# Patient Record
Sex: FEMALE | Race: WHITE | Employment: OTHER | URBAN - METROPOLITAN AREA
[De-identification: names, ages, dates, MRNs, and addresses within clinical notes are randomized per-mention and may not be internally consistent; named-entity substitution may affect disease eponyms.]

---

## 2018-05-16 ENCOUNTER — APPOINTMENT (OUTPATIENT)
Dept: GENERAL RADIOLOGY | Age: 83
End: 2018-05-16
Payer: MEDICARE

## 2018-05-16 ENCOUNTER — HOSPITAL ENCOUNTER (EMERGENCY)
Age: 83
Discharge: HOME | End: 2018-05-17
Attending: EMERGENCY MEDICINE
Payer: MEDICARE

## 2018-05-16 VITALS
OXYGEN SATURATION: 97 % | HEIGHT: 63 IN | BODY MASS INDEX: 26.22 KG/M2 | WEIGHT: 148 LBS | DIASTOLIC BLOOD PRESSURE: 78 MMHG | RESPIRATION RATE: 16 BRPM | HEART RATE: 74 BPM | TEMPERATURE: 99.1 F | SYSTOLIC BLOOD PRESSURE: 142 MMHG

## 2018-05-16 DIAGNOSIS — K59.09 OTHER CONSTIPATION: Primary | ICD-10-CM

## 2018-05-16 LAB
BASOPHILS ABSOLUTE: 0.03 E9/L (ref 0–0.2)
BASOPHILS RELATIVE PERCENT: 0.5 % (ref 0–2)
EOSINOPHILS ABSOLUTE: 0.08 E9/L (ref 0.05–0.5)
EOSINOPHILS RELATIVE PERCENT: 1.3 % (ref 0–6)
HCT VFR BLD CALC: 41 % (ref 34–48)
HEMOGLOBIN: 14.7 G/DL (ref 11.5–15.5)
IMMATURE GRANULOCYTES #: 0.02 E9/L
IMMATURE GRANULOCYTES %: 0.3 % (ref 0–5)
LYMPHOCYTES ABSOLUTE: 1.22 E9/L (ref 1.5–4)
LYMPHOCYTES RELATIVE PERCENT: 20.2 % (ref 20–42)
MCH RBC QN AUTO: 31.2 PG (ref 26–35)
MCHC RBC AUTO-ENTMCNC: 35.9 % (ref 32–34.5)
MCV RBC AUTO: 87 FL (ref 80–99.9)
MONOCYTES ABSOLUTE: 0.62 E9/L (ref 0.1–0.95)
MONOCYTES RELATIVE PERCENT: 10.2 % (ref 2–12)
NEUTROPHILS ABSOLUTE: 4.08 E9/L (ref 1.8–7.3)
NEUTROPHILS RELATIVE PERCENT: 67.5 % (ref 43–80)
PDW BLD-RTO: 12.6 FL (ref 11.5–15)
PLATELET # BLD: 223 E9/L (ref 130–450)
PMV BLD AUTO: 11.1 FL (ref 7–12)
RBC # BLD: 4.71 E12/L (ref 3.5–5.5)
WBC # BLD: 6.1 E9/L (ref 4.5–11.5)

## 2018-05-16 PROCEDURE — 80053 COMPREHEN METABOLIC PANEL: CPT

## 2018-05-16 PROCEDURE — 99284 EMERGENCY DEPT VISIT MOD MDM: CPT

## 2018-05-16 PROCEDURE — 36415 COLL VENOUS BLD VENIPUNCTURE: CPT

## 2018-05-16 PROCEDURE — 74022 RADEX COMPL AQT ABD SERIES: CPT

## 2018-05-16 PROCEDURE — 85025 COMPLETE CBC W/AUTO DIFF WBC: CPT

## 2018-05-16 RX ORDER — TRAMADOL HYDROCHLORIDE 50 MG/1
50 TABLET ORAL EVERY 6 HOURS PRN
COMMUNITY
End: 2021-06-25 | Stop reason: ALTCHOICE

## 2018-05-16 RX ORDER — ATENOLOL 50 MG/1
50 TABLET ORAL DAILY
COMMUNITY

## 2018-05-16 RX ORDER — LORAZEPAM 1 MG/1
1 TABLET ORAL 2 TIMES DAILY PRN
COMMUNITY

## 2018-05-16 RX ORDER — ATORVASTATIN CALCIUM 10 MG/1
10 TABLET, FILM COATED ORAL DAILY
Status: ON HOLD | COMMUNITY
End: 2021-12-27

## 2018-05-17 LAB
ALBUMIN SERPL-MCNC: 4.4 G/DL (ref 3.5–5.2)
ALP BLD-CCNC: 84 U/L (ref 35–104)
ALT SERPL-CCNC: 18 U/L (ref 0–32)
ANION GAP SERPL CALCULATED.3IONS-SCNC: 14 MMOL/L (ref 7–16)
AST SERPL-CCNC: 35 U/L (ref 0–31)
BILIRUB SERPL-MCNC: 0.9 MG/DL (ref 0–1.2)
BUN BLDV-MCNC: 12 MG/DL (ref 8–23)
CALCIUM SERPL-MCNC: 10 MG/DL (ref 8.6–10.2)
CHLORIDE BLD-SCNC: 85 MMOL/L (ref 98–107)
CO2: 27 MMOL/L (ref 22–29)
CREAT SERPL-MCNC: 0.9 MG/DL (ref 0.5–1)
GFR AFRICAN AMERICAN: >60
GFR NON-AFRICAN AMERICAN: 59 ML/MIN/1.73
GLUCOSE BLD-MCNC: 120 MG/DL (ref 74–109)
POTASSIUM SERPL-SCNC: 4.2 MMOL/L (ref 3.5–5)
SODIUM BLD-SCNC: 126 MMOL/L (ref 132–146)
TOTAL PROTEIN: 7.7 G/DL (ref 6.4–8.3)

## 2018-05-17 PROCEDURE — 6370000000 HC RX 637 (ALT 250 FOR IP): Performed by: EMERGENCY MEDICINE

## 2018-05-17 RX ADMIN — MAGESIUM CITRATE 296 ML: 1.75 LIQUID ORAL at 00:18

## 2020-05-08 ENCOUNTER — HOSPITAL ENCOUNTER (OUTPATIENT)
Age: 85
Discharge: HOME | End: 2020-05-10
Payer: MEDICARE

## 2020-05-08 PROCEDURE — 87088 URINE BACTERIA CULTURE: CPT

## 2020-05-08 PROCEDURE — 87186 SC STD MICRODIL/AGAR DIL: CPT

## 2020-05-11 LAB
ORGANISM: ABNORMAL
URINE CULTURE, ROUTINE: ABNORMAL

## 2020-06-03 ENCOUNTER — HOSPITAL ENCOUNTER (EMERGENCY)
Age: 85
Discharge: HOME | End: 2020-06-03
Payer: MEDICARE

## 2020-06-03 VITALS
WEIGHT: 148 LBS | HEART RATE: 86 BPM | OXYGEN SATURATION: 98 % | HEIGHT: 63 IN | RESPIRATION RATE: 16 BRPM | SYSTOLIC BLOOD PRESSURE: 144 MMHG | TEMPERATURE: 97.8 F | BODY MASS INDEX: 26.22 KG/M2 | DIASTOLIC BLOOD PRESSURE: 78 MMHG

## 2020-06-03 LAB
BACTERIA: ABNORMAL /HPF
BILIRUBIN URINE: NEGATIVE
BLOOD, URINE: ABNORMAL
CLARITY: ABNORMAL
COLOR: YELLOW
EPITHELIAL CELLS, UA: ABNORMAL /HPF
GLUCOSE URINE: NEGATIVE MG/DL
KETONES, URINE: NEGATIVE MG/DL
LEUKOCYTE ESTERASE, URINE: ABNORMAL
NITRITE, URINE: POSITIVE
PH UA: 7 (ref 5–9)
PROTEIN UA: NEGATIVE MG/DL
RBC UA: ABNORMAL /HPF (ref 0–2)
SPECIFIC GRAVITY UA: 1.01 (ref 1–1.03)
UROBILINOGEN, URINE: 0.2 E.U./DL
WBC UA: ABNORMAL /HPF (ref 0–5)

## 2020-06-03 PROCEDURE — 81001 URINALYSIS AUTO W/SCOPE: CPT

## 2020-06-03 PROCEDURE — 99283 EMERGENCY DEPT VISIT LOW MDM: CPT

## 2020-06-03 PROCEDURE — 6370000000 HC RX 637 (ALT 250 FOR IP): Performed by: PHYSICIAN ASSISTANT

## 2020-06-03 RX ORDER — CIPROFLOXACIN 500 MG/1
500 TABLET, FILM COATED ORAL 2 TIMES DAILY
Qty: 14 TABLET | Refills: 0 | Status: SHIPPED | OUTPATIENT
Start: 2020-06-03 | End: 2020-06-10

## 2020-06-03 RX ORDER — CIPROFLOXACIN 500 MG/1
500 TABLET, FILM COATED ORAL ONCE
Status: COMPLETED | OUTPATIENT
Start: 2020-06-03 | End: 2020-06-03

## 2020-06-03 RX ORDER — OXYBUTYNIN CHLORIDE 15 MG/1
15 TABLET, EXTENDED RELEASE ORAL
COMMUNITY
End: 2020-12-31

## 2020-06-03 RX ORDER — HYDROCHLOROTHIAZIDE 12.5 MG/1
12.5 CAPSULE, GELATIN COATED ORAL DAILY
Status: ON HOLD | COMMUNITY
End: 2020-10-14 | Stop reason: HOSPADM

## 2020-06-03 RX ADMIN — CIPROFLOXACIN 500 MG: 500 TABLET, FILM COATED ORAL at 16:00

## 2020-06-03 NOTE — ED PROVIDER NOTES
if her symptoms worsen or do not begin to improve after Cipro. Patient understands she may need admission to hospital for IV antibiotics and possible ID consult. All of this was offered to her at this visit, however, she politely and adamantly refused. Another urine culture will be obtained at this ED visit. Counseling: The emergency provider has spoken with the patient/caregiver and discussed todays results, in addition to providing specific details for the plan of care and counseling regarding the diagnosis and prognosis. Questions are answered at this time and they are agreeable with the plan. All results reviewed. I discussed the differential, results and discharge plan with the patient and/or family/friend/caregiver if present. I emphasized the importance of follow-up with the physician I referred them to in the timeframe recommended. I explained reasons for the patient to return to the Emergency Department. Additional verbal discharge instructions were also given and discussed with the patient to supplement those generated by the EMR. We also discussed medications that were prescribed (if any) including common side effects and interactions. All questions were addressed. They understand return precautions and discharge instructions. The patient and/or family/friend/caregiver expressed understanding. Vitals were stable and they were in no distress at discharge. Assessment      1. Urinary tract infection without hematuria, site unspecified      Plan   Discharge to home with urology follow-up   Patient condition is good    New Medications     Discharge Medication List as of 6/3/2020  3:53 PM      START taking these medications    Details   ciprofloxacin (CIPRO) 500 MG tablet Take 1 tablet by mouth 2 times daily for 7 days, Disp-14 tablet, R-0Print             Electronically signed by Katerin Blankenship PA-C   DD: 6/3/20    **This report was transcribed using voice recognition software.  Every effort was

## 2020-06-09 ENCOUNTER — HOSPITAL ENCOUNTER (EMERGENCY)
Age: 85
Discharge: HOME | End: 2020-06-09
Attending: EMERGENCY MEDICINE
Payer: MEDICARE

## 2020-06-09 VITALS
DIASTOLIC BLOOD PRESSURE: 73 MMHG | WEIGHT: 148 LBS | SYSTOLIC BLOOD PRESSURE: 144 MMHG | HEART RATE: 70 BPM | TEMPERATURE: 98.1 F | BODY MASS INDEX: 26.22 KG/M2 | OXYGEN SATURATION: 98 % | RESPIRATION RATE: 17 BRPM

## 2020-06-09 LAB
BACTERIA: ABNORMAL /HPF
BILIRUBIN URINE: NEGATIVE
BLOOD, URINE: ABNORMAL
CLARITY: ABNORMAL
COLOR: YELLOW
GLUCOSE URINE: NEGATIVE MG/DL
KETONES, URINE: NEGATIVE MG/DL
LEUKOCYTE ESTERASE, URINE: ABNORMAL
NITRITE, URINE: POSITIVE
PH UA: 6 (ref 5–9)
PROTEIN UA: NEGATIVE MG/DL
RBC UA: ABNORMAL /HPF (ref 0–2)
SPECIFIC GRAVITY UA: 1.01 (ref 1–1.03)
UROBILINOGEN, URINE: 0.2 E.U./DL
WBC UA: ABNORMAL /HPF (ref 0–5)

## 2020-06-09 PROCEDURE — 87077 CULTURE AEROBIC IDENTIFY: CPT

## 2020-06-09 PROCEDURE — 87186 SC STD MICRODIL/AGAR DIL: CPT

## 2020-06-09 PROCEDURE — 81001 URINALYSIS AUTO W/SCOPE: CPT

## 2020-06-09 PROCEDURE — 99283 EMERGENCY DEPT VISIT LOW MDM: CPT

## 2020-06-09 PROCEDURE — 87088 URINE BACTERIA CULTURE: CPT

## 2020-06-09 PROCEDURE — 6370000000 HC RX 637 (ALT 250 FOR IP): Performed by: EMERGENCY MEDICINE

## 2020-06-09 RX ORDER — GRANULES FOR ORAL 3 G/1
3 POWDER ORAL ONCE
Status: COMPLETED | OUTPATIENT
Start: 2020-06-09 | End: 2020-06-09

## 2020-06-09 RX ADMIN — FOSFOMYCIN TROMETHAMINE 1 PACKET: 3 POWDER ORAL at 18:34

## 2020-06-09 ASSESSMENT — ENCOUNTER SYMPTOMS
SHORTNESS OF BREATH: 0
VOMITING: 0
CHEST TIGHTNESS: 0
SINUS PAIN: 0
SORE THROAT: 0
DIARRHEA: 0
NAUSEA: 0
BACK PAIN: 0
COUGH: 0
ABDOMINAL PAIN: 0

## 2020-06-09 NOTE — ED PROVIDER NOTES
Abdominal:      General: Bowel sounds are normal. There is no distension. Palpations: Abdomen is soft. Tenderness: There is no abdominal tenderness. Musculoskeletal: Normal range of motion. Skin:     General: Skin is warm and dry. Capillary Refill: Capillary refill takes less than 2 seconds. Neurological:      Mental Status: She is alert and oriented to person, place, and time. Cranial Nerves: No cranial nerve deficit. Sensory: No sensory deficit. Motor: No abnormal muscle tone. Psychiatric:         Behavior: Behavior normal.         Thought Content: Thought content normal.         Judgment: Judgment normal.          Procedures     MDM  Number of Diagnoses or Management Options  Diagnosis management comments: Patient presents with concerns of a UTI. For months, she has had frequent urination and at times she gets burning but she denies any burning at this time. No chest pain or shortness of breath. No fevers. No vomiting or diarrhea. Reviewed cultures, will start on fosfomycin after urinalysis and culture obtained and have her follow-up with urology for reevaluation. Welcomed her back to the department if symptoms worsen. ED Course as of Jun 09 1901   Tue Jun 09, 2020 1813 Reviewed previous culture results, she has many antibiotic resistances. Will give fosfomycin here and have her follow-up with urology as an outpatient. She has no systemic symptoms, no indication for admission at this point. [CW]   1900 Patient in no acute distress, vitals stable. Will discharge to follow-up with urology. Welcomed her back for reevaluation if condition worsens. Discussed that a send a urine culture off so if there are any abnormalities she will get a call within the next few days, otherwise she needs to follow-up with urology for reevaluation. She and family voiced understanding and agreement with the plan.     [CW]      ED Course User Index  [CW] Britni Blair DO --------------------------------------------- PAST HISTORY ---------------------------------------------  Past Medical History:  has a past medical history of Arthritis, Hypertension, Myocardial infarct ([address] and UTI (urinary tract infection). Past Surgical History:  has a past surgical history that includes Hysterectomy and Appendectomy. Social History:  reports that she has never smoked. She has never used smokeless tobacco. She reports that she does not drink alcohol. Family History: family history is not on file. The patients home medications have been reviewed. Allergies: Sulfa antibiotics    -------------------------------------------------- RESULTS -------------------------------------------------  Labs:  Results for orders placed or performed during the hospital encounter of 06/09/20   Urinalysis, reflex to microscopic   Result Value Ref Range    Color, UA Yellow Straw/Yellow    Clarity, UA CLOUDY (A) Clear    Glucose, Ur Negative Negative mg/dL    Bilirubin Urine Negative Negative    Ketones, Urine Negative Negative mg/dL    Specific Gravity, UA 1.010 1.005 - 1.030    Blood, Urine SMALL (A) Negative    pH, UA 6.0 5.0 - 9.0    Protein, UA Negative Negative mg/dL    Urobilinogen, Urine 0.2 <2.0 E.U./dL    Nitrite, Urine POSITIVE (A) Negative    Leukocyte Esterase, Urine LARGE (A) Negative   Microscopic Urinalysis   Result Value Ref Range    WBC, UA PACKED (A) 0 - 5 /HPF    RBC, UA 5-10 (A) 0 - 2 /HPF    Bacteria, UA MANY (A) None Seen /HPF       Radiology:  No orders to display           ------------------------- NURSING NOTES AND VITALS REVIEWED ---------------------------  Date / Time Roomed:  6/9/2020  5:19 PM  ED Bed Assignment:  RIAN HARO    The nursing notes within the ED encounter and vital signs as below have been reviewed.    BP (!) 163/79   Pulse 79   Temp 96.6 °F (35.9 °C) (Infrared)   Resp 20   Wt 148 lb (67.1 kg)   LMP 05/16/2018   SpO2 95%   BMI 26.22 kg/m²

## 2020-06-09 NOTE — ED NOTES
Pt alert and oriented x4. Speech clear. Respirations easy/unlabored. Skin warm/dry. Appropriate color. No signs of acute distress noted. Pt/family teaching provided; verbalized understanding. Pt stable for discharge.        Tri Simeon RN  06/09/20 1921

## 2020-06-12 LAB
ORGANISM: ABNORMAL
URINE CULTURE, ROUTINE: ABNORMAL

## 2020-06-23 ENCOUNTER — HOSPITAL ENCOUNTER (OUTPATIENT)
Age: 85
Discharge: HOME | End: 2020-06-25
Payer: MEDICARE

## 2020-06-23 PROCEDURE — 87186 SC STD MICRODIL/AGAR DIL: CPT

## 2020-06-23 PROCEDURE — 87077 CULTURE AEROBIC IDENTIFY: CPT

## 2020-06-23 PROCEDURE — 87088 URINE BACTERIA CULTURE: CPT

## 2020-06-27 LAB
ORGANISM: ABNORMAL
URINE CULTURE, ROUTINE: ABNORMAL

## 2020-07-16 ENCOUNTER — APPOINTMENT (OUTPATIENT)
Dept: GENERAL RADIOLOGY | Age: 85
DRG: 690 | End: 2020-07-16
Payer: MEDICARE

## 2020-07-16 ENCOUNTER — HOSPITAL ENCOUNTER (INPATIENT)
Age: 85
LOS: 6 days | Discharge: NURSING, REHAB OR LONG-TERM CARE | DRG: 690 | End: 2020-07-22
Attending: EMERGENCY MEDICINE | Admitting: FAMILY MEDICINE
Payer: MEDICARE

## 2020-07-16 PROBLEM — Z16.12 ESBL (EXTENDED SPECTRUM BETA-LACTAMASE) PRODUCING BACTERIA INFECTION: Status: ACTIVE | Noted: 2020-07-16

## 2020-07-16 PROBLEM — A49.9 ESBL (EXTENDED SPECTRUM BETA-LACTAMASE) PRODUCING BACTERIA INFECTION: Status: ACTIVE | Noted: 2020-07-16

## 2020-07-16 LAB
ALBUMIN SERPL-MCNC: 4.4 G/DL (ref 3.5–5.2)
ALP BLD-CCNC: 83 U/L (ref 35–104)
ALT SERPL-CCNC: 16 U/L (ref 0–32)
ANION GAP SERPL CALCULATED.3IONS-SCNC: 14 MMOL/L (ref 7–16)
AST SERPL-CCNC: 28 U/L (ref 0–31)
BACTERIA: ABNORMAL /HPF
BASOPHILS ABSOLUTE: 0.03 E9/L (ref 0–0.2)
BASOPHILS RELATIVE PERCENT: 0.4 % (ref 0–2)
BILIRUB SERPL-MCNC: 0.6 MG/DL (ref 0–1.2)
BILIRUBIN URINE: NEGATIVE
BLOOD, URINE: ABNORMAL
BUN BLDV-MCNC: 12 MG/DL (ref 8–23)
CALCIUM SERPL-MCNC: 9.8 MG/DL (ref 8.6–10.2)
CHLORIDE BLD-SCNC: 86 MMOL/L (ref 98–107)
CLARITY: ABNORMAL
CO2: 27 MMOL/L (ref 22–29)
COLOR: YELLOW
CREAT SERPL-MCNC: 0.9 MG/DL (ref 0.5–1)
EOSINOPHILS ABSOLUTE: 0.03 E9/L (ref 0.05–0.5)
EOSINOPHILS RELATIVE PERCENT: 0.4 % (ref 0–6)
GFR AFRICAN AMERICAN: >60
GFR NON-AFRICAN AMERICAN: 58 ML/MIN/1.73
GLUCOSE BLD-MCNC: 125 MG/DL (ref 74–99)
GLUCOSE URINE: NEGATIVE MG/DL
HCT VFR BLD CALC: 40.8 % (ref 34–48)
HEMOGLOBIN: 13.9 G/DL (ref 11.5–15.5)
IMMATURE GRANULOCYTES #: 0.02 E9/L
IMMATURE GRANULOCYTES %: 0.3 % (ref 0–5)
KETONES, URINE: NEGATIVE MG/DL
LEUKOCYTE ESTERASE, URINE: ABNORMAL
LYMPHOCYTES ABSOLUTE: 1.82 E9/L (ref 1.5–4)
LYMPHOCYTES RELATIVE PERCENT: 25.4 % (ref 20–42)
MCH RBC QN AUTO: 31.4 PG (ref 26–35)
MCHC RBC AUTO-ENTMCNC: 34.1 % (ref 32–34.5)
MCV RBC AUTO: 92.1 FL (ref 80–99.9)
MONOCYTES ABSOLUTE: 0.61 E9/L (ref 0.1–0.95)
MONOCYTES RELATIVE PERCENT: 8.5 % (ref 2–12)
NEUTROPHILS ABSOLUTE: 4.65 E9/L (ref 1.8–7.3)
NEUTROPHILS RELATIVE PERCENT: 65 % (ref 43–80)
NITRITE, URINE: NEGATIVE
PDW BLD-RTO: 13.2 FL (ref 11.5–15)
PH UA: 7 (ref 5–9)
PLATELET # BLD: 216 E9/L (ref 130–450)
PMV BLD AUTO: 9.7 FL (ref 7–12)
POTASSIUM SERPL-SCNC: 3.9 MMOL/L (ref 3.5–5)
PROTEIN UA: NEGATIVE MG/DL
RBC # BLD: 4.43 E12/L (ref 3.5–5.5)
RBC UA: ABNORMAL /HPF (ref 0–2)
SODIUM BLD-SCNC: 127 MMOL/L (ref 132–146)
SPECIFIC GRAVITY UA: 1.01 (ref 1–1.03)
TOTAL PROTEIN: 7.4 G/DL (ref 6.4–8.3)
TROPONIN: <0.01 NG/ML (ref 0–0.03)
UROBILINOGEN, URINE: 0.2 E.U./DL
WBC # BLD: 7.2 E9/L (ref 4.5–11.5)
WBC UA: >20 /HPF (ref 0–5)

## 2020-07-16 PROCEDURE — 93005 ELECTROCARDIOGRAM TRACING: CPT | Performed by: PHYSICIAN ASSISTANT

## 2020-07-16 PROCEDURE — 99285 EMERGENCY DEPT VISIT HI MDM: CPT

## 2020-07-16 PROCEDURE — 6370000000 HC RX 637 (ALT 250 FOR IP): Performed by: FAMILY MEDICINE

## 2020-07-16 PROCEDURE — 2580000003 HC RX 258: Performed by: PHYSICIAN ASSISTANT

## 2020-07-16 PROCEDURE — 87040 BLOOD CULTURE FOR BACTERIA: CPT

## 2020-07-16 PROCEDURE — 85025 COMPLETE CBC W/AUTO DIFF WBC: CPT

## 2020-07-16 PROCEDURE — 87088 URINE BACTERIA CULTURE: CPT

## 2020-07-16 PROCEDURE — 73060 X-RAY EXAM OF HUMERUS: CPT

## 2020-07-16 PROCEDURE — 81001 URINALYSIS AUTO W/SCOPE: CPT

## 2020-07-16 PROCEDURE — 84484 ASSAY OF TROPONIN QUANT: CPT

## 2020-07-16 PROCEDURE — 73080 X-RAY EXAM OF ELBOW: CPT

## 2020-07-16 PROCEDURE — 80053 COMPREHEN METABOLIC PANEL: CPT

## 2020-07-16 PROCEDURE — 87186 SC STD MICRODIL/AGAR DIL: CPT

## 2020-07-16 PROCEDURE — 36415 COLL VENOUS BLD VENIPUNCTURE: CPT

## 2020-07-16 PROCEDURE — 6360000002 HC RX W HCPCS: Performed by: PHYSICIAN ASSISTANT

## 2020-07-16 PROCEDURE — 1200000000 HC SEMI PRIVATE

## 2020-07-16 PROCEDURE — 87077 CULTURE AEROBIC IDENTIFY: CPT

## 2020-07-16 PROCEDURE — 6360000002 HC RX W HCPCS: Performed by: FAMILY MEDICINE

## 2020-07-16 RX ORDER — TRAMADOL HYDROCHLORIDE 50 MG/1
50 TABLET ORAL EVERY 6 HOURS PRN
Status: DISCONTINUED | OUTPATIENT
Start: 2020-07-16 | End: 2020-07-22 | Stop reason: HOSPADM

## 2020-07-16 RX ORDER — ATORVASTATIN CALCIUM 10 MG/1
10 TABLET, FILM COATED ORAL DAILY
Status: DISCONTINUED | OUTPATIENT
Start: 2020-07-16 | End: 2020-07-22 | Stop reason: HOSPADM

## 2020-07-16 RX ORDER — ATENOLOL 50 MG/1
50 TABLET ORAL DAILY
Status: DISCONTINUED | OUTPATIENT
Start: 2020-07-16 | End: 2020-07-22 | Stop reason: HOSPADM

## 2020-07-16 RX ORDER — HYDROCHLOROTHIAZIDE 12.5 MG/1
12.5 TABLET ORAL DAILY
Status: DISCONTINUED | OUTPATIENT
Start: 2020-07-16 | End: 2020-07-22 | Stop reason: HOSPADM

## 2020-07-16 RX ORDER — LORAZEPAM 1 MG/1
1 TABLET ORAL 2 TIMES DAILY PRN
Status: DISCONTINUED | OUTPATIENT
Start: 2020-07-16 | End: 2020-07-22 | Stop reason: HOSPADM

## 2020-07-16 RX ORDER — ISOSORBIDE MONONITRATE 30 MG/1
30 TABLET, EXTENDED RELEASE ORAL DAILY
Status: DISCONTINUED | OUTPATIENT
Start: 2020-07-16 | End: 2020-07-22 | Stop reason: HOSPADM

## 2020-07-16 RX ORDER — ASPIRIN 81 MG/1
81 TABLET ORAL DAILY
Status: DISCONTINUED | OUTPATIENT
Start: 2020-07-16 | End: 2020-07-22 | Stop reason: HOSPADM

## 2020-07-16 RX ORDER — OXYBUTYNIN CHLORIDE 5 MG/1
15 TABLET, EXTENDED RELEASE ORAL
Status: DISCONTINUED | OUTPATIENT
Start: 2020-07-16 | End: 2020-07-22 | Stop reason: HOSPADM

## 2020-07-16 RX ADMIN — MEROPENEM 1 G: 1 INJECTION, POWDER, FOR SOLUTION INTRAVENOUS at 16:06

## 2020-07-16 RX ADMIN — ENOXAPARIN SODIUM 40 MG: 40 INJECTION SUBCUTANEOUS at 20:42

## 2020-07-16 RX ADMIN — LORAZEPAM 1 MG: 1 TABLET ORAL at 20:43

## 2020-07-16 ASSESSMENT — PAIN SCALES - GENERAL: PAINLEVEL_OUTOF10: 0

## 2020-07-17 ENCOUNTER — APPOINTMENT (OUTPATIENT)
Dept: ULTRASOUND IMAGING | Age: 85
DRG: 690 | End: 2020-07-17
Payer: MEDICARE

## 2020-07-17 LAB
ANION GAP SERPL CALCULATED.3IONS-SCNC: 12 MMOL/L (ref 7–16)
BUN BLDV-MCNC: 8 MG/DL (ref 8–23)
CALCIUM SERPL-MCNC: 8.7 MG/DL (ref 8.6–10.2)
CHLORIDE BLD-SCNC: 89 MMOL/L (ref 98–107)
CO2: 29 MMOL/L (ref 22–29)
CREAT SERPL-MCNC: 0.8 MG/DL (ref 0.5–1)
EKG ATRIAL RATE: 74 BPM
EKG P AXIS: 80 DEGREES
EKG P-R INTERVAL: 234 MS
EKG Q-T INTERVAL: 476 MS
EKG QRS DURATION: 160 MS
EKG QTC CALCULATION (BAZETT): 528 MS
EKG R AXIS: -48 DEGREES
EKG T AXIS: 104 DEGREES
EKG VENTRICULAR RATE: 74 BPM
GFR AFRICAN AMERICAN: >60
GFR NON-AFRICAN AMERICAN: >60 ML/MIN/1.73
GLUCOSE BLD-MCNC: 100 MG/DL (ref 74–99)
HCT VFR BLD CALC: 38.7 % (ref 34–48)
HEMOGLOBIN: 13.3 G/DL (ref 11.5–15.5)
MCH RBC QN AUTO: 31.4 PG (ref 26–35)
MCHC RBC AUTO-ENTMCNC: 34.4 % (ref 32–34.5)
MCV RBC AUTO: 91.3 FL (ref 80–99.9)
PDW BLD-RTO: 13.4 FL (ref 11.5–15)
PLATELET # BLD: 194 E9/L (ref 130–450)
PMV BLD AUTO: 10 FL (ref 7–12)
POTASSIUM SERPL-SCNC: 3 MMOL/L (ref 3.5–5)
RBC # BLD: 4.24 E12/L (ref 3.5–5.5)
SODIUM BLD-SCNC: 130 MMOL/L (ref 132–146)
WBC # BLD: 4.4 E9/L (ref 4.5–11.5)

## 2020-07-17 PROCEDURE — 97530 THERAPEUTIC ACTIVITIES: CPT

## 2020-07-17 PROCEDURE — 97161 PT EVAL LOW COMPLEX 20 MIN: CPT

## 2020-07-17 PROCEDURE — 93010 ELECTROCARDIOGRAM REPORT: CPT | Performed by: INTERNAL MEDICINE

## 2020-07-17 PROCEDURE — 2580000003 HC RX 258: Performed by: INTERNAL MEDICINE

## 2020-07-17 PROCEDURE — 6360000002 HC RX W HCPCS: Performed by: FAMILY MEDICINE

## 2020-07-17 PROCEDURE — 76770 US EXAM ABDO BACK WALL COMP: CPT

## 2020-07-17 PROCEDURE — 36415 COLL VENOUS BLD VENIPUNCTURE: CPT

## 2020-07-17 PROCEDURE — 97165 OT EVAL LOW COMPLEX 30 MIN: CPT

## 2020-07-17 PROCEDURE — 6370000000 HC RX 637 (ALT 250 FOR IP): Performed by: FAMILY MEDICINE

## 2020-07-17 PROCEDURE — 85027 COMPLETE CBC AUTOMATED: CPT

## 2020-07-17 PROCEDURE — 6360000002 HC RX W HCPCS: Performed by: INTERNAL MEDICINE

## 2020-07-17 PROCEDURE — 80048 BASIC METABOLIC PNL TOTAL CA: CPT

## 2020-07-17 PROCEDURE — 1200000000 HC SEMI PRIVATE

## 2020-07-17 RX ADMIN — MEROPENEM 1 G: 1 INJECTION, POWDER, FOR SOLUTION INTRAVENOUS at 11:28

## 2020-07-17 RX ADMIN — TRAMADOL HYDROCHLORIDE 50 MG: 50 TABLET, FILM COATED ORAL at 16:42

## 2020-07-17 RX ADMIN — TRAMADOL HYDROCHLORIDE 50 MG: 50 TABLET, FILM COATED ORAL at 10:23

## 2020-07-17 RX ADMIN — ENOXAPARIN SODIUM 40 MG: 40 INJECTION SUBCUTANEOUS at 08:35

## 2020-07-17 RX ADMIN — ATENOLOL 50 MG: 50 TABLET ORAL at 08:35

## 2020-07-17 RX ADMIN — ATORVASTATIN CALCIUM 10 MG: 10 TABLET, FILM COATED ORAL at 08:35

## 2020-07-17 RX ADMIN — HYDROCHLOROTHIAZIDE 12.5 MG: 12.5 TABLET ORAL at 08:35

## 2020-07-17 RX ADMIN — ASPIRIN 81 MG: 81 TABLET, COATED ORAL at 08:35

## 2020-07-17 RX ADMIN — ISOSORBIDE MONONITRATE 30 MG: 30 TABLET ORAL at 08:35

## 2020-07-17 RX ADMIN — LORAZEPAM 1 MG: 1 TABLET ORAL at 22:34

## 2020-07-17 ASSESSMENT — ENCOUNTER SYMPTOMS
COLOR CHANGE: 0
ABDOMINAL PAIN: 0
SHORTNESS OF BREATH: 0

## 2020-07-17 ASSESSMENT — PAIN SCALES - GENERAL
PAINLEVEL_OUTOF10: 7
PAINLEVEL_OUTOF10: 10
PAINLEVEL_OUTOF10: 4
PAINLEVEL_OUTOF10: 4
PAINLEVEL_OUTOF10: 0

## 2020-07-17 ASSESSMENT — PAIN DESCRIPTION - DESCRIPTORS
DESCRIPTORS: ACHING;CONSTANT;DISCOMFORT
DESCRIPTORS: ACHING;CONSTANT;DISCOMFORT

## 2020-07-17 ASSESSMENT — PAIN DESCRIPTION - PAIN TYPE
TYPE: ACUTE PAIN

## 2020-07-17 NOTE — PROGRESS NOTES
Occupational Therapy  OCCUPATIONAL THERAPY INITIAL EVALUATION      Date:2020  Patient Name: Noah Parker  MRN: [id number]  : 1926  Room: [address] [place] [address]    Evaluating OT: Jose [facility], OTR/L #[id number]    Referring physician:    Alonso Gauthier MD        AM-PAC Daily Activity Raw Score:   Recommended Adaptive Equipment: ww, life alert system    Diagnosis: ESBL    Surgery:   Past Surgical History:   Procedure Laterality Date    APPENDECTOMY      HYSTERECTOMY          Pertinent Medical History:   Past Medical History:   Diagnosis Date    Arthritis     Hypertension     Myocardial infarct ([facility] [address]     UTI (urinary tract infection)         Precautions:  Falls, [place], contact isolation ESBL urine     Home Living: Pt lives alone in a one floor home  with one step(s) to enter and one rail(s); bed/bath on main floor. Bathroom setup: tub shower with seat and HH shower and HR, elevated commode with HR   Equipment owned: stw, shower seat  Prior Level of Function:   Mod I  with ADLs ,  Mod I  with IADLs; using no AD usually and spc and stw prn recently for ambulation. Driving: no family assists                           Medication Management self  Occupation: enjoys reading    Pain Level: 5/10 B feet pain  Cognition: A&O: 3/3; Follows 2 step directions   Memory:  good    Sequencing:  good    Problem solving:  good    Judgement/safety:  fair      Functional Assessment:   Initial Eval Status  Date: 20 Treatment Status  Date: Short Term Goals=LTG  Treatment frequency: PRN 1-3 x/week   Feeding Independent       Grooming Setup sitting  Mod I with ww standing    UB Dressing setup  Independent   LB Dressing Min A   Mod I     Bathing N/t simulated min A  Mod I     Toileting Min A   Mod I with ww   Bed Mobility  Supine to sit: SBA  Sit to supine: SBA  Supine to sit:  Independent   Sit to supine: Independent   Functional Transfers Sit to stand: min A   Stand to sit:  Min A   Stand pivot: min A   Mod I with ww Functional Mobility Min A with ww  Mod I with ww   Balance Sitting:     Static:  good    Dynamic:SBA  Standing: min A      Activity Tolerance Good-  good   Visual/  Perceptual Glasses: yes WFL         Safety Good-                 good     Hand dominance: R   UE ROM: RUE:  WFL  LUE:  WFL  Strength: RUE:  4+/5 LUE:  4+/5   Strength: B WFL  Fine Motor Coordination:  WFL    Hearing: [place]   Sensation:  No c/o numbness or tingling  Tone:  WFL  Edema:min B LE's                            Comments: Nursing approval to work with patient given. Upon arrival, patient supine and agreeable to evaluation. OT evaluation performed with  education on benefits of mobility and safety with ADL completion. Patient cooperative and motivated. At end of session, patient sitting up in chair and  with call light and phone within reach, all lines and tubes intact. Nursing notified. OT treatment: OT for functional assessment of  ADL, Functional Transfer/Mobility Training, Equipment Needs, Energy Conservation Techniques, Pt/Family Education, Ther Ex- deep breathing for edema and pain control., OT role and POC reviewed. Patient and daughter demo good understanding of functional limitations and safety with mobility and ADL completion. Skilled occupational therapy services provided include instruction/training on safety and adapted techniques for completion of therapeutic activities, ADLs/IADLs. Therapist educated pt on ww safety precautions. Therapist facilitated bed mobility, functional transfers, graded functional activities (static/dynamic sitting, static/dynamic standing with ww, functional reaching), and functional ambulation - providing min verbal  cuing (verbal, visual, tactile) on AD management, hand placement, body mechanics, posture, breathing techniques, energy conservation, compensatory strategies, and safety.  Therapist facilitated self-care retraining: UB dressing, LB dressing, grooming, toileting, bathing-simulated tasks - providing min A and cuing (verbal, visual, tactile) on body mechanics, posture, breathing techniques, energy conservation, compensatory strategies, and safety. Therapist educated pt on compensatory strategies/energy conservation techniques to safely complete ADLs/IADLs. Skilled monitoring of O2 sats, HR, and pt response throughout treatment. would benefit from continued skilled OT to increase functional independence and quality of life. Eval Complexity: low    Assessment of current deficits   Functional mobility [x]  ADLs [x] Strength []  Cognition []  Functional transfers  [x] IADLs [x] Safety Awareness [x]  Endurance [x]  Fine Motor Coordination [] Balance [x] Vision/perception [] Sensation []   Gross Motor Coordination [] ROM [] Delirium []                  Motor Control []    Plan of Care: 2-4 days     ADL retraining [x]   Equipment needs [x]   Neuromuscular re-education [] Energy Conservation Techniques [x]  Functional Transfer training [x] Patient and/or Family Education [x]  Functional Mobility training [x]  Environmental Modifications [x]  Cognitive re-training []   Compensatory techniques for ADLs [x]  Splinting Needs []   Positioning to improve overall function [x]   Therapeutic Activity [x]  Therapeutic Exercise  [x]  Visual/Perceptual: []    Delirium prevention/treatment  []   Other:  []    Rehab Potential: Good for established goals    Patient / Family Goal: home with assist     Evaluation time includes thorough review of current medical information, gathering information on past medical & social history & PLOF, completion of standardized testing, informal observation of tasks, consultation with other medical professions/disciplines, assessment of data & development of POC/goals. Patient and/or family were instructed diagnosis, prognosis/goals and plan of care. yes Demonstrated good understanding.       Treatment Time In: 11:45            Treatment Time Out: 12:05             Treatment Charges:

## 2020-07-17 NOTE — H&P
Kylie Nicolas is an 80 y.o.  female. Patient presented to the ER with pelvic pain and dysuria. She has failed oral Abx and urine culture shows ESBL. Past Medical History:   Diagnosis Date    Arthritis     Hypertension     Myocardial infarct ([address]     UTI (urinary tract infection)      Past Surgical History:   Procedure Laterality Date    APPENDECTOMY      HYSTERECTOMY         No family history on file. Social History     Tobacco Use    Smoking status: Never Smoker    Smokeless tobacco: Never Used   Substance Use Topics    Alcohol use: No    Drug use: Not on file       Current Facility-Administered Medications   Medication Dose Route Frequency Provider Last Rate Last Dose    aspirin EC tablet 81 mg  81 mg Oral Daily Gypsy Molina MD        atenolol (TENORMIN) tablet 50 mg  50 mg Oral Daily Gypsy Molina MD        atorvastatin (LIPITOR) tablet 10 mg  10 mg Oral Daily Gypsy Molina MD        hydrochlorothiazide (HYDRODIURIL) tablet 12.5 mg  12.5 mg Oral Daily Gypsy Molina MD        isosorbide mononitrate (IMDUR) extended release tablet 30 mg  30 mg Oral Daily Gypsy Molina MD        LORazepam (ATIVAN) tablet 1 mg  1 mg Oral BID PRN Gypsy Molina MD   1 mg at 07/16/20 2043    oxybutynin (DITROPAN-XL) extended release tablet 15 mg  15 mg Oral Once per day on Mon Thu Gypsy Molina MD        traMADol [place] Mediate) tablet 50 mg  50 mg Oral Q6H PRN Gypsy Molina MD        enoxaparin (LOVENOX) injection 40 mg  40 mg Subcutaneous Daily Gypsy Molina MD   40 mg at 07/16/20 2042       Allergies: Allergies   Allergen Reactions    Sulfa Antibiotics Hives       Active Problems:    ESBL (extended spectrum beta-lactamase) producing bacteria infection  Resolved Problems:    * No resolved hospital problems. *    Blood pressure (!) 167/79, pulse 69, temperature 98 °F (36.7 °C), temperature source Temporal, resp.  rate 16, height 5' 5\" (1.651 m), weight 143 lb (64.9 kg), last menstrual period 05/16/2018, SpO2 95 %. Review of Systems   Constitutional: Positive for activity change. HENT: Negative for congestion. Respiratory: Negative for shortness of breath. Cardiovascular: Negative for chest pain. Gastrointestinal: Negative for abdominal pain. Genitourinary: Positive for dysuria and pelvic pain. Musculoskeletal: Negative for arthralgias. Skin: Negative for color change. Neurological: Negative for dizziness. Hematological: Negative for adenopathy. Psychiatric/Behavioral: Negative for agitation. Physical Exam  HENT:      Head: Normocephalic. Mouth/Throat:      Mouth: Mucous membranes are moist.   Eyes:      Pupils: Pupils are equal, round, and reactive to light. Neck:      Musculoskeletal: Normal range of motion and neck supple. Cardiovascular:      Rate and Rhythm: Normal rate and regular rhythm. Pulses: Normal pulses. Heart sounds: Normal heart sounds. Pulmonary:      Effort: Pulmonary effort is normal. No respiratory distress. Breath sounds: Normal breath sounds. No wheezing. Abdominal:      General: Abdomen is flat. Bowel sounds are normal. There is no distension. Tenderness: There is no abdominal tenderness. Skin:     General: Skin is warm and dry. Capillary Refill: Capillary refill takes less than 2 seconds. Neurological:      General: No focal deficit present. Mental Status: She is alert and oriented to person, place, and time. Psychiatric:         Mood and Affect: Mood normal.         Behavior: Behavior normal.         Assessment:  ESBL UTI  Hyponatremia  Falls    Plan:  ID consult as patient needs IV Abx. Monitor labs and cultures. Continue meds. PT/OT.     Sabiha Domingo MD  7/17/2020

## 2020-07-17 NOTE — CARE COORDINATION
Met with the pt's daughter at the bedside (the pt was not in the room d/t testing). The pt lives alone and is usually independent, but has recently started using a walker for assistance. She would like to return home when medically stable. If she requires IV antibiotics, she will need rehab d/t not having anyone who can stay with her. Choices for rehab are: 1. [facility] 2. [facility]. Referral made to chandu Briseno. Will follow.  Payton Pineda RN [phone]

## 2020-07-17 NOTE — PROGRESS NOTES
Physical Therapy  Physical Therapy Initial Assessment     Name: Johnny Heredia  : 1926  MRN: [id number]    Referring Provider:  Ezequiel Mendoza MD    Date of Service: 2020    Evaluating PT:  Rachel Rodríguez PT   Room #:  2675/0715-B  Diagnosis: ESBL UTI  PMHx/PSHx:   has a past medical history of Arthritis, Hypertension, Myocardial infarct ([address] and UTI (urinary tract infection). Procedure/Surgery:  Not Applicable  Precautions:  Falls, [place] , FWB (full weight bearing), Contact isolation  Equipment Needs:  none    SUBJECTIVE:    Patient lives alone with daily family support in a ranch home  with 1 step to enter without Rail  Bed is on 1 floor and bath is on 1 floor. Patient ambulated independently  PTA had recently purchased a walker for safety in her home. . Equipment owned: Christina Smith,  Discussed with daughter how to modify walker to fww. OBJECTIVE:   Initial Evaluation  Date: 20 Treatment Short Term/ Long Term   Goals   AM-PAC 6 Clicks      Was pt agreeable to Eval/treatment? yes     Does pt have pain? none     Bed Mobility  Rolling: SBA  Supine to sit: SBA  Sit to supine: NT  Scooting: SBA  Rolling: Ind  Supine to sit: Ind  Sit to supine: Ind  Scooting: Ind   Transfers Sit to stand: Min to fww  Stand to sit: Min  Stand pivot: Min  Sit to stand: Mod Ind  Stand to sit: Mod Ind  Stand pivot: Mod Ind   Ambulation    30 feet with fww Min with fww. 100 feet with device if needed Mod Ind. Stair negotiation: ascended and descended  NT  1 steps with 0 rail Min   ROM BUE:  See OT eval  BLE:  wfl     Strength BUE: See OT eval   RLE:  4/5  LLE:   4/5  4+/5   Balance Sitting EOB:  Ind  Dynamic Standing:  Min with fww. Sitting EOB:  Ind  Dynamic Standing: Mod Ind. Pt is A & O x 3  Sensation:  Pt denies numbness and tingling to extremities  Edema:  none  Therapeutic Exercises:  none    Patient education  Pt educated on role of PT eval and sequencing to use fww safely. Patient response to education:   Pt verbalized understanding Pt demonstrated skill Pt requires further education in this area   yes       ASSESSMENT:    Comments:  Patient was seen this date for PT evaluation. Patient was agreeable to evaluation. Results of the functional assessment are noted above. Upon entering the room patient was found supine in bed. Daughter was present and observed session. Sat EOB x 10 minutes to increase dynamic sitting balance and activity tolerance. Patient then completed gait with fww with minimal cues for sequencing. At end of session, patient in chair with  call light and phone within reach,  all lines and tubes intact, nursing notified. daughter present in the room. This patient can benefit from the continuation of skilled PT  to maximize functional level and return to PLOF. Treatment:  Patient practiced and was instructed in the following treatment:    Bed mobility training - pt given verbal and tactile cues to facilitate proper sequencing and safety during rolling and supine>sit as well as provided with physical assistance to complete task    Assistive device training - pt educated on using [employer] during gait, [employer] approximation/negotiation, and hand placement during sit<>stand to [employer]  STS and transfer training - educated on hand/foot placement, safety, and sequencing during STS and pivot transfers using assistive device  Gait training - verbal cues for [employer] approximation/negotiation, upright posture, and safety during 90 and 180 degree turns during gait       Pt's/ family goals   1. Home with family support. Patient and or family understand(s) diagnosis, prognosis, and plan of care. yes    PLAN:    PT care will be provided in accordance with the objectives noted above. Exercises and functional mobility practice will be used as well as appropriate assistive devices or modalities to obtain goals. Patient and family education will also be administered as needed.     Frequency of treatments: 2-5x/week x 1-2 weeks. Time in  1140  Time out  1200    Total Treatment Time  20 minutes     Evaluation Time includes thorough review of current medical information, gathering information on past medical history/social history and prior level of function, completion of standardized testing/informal observation of tasks, assessment of data and education on plan of care and goals.     CPT codes:  [x] Low Complexity PT evaluation 63836  [] Moderate Complexity PT evaluation 34688  [] High Complexity PT evaluation 68777  [] PT Re-evaluation 66630  [] Gait training 28869 - minutes  [] Manual therapy 87242 - minutes  [x] Therapeutic activities 20144 10 minutes  [] Therapeutic exercises 81264 - minutes  [] Neuromuscular reeducation 47249 - minutes     Dewey Story, [address] [facility]

## 2020-07-17 NOTE — PROGRESS NOTES
[address] [place] [address] [facility]    Consultation Note     Admit Date: 7/16/2020  1:10 PM    Reason for Consult:   ESBL UTI    Attending Physician:  Jamil Russell MD     Chief Complaint: back pain and foot pain    HISTORY OF PRESENT ILLNESS:   The patient is a 80 y.o.  female known to the Infectious Diseases service. The patient has the below past med history. Pt presented to ER with pelvic pain and dysuria. She failed outpt antibiotics and her urine culture shows ESBL  E. Coli. She has had multiple visits to the ED. Her daughter is concerned because she keeps falling     Past Medical History:        Diagnosis Date    Arthritis     Hypertension     Myocardial infarct ([facility] [address]     UTI (urinary tract infection)      Past Surgical History:        Procedure Laterality Date    APPENDECTOMY      HYSTERECTOMY       Current Medications:   Scheduled Meds:   aspirin  81 mg Oral Daily    atenolol  50 mg Oral Daily    atorvastatin  10 mg Oral Daily    hydrochlorothiazide  12.5 mg Oral Daily    isosorbide mononitrate  30 mg Oral Daily    oxybutynin  15 mg Oral Once per day on Mon Thu    enoxaparin  40 mg Subcutaneous Daily     Continuous Infusions:  PRN Meds:LORazepam, traMADol    Allergies:  Sulfa antibiotics    Social History:   Social History     Socioeconomic History    Marital status:       Spouse name: Not on file    Number of children: Not on file    Years of education: Not on file    Highest education level: Not on file   Occupational History    Not on file   Social Needs    Financial resource strain: Not on file    Food insecurity     Worry: Not on file     Inability: Not on file    Transportation needs     Medical: Not on file     Non-medical: Not on file   Tobacco Use    Smoking status: Never Smoker    Smokeless tobacco: Never Used   Substance and Sexual Activity    Alcohol use: No    Drug use: Not on file    Sexual activity: Not on file   Lifestyle    Physical activity     Days per week: Not on file     Minutes per session: Not on file    Stress: Not on file   Relationships    Social connections     Talks on phone: Not on file     Gets together: Not on file     Attends [employer] service: Not on file     Active member of club or organization: Not on file     Attends meetings of clubs or organizations: Not on file     Relationship status: Not on file    Intimate partner violence     Fear of current or ex partner: Not on file     Emotionally abused: Not on file     Physically abused: Not on file     Forced sexual activity: Not on file   Other Topics Concern    Not on file   Social History Narrative    Not on file     Tobacco: No  Alcohol: No  Pets: No  Travel: No    Family History:   No family history on file. . Otherwise non-pertinent to the chief complaint. REVIEW OF SYSTEMS:    CONSTITUTIONAL:  No chills, fevers or night sweats. No loss of weight. EYES:  No double vision or drainage from eyes, ears or throat. HEENT:  No neck stiffness. No dysphagia. No drainage from eyes, ears or throat  RESPIRATORY:  No cough, productive sputum or hemoptysis. CARDIOVASCULAR:  No chest pain, palpitations, orthopnea or dyspnea on exertion. GASTROINTESTINAL:  No nausea, vomiting, diarrhea or constipation or hematochezia   GENITOURINARY:  No frequency burning dysuria or hematuria. INTEGUMENT/BREAST:  No rash or breast masses. HEMATOLOGIC/LYMPHATIC:  No lymphadenopathy or blood dyscrasics. ALLERGIC/IMMUNOLOGIC:  No anaphylaxis. ENDOCRINE:  No polyuria or polydipsia or temperature intolerance. MUSCULOSKELETAL:  No myalgia or arthralgia. Full ROM. NEUROLOGICAL:  No focal motor sensory deficit. BEHAVIOR/PSYCH:  No psychosis.      PHYSICAL EXAM:    Vitals:    BP (!) 176/77   Pulse 61   Temp 97.8 °F (36.6 °C) (Temporal)   Resp 18   Ht 5' 5\" (1.651 m)   Wt 143 lb (64.9 kg)   LMP 05/16/2018   SpO2 96%   BMI 23.80 kg/m²   Constitutional: The patient is awake, alert, and oriented. Skin: Warm and dry. No rashes were noted. HEENT: Eyes show round, and reactive pupils. No jaundice. Moist mucous membranes, no ulcerations, no thrush. Neck: Supple to movements. No lymphadenopathy. Chest: No use of accessory muscles to breathe. Symmetrical expansion. Auscultation reveals no wheezing, crackles, or rhonchi. Cardiovascular: S1 and S2 are rhythmic and regular. No murmurs appreciated. Abdomen: Positive bowel sounds to auscultation. Benign to palpation. No masses felt. No hepatosplenomegaly. Extremities: No clubbing, no cyanosis, no edema.   Lines: peripheral      CBC+dif:  Recent Labs     07/16/20  1328 07/17/20  0555   WBC 7.2 4.4*   HGB 13.9 13.3   HCT 40.8 38.7   MCV 92.1 91.3    194   NEUTROABS 4.65  --      No results found for: CRP  No results found for: CRPHS  No results found for: SEDRATE  Lab Results   Component Value Date    ALT 16 07/16/2020    AST 28 07/16/2020    ALKPHOS 83 07/16/2020    BILITOT 0.6 07/16/2020     Lab Results   Component Value Date     07/17/2020    K 3.0 07/17/2020    CL 89 07/17/2020    CO2 29 07/17/2020    BUN 8 07/17/2020    CREATININE 0.8 07/17/2020    GFRAA >60 07/17/2020    LABGLOM >60 07/17/2020    GLUCOSE 100 07/17/2020    PROT 7.4 07/16/2020    LABALBU 4.4 07/16/2020    CALCIUM 8.7 07/17/2020    BILITOT 0.6 07/16/2020    ALKPHOS 83 07/16/2020    AST 28 07/16/2020    ALT 16 07/16/2020       No results found for: PROTIME, INR    No results found for: TSH    Lab Results   Component Value Date    COLORU Yellow 07/16/2020    PHUR 7.0 07/16/2020    WBCUA >20 07/16/2020    RBCUA 0-1 07/16/2020    BACTERIA MANY 07/16/2020    CLARITYU CLOUDY 07/16/2020    SPECGRAV 1.010 07/16/2020    LEUKOCYTESUR LARGE 07/16/2020    UROBILINOGEN 0.2 07/16/2020    BILIRUBINUR Negative 07/16/2020    BLOODU SMALL 07/16/2020    GLUCOSEU Negative 07/16/2020       No results found for: CQB3AWM, BEART, O1ZUOXIX, PHART, THGBART, DSJ6UVR, PO2ART, [address]  Radiology:  XR HUMERUS LEFT (MIN 2 VIEWS)   Final Result   No definite acute fracture or dislocation. See above. Osteopenia. Osteoarthritis the left shoulder. XR ELBOW LEFT (MIN 3 VIEWS)   Final Result   No definite acute fracture or dislocation. See above. Osteopenia. Osteoarthritis the left shoulder. Microbiology:  Pending  No results for input(s): BC in the last 72 hours. No results for input(s): ORG in the last 72 hours. No results for input(s): Maryfrances Bigger in the last 72 hours. No results for input(s): STREPNEUMAGU in the last 72 hours. No results for input(s): LP1UAG in the last 72 hours. No results for input(s): ASO in the last 72 hours. No results for input(s): CULTRESP in the last 72 hours. Assessment:  · ESBL UTI MDR   · History of falls     Plan:    · Cont merrem for seven days   · US of the kidneys   · Check cultures  · Baseline ESR, CRP  · Monitor labs  · Will follow with you    Thank you for having us see this patient in consultation. I will be discussing this case with the treating physicians.       Electronically signed by Ge Aranda MD on 7/17/2020 at 10:41 AM

## 2020-07-18 LAB
ANION GAP SERPL CALCULATED.3IONS-SCNC: 13 MMOL/L (ref 7–16)
BUN BLDV-MCNC: 8 MG/DL (ref 8–23)
CALCIUM SERPL-MCNC: 8.9 MG/DL (ref 8.6–10.2)
CHLORIDE BLD-SCNC: 88 MMOL/L (ref 98–107)
CO2: 28 MMOL/L (ref 22–29)
CREAT SERPL-MCNC: 0.7 MG/DL (ref 0.5–1)
GFR AFRICAN AMERICAN: >60
GFR NON-AFRICAN AMERICAN: >60 ML/MIN/1.73
GLUCOSE BLD-MCNC: 107 MG/DL (ref 74–99)
HCT VFR BLD CALC: 40.5 % (ref 34–48)
HEMOGLOBIN: 13.8 G/DL (ref 11.5–15.5)
MCH RBC QN AUTO: 31.1 PG (ref 26–35)
MCHC RBC AUTO-ENTMCNC: 34.1 % (ref 32–34.5)
MCV RBC AUTO: 91.2 FL (ref 80–99.9)
PDW BLD-RTO: 13.3 FL (ref 11.5–15)
PLATELET # BLD: 188 E9/L (ref 130–450)
PMV BLD AUTO: 9.9 FL (ref 7–12)
POTASSIUM SERPL-SCNC: 3.2 MMOL/L (ref 3.5–5)
RBC # BLD: 4.44 E12/L (ref 3.5–5.5)
SODIUM BLD-SCNC: 129 MMOL/L (ref 132–146)
WBC # BLD: 5.7 E9/L (ref 4.5–11.5)

## 2020-07-18 PROCEDURE — 36415 COLL VENOUS BLD VENIPUNCTURE: CPT

## 2020-07-18 PROCEDURE — 2580000003 HC RX 258: Performed by: INTERNAL MEDICINE

## 2020-07-18 PROCEDURE — 6370000000 HC RX 637 (ALT 250 FOR IP): Performed by: FAMILY MEDICINE

## 2020-07-18 PROCEDURE — 85027 COMPLETE CBC AUTOMATED: CPT

## 2020-07-18 PROCEDURE — 80048 BASIC METABOLIC PNL TOTAL CA: CPT

## 2020-07-18 PROCEDURE — 6360000002 HC RX W HCPCS: Performed by: INTERNAL MEDICINE

## 2020-07-18 PROCEDURE — 6360000002 HC RX W HCPCS: Performed by: FAMILY MEDICINE

## 2020-07-18 PROCEDURE — 1200000000 HC SEMI PRIVATE

## 2020-07-18 RX ORDER — DOCUSATE SODIUM 100 MG/1
100 CAPSULE, LIQUID FILLED ORAL DAILY
Status: DISCONTINUED | OUTPATIENT
Start: 2020-07-18 | End: 2020-07-22 | Stop reason: HOSPADM

## 2020-07-18 RX ORDER — BISACODYL 10 MG
10 SUPPOSITORY, RECTAL RECTAL DAILY PRN
Status: DISCONTINUED | OUTPATIENT
Start: 2020-07-18 | End: 2020-07-22 | Stop reason: HOSPADM

## 2020-07-18 RX ADMIN — HYDROCHLOROTHIAZIDE 12.5 MG: 12.5 TABLET ORAL at 08:20

## 2020-07-18 RX ADMIN — ASPIRIN 81 MG: 81 TABLET, COATED ORAL at 08:20

## 2020-07-18 RX ADMIN — ISOSORBIDE MONONITRATE 30 MG: 30 TABLET ORAL at 08:20

## 2020-07-18 RX ADMIN — TRAMADOL HYDROCHLORIDE 50 MG: 50 TABLET, FILM COATED ORAL at 08:21

## 2020-07-18 RX ADMIN — BISACODYL 10 MG: 10 SUPPOSITORY RECTAL at 16:49

## 2020-07-18 RX ADMIN — MAGNESIUM HYDROXIDE 30 ML: 2400 SUSPENSION ORAL at 12:03

## 2020-07-18 RX ADMIN — ATORVASTATIN CALCIUM 10 MG: 10 TABLET, FILM COATED ORAL at 08:20

## 2020-07-18 RX ADMIN — MEROPENEM 1 G: 1 INJECTION, POWDER, FOR SOLUTION INTRAVENOUS at 12:03

## 2020-07-18 RX ADMIN — DOCUSATE SODIUM 100 MG: 100 CAPSULE, LIQUID FILLED ORAL at 08:23

## 2020-07-18 RX ADMIN — TRAMADOL HYDROCHLORIDE 50 MG: 50 TABLET, FILM COATED ORAL at 19:50

## 2020-07-18 RX ADMIN — MEROPENEM 1 G: 1 INJECTION, POWDER, FOR SOLUTION INTRAVENOUS at 00:39

## 2020-07-18 RX ADMIN — ATENOLOL 50 MG: 50 TABLET ORAL at 08:20

## 2020-07-18 RX ADMIN — ENOXAPARIN SODIUM 40 MG: 40 INJECTION SUBCUTANEOUS at 08:21

## 2020-07-18 RX ADMIN — LORAZEPAM 1 MG: 1 TABLET ORAL at 23:15

## 2020-07-18 ASSESSMENT — PAIN DESCRIPTION - FREQUENCY: FREQUENCY: INTERMITTENT

## 2020-07-18 ASSESSMENT — PAIN DESCRIPTION - PAIN TYPE: TYPE: ACUTE PAIN

## 2020-07-18 ASSESSMENT — PAIN SCALES - GENERAL
PAINLEVEL_OUTOF10: 0
PAINLEVEL_OUTOF10: 6
PAINLEVEL_OUTOF10: 6

## 2020-07-18 ASSESSMENT — PAIN DESCRIPTION - LOCATION: LOCATION: OTHER (COMMENT)

## 2020-07-18 ASSESSMENT — PAIN DESCRIPTION - ONSET: ONSET: GRADUAL

## 2020-07-18 ASSESSMENT — PAIN DESCRIPTION - DESCRIPTORS: DESCRIPTORS: ACHING;DISCOMFORT

## 2020-07-18 NOTE — PLAN OF CARE
Problem: Falls - Risk of:  Goal: Will remain free from falls  Description: Will remain free from falls  7/18/2020 0244 by Parris Rodriguez RN  Outcome: Met This Shift     Problem: Falls - Risk of:  Goal: Absence of physical injury  Description: Absence of physical injury  7/18/2020 0244 by Parris Rodriguez RN  Outcome: Met This Shift

## 2020-07-18 NOTE — PROGRESS NOTES
Betina Liu is a 80 y.o. female patient. Patient says her pelvic pain is improved today. Current Facility-Administered Medications   Medication Dose Route Frequency Provider Last Rate Last Dose    docusate sodium (COLACE) capsule 100 mg  100 mg Oral Daily Kang Peralta MD        meropenem [facility]) 1 g in sodium chloride 0.9 % 100 mL IVPB (mini-bag)  1 g Intravenous Q12H Naomi Edouard MD   Stopped at 07/18/20 0109    aspirin EC tablet 81 mg  81 mg Oral Daily Kang Peralta MD   81 mg at 07/17/20 0835    atenolol (TENORMIN) tablet 50 mg  50 mg Oral Daily Kang Peralta MD   50 mg at 07/17/20 0835    atorvastatin (LIPITOR) tablet 10 mg  10 mg Oral Daily Kang Peralta MD   10 mg at 07/17/20 0835    hydrochlorothiazide (HYDRODIURIL) tablet 12.5 mg  12.5 mg Oral Daily Kang Peralta MD   12.5 mg at 07/17/20 8434    isosorbide mononitrate (IMDUR) extended release tablet 30 mg  30 mg Oral Daily Kang Peralta MD   30 mg at 07/17/20 3161    LORazepam (ATIVAN) tablet 1 mg  1 mg Oral BID PRN Kang Peralta MD   1 mg at 07/17/20 2234    oxybutynin (DITROPAN-XL) extended release tablet 15 mg  15 mg Oral Once per day on Mon Thu Kang Peralta MD        traMADol Erlin Dangelo) tablet 50 mg  50 mg Oral Q6H PRN Kang Peralta MD   50 mg at 07/17/20 1642    enoxaparin (LOVENOX) injection 40 mg  40 mg Subcutaneous Daily Kang Peralta MD   40 mg at 07/17/20 6761     Allergies   Allergen Reactions    Sulfa Antibiotics Hives     Active Problems:    ESBL (extended spectrum beta-lactamase) producing bacteria infection  Resolved Problems:    * No resolved hospital problems. *    Blood pressure (!) 171/83, pulse 67, temperature 96.9 °F (36.1 °C), temperature source Temporal, resp. rate 16, height 5' 5\" (1.651 m), weight 143 lb (64.9 kg), last menstrual period 05/16/2018, SpO2 94 %. Subjective:  Symptoms:  Improved. Diet:  Poor intake. Activity level: Impaired due to pain. Pain:  She complains of pain that is mild. Objective:  General Appearance:  Comfortable. Vital signs: (most recent): Blood pressure (!) 171/83, pulse 67, temperature 96.9 °F (36.1 °C), temperature source Temporal, resp. rate 16, height 5' 5\" (1.651 m), weight 143 lb (64.9 kg), last menstrual period 05/16/2018, SpO2 94 %. No fever. Lungs:  Normal effort and normal respiratory rate. Breath sounds clear to auscultation. Heart: Normal rate. Regular rhythm. S1 normal and S2 normal.      Assessment:  (ESBL UTI  Hyponatremia  Falls). Plan:   (Meropenem per ID  Monitor labs and cultures. Continue rest of meds. ).        Zuhair Kim MD  7/18/2020

## 2020-07-19 LAB
ANION GAP SERPL CALCULATED.3IONS-SCNC: 12 MMOL/L (ref 7–16)
BUN BLDV-MCNC: 8 MG/DL (ref 8–23)
CALCIUM SERPL-MCNC: 8.9 MG/DL (ref 8.6–10.2)
CHLORIDE BLD-SCNC: 89 MMOL/L (ref 98–107)
CO2: 29 MMOL/L (ref 22–29)
CREAT SERPL-MCNC: 0.7 MG/DL (ref 0.5–1)
GFR AFRICAN AMERICAN: >60
GFR NON-AFRICAN AMERICAN: >60 ML/MIN/1.73
GLUCOSE BLD-MCNC: 105 MG/DL (ref 74–99)
HCT VFR BLD CALC: 38.4 % (ref 34–48)
HEMOGLOBIN: 13.3 G/DL (ref 11.5–15.5)
MCH RBC QN AUTO: 31.6 PG (ref 26–35)
MCHC RBC AUTO-ENTMCNC: 34.6 % (ref 32–34.5)
MCV RBC AUTO: 91.2 FL (ref 80–99.9)
ORGANISM: ABNORMAL
PDW BLD-RTO: 13.2 FL (ref 11.5–15)
PLATELET # BLD: 172 E9/L (ref 130–450)
PMV BLD AUTO: 9.9 FL (ref 7–12)
POTASSIUM SERPL-SCNC: 3.3 MMOL/L (ref 3.5–5)
RBC # BLD: 4.21 E12/L (ref 3.5–5.5)
SODIUM BLD-SCNC: 130 MMOL/L (ref 132–146)
URINE CULTURE, ROUTINE: ABNORMAL
WBC # BLD: 6.1 E9/L (ref 4.5–11.5)

## 2020-07-19 PROCEDURE — 1200000000 HC SEMI PRIVATE

## 2020-07-19 PROCEDURE — 85027 COMPLETE CBC AUTOMATED: CPT

## 2020-07-19 PROCEDURE — 6360000002 HC RX W HCPCS: Performed by: FAMILY MEDICINE

## 2020-07-19 PROCEDURE — 36415 COLL VENOUS BLD VENIPUNCTURE: CPT

## 2020-07-19 PROCEDURE — 6370000000 HC RX 637 (ALT 250 FOR IP): Performed by: FAMILY MEDICINE

## 2020-07-19 PROCEDURE — 6360000002 HC RX W HCPCS: Performed by: INTERNAL MEDICINE

## 2020-07-19 PROCEDURE — 2580000003 HC RX 258: Performed by: INTERNAL MEDICINE

## 2020-07-19 PROCEDURE — 80048 BASIC METABOLIC PNL TOTAL CA: CPT

## 2020-07-19 RX ORDER — CALCIUM CARBONATE 200(500)MG
500 TABLET,CHEWABLE ORAL 3 TIMES DAILY PRN
Status: DISCONTINUED | OUTPATIENT
Start: 2020-07-19 | End: 2020-07-22 | Stop reason: HOSPADM

## 2020-07-19 RX ADMIN — MEROPENEM 1 G: 1 INJECTION, POWDER, FOR SOLUTION INTRAVENOUS at 11:53

## 2020-07-19 RX ADMIN — ATORVASTATIN CALCIUM 10 MG: 10 TABLET, FILM COATED ORAL at 08:06

## 2020-07-19 RX ADMIN — ASPIRIN 81 MG: 81 TABLET, COATED ORAL at 08:06

## 2020-07-19 RX ADMIN — CALCIUM CARBONATE (ANTACID) CHEW TAB 500 MG 500 MG: 500 CHEW TAB at 15:34

## 2020-07-19 RX ADMIN — TRAMADOL HYDROCHLORIDE 50 MG: 50 TABLET, FILM COATED ORAL at 08:05

## 2020-07-19 RX ADMIN — HYDROCHLOROTHIAZIDE 12.5 MG: 12.5 TABLET ORAL at 08:06

## 2020-07-19 RX ADMIN — MAGNESIUM CITRATE 296 ML: 1.75 LIQUID ORAL at 08:05

## 2020-07-19 RX ADMIN — MEROPENEM 1 G: 1 INJECTION, POWDER, FOR SOLUTION INTRAVENOUS at 00:20

## 2020-07-19 RX ADMIN — DOCUSATE SODIUM 100 MG: 100 CAPSULE, LIQUID FILLED ORAL at 08:06

## 2020-07-19 RX ADMIN — ISOSORBIDE MONONITRATE 30 MG: 30 TABLET ORAL at 08:06

## 2020-07-19 RX ADMIN — LORAZEPAM 1 MG: 1 TABLET ORAL at 23:23

## 2020-07-19 RX ADMIN — MEROPENEM 1 G: 1 INJECTION, POWDER, FOR SOLUTION INTRAVENOUS at 23:20

## 2020-07-19 RX ADMIN — ATENOLOL 50 MG: 50 TABLET ORAL at 08:06

## 2020-07-19 RX ADMIN — ENOXAPARIN SODIUM 40 MG: 40 INJECTION SUBCUTANEOUS at 08:06

## 2020-07-19 RX ADMIN — TRAMADOL HYDROCHLORIDE 50 MG: 50 TABLET, FILM COATED ORAL at 15:34

## 2020-07-19 ASSESSMENT — PAIN - FUNCTIONAL ASSESSMENT: PAIN_FUNCTIONAL_ASSESSMENT: ACTIVITIES ARE NOT PREVENTED

## 2020-07-19 ASSESSMENT — PAIN SCALES - GENERAL
PAINLEVEL_OUTOF10: 0
PAINLEVEL_OUTOF10: 0
PAINLEVEL_OUTOF10: 7
PAINLEVEL_OUTOF10: 0
PAINLEVEL_OUTOF10: 0
PAINLEVEL_OUTOF10: 4
PAINLEVEL_OUTOF10: 0

## 2020-07-19 ASSESSMENT — PAIN DESCRIPTION - LOCATION
LOCATION: OTHER (COMMENT)
LOCATION: OTHER (COMMENT)

## 2020-07-19 ASSESSMENT — PAIN DESCRIPTION - PAIN TYPE
TYPE: ACUTE PAIN
TYPE: ACUTE PAIN

## 2020-07-19 ASSESSMENT — PAIN DESCRIPTION - FREQUENCY
FREQUENCY: INTERMITTENT
FREQUENCY: INTERMITTENT

## 2020-07-19 ASSESSMENT — PAIN DESCRIPTION - DESCRIPTORS
DESCRIPTORS: ACHING;DISCOMFORT
DESCRIPTORS: ACHING;DISCOMFORT

## 2020-07-19 ASSESSMENT — PAIN DESCRIPTION - ONSET
ONSET: GRADUAL
ONSET: GRADUAL

## 2020-07-19 ASSESSMENT — PAIN DESCRIPTION - PROGRESSION
CLINICAL_PROGRESSION: NOT CHANGED
CLINICAL_PROGRESSION: NOT CHANGED

## 2020-07-19 NOTE — PROGRESS NOTES
Juan Diaz is a 80 y.o. female patient. Patient says her pelvic pain is improved today.     Current Facility-Administered Medications   Medication Dose Route Frequency Provider Last Rate Last Dose    magnesium citrate solution 296 mL  296 mL Oral Once Sherry Howard MD        docusate sodium (COLACE) capsule 100 mg  100 mg Oral Daily Sherry Howard MD   100 mg at 07/18/20 0478    magnesium hydroxide (MILK OF MAGNESIA) 400 MG/5ML suspension 30 mL  30 mL Oral Daily PRN Sherry Howard MD   30 mL at 07/18/20 1203    bisacodyl (DULCOLAX) suppository 10 mg  10 mg Rectal Daily PRN Sherry Howard MD   10 mg at 07/18/20 1649    meropenem (MERREM) 1 g in sodium chloride 0.9 % 100 mL IVPB (mini-bag)  1 g Intravenous Q12H Ge Aranda MD   Stopped at 07/19/20 0056    aspirin EC tablet 81 mg  81 mg Oral Daily Sherry Howard MD   81 mg at 07/18/20 0820    atenolol (TENORMIN) tablet 50 mg  50 mg Oral Daily Sherry Howard MD   50 mg at 07/18/20 0820    atorvastatin (LIPITOR) tablet 10 mg  10 mg Oral Daily Sherry Howard MD   10 mg at 07/18/20 0820    hydrochlorothiazide (HYDRODIURIL) tablet 12.5 mg  12.5 mg Oral Daily Sherry Howard MD   12.5 mg at 07/18/20 0820    isosorbide mononitrate (IMDUR) extended release tablet 30 mg  30 mg Oral Daily Sherry Howard MD   30 mg at 07/18/20 0820    LORazepam (ATIVAN) tablet 1 mg  1 mg Oral BID PRN Sherry Howard MD   1 mg at 07/18/20 2315    oxybutynin (DITROPAN-XL) extended release tablet 15 mg  15 mg Oral Once per day on Mon Thu Sherry Howard MD        traMADol Guerry Mater) tablet 50 mg  50 mg Oral Q6H PRN Sherry Howard MD   50 mg at 07/18/20 1950    enoxaparin (LOVENOX) injection 40 mg  40 mg Subcutaneous Daily Sherry Howard MD   40 mg at 07/18/20 8202     Allergies   Allergen Reactions    Sulfa Antibiotics Hives     Active Problems:    ESBL (extended spectrum beta-lactamase) producing bacteria infection  Resolved Problems:    * No resolved hospital problems. *    Blood pressure 138/86, pulse 74, temperature 97 °F (36.1 °C), temperature source Temporal, resp. rate 18, height 5' 5\" (1.651 m), weight 143 lb (64.9 kg), last menstrual period 05/16/2018, SpO2 98 %. Subjective:  Symptoms:  Improved. Diet:  Poor intake. Activity level: Impaired due to pain. Pain:  She complains of pain that is mild. Objective:  General Appearance:  Comfortable. Vital signs: (most recent): Blood pressure 138/86, pulse 74, temperature 97 °F (36.1 °C), temperature source Temporal, resp. rate 18, height 5' 5\" (1.651 m), weight 143 lb (64.9 kg), last menstrual period 05/16/2018, SpO2 98 %. No fever. Lungs:  Normal effort and normal respiratory rate. Breath sounds clear to auscultation. Heart: Normal rate. Regular rhythm. S1 normal and S2 normal.      Assessment:  (ESBL UTI  Hyponatremia  Falls). Plan:   (Meropenem per ID  Monitor labs and cultures. Continue rest of meds. ).        Chico Hugo MD  7/19/2020

## 2020-07-19 NOTE — PLAN OF CARE
Problem: Falls - Risk of:  Goal: Will remain free from falls  Description: Will remain free from falls  7/19/2020 0254 by Tahira Rodgers RN  Outcome: Met This Shift  7/18/2020 1830 by Britni Cortes RN  Outcome: Met This Shift  Goal: Absence of physical injury  Description: Absence of physical injury  7/18/2020 1830 by Britni Cortes RN  Outcome: Met This Shift     Problem: Skin Integrity:  Goal: Will show no infection signs and symptoms  Description: Will show no infection signs and symptoms  7/19/2020 0254 by Tahira Rodgers RN  Outcome: Met This Shift  7/18/2020 1830 by Britni Cortes RN  Outcome: Met This Shift  Goal: Absence of new skin breakdown  Description: Absence of new skin breakdown  7/19/2020 0254 by Tahira Rodgers RN  Outcome: Met This Shift  7/18/2020 1830 by Britni Cortes RN  Outcome: Met This Shift     Problem: Pain:  Goal: Pain level will decrease  Description: Pain level will decrease  7/19/2020 0254 by Tahira Rodgers RN  Outcome: Ongoing  7/18/2020 1830 by Britni Cortes RN  Outcome: Met This Shift  Goal: Control of acute pain  Description: Control of acute pain  7/19/2020 0254 by Tahira Rodgers RN  Outcome: Ongoing  7/18/2020 1830 by Britni Cortes RN  Outcome: Met This Shift  Goal: Control of chronic pain  Description: Control of chronic pain  7/19/2020 0254 by Tahira Rodgers RN  Outcome: Ongoing  7/18/2020 1830 by Britni Cortes RN  Outcome: Met This Shift

## 2020-07-20 LAB
ANION GAP SERPL CALCULATED.3IONS-SCNC: 11 MMOL/L (ref 7–16)
BUN BLDV-MCNC: 10 MG/DL (ref 8–23)
CALCIUM SERPL-MCNC: 8.8 MG/DL (ref 8.6–10.2)
CHLORIDE BLD-SCNC: 90 MMOL/L (ref 98–107)
CO2: 32 MMOL/L (ref 22–29)
CREAT SERPL-MCNC: 0.8 MG/DL (ref 0.5–1)
GFR AFRICAN AMERICAN: >60
GFR NON-AFRICAN AMERICAN: >60 ML/MIN/1.73
GLUCOSE BLD-MCNC: 102 MG/DL (ref 74–99)
HCT VFR BLD CALC: 37.8 % (ref 34–48)
HEMOGLOBIN: 12.9 G/DL (ref 11.5–15.5)
MCH RBC QN AUTO: 31.2 PG (ref 26–35)
MCHC RBC AUTO-ENTMCNC: 34.1 % (ref 32–34.5)
MCV RBC AUTO: 91.3 FL (ref 80–99.9)
PDW BLD-RTO: 13.3 FL (ref 11.5–15)
PLATELET # BLD: 171 E9/L (ref 130–450)
PMV BLD AUTO: 9.8 FL (ref 7–12)
POTASSIUM SERPL-SCNC: 3.2 MMOL/L (ref 3.5–5)
RBC # BLD: 4.14 E12/L (ref 3.5–5.5)
SODIUM BLD-SCNC: 133 MMOL/L (ref 132–146)
WBC # BLD: 5.7 E9/L (ref 4.5–11.5)

## 2020-07-20 PROCEDURE — 85027 COMPLETE CBC AUTOMATED: CPT

## 2020-07-20 PROCEDURE — 36415 COLL VENOUS BLD VENIPUNCTURE: CPT

## 2020-07-20 PROCEDURE — 97535 SELF CARE MNGMENT TRAINING: CPT

## 2020-07-20 PROCEDURE — 1200000000 HC SEMI PRIVATE

## 2020-07-20 PROCEDURE — 6360000002 HC RX W HCPCS: Performed by: FAMILY MEDICINE

## 2020-07-20 PROCEDURE — 6370000000 HC RX 637 (ALT 250 FOR IP): Performed by: FAMILY MEDICINE

## 2020-07-20 PROCEDURE — 6360000002 HC RX W HCPCS: Performed by: INTERNAL MEDICINE

## 2020-07-20 PROCEDURE — 97530 THERAPEUTIC ACTIVITIES: CPT

## 2020-07-20 PROCEDURE — 80048 BASIC METABOLIC PNL TOTAL CA: CPT

## 2020-07-20 PROCEDURE — 2580000003 HC RX 258: Performed by: INTERNAL MEDICINE

## 2020-07-20 RX ADMIN — ENOXAPARIN SODIUM 40 MG: 40 INJECTION SUBCUTANEOUS at 08:34

## 2020-07-20 RX ADMIN — ATORVASTATIN CALCIUM 10 MG: 10 TABLET, FILM COATED ORAL at 08:33

## 2020-07-20 RX ADMIN — LORAZEPAM 1 MG: 1 TABLET ORAL at 09:31

## 2020-07-20 RX ADMIN — LORAZEPAM 1 MG: 1 TABLET ORAL at 23:25

## 2020-07-20 RX ADMIN — OXYBUTYNIN CHLORIDE 15 MG: 5 TABLET, EXTENDED RELEASE ORAL at 08:33

## 2020-07-20 RX ADMIN — ATENOLOL 50 MG: 50 TABLET ORAL at 08:33

## 2020-07-20 RX ADMIN — MEROPENEM 1 G: 1 INJECTION, POWDER, FOR SOLUTION INTRAVENOUS at 23:32

## 2020-07-20 RX ADMIN — DOCUSATE SODIUM 100 MG: 100 CAPSULE, LIQUID FILLED ORAL at 08:33

## 2020-07-20 RX ADMIN — ISOSORBIDE MONONITRATE 30 MG: 30 TABLET ORAL at 08:33

## 2020-07-20 RX ADMIN — HYDROCHLOROTHIAZIDE 12.5 MG: 12.5 TABLET ORAL at 08:33

## 2020-07-20 RX ADMIN — TRAMADOL HYDROCHLORIDE 50 MG: 50 TABLET, FILM COATED ORAL at 16:11

## 2020-07-20 RX ADMIN — MEROPENEM 1 G: 1 INJECTION, POWDER, FOR SOLUTION INTRAVENOUS at 11:41

## 2020-07-20 RX ADMIN — ASPIRIN 81 MG: 81 TABLET, COATED ORAL at 08:33

## 2020-07-20 ASSESSMENT — PAIN DESCRIPTION - DESCRIPTORS: DESCRIPTORS: ACHING;DISCOMFORT;SORE

## 2020-07-20 ASSESSMENT — PAIN DESCRIPTION - PROGRESSION
CLINICAL_PROGRESSION: NOT CHANGED
CLINICAL_PROGRESSION: NOT CHANGED

## 2020-07-20 ASSESSMENT — PAIN SCALES - GENERAL
PAINLEVEL_OUTOF10: 0
PAINLEVEL_OUTOF10: 6

## 2020-07-20 ASSESSMENT — PAIN DESCRIPTION - LOCATION: LOCATION: LEG

## 2020-07-20 ASSESSMENT — PAIN DESCRIPTION - PAIN TYPE: TYPE: CHRONIC PAIN

## 2020-07-20 ASSESSMENT — PAIN DESCRIPTION - ORIENTATION: ORIENTATION: RIGHT;LEFT

## 2020-07-20 NOTE — DISCHARGE INSTR - COC
Continuity of Care Form    Patient Name: Juan Diaz   :  1926  MRN:  [id number]    Admit date:  2020  Discharge date:   2020    Code Status Order: DNR-CCA   Advance Directives:   [facility] Documentation     Date/Time Healthcare Directive Type of Healthcare Directive Copy in [address] Agent's Name Healthcare Agent's Phone Number    [phone]  Yes, patient has an advance directive for healthcare treatment  Living will  --  --  --  --          Admitting Physician:  Sherry Howard MD  PCP: [facility] Unit/Room#: 1090/5155-S  Discharging Unit Phone Number: [phone]  Emergency Contact:   Extended Emergency Contact Information  Primary Emergency Contact: Kerri Banuelos  Address: [address] Phone: [phone]  Mobile Phone: [phone]  Relation: Child  Secondary Emergency Contact: Chris Banuelos  Address: [address] Phone: [phone]  Relation: Grandchild    Past Surgical History:  Past Surgical History:   Procedure Laterality Date    APPENDECTOMY      HYSTERECTOMY         Immunization History: There is no immunization history on file for this patient.     Active Problems:  Patient Active Problem List   Diagnosis Code    ESBL (extended spectrum beta-lactamase) producing bacteria infection A49.9, Z16.12       Isolation/Infection:   Isolation          Contact        Patient Infection Status     Infection Onset Added Last Indicated Last Indicated By Review Planned Expiration Resolved Resolved By    MDRO (multi-drug resistant organism)  20 Gareth Grant, ROBE        E Coli Urine 20, 20, 20    ESBL (Extended Spectrum Beta Lactamase) 05/08/20 05/10/20 07/16/20 Culture, Urine        E.coli urine 20, 20, 20, 20          Nurse Assessment:  Last Vital Signs: BP (!) 144/70   Pulse 72   Temp 97 °F (36.1 °C) (Temporal)   Resp 18   Ht 5' 5\" (1.651 m)   Wt 143 lb (64.9 kg)   LMP 05/16/2018   SpO2 96%   BMI 23.80 kg/m²     Last documented pain score (0-10 scale): Pain Level: 0  Last Weight:   Wt Readings from Last 1 Encounters:   07/16/20 143 lb (64.9 kg)     Mental Status:  oriented and alert confused to time sometimes    IV Access:  - midline 07/21/2020    Nursing Mobility/ADLs:  Walking   Assisted  Transfer  Assisted  Bathing  Assisted  Dressing  Assisted  Toileting  Assisted  Feeding  Assisted  Med Admin  Assisted  Med Delivery   whole    Wound Care Documentation and Therapy:        Elimination:  Continence:   · Bowel: Yes  · Bladder: Yes  Urinary Catheter: Insertion Date: 07/16/2020   Colostomy/Ileostomy/Ileal Conduit: No       Date of Last BM:     Intake/Output Summary (Last 24 hours) at 7/20/2020 1023  Last data filed at 7/20/2020 0649  Gross per 24 hour   Intake 180 ml   Output 1950 ml   Net -1770 ml     I/O last 3 completed shifts: In: 180 [P.O.:180]  Out: 2350 [Urine:2350]    Safety Concerns: At Risk for Falls    Impairments/Disabilities:      Vision and Hearing    Nutrition Therapy:  Current Nutrition Therapy:   - Oral Diet:  Cardiac    Routes of Feeding: Oral  Liquids: Thin Liquids  Daily Fluid Restriction: no  Last Modified Barium Swallow with Video (Video Swallowing Test): not done    Treatments at the Time of Hospital Discharge:   Oxygen Therapy:  is not on home oxygen therapy.   Ventilator:    - No ventilator support    Rehab Therapies: Physical Therapy and OT  Weight Bearing Status/Restrictions: No weight bearing restirctions  Other Medical Equipment (for information only, NOT a DME order):  walker, bedside commode and hospital bed  Other Treatments:     Patient's personal belongings (please select all that are sent with patient):  {JUAN DME Belongings:[id number]}    RN SIGNATURE:  {Esignature:[id number]}    CASE MANAGEMENT/SOCIAL WORK SECTION    Inpatient Status Date: ***    Readmission Risk Assessment Score:  Readmission Risk              Risk of Unplanned Readmission:        13           Discharging to Facility/ Agency   · Name: [facility]  · Address:  · Phone:  · Fax:    Dialysis Facility (if applicable)   · Name:  · Address:  · Dialysis Schedule:  · Phone:  · Fax:    / signature: Electronically signed by TONY Mohan on 7/20/2020 at 10:24 AM      PHYSICIAN SECTION    Prognosis: {Prognosis:9326285154}    Condition at Discharge: [facility] Patient Condition:[id number]}    Rehab Potential (if transferring to Rehab): {Prognosis:6313123950}    Recommended Labs or Other Treatments After Discharge: ***    Physician Certification: I certify the above information and transfer of Zneobia Garcia  is necessary for the continuing treatment of the diagnosis listed and that she requires [place] for less 30 days.      Update Admission H&P: {CHP DME Changes in HOMWD:[id number]}    PHYSICIAN SIGNATURE:  Eladio Watkins MD

## 2020-07-20 NOTE — PROGRESS NOTES
[address] [place] [address] [facility]  Progress Note      Chief Complaint   Patient presents with    Fatigue     chronic UTI with antbx resistance, [facility] appt [facility] on monday but states cannot wait this long; dysuria/frequency       SUBJECTIVE:      Patient is tolerating medications. No reported adverse drug reactions. No problems overnight. Review of systems:    As stated above in the chief complaint, otherwise negative. Medications:    Scheduled Meds:   docusate sodium  100 mg Oral Daily    meropenem  1 g Intravenous Q12H    aspirin  81 mg Oral Daily    atenolol  50 mg Oral Daily    atorvastatin  10 mg Oral Daily    hydrochlorothiazide  12.5 mg Oral Daily    isosorbide mononitrate  30 mg Oral Daily    oxybutynin  15 mg Oral Once per day on Mon Thu    enoxaparin  40 mg Subcutaneous Daily     Continuous Infusions:  PRN Meds:calcium carbonate, magnesium hydroxide, bisacodyl, LORazepam, traMADol  Prior to Admission medications    Medication Sig Start Date End Date Taking? Authorizing Provider   traMADol (ULTRAM) 50 MG tablet Take 50 mg by mouth every 6 hours as needed for Pain. .   Yes Historical Provider, MD   hydroCHLOROthiazide (MICROZIDE) 12.5 MG capsule Take 12.5 mg by mouth daily    Historical Provider, MD   oxybutynin (DITROPAN XL) 15 MG extended release tablet Take 15 mg by mouth Twice a Week    Historical Provider, MD   Isosorbide Mononitrate (IMDUR PO) Take 30 mg by mouth daily     Historical Provider, MD   ATENOLOL PO Take 50 mg by mouth daily     Historical Provider, MD   aspirin 81 MG tablet Take 81 mg by mouth daily    Historical Provider, MD   Atorvastatin Calcium (LIPITOR PO) Take 10 mg by mouth daily     Historical Provider, MD   LORazepam (ATIVAN PO) Take 1 mg by mouth 2 times daily as needed     Historical Provider, MD       OBJECTIVE:  BP (!) 144/70   Pulse 72   Temp 97 °F (36.1 °C) (Temporal)   Resp 18   Ht 5' 5\" (1.651 m)   Wt 143 lb (64.9 kg)   LMP 2018   SpO2 96%   BMI 23.80 kg/m²   Temp  Av.4 °F (36.3 °C)  Min: 97 °F (36.1 °C)  Max: 98.1 °F (36.7 °C)  General appearance: Resting in bed in no apparent distress. Skin: Warm and dry. No rashes were noted. HEENT: Round and reactive pupils. Moist mucous membranes. No ulcerations or thrush. Neck: Supple to movements. Chest: No use of accessory muscles to breathe. Symmetrical expansion. No wheezing, crackles or rhonchi. Cardiovascular: Reguar rate and rhythm. No murmurs gallops, or rubs appreciated. Abdomen: Bowel sounds present, nontender, nondistended, no masses or hepatosplenomegaly. Extremities: No clubbing, no cyanosis, no edema. Lines: peripheral    I/O last 3 completed shifts: In: 180 [P.O.:180]  Out: 2350 [Urine:2350]      Laboratory and Tests Review:      Lab Results   Component Value Date    WBC 5.7 2020    WBC 6.1 2020    WBC 5.7 2020    HGB 12.9 2020    HCT 37.8 2020    MCV 91.3 2020     2020     Lab Results   Component Value Date    NEUTROABS 4.65 2020    NEUTROABS 4.08 2018     No results found for: [facility]  Lab Results   Component Value Date    ALT 16 2020    AST 28 2020    ALKPHOS 83 2020    BILITOT 0.6 2020     Lab Results   Component Value Date     2020    K 3.2 2020    CL 90 2020    CO2 32 2020    BUN 10 2020    CREATININE 0.8 2020    CREATININE 0.7 2020    CREATININE 0.7 2020    GFRAA >60 2020    LABGLOM >60 2020    GLUCOSE 102 2020    PROT 7.4 2020    LABALBU 4.4 2020    CALCIUM 8.8 2020    BILITOT 0.6 2020    ALKPHOS 83 2020    AST 28 2020    ALT 16 2020     No results found for: CRP  No results found for: Dora Ramirez    Radiology:    US RETROPERITONEAL COMPLETE   Final Result   No evidence of hydronephrosis.          XR HUMERUS LEFT (MIN 2 VIEWS)   Final Result   No definite acute fracture or dislocation. See above. Osteopenia. Osteoarthritis the left shoulder. XR ELBOW LEFT (MIN 3 VIEWS)   Final Result   No definite acute fracture or dislocation. See above. Osteopenia. Osteoarthritis the left shoulder. Microbiology:   Lab Results   Component Value Date    BC 24 Hours no growth 07/16/2020    ORG Escherichia coli 07/16/2020    ORG Escherichia coli 06/23/2020    ORG Escherichia coli 06/09/2020     Lab Results   Component Value Date    BLOODCULT2 24 Hours no growth 07/16/2020    ORG Escherichia coli 07/16/2020    ORG Escherichia coli 06/23/2020    ORG Escherichia coli 06/09/2020     No results found for: WNDABS  No results found for: RESPSMEAR  No results found for: MPNEUMO, CLAMYDCU, LABLEGI, AFBCX, FUNGSM, LABFUNG  No results found for: CULTRESP  No results found for: CXCATHTIP  No results found for: BFCS  No results found for: CXSURG  Urine Culture, Routine   Date Value Ref Range Status   07/16/2020   Final    >100,000 CFU/ml  This organism possesses an Extended Spectrum Beta Lactamase  that is inhibited by Clavulanic Acid. This isolate demonstrated resistance to multiple classes of  antibiotics. Please review results with care and follow  isolation guidelines as indicated. 06/23/2020   Final    >100,000 CFU/ml  This organism possesses an Extended Spectrum Beta Lactamase  that is inhibited by Clavulanic Acid. This isolate demonstrated resistance to multiple classes of  antibiotics. Please review results with care and follow  isolation guidelines as indicated. 06/09/2020   Final    >100,000 CFU/ml  This organism possesses an Extended Spectrum Beta Lactamase  that is inhibited by Clavulanic Acid. This isolate demonstrated resistance to multiple classes of  antibiotics. Please review results with care and follow  isolation guidelines as indicated.        No results found for: [facility]    Problem list:    Active Problems:    ESBL (extended spectrum beta-lactamase) producing bacteria infection  Resolved Problems:    * No resolved hospital problems. *      ASSESSMENT:    ESBL UTI MDR  E.  Coli   History of falls   WBC 5700  afebrile  Plan:   Cont merrem for seven days   Day #4  US of the kidneys : no evidence of hydronephrosis  Check cultures   Baseline ESR, CRP   Monitor labs   Will follow with you        Natalia Newman DO    10:21 AM  7/20/2020

## 2020-07-20 NOTE — PROGRESS NOTES
Toileting Min A  CGA  For clothing management  Mod I with ww   Bed Mobility  Supine to sit: SBA  Sit to supine: SBA SBA- supine to sit  Educated pt on technique to increase independence.    Supine to sit: Independent   Sit to supine: Independent   Functional Transfers Sit to stand: min A   Stand to sit:  Min A   Stand pivot: min A  Min A- sit<->stand  Cuing for hand placement and body mechanics, LOB backwards when standing requiring Min A to recover  Mod I with ww   Functional Mobility Min A with ww CGA  To and from bathroom using w/w, cuing for w/w safety  Mod I with ww   Balance Sitting:     Static:  good    Dynamic:SBA  Standing: min A  Sitting:     Static:  Ind    Dynamic: Ind  Standing: CGA      Activity Tolerance Good-  Good- good   Visual/  Perceptual Glasses: yes WFL           Safety Good-  Fair                good       Comments: Upon arrival pt supine in bed. Pt educated on techniques to increase independence and safety during ADL's, bed mobility, and functional transfers. Discussed home set up with pt, giving suggestions to increase safety at discharge. At end of session pt left seated in bedside chair, call light within reach. · Pt has made fair progress towards set goals.      · Continue with current plan of care    Treatment Time In: 11:05            Treatment Time Out: 11:30             Treatment Charges: Mins Units   Ther Ex  [id number]     Manual Therapy [id number]     Thera Activities 02207 10 1   ADL/Home Mgt 07625 15 1   Neuro Re-ed 59710     Group Therapy      Orthotic manage/training  46726     Non-Billable Time     Total Timed Treatment 25 2       Delmis Score, Algade 86

## 2020-07-20 NOTE — PROGRESS NOTES
Johnny Heredia is a 80 y.o. female patient. Patient resting, hard to arouse.     Current Facility-Administered Medications   Medication Dose Route Frequency Provider Last Rate Last Dose    calcium carbonate (TUMS) chewable tablet 500 mg  500 mg Oral TID PRN Ezequiel Mendoza MD   500 mg at 07/19/20 1534    docusate sodium (COLACE) capsule 100 mg  100 mg Oral Daily Ezequiel Mendoza MD   100 mg at 07/19/20 0806    magnesium hydroxide (MILK OF MAGNESIA) 400 MG/5ML suspension 30 mL  30 mL Oral Daily PRN Ezequiel Mendoza MD   30 mL at 07/18/20 1203    bisacodyl (DULCOLAX) suppository 10 mg  10 mg Rectal Daily PRN Ezequiel Mendoza MD   10 mg at 07/18/20 1649    meropenem (MERREM) 1 g in sodium chloride 0.9 % 100 mL IVPB (mini-bag)  1 g Intravenous Q12H Avis Begum MD   Stopped at 07/20/20 0023    aspirin EC tablet 81 mg  81 mg Oral Daily Ezequiel Menodza MD   81 mg at 07/19/20 0806    atenolol (TENORMIN) tablet 50 mg  50 mg Oral Daily Ezequiel Mendoza MD   50 mg at 07/19/20 0806    atorvastatin (LIPITOR) tablet 10 mg  10 mg Oral Daily Ezequiel Mendoza MD   10 mg at 07/19/20 0806    hydrochlorothiazide (HYDRODIURIL) tablet 12.5 mg  12.5 mg Oral Daily Ezequiel Mendoza MD   12.5 mg at 07/19/20 3464    isosorbide mononitrate (IMDUR) extended release tablet 30 mg  30 mg Oral Daily Ezequiel Mendoza MD   30 mg at 07/19/20 7289    LORazepam (ATIVAN) tablet 1 mg  1 mg Oral BID PRN Ezequiel Mendoza MD   1 mg at 07/19/20 2323    oxybutynin (DITROPAN-XL) extended release tablet 15 mg  15 mg Oral Once per day on Mon Thu Ezequiel Mendoza MD        traMADol [facility]) tablet 50 mg  50 mg Oral Q6H PRN Ezequiel Mendoza MD   50 mg at 07/19/20 1534    enoxaparin (LOVENOX) injection 40 mg  40 mg Subcutaneous Daily Ezequiel Mendoza MD   40 mg at 07/19/20 0966     Allergies   Allergen Reactions    Sulfa Antibiotics Hives     Active Problems:

## 2020-07-21 LAB
ANION GAP SERPL CALCULATED.3IONS-SCNC: 11 MMOL/L (ref 7–16)
BLOOD CULTURE, ROUTINE: NORMAL
BUN BLDV-MCNC: 14 MG/DL (ref 8–23)
CALCIUM SERPL-MCNC: 9.4 MG/DL (ref 8.6–10.2)
CHLORIDE BLD-SCNC: 89 MMOL/L (ref 98–107)
CO2: 31 MMOL/L (ref 22–29)
CREAT SERPL-MCNC: 0.7 MG/DL (ref 0.5–1)
CULTURE, BLOOD 2: NORMAL
GFR AFRICAN AMERICAN: >60
GFR NON-AFRICAN AMERICAN: >60 ML/MIN/1.73
GLUCOSE BLD-MCNC: 91 MG/DL (ref 74–99)
HCT VFR BLD CALC: 39 % (ref 34–48)
HEMOGLOBIN: 13 G/DL (ref 11.5–15.5)
MCH RBC QN AUTO: 31.1 PG (ref 26–35)
MCHC RBC AUTO-ENTMCNC: 33.3 % (ref 32–34.5)
MCV RBC AUTO: 93.3 FL (ref 80–99.9)
PDW BLD-RTO: 13.6 FL (ref 11.5–15)
PLATELET # BLD: 190 E9/L (ref 130–450)
PMV BLD AUTO: 10.5 FL (ref 7–12)
POTASSIUM SERPL-SCNC: 3.4 MMOL/L (ref 3.5–5)
RBC # BLD: 4.18 E12/L (ref 3.5–5.5)
SODIUM BLD-SCNC: 131 MMOL/L (ref 132–146)
WBC # BLD: 5 E9/L (ref 4.5–11.5)

## 2020-07-21 PROCEDURE — 02HV33Z INSERTION OF INFUSION DEVICE INTO SUPERIOR VENA CAVA, PERCUTANEOUS APPROACH: ICD-10-PCS | Performed by: INTERNAL MEDICINE

## 2020-07-21 PROCEDURE — 97530 THERAPEUTIC ACTIVITIES: CPT

## 2020-07-21 PROCEDURE — 97535 SELF CARE MNGMENT TRAINING: CPT

## 2020-07-21 PROCEDURE — 2580000003 HC RX 258: Performed by: INTERNAL MEDICINE

## 2020-07-21 PROCEDURE — 36415 COLL VENOUS BLD VENIPUNCTURE: CPT

## 2020-07-21 PROCEDURE — 6370000000 HC RX 637 (ALT 250 FOR IP): Performed by: FAMILY MEDICINE

## 2020-07-21 PROCEDURE — 1200000000 HC SEMI PRIVATE

## 2020-07-21 PROCEDURE — C1751 CATH, INF, PER/CENT/MIDLINE: HCPCS

## 2020-07-21 PROCEDURE — 36410 VNPNXR 3YR/> PHY/QHP DX/THER: CPT

## 2020-07-21 PROCEDURE — 6360000002 HC RX W HCPCS: Performed by: FAMILY MEDICINE

## 2020-07-21 PROCEDURE — 80048 BASIC METABOLIC PNL TOTAL CA: CPT

## 2020-07-21 PROCEDURE — 6360000002 HC RX W HCPCS: Performed by: INTERNAL MEDICINE

## 2020-07-21 PROCEDURE — 76937 US GUIDE VASCULAR ACCESS: CPT

## 2020-07-21 PROCEDURE — 85027 COMPLETE CBC AUTOMATED: CPT

## 2020-07-21 RX ORDER — HEPARIN SODIUM (PORCINE) LOCK FLUSH IV SOLN 100 UNIT/ML 100 UNIT/ML
1 SOLUTION INTRAVENOUS PRN
Status: DISCONTINUED | OUTPATIENT
Start: 2020-07-21 | End: 2020-07-22 | Stop reason: HOSPADM

## 2020-07-21 RX ORDER — HEPARIN SODIUM (PORCINE) LOCK FLUSH IV SOLN 100 UNIT/ML 100 UNIT/ML
1 SOLUTION INTRAVENOUS EVERY 12 HOURS SCHEDULED
Status: DISCONTINUED | OUTPATIENT
Start: 2020-07-21 | End: 2020-07-22 | Stop reason: HOSPADM

## 2020-07-21 RX ORDER — SODIUM CHLORIDE 0.9 % (FLUSH) 0.9 %
10 SYRINGE (ML) INJECTION PRN
Status: DISCONTINUED | OUTPATIENT
Start: 2020-07-21 | End: 2020-07-22 | Stop reason: HOSPADM

## 2020-07-21 RX ADMIN — ATORVASTATIN CALCIUM 10 MG: 10 TABLET, FILM COATED ORAL at 09:08

## 2020-07-21 RX ADMIN — MEROPENEM 1 G: 1 INJECTION, POWDER, FOR SOLUTION INTRAVENOUS at 15:32

## 2020-07-21 RX ADMIN — TRAMADOL HYDROCHLORIDE 50 MG: 50 TABLET, FILM COATED ORAL at 09:08

## 2020-07-21 RX ADMIN — ISOSORBIDE MONONITRATE 30 MG: 30 TABLET ORAL at 09:08

## 2020-07-21 RX ADMIN — DOCUSATE SODIUM 100 MG: 100 CAPSULE, LIQUID FILLED ORAL at 09:08

## 2020-07-21 RX ADMIN — SODIUM CHLORIDE, PRESERVATIVE FREE 100 UNITS: 5 INJECTION INTRAVENOUS at 20:59

## 2020-07-21 RX ADMIN — MEROPENEM 1 G: 1 INJECTION, POWDER, FOR SOLUTION INTRAVENOUS at 23:21

## 2020-07-21 RX ADMIN — HYDROCHLOROTHIAZIDE 12.5 MG: 12.5 TABLET ORAL at 09:07

## 2020-07-21 RX ADMIN — LORAZEPAM 1 MG: 1 TABLET ORAL at 20:58

## 2020-07-21 RX ADMIN — ENOXAPARIN SODIUM 40 MG: 40 INJECTION SUBCUTANEOUS at 09:07

## 2020-07-21 RX ADMIN — LORAZEPAM 1 MG: 1 TABLET ORAL at 12:20

## 2020-07-21 RX ADMIN — ASPIRIN 81 MG: 81 TABLET, COATED ORAL at 09:07

## 2020-07-21 RX ADMIN — ATENOLOL 50 MG: 50 TABLET ORAL at 09:08

## 2020-07-21 ASSESSMENT — PAIN SCALES - GENERAL
PAINLEVEL_OUTOF10: 0
PAINLEVEL_OUTOF10: 7
PAINLEVEL_OUTOF10: 0
PAINLEVEL_OUTOF10: 0

## 2020-07-21 NOTE — PROGRESS NOTES
education  Pt educated on role of PT eval and sequencing to use fww safely. Patient response to education:   Pt verbalized understanding Pt demonstrated skill Pt requires further education in this area   yes       ASSESSMENT:    Comments:  Pt was found supine when went into the room. Pt was eager to get up on own and start walking and did not fatigue with longer ambulation distance. Pt walked to bathroom and able to toilet self. Pt left on EOB at end of tx and given food tray and call light. Treatment:  Patient practiced and was instructed in the following treatment:    Bed mobility training - pt able to complete task without any cues  Assistive device training - pt used proper hand placement while ambulating  STS and transfer training - educated on hand/foot placement, safety, and sequencing during STS and pivot transfers using assistive device  Gait training - was able to correct gait quickly with proper hand placements            PLAN:    PT care will be provided in accordance with the objectives noted above. Exercises and functional mobility practice will be used as well as appropriate assistive devices or modalities to obtain goals. Patient and family education will also be administered as needed. Frequency of treatments: 2-5x/week x 1-2 weeks.     Time in  840  Time out  900    Total Treatment Time  20 minutes       CPT codes:  [] Low Complexity PT evaluation 89545  [] Moderate Complexity PT evaluation 37343  [] High Complexity PT evaluation 42989  [] PT Re-evaluation 91838  [] Gait training 63558 - minutes  [] Manual therapy 60501 - minutes  [x] Therapeutic activities 74470 20 minutes  [] Therapeutic exercises 01529 - minutes  [] Neuromuscular reeducation 93457 - minutes     Merlinda Floro [id number]

## 2020-07-21 NOTE — PROGRESS NOTES
Occupational Therapy  OT BEDSIDE TREATMENT NOTE      Date:2020  Patient Name: Michael Albright  MRN: [id number]  : 1926  Room: [address] [place] [address]     Evaluating OT: Zora Debbie. Ruslan 72, OTR/L #[id number]     Referring physician:     Martha Delgado MD       AM-PAC Daily Activity Raw Score:   Recommended Adaptive Equipment: ww, life alert system,     Diagnosis: ESBL    Surgery:   Past Surgical History         Past Surgical History:   Procedure Laterality Date    APPENDECTOMY        HYSTERECTOMY             Pertinent Medical History:   Past Medical History        Past Medical History:   Diagnosis Date    Arthritis      Hypertension      Myocardial infarct ([facility] [address]      UTI (urinary tract infection)          Precautions:  Falls, [place], contact isolation ESBL urine     Home Living: Pt lives alone in a one floor home  with one step(s) to enter and one rail(s); bed/bath on main floor. Bathroom setup: tub shower with seat and HH shower and HR, elevated commode with HR   Equipment owned: stw, shower seat  Prior Level of Function:   Mod I  with ADLs , Mod I  with IADLs; using no AD usually and spc and stw prn recently for ambulation. Driving: no family assists                           Medication Management self  Occupation: enjoys reading     Pain Level: No pain reported at this time. Cognition: A&O: 3/3; Follows 2 step directions              Memory:  good               Sequencing:  good               Problem solving:  good               Judgement/safety:  fair+                 Functional Assessment:    Initial Eval Status  Date: 20 Treatment Status  Date: 20 Short Term Goals=LTG  Treatment frequency: PRN 1-3 x/week   Feeding Independent    Independent      Grooming Setup sitting SBA;    Pt stood up at the sink to wash face, brush denture/mouth and comb hair with SBA.     Mod I with ww standing    UB Dressing setup  Setup   Independent   LB Dressing Min A   SBA    To don socks while seated EOB   Mod I

## 2020-07-21 NOTE — PROGRESS NOTES
POWER MIDLINE CATH Placement 7/21/2020    Product number:  [id number]   Lot Number: [id number]      Ultrasound: YES   Anatomy; iv placement site: RIGHT BRACHIAL VEIN                Upper Arm Circumference: 28 CM    Size: 4.5 FR SL    Exposed Length: 3 CM    Internal Length: 12 CM   Cut: 0 CM   Vein Measurement: 0.50 CM    Ray Vargas  7/21/2020  2:59 PM    PLACED PER Maggi Plata RN

## 2020-07-21 NOTE — PROGRESS NOTES
Active Problems:    ESBL (extended spectrum beta-lactamase) producing bacteria infection  Resolved Problems:    * No resolved hospital problems. *    Blood pressure 123/60, pulse 79, temperature 98.3 °F (36.8 °C), temperature source Temporal, resp. rate 18, height 5' 5\" (1.651 m), weight 143 lb (64.9 kg), last menstrual period 05/16/2018, SpO2 94 %. Subjective:  Symptoms:  Improved. Diet:  Poor intake. Activity level: Impaired due to pain. Pain:  She complains of pain that is mild. Objective:  General Appearance:  Comfortable. Vital signs: (most recent): Blood pressure 123/60, pulse 79, temperature 98.3 °F (36.8 °C), temperature source Temporal, resp. rate 18, height 5' 5\" (1.651 m), weight 143 lb (64.9 kg), last menstrual period 05/16/2018, SpO2 94 %. No fever. Lungs:  Normal effort and normal respiratory rate. Breath sounds clear to auscultation. Heart: Normal rate. Regular rhythm. S1 normal and S2 normal.      Assessment:  (ESBL UTI  Hyponatremia  Falls). Plan:   (Meropenem per [facility]  Monitor labs and cultures. Continue rest of meds. ).        Konrad Syed MD  7/21/2020

## 2020-07-21 NOTE — PROGRESS NOTES
[address] [place] [address] [facility]  Progress Note      Chief Complaint   Patient presents with    Fatigue     chronic UTI with antbx resistance, has appt at [facility] on monday but states cannot wait this long; dysuria/frequency       SUBJECTIVE:      Patient is tolerating medications. No reported adverse drug reactions. No problems overnight. Review of systems:    As stated above in the chief complaint, otherwise negative. Medications:    Scheduled Meds:   docusate sodium  100 mg Oral Daily    meropenem  1 g Intravenous Q12H    aspirin  81 mg Oral Daily    atenolol  50 mg Oral Daily    atorvastatin  10 mg Oral Daily    hydrochlorothiazide  12.5 mg Oral Daily    isosorbide mononitrate  30 mg Oral Daily    oxybutynin  15 mg Oral Once per day on Mon Thu    enoxaparin  40 mg Subcutaneous Daily     Continuous Infusions:  PRN Meds:calcium carbonate, magnesium hydroxide, bisacodyl, LORazepam, traMADol  Prior to Admission medications    Medication Sig Start Date End Date Taking? Authorizing Provider   traMADol (ULTRAM) 50 MG tablet Take 50 mg by mouth every 6 hours as needed for Pain. .   Yes Historical Provider, MD   hydroCHLOROthiazide (MICROZIDE) 12.5 MG capsule Take 12.5 mg by mouth daily    Historical Provider, MD   oxybutynin (DITROPAN XL) 15 MG extended release tablet Take 15 mg by mouth Twice a Week    Historical Provider, MD   Isosorbide Mononitrate (IMDUR PO) Take 30 mg by mouth daily     Historical Provider, MD   ATENOLOL PO Take 50 mg by mouth daily     Historical Provider, MD   aspirin 81 MG tablet Take 81 mg by mouth daily    Historical Provider, MD   Atorvastatin Calcium (LIPITOR PO) Take 10 mg by mouth daily     Historical Provider, MD   LORazepam (ATIVAN PO) Take 1 mg by mouth 2 times daily as needed     Historical Provider, MD       OBJECTIVE:  BP (!) 145/67   Pulse 99   Temp 97.3 °F (36.3 °C) (Temporal)   Resp 17   Ht 5' 5\" (1.651 m)   Wt 143 lb (64.9 kg)   LMP 2018   SpO2 98%   BMI 23.80 kg/m²   Temp  Av.7 °F (36.5 °C)  Min: 97.3 °F (36.3 °C)  Max: 98.3 °F (36.8 °C)  General appearance: Resting in bed in no apparent distress. Skin: Warm and dry. No rashes were noted. HEENT: Round and reactive pupils. Moist mucous membranes. No ulcerations or thrush. Neck: Supple to movements. Chest: No use of accessory muscles to breathe. Symmetrical expansion. No wheezing, crackles or rhonchi. Cardiovascular: Reguar rate and rhythm. No murmurs gallops, or rubs appreciated. Abdomen: Bowel sounds present, nontender, nondistended, no masses or hepatosplenomegaly. Extremities: No clubbing, no cyanosis, no edema. Lines: peripheral    I/O last 3 completed shifts: In: 240 [P.O.:240]  Out: 2500 [Urine:2500]      Laboratory and Tests Review:      Lab Results   Component Value Date    WBC 5.0 2020    WBC 5.7 2020    WBC 6.1 2020    HGB 13.0 2020    HCT 39.0 2020    MCV 93.3 2020     2020     Lab Results   Component Value Date    NEUTROABS 4.65 2020    NEUTROABS 4.08 2018     No results found for: [facility]  Lab Results   Component Value Date    ALT 16 2020    AST 28 2020    ALKPHOS 83 2020    BILITOT 0.6 2020     Lab Results   Component Value Date     2020    K 3.4 2020    CL 89 2020    CO2 31 2020    BUN 14 2020    CREATININE 0.7 2020    CREATININE 0.8 2020    CREATININE 0.7 2020    GFRAA >60 2020    LABGLOM >60 2020    GLUCOSE 91 2020    PROT 7.4 2020    LABALBU 4.4 2020    CALCIUM 9.4 2020    BILITOT 0.6 2020    ALKPHOS 83 2020    AST 28 2020    ALT 16 2020     No results found for: CRP  No results found for: [address]    Radiology:    US RETROPERITONEAL COMPLETE   Final Result   No evidence of hydronephrosis.          XR HUMERUS LEFT (MIN 2 VIEWS)   Final Result   No definite producing bacteria infection  Resolved Problems:    * No resolved hospital problems. *      ASSESSMENT:    ESBL UTI MDR E.  Coli   History of falls   WBC 5700   afebrile  Plan:   Cont merrem for seven days Day #5   She needs two more days of antibiotics  Talked to   Talked to daughter   US of the kidneys : no evidence of hydronephrosis   Check cultures   Baseline ESR, CRP   Monitor labs   Will follow with you          TJ Mcpherson DO    11:40 AM  7/21/2020

## 2020-07-22 VITALS
RESPIRATION RATE: 19 BRPM | BODY MASS INDEX: 23.82 KG/M2 | WEIGHT: 143 LBS | DIASTOLIC BLOOD PRESSURE: 74 MMHG | HEART RATE: 84 BPM | HEIGHT: 65 IN | TEMPERATURE: 97.3 F | SYSTOLIC BLOOD PRESSURE: 168 MMHG | OXYGEN SATURATION: 98 %

## 2020-07-22 LAB — SARS-COV-2, NAAT: NOT DETECTED

## 2020-07-22 PROCEDURE — 6360000002 HC RX W HCPCS: Performed by: FAMILY MEDICINE

## 2020-07-22 PROCEDURE — 6360000002 HC RX W HCPCS: Performed by: INTERNAL MEDICINE

## 2020-07-22 PROCEDURE — 6370000000 HC RX 637 (ALT 250 FOR IP): Performed by: FAMILY MEDICINE

## 2020-07-22 PROCEDURE — U0002 COVID-19 LAB TEST NON-CDC: HCPCS

## 2020-07-22 PROCEDURE — 2580000003 HC RX 258: Performed by: INTERNAL MEDICINE

## 2020-07-22 RX ORDER — MEROPENEM 500 MG/1
1 INJECTION, POWDER, FOR SOLUTION INTRAVENOUS EVERY 12 HOURS
Qty: 60 G | Refills: 1
Start: 2020-07-22 | End: 2020-07-23

## 2020-07-22 RX ORDER — PSEUDOEPHEDRINE HCL 30 MG
100 TABLET ORAL DAILY
Qty: 30 CAPSULE | Refills: 0
Start: 2020-07-22

## 2020-07-22 RX ORDER — BISACODYL 10 MG
10 SUPPOSITORY, RECTAL RECTAL DAILY PRN
Qty: 30 SUPPOSITORY | Refills: 0
Start: 2020-07-22 | End: 2020-08-21

## 2020-07-22 RX ADMIN — LORAZEPAM 1 MG: 1 TABLET ORAL at 09:27

## 2020-07-22 RX ADMIN — SODIUM CHLORIDE, PRESERVATIVE FREE 10 ML: 5 INJECTION INTRAVENOUS at 09:30

## 2020-07-22 RX ADMIN — ATORVASTATIN CALCIUM 10 MG: 10 TABLET, FILM COATED ORAL at 09:27

## 2020-07-22 RX ADMIN — ASPIRIN 81 MG: 81 TABLET, COATED ORAL at 09:27

## 2020-07-22 RX ADMIN — MEROPENEM 1 G: 1 INJECTION, POWDER, FOR SOLUTION INTRAVENOUS at 11:11

## 2020-07-22 RX ADMIN — ENOXAPARIN SODIUM 40 MG: 40 INJECTION SUBCUTANEOUS at 09:00

## 2020-07-22 RX ADMIN — TRAMADOL HYDROCHLORIDE 50 MG: 50 TABLET, FILM COATED ORAL at 09:27

## 2020-07-22 RX ADMIN — HYDROCHLOROTHIAZIDE 12.5 MG: 12.5 TABLET ORAL at 09:27

## 2020-07-22 RX ADMIN — SODIUM CHLORIDE, PRESERVATIVE FREE 100 UNITS: 5 INJECTION INTRAVENOUS at 09:00

## 2020-07-22 RX ADMIN — DOCUSATE SODIUM 100 MG: 100 CAPSULE, LIQUID FILLED ORAL at 09:27

## 2020-07-22 RX ADMIN — ISOSORBIDE MONONITRATE 30 MG: 30 TABLET ORAL at 09:27

## 2020-07-22 RX ADMIN — ATENOLOL 50 MG: 50 TABLET ORAL at 09:27

## 2020-07-22 ASSESSMENT — PAIN SCALES - GENERAL
PAINLEVEL_OUTOF10: 4
PAINLEVEL_OUTOF10: 0
PAINLEVEL_OUTOF10: 5

## 2020-07-22 NOTE — PROGRESS NOTES
extended release tablet 15 mg  15 mg Oral Once per day on Mon Thu Yue Peralta MD   15 mg at 07/20/20 8608    traMADol (ULTRAM) tablet 50 mg  50 mg Oral Q6H PRN Yue Peralta MD   50 mg at 07/21/20 0908    enoxaparin (LOVENOX) injection 40 mg  40 mg Subcutaneous Daily Yue Peralta MD   40 mg at 07/21/20 0907     Allergies   Allergen Reactions    Sulfa Antibiotics Hives     Active Problems:    ESBL (extended spectrum beta-lactamase) producing bacteria infection  Resolved Problems:    * No resolved hospital problems. *    Blood pressure 133/62, pulse 76, temperature 97.5 °F (36.4 °C), temperature source Temporal, resp. rate 18, height 5' 5\" (1.651 m), weight 143 lb (64.9 kg), last menstrual period 05/16/2018, SpO2 98 %. Subjective:  Symptoms:  Improved. Diet:  Poor intake. Activity level: Impaired due to pain. Pain:  She complains of pain that is mild. Objective:  General Appearance:  Comfortable. Vital signs: (most recent): Blood pressure 133/62, pulse 76, temperature 97.5 °F (36.4 °C), temperature source Temporal, resp. rate 18, height 5' 5\" (1.651 m), weight 143 lb (64.9 kg), last menstrual period 05/16/2018, SpO2 98 %. No fever. Lungs:  Normal effort and normal respiratory rate. Breath sounds clear to auscultation. Heart: Normal rate. Regular rhythm. S1 normal and S2 normal.      Assessment:  (ESBL UTI  Hyponatremia  Falls). Plan:   (Meropenem per [facility]  Monitor labs and cultures. Continue rest of meds. ).        Yue Peralta MD  7/22/2020

## 2020-07-22 NOTE — DISCHARGE SUMMARY
Physician Discharge Summary     Patient ID:  Cain Valerie  [id number]  54 y.o.  8/27/1926    Admit date: 7/16/2020    Discharge date and time: 7/22/20     Admitting Physician: Sabiha Domingo MD     Discharge Physician: Salvador Pham MD    Admission Diagnoses: ESBL (extended spectrum beta-lactamase) producing bacteria infection [A49.9, Z16.12]    Discharge Diagnoses: SAME    Admission Condition: fair    Discharged Condition: stable    Indication for Admission: ESBL UTI    Hospital Course: Patient admitted after failed oral Abx. Patient on meropenem with ID and did well. Consults: [facility]    Significant Diagnostic Studies:culture    Treatments: as above    Discharge Exam:  See note    Disposition: long term care facility    In process/preliminary results:  Outstanding Order Results     No orders found from 6/17/2020 to 7/17/2020. Patient Instructions:   Current Discharge Medication List      START taking these medications    Details   bisacodyl (DULCOLAX) 10 MG suppository Place 1 suppository rectally daily as needed for Constipation  Qty: 30 suppository, Refills: 0      docusate sodium (COLACE, DULCOLAX) 100 MG CAPS Take 100 mg by mouth daily  Qty: 30 capsule, Refills: 0         CONTINUE these medications which have NOT CHANGED    Details   traMADol (ULTRAM) 50 MG tablet Take 50 mg by mouth every 6 hours as needed for Pain. .      hydroCHLOROthiazide (MICROZIDE) 12.5 MG capsule Take 12.5 mg by mouth daily      oxybutynin (DITROPAN XL) 15 MG extended release tablet Take 15 mg by mouth Twice a Week      Isosorbide Mononitrate (IMDUR PO) Take 30 mg by mouth daily       ATENOLOL PO Take 50 mg by mouth daily       aspirin 81 MG tablet Take 81 mg by mouth daily      Atorvastatin Calcium (LIPITOR PO) Take 10 mg by mouth daily       LORazepam (ATIVAN PO) Take 1 mg by mouth 2 times daily as needed            Activity: activity as tolerated  Diet: cardiac diet  Wound Care: none needed    Follow-up with facility    Signed:  Nicole Min MD  7/22/2020  8:48 AM

## 2020-07-22 NOTE — PROGRESS NOTES
[address] [place] [address] [facility]  Progress Note      Chief Complaint   Patient presents with    Fatigue     chronic UTI with antbx resistance, [facility] appt at [facility] on monday but states cannot wait this long; dysuria/frequency       SUBJECTIVE:      Patient is tolerating medications. No reported adverse drug reactions. No problems overnight. Review of systems:    As stated above in the chief complaint, otherwise negative. Medications:    Scheduled Meds:   lidocaine  5 mL Intradermal Once    heparin flush  1 mL Intravenous 2 times per day    docusate sodium  100 mg Oral Daily    meropenem  1 g Intravenous Q12H    aspirin  81 mg Oral Daily    atenolol  50 mg Oral Daily    atorvastatin  10 mg Oral Daily    hydrochlorothiazide  12.5 mg Oral Daily    isosorbide mononitrate  30 mg Oral Daily    oxybutynin  15 mg Oral Once per day on Mon Thu    enoxaparin  40 mg Subcutaneous Daily     Continuous Infusions:  PRN Meds:sodium chloride flush, heparin flush, calcium carbonate, magnesium hydroxide, bisacodyl, LORazepam, traMADol  Prior to Admission medications    Medication Sig Start Date End Date Taking? Authorizing Provider   bisacodyl (DULCOLAX) 10 MG suppository Place 1 suppository rectally daily as needed for Constipation 7/22/20 8/21/20 Yes Wander Ngo MD   docusate sodium (COLACE, DULCOLAX) 100 MG CAPS Take 100 mg by mouth daily 7/22/20  Yes Wander Ngo MD   meropenem (MERREM) 500 MG injection Infuse 1 g intravenously every 12 hours for 1 day 7/22/20 7/23/20 Yes Jose Ramon Patton MD   traMADol (ULTRAM) 50 MG tablet Take 50 mg by mouth every 6 hours as needed for Pain. .   Yes Historical Provider, MD   hydroCHLOROthiazide (MICROZIDE) 12.5 MG capsule Take 12.5 mg by mouth daily    Historical Provider, MD   oxybutynin (DITROPAN XL) 15 MG extended release tablet Take 15 mg by mouth Twice a Week    Historical Provider, MD   Isosorbide Mononitrate (IMDUR PO) Take 30 mg by mouth daily     Historical Provider, MD   ATENOLOL PO Take 50 mg by mouth daily     Historical Provider, MD   aspirin 81 MG tablet Take 81 mg by mouth daily    Historical Provider, MD   Atorvastatin Calcium (LIPITOR PO) Take 10 mg by mouth daily     Historical Provider, MD   LORazepam (ATIVAN PO) Take 1 mg by mouth 2 times daily as needed     Historical Provider, MD       OBJECTIVE:  BP (!) 168/74   Pulse 84   Temp 97.6 °F (36.4 °C) (Temporal)   Resp 19   Ht 5' 5\" (1.651 m)   Wt 143 lb (64.9 kg)   LMP 2018   SpO2 98%   BMI 23.80 kg/m²   Temp  Av.5 °F (36.4 °C)  Min: 97.3 °F (36.3 °C)  Max: 97.6 °F (36.4 °C)  General appearance: Resting in bed in no apparent distress. Skin: Warm and dry. No rashes were noted. HEENT: Round and reactive pupils. Moist mucous membranes. No ulcerations or thrush. Neck: Supple to movements. Chest: No use of accessory muscles to breathe. Symmetrical expansion. No wheezing, crackles or rhonchi. Cardiovascular: Reguar rate and rhythm. No murmurs gallops, or rubs appreciated. Abdomen: Bowel sounds present, nontender, nondistended, no masses or hepatosplenomegaly. Extremities: No clubbing, no cyanosis, no edema.   Lines: peripheral    I/O last 3 completed shifts:  In: -   Out: 2800 [Urine:2800]      Laboratory and Tests Review:      Lab Results   Component Value Date    WBC 5.0 2020    WBC 5.7 2020    WBC 6.1 2020    HGB 13.0 2020    HCT 39.0 2020    MCV 93.3 2020     2020     Lab Results   Component Value Date    NEUTROABS 4.65 2020    NEUTROABS 4.08 2018     No results found for: [facility]  Lab Results   Component Value Date    ALT 16 2020    AST 28 2020    ALKPHOS 83 2020    BILITOT 0.6 2020     Lab Results   Component Value Date     2020    K 3.4 2020    CL 89 2020    CO2 31 2020    BUN 14 2020    CREATININE 0.7 2020    CREATININE 0.8 07/20/2020    CREATININE 0.7 07/19/2020    GFRAA >60 07/21/2020    LABGLOM >60 07/21/2020    GLUCOSE 91 07/21/2020    PROT 7.4 07/16/2020    LABALBU 4.4 07/16/2020    CALCIUM 9.4 07/21/2020    BILITOT 0.6 07/16/2020    ALKPHOS 83 07/16/2020    AST 28 07/16/2020    ALT 16 07/16/2020     No results found for: CRP  No results found for: [address]    Radiology:    [address] [facility]   Final Result   No evidence of hydronephrosis. XR HUMERUS LEFT (MIN 2 VIEWS)   Final Result   No definite acute fracture or dislocation. See above. Osteopenia. Osteoarthritis the left shoulder. XR ELBOW LEFT (MIN 3 VIEWS)   Final Result   No definite acute fracture or dislocation. See above. Osteopenia. Osteoarthritis the left shoulder. Microbiology:   Lab Results   Component Value Date    BC 5 Days no growth 07/16/2020    ORG Escherichia coli 07/16/2020    ORG Escherichia coli 06/23/2020    ORG Escherichia coli 06/09/2020     Lab Results   Component Value Date    BLOODCULT2 5 Days no growth 07/16/2020    ORG Escherichia coli 07/16/2020    ORG Escherichia coli 06/23/2020    ORG Escherichia coli 06/09/2020     No results found for: WNDABS  No results found for: RESPSMEAR  No results found for: MPNEUMO, CLAMYDCU, LABLEGI, AFBCX, FUNGSM, LABFUNG  No results found for: CULTRESP  No results found for: CXCATHTIP  No results found for: BFCS  No results found for: CXSURG  Urine Culture, Routine   Date Value Ref Range Status   07/16/2020   Final    >100,000 CFU/ml  This organism possesses an Extended Spectrum Beta Lactamase  that is inhibited by Clavulanic Acid. This isolate demonstrated resistance to multiple classes of  antibiotics. Please review results with care and follow  isolation guidelines as indicated. 06/23/2020   Final    >100,000 CFU/ml  This organism possesses an Extended Spectrum Beta Lactamase  that is inhibited by Clavulanic Acid.   This isolate demonstrated resistance to multiple classes of  antibiotics. Please review results with care and follow  isolation guidelines as indicated. 06/09/2020   Final    >100,000 CFU/ml  This organism possesses an Extended Spectrum Beta Lactamase  that is inhibited by Clavulanic Acid. This isolate demonstrated resistance to multiple classes of  antibiotics. Please review results with care and follow  isolation guidelines as indicated. No results found for: [facility]    Problem list:    Active Problems:    ESBL (extended spectrum beta-lactamase) producing bacteria infection  Resolved Problems:    * No resolved hospital problems. *      ASSESSMENT:  ESBL UTI MDR E.  Coli   History of falls   WBC 5700   afebrile  Plan:   Cont merrem for seven days Day #6  She needs two more days of antibiotics   Talked to    Talked to daughter   US of the kidneys : no evidence of hydronephrosis   Check cultures   Baseline ESR, CRP   Monitor labs   Will follow with you  She is leaving today to finish antibiotics at [address]    10:41 AM  7/22/2020

## 2020-07-22 NOTE — CARE COORDINATION
7/22/2020 social work transition of care planning  Sw notified that margret received auth for pt. Sw messaged physician for discharge order. Awatiing Id to place med on med rec. Sw will work with facility to set up transport time. Electronically signed by TONY Galeano on 7/22/2020 at 8:56 AM     Addendum: Facility to transport at noon today. Nurse and dtr notified of pt transport.   Electronically signed by TONY Galeano on 7/22/2020 at 9:43 AM

## 2020-10-07 ENCOUNTER — HOSPITAL ENCOUNTER (INPATIENT)
Age: 85
LOS: 7 days | Discharge: HOME HEALTH | DRG: 479 | End: 2020-10-14
Attending: FAMILY MEDICINE | Admitting: INTERNAL MEDICINE
Payer: MEDICARE

## 2020-10-07 ENCOUNTER — APPOINTMENT (OUTPATIENT)
Dept: CT IMAGING | Age: 85
DRG: 479 | End: 2020-10-07
Payer: MEDICARE

## 2020-10-07 PROBLEM — S22.000A COMPRESSION FX, THORACIC SPINE, CLOSED, INITIAL ENCOUNTER (HCC): Status: ACTIVE | Noted: 2020-10-07

## 2020-10-07 LAB
ALBUMIN SERPL-MCNC: 3.8 G/DL (ref 3.5–5.2)
ALP BLD-CCNC: 104 U/L (ref 35–104)
ALT SERPL-CCNC: 15 U/L (ref 0–32)
ANION GAP SERPL CALCULATED.3IONS-SCNC: 12 MMOL/L (ref 7–16)
AST SERPL-CCNC: 30 U/L (ref 0–31)
BACTERIA: ABNORMAL /HPF
BASOPHILS ABSOLUTE: 0.05 E9/L (ref 0–0.2)
BASOPHILS RELATIVE PERCENT: 0.8 % (ref 0–2)
BILIRUB SERPL-MCNC: 0.8 MG/DL (ref 0–1.2)
BILIRUBIN URINE: NEGATIVE
BLOOD, URINE: ABNORMAL
BUN BLDV-MCNC: 12 MG/DL (ref 8–23)
CALCIUM SERPL-MCNC: 9.7 MG/DL (ref 8.6–10.2)
CHLORIDE BLD-SCNC: 92 MMOL/L (ref 98–107)
CLARITY: CLEAR
CO2: 31 MMOL/L (ref 22–29)
COLOR: YELLOW
CREAT SERPL-MCNC: 0.9 MG/DL (ref 0.5–1)
EOSINOPHILS ABSOLUTE: 0.16 E9/L (ref 0.05–0.5)
EOSINOPHILS RELATIVE PERCENT: 2.6 % (ref 0–6)
GFR AFRICAN AMERICAN: >60
GFR NON-AFRICAN AMERICAN: 58 ML/MIN/1.73
GLUCOSE BLD-MCNC: 129 MG/DL (ref 74–99)
GLUCOSE URINE: NEGATIVE MG/DL
HCT VFR BLD CALC: 41.1 % (ref 34–48)
HEMOGLOBIN: 14.3 G/DL (ref 11.5–15.5)
IMMATURE GRANULOCYTES #: 0.02 E9/L
IMMATURE GRANULOCYTES %: 0.3 % (ref 0–5)
KETONES, URINE: NEGATIVE MG/DL
LEUKOCYTE ESTERASE, URINE: ABNORMAL
LYMPHOCYTES ABSOLUTE: 1.07 E9/L (ref 1.5–4)
LYMPHOCYTES RELATIVE PERCENT: 17.1 % (ref 20–42)
MCH RBC QN AUTO: 31.2 PG (ref 26–35)
MCHC RBC AUTO-ENTMCNC: 34.8 % (ref 32–34.5)
MCV RBC AUTO: 89.7 FL (ref 80–99.9)
MONOCYTES ABSOLUTE: 0.73 E9/L (ref 0.1–0.95)
MONOCYTES RELATIVE PERCENT: 11.7 % (ref 2–12)
NEUTROPHILS ABSOLUTE: 4.21 E9/L (ref 1.8–7.3)
NEUTROPHILS RELATIVE PERCENT: 67.5 % (ref 43–80)
NITRITE, URINE: NEGATIVE
PDW BLD-RTO: 12.6 FL (ref 11.5–15)
PH UA: 8 (ref 5–9)
PLATELET # BLD: 244 E9/L (ref 130–450)
PMV BLD AUTO: 10.2 FL (ref 7–12)
POTASSIUM REFLEX MAGNESIUM: 3.8 MMOL/L (ref 3.5–5)
PROTEIN UA: NEGATIVE MG/DL
RBC # BLD: 4.58 E12/L (ref 3.5–5.5)
RBC UA: ABNORMAL /HPF (ref 0–2)
SODIUM BLD-SCNC: 135 MMOL/L (ref 132–146)
SPECIFIC GRAVITY UA: 1.01 (ref 1–1.03)
TOTAL PROTEIN: 7.2 G/DL (ref 6.4–8.3)
UROBILINOGEN, URINE: 0.2 E.U./DL
WBC # BLD: 6.2 E9/L (ref 4.5–11.5)
WBC UA: ABNORMAL /HPF (ref 0–5)

## 2020-10-07 PROCEDURE — 36415 COLL VENOUS BLD VENIPUNCTURE: CPT

## 2020-10-07 PROCEDURE — 2580000003 HC RX 258: Performed by: INTERNAL MEDICINE

## 2020-10-07 PROCEDURE — 96374 THER/PROPH/DIAG INJ IV PUSH: CPT

## 2020-10-07 PROCEDURE — 81001 URINALYSIS AUTO W/SCOPE: CPT

## 2020-10-07 PROCEDURE — 85025 COMPLETE CBC W/AUTO DIFF WBC: CPT

## 2020-10-07 PROCEDURE — 72131 CT LUMBAR SPINE W/O DYE: CPT

## 2020-10-07 PROCEDURE — 6360000002 HC RX W HCPCS: Performed by: INTERNAL MEDICINE

## 2020-10-07 PROCEDURE — 80053 COMPREHEN METABOLIC PANEL: CPT

## 2020-10-07 PROCEDURE — 74177 CT ABD & PELVIS W/CONTRAST: CPT

## 2020-10-07 PROCEDURE — 2060000000 HC ICU INTERMEDIATE R&B

## 2020-10-07 PROCEDURE — 99284 EMERGENCY DEPT VISIT MOD MDM: CPT

## 2020-10-07 PROCEDURE — 6360000004 HC RX CONTRAST MEDICATION: Performed by: STUDENT IN AN ORGANIZED HEALTH CARE EDUCATION/TRAINING PROGRAM

## 2020-10-07 PROCEDURE — 6360000002 HC RX W HCPCS: Performed by: FAMILY MEDICINE

## 2020-10-07 PROCEDURE — 6360000002 HC RX W HCPCS: Performed by: EMERGENCY MEDICINE

## 2020-10-07 PROCEDURE — 96375 TX/PRO/DX INJ NEW DRUG ADDON: CPT

## 2020-10-07 PROCEDURE — 2580000003 HC RX 258: Performed by: STUDENT IN AN ORGANIZED HEALTH CARE EDUCATION/TRAINING PROGRAM

## 2020-10-07 RX ORDER — PROMETHAZINE HYDROCHLORIDE 25 MG/1
12.5 TABLET ORAL EVERY 6 HOURS PRN
Status: DISCONTINUED | OUTPATIENT
Start: 2020-10-07 | End: 2020-10-14 | Stop reason: HOSPADM

## 2020-10-07 RX ORDER — SODIUM CHLORIDE 0.9 % (FLUSH) 0.9 %
10 SYRINGE (ML) INJECTION PRN
Status: COMPLETED | OUTPATIENT
Start: 2020-10-07 | End: 2020-10-07

## 2020-10-07 RX ORDER — ACETAMINOPHEN 325 MG/1
650 TABLET ORAL EVERY 6 HOURS PRN
Status: DISCONTINUED | OUTPATIENT
Start: 2020-10-07 | End: 2020-10-14 | Stop reason: HOSPADM

## 2020-10-07 RX ORDER — ACETAMINOPHEN 650 MG/1
650 SUPPOSITORY RECTAL EVERY 6 HOURS PRN
Status: DISCONTINUED | OUTPATIENT
Start: 2020-10-07 | End: 2020-10-14 | Stop reason: HOSPADM

## 2020-10-07 RX ORDER — KETOROLAC TROMETHAMINE 30 MG/ML
15 INJECTION, SOLUTION INTRAMUSCULAR; INTRAVENOUS ONCE
Status: COMPLETED | OUTPATIENT
Start: 2020-10-07 | End: 2020-10-07

## 2020-10-07 RX ORDER — SODIUM CHLORIDE 0.9 % (FLUSH) 0.9 %
10 SYRINGE (ML) INJECTION PRN
Status: DISCONTINUED | OUTPATIENT
Start: 2020-10-07 | End: 2020-10-14 | Stop reason: HOSPADM

## 2020-10-07 RX ORDER — ISOSORBIDE MONONITRATE 30 MG/1
30 TABLET, EXTENDED RELEASE ORAL DAILY
Status: DISCONTINUED | OUTPATIENT
Start: 2020-10-08 | End: 2020-10-14 | Stop reason: HOSPADM

## 2020-10-07 RX ORDER — SODIUM CHLORIDE 0.9 % (FLUSH) 0.9 %
10 SYRINGE (ML) INJECTION EVERY 12 HOURS SCHEDULED
Status: DISCONTINUED | OUTPATIENT
Start: 2020-10-07 | End: 2020-10-14 | Stop reason: HOSPADM

## 2020-10-07 RX ORDER — ACETAMINOPHEN 650 MG/1
650 SUPPOSITORY RECTAL EVERY 6 HOURS PRN
Status: DISCONTINUED | OUTPATIENT
Start: 2020-10-07 | End: 2020-10-07

## 2020-10-07 RX ORDER — MORPHINE SULFATE 2 MG/ML
2 INJECTION, SOLUTION INTRAMUSCULAR; INTRAVENOUS EVERY 4 HOURS PRN
Status: DISCONTINUED | OUTPATIENT
Start: 2020-10-07 | End: 2020-10-07 | Stop reason: SDUPTHER

## 2020-10-07 RX ORDER — ONDANSETRON 2 MG/ML
4 INJECTION INTRAMUSCULAR; INTRAVENOUS EVERY 6 HOURS PRN
Status: DISCONTINUED | OUTPATIENT
Start: 2020-10-07 | End: 2020-10-14 | Stop reason: HOSPADM

## 2020-10-07 RX ORDER — TRAMADOL HYDROCHLORIDE 50 MG/1
50 TABLET ORAL EVERY 6 HOURS PRN
Status: DISCONTINUED | OUTPATIENT
Start: 2020-10-07 | End: 2020-10-14 | Stop reason: HOSPADM

## 2020-10-07 RX ORDER — ACETAMINOPHEN 325 MG/1
650 TABLET ORAL EVERY 6 HOURS PRN
Status: DISCONTINUED | OUTPATIENT
Start: 2020-10-07 | End: 2020-10-07

## 2020-10-07 RX ORDER — MORPHINE SULFATE 2 MG/ML
1 INJECTION, SOLUTION INTRAMUSCULAR; INTRAVENOUS EVERY 4 HOURS PRN
Status: DISCONTINUED | OUTPATIENT
Start: 2020-10-07 | End: 2020-10-14 | Stop reason: HOSPADM

## 2020-10-07 RX ORDER — SENNA PLUS 8.6 MG/1
1 TABLET ORAL DAILY PRN
Status: DISCONTINUED | OUTPATIENT
Start: 2020-10-07 | End: 2020-10-07 | Stop reason: SDUPTHER

## 2020-10-07 RX ORDER — MORPHINE SULFATE 2 MG/ML
1 INJECTION, SOLUTION INTRAMUSCULAR; INTRAVENOUS
Status: DISCONTINUED | OUTPATIENT
Start: 2020-10-07 | End: 2020-10-07 | Stop reason: SDUPTHER

## 2020-10-07 RX ORDER — SENNA PLUS 8.6 MG/1
1 TABLET ORAL DAILY PRN
Status: DISCONTINUED | OUTPATIENT
Start: 2020-10-07 | End: 2020-10-14 | Stop reason: HOSPADM

## 2020-10-07 RX ORDER — LORAZEPAM 2 MG/ML
1 INJECTION INTRAMUSCULAR 2 TIMES DAILY PRN
Status: DISCONTINUED | OUTPATIENT
Start: 2020-10-07 | End: 2020-10-08

## 2020-10-07 RX ORDER — FENTANYL CITRATE 50 UG/ML
25 INJECTION, SOLUTION INTRAMUSCULAR; INTRAVENOUS ONCE
Status: COMPLETED | OUTPATIENT
Start: 2020-10-07 | End: 2020-10-07

## 2020-10-07 RX ORDER — MORPHINE SULFATE 2 MG/ML
2 INJECTION, SOLUTION INTRAMUSCULAR; INTRAVENOUS EVERY 4 HOURS PRN
Status: DISCONTINUED | OUTPATIENT
Start: 2020-10-07 | End: 2020-10-14 | Stop reason: HOSPADM

## 2020-10-07 RX ORDER — ATENOLOL 50 MG/1
50 TABLET ORAL DAILY
Status: DISCONTINUED | OUTPATIENT
Start: 2020-10-08 | End: 2020-10-14 | Stop reason: HOSPADM

## 2020-10-07 RX ADMIN — Medication 10 ML: at 21:30

## 2020-10-07 RX ADMIN — Medication 10 ML: at 15:47

## 2020-10-07 RX ADMIN — FENTANYL CITRATE 25 MCG: 50 INJECTION, SOLUTION INTRAMUSCULAR; INTRAVENOUS at 16:44

## 2020-10-07 RX ADMIN — SODIUM CHLORIDE, PRESERVATIVE FREE 10 ML: 5 INJECTION INTRAVENOUS at 23:31

## 2020-10-07 RX ADMIN — IOPAMIDOL 75 ML: 755 INJECTION, SOLUTION INTRAVENOUS at 15:47

## 2020-10-07 RX ADMIN — LORAZEPAM 1 MG: 2 INJECTION INTRAMUSCULAR; INTRAVENOUS at 23:31

## 2020-10-07 RX ADMIN — KETOROLAC TROMETHAMINE 15 MG: 30 INJECTION, SOLUTION INTRAMUSCULAR; INTRAVENOUS at 14:00

## 2020-10-07 RX ADMIN — MORPHINE SULFATE 2 MG: 2 INJECTION, SOLUTION INTRAMUSCULAR; INTRAVENOUS at 21:30

## 2020-10-07 ASSESSMENT — PAIN SCALES - GENERAL
PAINLEVEL_OUTOF10: 8
PAINLEVEL_OUTOF10: 8
PAINLEVEL_OUTOF10: 5
PAINLEVEL_OUTOF10: 8
PAINLEVEL_OUTOF10: 7
PAINLEVEL_OUTOF10: 9
PAINLEVEL_OUTOF10: 5

## 2020-10-07 ASSESSMENT — PAIN DESCRIPTION - PAIN TYPE
TYPE: ACUTE PAIN
TYPE: ACUTE PAIN

## 2020-10-07 ASSESSMENT — PAIN DESCRIPTION - LOCATION
LOCATION: BACK
LOCATION: BACK

## 2020-10-07 ASSESSMENT — PAIN DESCRIPTION - ORIENTATION
ORIENTATION: LOWER
ORIENTATION: RIGHT;LEFT;LOWER

## 2020-10-07 ASSESSMENT — PAIN DESCRIPTION - PROGRESSION: CLINICAL_PROGRESSION: GRADUALLY WORSENING

## 2020-10-07 ASSESSMENT — PAIN DESCRIPTION - FREQUENCY
FREQUENCY: CONTINUOUS
FREQUENCY: CONTINUOUS

## 2020-10-07 ASSESSMENT — PAIN DESCRIPTION - DESCRIPTORS: DESCRIPTORS: ACHING;CONSTANT;DISCOMFORT;SORE

## 2020-10-07 NOTE — ED NOTES
Nurse to nurse report called to the floor, spoke to Eleazar. Patient's test results reviewed, and vitals signs reported to the receiving nurse. And any and all other important information regarding the patient disclosed.    [phone]     Kiya Bryson RN  10/07/20 7037

## 2020-10-07 NOTE — ED PROVIDER NOTES
Department of Emergency Medicine    ED  Provider Note - Sign out / Oncoming Provider   Admit Date/RoomTime: 10/7/2020 12:08 PM  9:09 PM EDT      I received this patient at sign out from Dr. Ken Carrel  I have discussed the patient's initial exam, treatment and plan of care with the out going physician. I have introduced my self to the patient / family and have answered their questions to this point. I have examined the patient myself and reviewed ordered tests / medications and  reviewed any available results to this point. If a resident is involved in the Emergency Department care, I have discussed my findings and plan with them as well. MDM:     I, Dr. Phill Chi am the primary provider of record    Took over case from Dr. Ken Carrel with scans awaiting. Patient was found to have pancreatic cyst as well as acute to subacute compression fracture of T12. Given that she lived alone and could not likely care for herself, case was discussed with Dr. Narda Castro who agreed to admit the patient. Patient and daughter were made aware and agreeable with plan. Time: 2008  Re-evaluation. Patients symptoms are improving  Repeat physical examination is not changed       --------------------------------- IMPRESSION AND DISPOSITION ---------------------------------    IMPRESSION  1. Pancreatic cyst    2. Abnormal CT scan    3.  [facility]        DISPOSITION  Disposition: Admit to med/surg floor  Patient condition is stable       Jenna Wylie DO  10/08/20 9428

## 2020-10-07 NOTE — ED NOTES
Bed: 10  Expected date:   Expected time:   Means of arrival:   Comments:  lanes Amparo Lava, RN  10/07/20 8396

## 2020-10-07 NOTE — ED PROVIDER NOTES
HPI:  10/7/20,   Time: 1:30 PM EDT         Dub Hodgkins is a 80 y.o. female presenting to the ED for acute onset low back pain and intermittent mid abdominal pain starting 2 nights ago. The back pain is a constant, aching type pain of moderately severe intensity. It does not radiate to the buttocks or legs. The abdominal pain is an achy pain of moderate intensity that is intermittent. The back pain worsens with change of position. She denies paresthesias or weakness of the lower extremities. She denies nausea or vomiting. She had a normal bowel movement yesterday and she denies constipation or diarrhea. She denies urinary symptoms such as dysuria or frequency urgency. Recently within the past few weeks she was hospitalized with a urinary tract infection. ROS:   Pertinent positives and negatives are stated within HPI, all other systems reviewed and are negative.  --------------------------------------------- PAST HISTORY ---------------------------------------------  Past Medical History:  has a past medical history of Arthritis, Hypertension, Myocardial infarct ([address] and UTI (urinary tract infection). Past Surgical History:  has a past surgical history that includes Hysterectomy and Appendectomy. Social History:  reports that she has never smoked. She has never used smokeless tobacco. She reports that she does not drink alcohol. Family History: family history is not on file. The patients home medications have been reviewed. Allergies: Sulfa antibiotics    -------------------------------------------------- RESULTS -------------------------------------------------  All laboratory and radiology results have been personally reviewed by myself   LABS:  No results found for this visit on 10/07/20.     RADIOLOGY:  Interpreted by Radiologist.  Bill    (Results Pending)   CT ABDOMEN PELVIS W IV CONTRAST Additional Contrast? None    (Results Pending) ------------------------- NURSING NOTES AND VITALS REVIEWED ---------------------------   The nursing notes within the ED encounter and vital signs as below have been reviewed. BP (!) 170/100   Pulse 70   Resp 16   Ht 5' 3\" (1.6 m)   Wt 145 lb (65.8 kg)   LMP 05/16/2018   SpO2 96%   BMI 25.69 kg/m²   Oxygen Saturation Interpretation: Normal      ---------------------------------------------------PHYSICAL EXAM--------------------------------------    Constitutional/General: Alert and oriented x3, well appearing, non toxic in NAD  Head: NC/AT  Eyes: PERRL, EOMI  Mouth: Oropharynx clear, handling secretions, no trismus  Neck: Supple, full ROM, no meningeal signs  Pulmonary: Lungs clear to auscultation bilaterally, no wheezes, rales, or rhonchi. Not in respiratory distress  Cardiovascular:  Regular rate and rhythm, no murmurs, gallops, or rubs. 2+ distal pulses  Abdomen: Soft, there is mildly diffuse abdominal tenderness palpation, no hernias or masses organomegaly were palpated, there is no rebound tenderness; Extremities: Moves all extremities x 4. Warm and well perfused  Back:  There is tenderness palpation along the left greater than the right paralumbar areas, the lumbar vertebra and sacrum are nontender. The SI joints are nontender. Skin: warm and dry without rash  Neurologic: GCS 15,  Psych: Normal Affect      ------------------------------ ED COURSE/MEDICAL DECISION MAKING----------------------  Medications   ketorolac (TORADOL) injection 15 mg (15 mg Intravenous Given 10/7/20 1400)         Medical Decision Making:    Patient certainly could have arthritis of the lumbar spine but we will rule out compression fracture. Also do CT scan the abdomen pelvis for abdominal pain of unclear etiology. She will have lab testing and she will get Toradol for pain relief.     The patient was endorsed to Dr. Rolando Jaramillo at [phone] follow-up with lab results and imaging results, final diagnosis and disposition.    --------------------------------- IMPRESSION AND DISPOSITION ---------------------------------    IMPRESSION  Low back pain               Alexei Ashton MD  10/08/20 4465

## 2020-10-07 NOTE — ED NOTES
Patient transport form signed at this time by patient, and placed into the chart.      Laurie Barriga RN  10/07/20 5125

## 2020-10-07 NOTE — ED NOTES
Patient remains [id number] family at the bedside. Patient currently has mask on. Nurse currently has mask and gloves on while providing patient care. Patient resting comfortably. No current complaints/problems stated or noted at this time.       Salomón Muñoz RN  10/07/20 4301

## 2020-10-07 NOTE — ED NOTES
[facility] called for patient transport spoke Darcie Muired will be90 to 2 hrs     Marry Yarbrough, ROBE  10/07/20 9044

## 2020-10-08 PROCEDURE — 1200000000 HC SEMI PRIVATE

## 2020-10-08 PROCEDURE — 6370000000 HC RX 637 (ALT 250 FOR IP): Performed by: INTERNAL MEDICINE

## 2020-10-08 PROCEDURE — 6360000002 HC RX W HCPCS: Performed by: INTERNAL MEDICINE

## 2020-10-08 PROCEDURE — 2580000003 HC RX 258: Performed by: INTERNAL MEDICINE

## 2020-10-08 RX ORDER — LORAZEPAM 1 MG/1
1 TABLET ORAL 2 TIMES DAILY PRN
Status: DISCONTINUED | OUTPATIENT
Start: 2020-10-08 | End: 2020-10-14 | Stop reason: HOSPADM

## 2020-10-08 RX ORDER — OXYCODONE HYDROCHLORIDE AND ACETAMINOPHEN 5; 325 MG/1; MG/1
1 TABLET ORAL EVERY 6 HOURS PRN
Status: DISCONTINUED | OUTPATIENT
Start: 2020-10-08 | End: 2020-10-14 | Stop reason: HOSPADM

## 2020-10-08 RX ADMIN — LORAZEPAM 1 MG: 1 TABLET ORAL at 14:30

## 2020-10-08 RX ADMIN — SODIUM CHLORIDE, PRESERVATIVE FREE 10 ML: 5 INJECTION INTRAVENOUS at 14:18

## 2020-10-08 RX ADMIN — SODIUM CHLORIDE, PRESERVATIVE FREE 10 ML: 5 INJECTION INTRAVENOUS at 03:09

## 2020-10-08 RX ADMIN — OXYCODONE AND ACETAMINOPHEN 1 TABLET: 5; 325 TABLET ORAL at 09:08

## 2020-10-08 RX ADMIN — ATENOLOL 50 MG: 50 TABLET ORAL at 09:08

## 2020-10-08 RX ADMIN — OXYCODONE AND ACETAMINOPHEN 1 TABLET: 5; 325 TABLET ORAL at 19:05

## 2020-10-08 RX ADMIN — LORAZEPAM 1 MG: 1 TABLET ORAL at 20:44

## 2020-10-08 RX ADMIN — Medication 2 MG: at 14:17

## 2020-10-08 RX ADMIN — SODIUM CHLORIDE, PRESERVATIVE FREE 10 ML: 5 INJECTION INTRAVENOUS at 20:44

## 2020-10-08 RX ADMIN — SODIUM CHLORIDE, PRESERVATIVE FREE 10 ML: 5 INJECTION INTRAVENOUS at 09:08

## 2020-10-08 RX ADMIN — ISOSORBIDE MONONITRATE 30 MG: 30 TABLET ORAL at 09:08

## 2020-10-08 RX ADMIN — Medication 2 MG: at 03:09

## 2020-10-08 RX ADMIN — Medication 2 MG: at 20:44

## 2020-10-08 ASSESSMENT — ENCOUNTER SYMPTOMS
SINUS PAIN: 0
ANAL BLEEDING: 0
ABDOMINAL DISTENTION: 0
CHEST TIGHTNESS: 0
BLOOD IN STOOL: 0
PHOTOPHOBIA: 0
SHORTNESS OF BREATH: 0
APNEA: 0
WHEEZING: 0
BACK PAIN: 1
RHINORRHEA: 0
COLOR CHANGE: 0
CONSTIPATION: 0

## 2020-10-08 ASSESSMENT — PAIN DESCRIPTION - ONSET
ONSET: ON-GOING
ONSET: PROGRESSIVE
ONSET: GRADUAL
ONSET: ON-GOING
ONSET: GRADUAL
ONSET: ON-GOING

## 2020-10-08 ASSESSMENT — PAIN DESCRIPTION - LOCATION
LOCATION: BACK

## 2020-10-08 ASSESSMENT — PAIN DESCRIPTION - FREQUENCY
FREQUENCY: CONTINUOUS

## 2020-10-08 ASSESSMENT — PAIN SCALES - GENERAL
PAINLEVEL_OUTOF10: 0
PAINLEVEL_OUTOF10: 0
PAINLEVEL_OUTOF10: 9
PAINLEVEL_OUTOF10: 0
PAINLEVEL_OUTOF10: 10
PAINLEVEL_OUTOF10: 9
PAINLEVEL_OUTOF10: 4
PAINLEVEL_OUTOF10: 3
PAINLEVEL_OUTOF10: 3
PAINLEVEL_OUTOF10: 9
PAINLEVEL_OUTOF10: 6
PAINLEVEL_OUTOF10: 9

## 2020-10-08 ASSESSMENT — PAIN DESCRIPTION - DESCRIPTORS
DESCRIPTORS: ACHING;CONSTANT;DISCOMFORT;SHARP
DESCRIPTORS: ACHING;DISCOMFORT;SHARP
DESCRIPTORS: ACHING;DISCOMFORT;DULL
DESCRIPTORS: ACHING;DISCOMFORT;DULL
DESCRIPTORS: ACHING;CONSTANT;DISCOMFORT;SHARP
DESCRIPTORS: ACHING;CONSTANT;DISCOMFORT;SHARP

## 2020-10-08 ASSESSMENT — PAIN DESCRIPTION - PROGRESSION
CLINICAL_PROGRESSION: NOT CHANGED
CLINICAL_PROGRESSION: GRADUALLY WORSENING
CLINICAL_PROGRESSION: NOT CHANGED
CLINICAL_PROGRESSION: NOT CHANGED

## 2020-10-08 ASSESSMENT — PAIN DESCRIPTION - PAIN TYPE
TYPE: ACUTE PAIN

## 2020-10-08 ASSESSMENT — PAIN - FUNCTIONAL ASSESSMENT
PAIN_FUNCTIONAL_ASSESSMENT: PREVENTS OR INTERFERES WITH ALL ACTIVE AND SOME PASSIVE ACTIVITIES
PAIN_FUNCTIONAL_ASSESSMENT: PREVENTS OR INTERFERES SOME ACTIVE ACTIVITIES AND ADLS
PAIN_FUNCTIONAL_ASSESSMENT: PREVENTS OR INTERFERES WITH ALL ACTIVE AND SOME PASSIVE ACTIVITIES
PAIN_FUNCTIONAL_ASSESSMENT: PREVENTS OR INTERFERES SOME ACTIVE ACTIVITIES AND ADLS

## 2020-10-08 ASSESSMENT — PAIN DESCRIPTION - ORIENTATION
ORIENTATION: LOWER

## 2020-10-08 NOTE — PROGRESS NOTES
Physical Therapy    PT order received, chart reviewed and PT evaluation held at this time. Pt has T12 compression fx, is on bed rest and awaiting Neurosurgery consult. Will re-attempt evaluation at a later time. Thank you for the opportunity to assist in the care of this patient.   Margot Ribera, PT, DPT  License PT [id number]

## 2020-10-08 NOTE — CONSULTS
[address]                  [address] [facility],  [facility]                                  CONSULTATION    PATIENT NAME: Grzegorz Nation                    :        1926  MED REC NO:   [id number]                            ROOM:       5414  ACCOUNT NO:   [de-identified]                           ADMIT DATE: 10/07/2020  PROVIDER:     Flash Conde MD    CONSULT DATE:  10/08/2020    CHIEF COMPLAINT:  Pain in the lower back of 4 days' duration. HISTORY OF PRESENT ILLNESS:  This 66-year-old female with sudden onset  of pain in the lower back 2 days ago. The patient apparently attended a  party for her daughter's birthday and subsequently experienced low back  pain which since then has persisted. She denies any fall or any  untoward event. She did walk up and down the steps. The pain does not  radiate down to the lower extremities. She denies any symptoms  pertaining to the upper extremities and denies passing out or blacking  out spells. She underwent various studies on admission including CT  scan of the lumbar spine, which were reported to show age indeterminate  T12 vertebral body fracture with superior endplate compression and #2 CT  scan of the abdomen and pelvis revealed a 11-mm pancreatic cystic  lesion. PAST MEDICAL HISTORY:  Arthritis, hypertension, myocardial infarction,  and urinary tract infection. PAST SURGICAL HISTORY:  Appendectomy and hysterectomy. ALLERGIES:  Personal history of allergy to SULFA antibiotics. SOCIAL HISTORY:  Not a smoker. Does not use alcohol or street drugs. PHYSICAL EXAMINATION:  GENERAL:  On examination, she is well-developed and well-nourished  female, in no acute distress. She is alert, awake and oriented,  somewhat hard of hearing. HEENT:  Pupils are equal and reactive. Extraocular movements are  present. NEUROLOGIC:  She can count the fingers well. Handgrip is equal  bilaterally.   No focal deficit noted in the upper extremities. No focal  motor deficit noted in the lower extremities. Sensations are intact in  the lower extremities. She does have tenderness over the thoracolumbar  area. IMPRESSION:  Compression fracture T12, probably acute and no other  medical lesion. Recommend MRI scan of the thoracic and lumbar spine. Sagittal views to rule out any other compression fractures. I discussed the option of back brace versus kyphoplasty with the  daughter. She is leaning more towards kyphoplasty. Once the MRI scan  is completed, we will make further recommendations. We will follow  along.         Christal Mckee MD    D: 10/08/2020 12:31:56       T: 10/08/2020 12:40:41     [email or web address]  Job#: [id number]     Doc#: [id number]    CC:

## 2020-10-08 NOTE — PROGRESS NOTES
Occupational Therapy  Attempted OT eval, however, will await NS recommendations prior to engaging pt in assessment ax for pt's safety.   Thank you for this referral. Tr Serrano MOT, OTR/L  # [phone]

## 2020-10-08 NOTE — H&P
History & Physicial  10/08/20  Primary Care:  Morenita Metcalf MD  [address] [facility] [address]        Chief Complaint   Patient presents with    Back Pain     lower back pain starting last night, NKI       HPI:    Patient is a 80year old female who presented to [facility] [address] in [place] due to Back pain that started suddenly on Tuesday. Patient states she was in her normal state of health on Monday and even attended a party for her Daughter's birthday. She has had no falls or injuries. She states on Tuesday she started having pain in her back. She tried biofreeze, tylenol and resting without relief. She has not eaten for 2 days due to inability to get comfortable. She denies any weakness in her legs or numbness or tingling. She does have arthritis but this pain was rated a 10/10 in intensity. Prior to Visit Medications    Medication Sig Taking? Authorizing Provider   docusate sodium (COLACE, DULCOLAX) 100 MG CAPS Take 100 mg by mouth daily Yes Sepideh Sandoval MD   Isosorbide Mononitrate (IMDUR PO) Take 30 mg by mouth daily  Yes Historical Provider, MD   ATENOLOL PO Take 50 mg by mouth daily  Yes Historical Provider, MD   aspirin 81 MG tablet Take 81 mg by mouth daily Yes Historical Provider, MD   traMADol (ULTRAM) 50 MG tablet Take 50 mg by mouth every 6 hours as needed for Pain. . Yes Historical Provider, MD   Atorvastatin Calcium (LIPITOR PO) Take 10 mg by mouth daily  Yes Historical Provider, MD   LORazepam (ATIVAN PO) Take 1 mg by mouth 2 times daily as needed  Yes Historical Provider, MD   hydroCHLOROthiazide (MICROZIDE) 12.5 MG capsule Take 12.5 mg by mouth daily  Historical Provider, MD   oxybutynin (DITROPAN XL) 15 MG extended release tablet Take 15 mg by mouth Twice a Week  Historical Provider, MD     Social History     Tobacco Use    Smoking status: Never Smoker    Smokeless tobacco: Never Used   Substance Use Topics    Alcohol use: No    Drug use: Not on file     History reviewed. No pertinent family history. Past Surgical History:   Procedure Laterality Date    APPENDECTOMY      HYSTERECTOMY       Past Medical History:   Diagnosis Date    Arthritis     Hypertension     My[facility] ([facility]     UTI (urinary tract infection)      Review of Systems   Constitutional: Positive for activity change and appetite change. Negative for chills and fever. HENT: Negative for rhinorrhea and sinus pain. Eyes: Negative for photophobia and visual disturbance. Respiratory: Negative for apnea, chest tightness, shortness of breath and wheezing. Cardiovascular: Negative for chest pain, palpitations and leg swelling. Gastrointestinal: Negative for abdominal distention, anal bleeding, blood in stool and constipation. Endocrine: Negative for cold intolerance and heat intolerance. Genitourinary: Negative for difficulty urinating, dysuria and flank pain. Musculoskeletal: Positive for back pain. Negative for arthralgias. Skin: Negative for color change, pallor and rash. Allergic/Immunologic: Negative for environmental allergies and food allergies. Neurological: Negative for dizziness and light-headedness. Hematological: Negative for adenopathy. Does not bruise/bleed easily. Psychiatric/Behavioral: Negative for agitation and confusion. Vitals:    10/08/20 0530   BP: (!) 140/85   Pulse: 100   Resp: 16   Temp: 96.5 °F (35.8 °C)   SpO2: 98%       Physical Exam  Constitutional:       Appearance: Normal appearance. HENT:      Head: Normocephalic and atraumatic. Right Ear: External ear normal.      Left Ear: External ear normal.      Ears:      Comments: Hard of hearing. Wearing hearing aids. Nose: Nose normal. No congestion. Mouth/Throat:      Mouth: Mucous membranes are moist.      Pharynx: Oropharynx is clear. No oropharyngeal exudate. Neck:      Musculoskeletal: Normal range of motion and neck supple.    Cardiovascular:      Rate and Rhythm: Normal rate and regular rhythm. Pulses: Normal pulses. Pulmonary:      Effort: Pulmonary effort is normal.      Breath sounds: Normal breath sounds. Abdominal:      General: Bowel sounds are normal. There is no distension. Palpations: Abdomen is soft. Tenderness: There is no abdominal tenderness. Musculoskeletal:      Right lower leg: No edema. Left lower leg: No edema. Skin:     General: Skin is warm and dry. Capillary Refill: Capillary refill takes less than 2 seconds. Neurological:      General: No focal deficit present. Mental Status: She is alert and oriented to person, place, and time. Psychiatric:         Mood and Affect: Mood normal.         Behavior: Behavior normal.         Active Problems:    Compression fx, thoracic spine, closed, initial encounter ([facility]  Resolved Problems:    * No resolved hospital problems. *  1. T12 compression fracture  2. 11 mm cystic pancreatic lesion  3. Hypertension  4. CAD  5. Anxiety    Plan:  Consult Neurosurgery. Bed rest. Pain control with narcotics. Will need to follow up with Pancreatic Surgery after discharge for incidental pancreatic lesion. Resume home medications. BP elevated due to uncontrolled pain.      DVT Prophylaxis: SCDs  Code Status: DNR-CCA      Palmira Owen DO      Electronically signed by Palmira Owen DO on 10/8/2020 at 7:41 AM

## 2020-10-09 ENCOUNTER — APPOINTMENT (OUTPATIENT)
Dept: MRI IMAGING | Age: 85
DRG: 479 | End: 2020-10-09
Payer: MEDICARE

## 2020-10-09 ENCOUNTER — ANESTHESIA EVENT (OUTPATIENT)
Dept: OPERATING ROOM | Age: 85
DRG: 479 | End: 2020-10-09
Payer: MEDICARE

## 2020-10-09 ENCOUNTER — ANESTHESIA (OUTPATIENT)
Dept: OPERATING ROOM | Age: 85
DRG: 479 | End: 2020-10-09
Payer: MEDICARE

## 2020-10-09 LAB
ALBUMIN SERPL-MCNC: 3.6 G/DL (ref 3.5–5.2)
ALP BLD-CCNC: 106 U/L (ref 35–104)
ALT SERPL-CCNC: 12 U/L (ref 0–32)
ANION GAP SERPL CALCULATED.3IONS-SCNC: 14 MMOL/L (ref 7–16)
AST SERPL-CCNC: 23 U/L (ref 0–31)
BASOPHILS ABSOLUTE: 0.08 E9/L (ref 0–0.2)
BASOPHILS RELATIVE PERCENT: 1.5 % (ref 0–2)
BILIRUB SERPL-MCNC: 0.8 MG/DL (ref 0–1.2)
BUN BLDV-MCNC: 16 MG/DL (ref 8–23)
CALCIUM SERPL-MCNC: 9.6 MG/DL (ref 8.6–10.2)
CHLORIDE BLD-SCNC: 91 MMOL/L (ref 98–107)
CO2: 30 MMOL/L (ref 22–29)
CREAT SERPL-MCNC: 1 MG/DL (ref 0.5–1)
EKG ATRIAL RATE: 75 BPM
EKG P AXIS: 80 DEGREES
EKG P-R INTERVAL: 206 MS
EKG Q-T INTERVAL: 458 MS
EKG QRS DURATION: 160 MS
EKG QTC CALCULATION (BAZETT): 511 MS
EKG R AXIS: -45 DEGREES
EKG T AXIS: 116 DEGREES
EKG VENTRICULAR RATE: 75 BPM
EOSINOPHILS ABSOLUTE: 0.39 E9/L (ref 0.05–0.5)
EOSINOPHILS RELATIVE PERCENT: 7.3 % (ref 0–6)
GFR AFRICAN AMERICAN: >60
GFR NON-AFRICAN AMERICAN: 52 ML/MIN/1.73
GLUCOSE BLD-MCNC: 117 MG/DL (ref 74–99)
HCT VFR BLD CALC: 42.5 % (ref 34–48)
HEMOGLOBIN: 14.3 G/DL (ref 11.5–15.5)
IMMATURE GRANULOCYTES #: 0.02 E9/L
IMMATURE GRANULOCYTES %: 0.4 % (ref 0–5)
LYMPHOCYTES ABSOLUTE: 1.36 E9/L (ref 1.5–4)
LYMPHOCYTES RELATIVE PERCENT: 25.4 % (ref 20–42)
MAGNESIUM: 1.9 MG/DL (ref 1.6–2.6)
MCH RBC QN AUTO: 30 PG (ref 26–35)
MCHC RBC AUTO-ENTMCNC: 33.6 % (ref 32–34.5)
MCV RBC AUTO: 89.3 FL (ref 80–99.9)
MONOCYTES ABSOLUTE: 0.69 E9/L (ref 0.1–0.95)
MONOCYTES RELATIVE PERCENT: 12.9 % (ref 2–12)
NEUTROPHILS ABSOLUTE: 2.82 E9/L (ref 1.8–7.3)
NEUTROPHILS RELATIVE PERCENT: 52.5 % (ref 43–80)
PDW BLD-RTO: 13.1 FL (ref 11.5–15)
PLATELET # BLD: 220 E9/L (ref 130–450)
PMV BLD AUTO: 10.3 FL (ref 7–12)
POTASSIUM REFLEX MAGNESIUM: 3.3 MMOL/L (ref 3.5–5)
RBC # BLD: 4.76 E12/L (ref 3.5–5.5)
SODIUM BLD-SCNC: 135 MMOL/L (ref 132–146)
TOTAL PROTEIN: 6.7 G/DL (ref 6.4–8.3)
WBC # BLD: 5.4 E9/L (ref 4.5–11.5)

## 2020-10-09 PROCEDURE — 6370000000 HC RX 637 (ALT 250 FOR IP): Performed by: INTERNAL MEDICINE

## 2020-10-09 PROCEDURE — 6360000002 HC RX W HCPCS: Performed by: INTERNAL MEDICINE

## 2020-10-09 PROCEDURE — 2580000003 HC RX 258: Performed by: INTERNAL MEDICINE

## 2020-10-09 PROCEDURE — 1200000000 HC SEMI PRIVATE

## 2020-10-09 PROCEDURE — 6370000000 HC RX 637 (ALT 250 FOR IP): Performed by: NEUROLOGICAL SURGERY

## 2020-10-09 PROCEDURE — 93005 ELECTROCARDIOGRAM TRACING: CPT | Performed by: INTERNAL MEDICINE

## 2020-10-09 PROCEDURE — 85025 COMPLETE CBC W/AUTO DIFF WBC: CPT

## 2020-10-09 PROCEDURE — 80053 COMPREHEN METABOLIC PANEL: CPT

## 2020-10-09 PROCEDURE — 83735 ASSAY OF MAGNESIUM: CPT

## 2020-10-09 PROCEDURE — 72148 MRI LUMBAR SPINE W/O DYE: CPT

## 2020-10-09 PROCEDURE — 72146 MRI CHEST SPINE W/O DYE: CPT

## 2020-10-09 PROCEDURE — 36415 COLL VENOUS BLD VENIPUNCTURE: CPT

## 2020-10-09 RX ORDER — POTASSIUM CHLORIDE 20 MEQ/1
20 TABLET, EXTENDED RELEASE ORAL ONCE
Status: COMPLETED | OUTPATIENT
Start: 2020-10-09 | End: 2020-10-09

## 2020-10-09 RX ORDER — ZINC OXIDE 13 %
CREAM (GRAM) TOPICAL
COMMUNITY

## 2020-10-09 RX ORDER — OXYCODONE HYDROCHLORIDE 5 MG/1
5 TABLET ORAL EVERY 6 HOURS
Status: DISCONTINUED | OUTPATIENT
Start: 2020-10-09 | End: 2020-10-10

## 2020-10-09 RX ADMIN — LORAZEPAM 1 MG: 1 TABLET ORAL at 12:33

## 2020-10-09 RX ADMIN — MAGNESIUM HYDROXIDE 30 ML: 2400 SUSPENSION ORAL at 21:06

## 2020-10-09 RX ADMIN — Medication 2 MG: at 20:11

## 2020-10-09 RX ADMIN — Medication 2 MG: at 09:21

## 2020-10-09 RX ADMIN — OXYCODONE 5 MG: 5 TABLET ORAL at 10:49

## 2020-10-09 RX ADMIN — SENNOSIDES 8.6 MG: 8.6 TABLET, FILM COATED ORAL at 16:22

## 2020-10-09 RX ADMIN — POTASSIUM CHLORIDE 20 MEQ: 1500 TABLET, EXTENDED RELEASE ORAL at 11:47

## 2020-10-09 RX ADMIN — SODIUM CHLORIDE, PRESERVATIVE FREE 10 ML: 5 INJECTION INTRAVENOUS at 20:11

## 2020-10-09 RX ADMIN — Medication 2 MG: at 14:22

## 2020-10-09 RX ADMIN — ATENOLOL 50 MG: 50 TABLET ORAL at 16:22

## 2020-10-09 RX ADMIN — Medication 10 ML: at 20:11

## 2020-10-09 RX ADMIN — OXYCODONE 5 MG: 5 TABLET ORAL at 16:22

## 2020-10-09 RX ADMIN — ISOSORBIDE MONONITRATE 30 MG: 30 TABLET ORAL at 16:22

## 2020-10-09 RX ADMIN — Medication 10 ML: at 09:09

## 2020-10-09 RX ADMIN — Medication 2 MG: at 05:01

## 2020-10-09 RX ADMIN — OXYCODONE 5 MG: 5 TABLET ORAL at 21:11

## 2020-10-09 ASSESSMENT — PAIN DESCRIPTION - DESCRIPTORS
DESCRIPTORS: ACHING;CONSTANT;DISCOMFORT
DESCRIPTORS_2: ACHING;CRAMPING;DISCOMFORT
DESCRIPTORS: ACHING;DISCOMFORT;CONSTANT
DESCRIPTORS_2: ACHING;CRAMPING;DISCOMFORT
DESCRIPTORS: ACHING;CONSTANT;DISCOMFORT

## 2020-10-09 ASSESSMENT — PAIN SCALES - GENERAL
PAINLEVEL_OUTOF10: 0
PAINLEVEL_OUTOF10: 9
PAINLEVEL_OUTOF10: 6
PAINLEVEL_OUTOF10: 0
PAINLEVEL_OUTOF10: 8
PAINLEVEL_OUTOF10: 0
PAINLEVEL_OUTOF10: 10
PAINLEVEL_OUTOF10: 9
PAINLEVEL_OUTOF10: 8
PAINLEVEL_OUTOF10: 7

## 2020-10-09 ASSESSMENT — PAIN DESCRIPTION - PROGRESSION
CLINICAL_PROGRESSION: GRADUALLY IMPROVING
CLINICAL_PROGRESSION: GRADUALLY WORSENING
CLINICAL_PROGRESSION_2: GRADUALLY WORSENING
CLINICAL_PROGRESSION_2: NOT CHANGED
CLINICAL_PROGRESSION: NOT CHANGED

## 2020-10-09 ASSESSMENT — PAIN DESCRIPTION - ONSET
ONSET_2: ON-GOING
ONSET_2: ON-GOING
ONSET: ON-GOING
ONSET: GRADUAL
ONSET: ON-GOING
ONSET: ON-GOING

## 2020-10-09 ASSESSMENT — PAIN DESCRIPTION - ORIENTATION
ORIENTATION: LOWER
ORIENTATION_2: MID
ORIENTATION: LOWER
ORIENTATION_2: MID

## 2020-10-09 ASSESSMENT — ENCOUNTER SYMPTOMS
VOMITING: 0
SHORTNESS OF BREATH: 0
NAUSEA: 0
COUGH: 0
DIARRHEA: 0

## 2020-10-09 ASSESSMENT — PAIN DESCRIPTION - LOCATION
LOCATION_2: ABDOMEN
LOCATION: BACK
LOCATION_2: ABDOMEN
LOCATION: BACK

## 2020-10-09 ASSESSMENT — PAIN DESCRIPTION - FREQUENCY
FREQUENCY: CONTINUOUS

## 2020-10-09 ASSESSMENT — PAIN DESCRIPTION - INTENSITY
RATING_2: 8
RATING_2: 8

## 2020-10-09 ASSESSMENT — PAIN DESCRIPTION - PAIN TYPE
TYPE: ACUTE PAIN
TYPE_2: ACUTE PAIN
TYPE: ACUTE PAIN
TYPE_2: ACUTE PAIN
TYPE: ACUTE PAIN

## 2020-10-09 ASSESSMENT — PAIN DESCRIPTION - DURATION
DURATION_2: INTERMITTENT
DURATION_2: INTERMITTENT

## 2020-10-09 ASSESSMENT — PAIN - FUNCTIONAL ASSESSMENT
PAIN_FUNCTIONAL_ASSESSMENT: PREVENTS OR INTERFERES SOME ACTIVE ACTIVITIES AND ADLS

## 2020-10-09 NOTE — PROGRESS NOTES
OT consult received and appreciated. Chart reviewed. Will hold evaluation due to MRI pending and bracing vs kyphoplasty decision to be made . Will evaluate at a later time. Thank you.  Alexys Garcia, OTR/L #[id number]

## 2020-10-09 NOTE — CARE COORDINATION
10/9/2020 social work transition of care planning  Pt was out for MRI, assessment completed with pt's dtr at bedside. Pt is form home alone and had been independent. PerKerri (pt's dtr) pt has w/w and cane at home, but does not use. Pt pcp is Dr. Angelique Garcia and [facility] is [facility]. Pt has hx of snf at [facility] and [facility]-unable to remember provider. Explained sw role in transition of care planning. Pt's dtr,would like for pt to return home, will await further evals and recs to assist wit transition of care planning. Sw left [facility] and [facility] list with pt's dtr,Kerri. The Plan for Transition of Care is related to the following treatment goals: improvement of functioning    The Patient and/or patient representative  was provided with a choice of provider and agrees   with the discharge plan. [x] Yes [] No    Freedom of choice list was provided with basic dialogue that supports the patient's individualized plan of care/goals, treatment preferences and shares the quality data associated with the providers.  [x] Yes [] No  Electronically signed by TONY Murrell on 10/9/2020 at 1:19 PM

## 2020-10-09 NOTE — PROGRESS NOTES
pts daughter at the bedside. Consents signed and both pt and daughter okay with pt going for surgery today with Dr. Holley Monday. Surgery notified pt back from MRI an consents have been signed.

## 2020-10-09 NOTE — PROGRESS NOTES
Physical Therapy    Date: 10/9/2020       Patient Name: Efrain George  : 1926      MRN: [id number]    PT order received. Chart has been reviewed. PT evaluation will be on hold due to awaiting mri and then NS POC. Bracing vs kyphoplasty. Will continue to follow and complete evaluation at later time.      Brisa Aguila, PT

## 2020-10-09 NOTE — PROGRESS NOTES
Per Dr. Crystal Martinez, will do surgery tomorrow 10/10/20. Pt able to have diet back and eat dinner tonight.  NPO at midnight

## 2020-10-09 NOTE — PROGRESS NOTES
Progress Note  Date:10/9/2020       QSDX:[id number]  Patient Nayely [place]     YOB: 1926     Age:89 y.o. Patient seen for follow up of T12 compression fracture. Patient states that she missed her pain medication last night. She was in terrible pain this am. She denies any chest pains, shortness of breath, nausea, vomiting or diarrhea. Subjective    Subjective:  Symptoms:  No shortness of breath, cough, chest pain, headache, chest pressure or diarrhea. Diet:  No nausea or vomiting. Review of Systems   Respiratory: Negative for cough and shortness of breath. Cardiovascular: Negative for chest pain. Gastrointestinal: Negative for diarrhea, nausea and vomiting. Objective         Vitals Last 24 Hours:  TEMPERATURE:  Temp  Av.7 °F (36.5 °C)  Min: 97.2 °F (36.2 °C)  Max: 98.1 °F (36.7 °C)  RESPIRATIONS RANGE: Resp  Av  Min: 15  Max: 17  PULSE OXIMETRY RANGE: SpO2  Av.3 %  Min: 92 %  Max: 96 %  PULSE RANGE: Pulse  Av.8  Min: 65  Max: 70  BLOOD PRESSURE RANGE: Systolic (45IZG), DJU:323 , Min:110 , TEP:345   ; Diastolic (04POG), EWY:47, Min:58, Max:71    I/O (24Hr): Intake/Output Summary (Last 24 hours) at 10/9/2020 0534  Last data filed at 10/8/2020 2044  Gross per 24 hour   Intake 130 ml   Output --   Net 130 ml     Objective:  General Appearance:  Comfortable, well-appearing and in no acute distress. Vital signs: (most recent): Blood pressure (!) 159/76, pulse 70, temperature 98.2 °F (36.8 °C), temperature source Temporal, resp. rate 16, height 5' 3\" (1.6 m), weight 145 lb (65.8 kg), last menstrual period 2018, SpO2 92 %. Lungs:  Normal effort and normal respiratory rate. Breath sounds clear to auscultation. No rales or rhonchi. Heart: Normal rate. Regular rhythm. S1 normal and S2 normal.  No friction rub. Abdomen: Abdomen is soft and non-distended. Bowel sounds are normal.   There is no abdominal tenderness.   There is no rebound tenderness. There is no guarding. Extremities: There is no dependent edema. Skin:  Warm and dry. Labs/Imaging/Diagnostics    Labs:  CBC:  Recent Labs     10/07/20  1421   WBC 6.2   RBC 4.58   HGB 14.3   HCT 41.1   MCV 89.7   RDW 12.6        CHEMISTRIES:  Recent Labs     10/07/20  1421      K 3.8   CL 92*   CO2 31*   BUN 12   CREATININE 0.9   GLUCOSE 129*     PT/INR:No results for input(s): PROTIME, INR in the last 72 hours. APTT:No results for input(s): APTT in the last 72 hours. LIVER PROFILE:  Recent Labs     10/07/20  1421   AST 30   ALT 15   BILITOT 0.8   ALKPHOS 104       Imaging Last 24 Hours:  Ct Lumbar Spine Wo Contrast    Result Date: 10/7/2020  EXAMINATION: CT OF THE LUMBAR SPINE WITHOUT CONTRAST  10/7/2020 TECHNIQUE: CT of the lumbar spine was performed without the administration of intravenous contrast. Multiplanar reformatted images are provided for review. Dose modulation, iterative reconstruction, and/or weight based adjustment of the mA/kV was utilized to reduce the radiation dose to as low as reasonably achievable. COMPARISON: None HISTORY: ORDERING SYSTEM PROVIDED HISTORY: PAIN TECHNOLOGIST PROVIDED HISTORY: Reason for exam:->Low lumbar and paralumbar pain FINDINGS: The last inferior well-formed disc space will be termed as L5-S1 for the purpose of this interpretation. Given this nomenclature, there is verna sacralization of the left L5 vertebral body. There is moderate levocurvature of the lumbar spine with apex centered at L1-L2. There is mild left lateral subluxation of L2 in relation to L3 vertebral body. There is normal anatomic lumbar lordosis with grade 1 anterolisthesis at L4-L5. There is diffuse osteopenia. There is age-indeterminate mild compression fracture of the T12 vertebral body superior endplate with subtle surrounding perivertebral edematous changes, likely acute to subacute. There is no significant bony retropulsion.   The lumbar vertebral body Peritoneum/Retroperitoneum: No intraperitoneal free air or free fluid. No evidence of mesenteric or retroperitoneal lymphadenopathy. There are scattered vascular calcifications. Bones/Soft Tissues: No suspicious lytic or blastic osseous lesion. Refer to lumbar spine CT imaging same date. 1. Diverticulosis without diverticulitis. 2. 11 mm pancreatic cystic lesion likely the result of an incidental IPMN. Attention on follow-up studies recommended as a cystic neoplasm cannot be excluded. 3. Hiatal hernia. EKG: Left bundle from July of 2020. Will repeat today. Pre-Operative Risk assessment using 2014 ACC/AHA guidelines     Emergent procedure No  Active Cardiac Condition No (decompensated HF, Arrhythmia, MI <3 weeks, severe valve disease)  Risk Level of Procedure Intermediate Risk (intraperitoneal, intrathoracic, HENT, orthopedic, or carotid endarterectomy, etc.)  Revised Cardiac Risk Index Risk factors: History of ischemic heart disease  Measurement of Exercise Tolerance before Surgery >4 Yes    According to the 2014 ACC/AHA pre-operative risk assessment guidelines Anne Marie Baker is a low risk for major cardiac complications during a intermediate risk procedure and may continue as planned. Specific medication recommendations are listed below. Medications recommended to continue should be taken with a sip of water even when NPO. Further recommendations from consultants: None    Medication Recommendations:  Betablocker should be continued the day of surgery     Assessment//Plan           Hospital Problems           Last Modified POA    Compression fx, thoracic spine, closed, initial encounter ([facility] [address] 10/7/2020 Yes        Assessment & Plan  1. T12 compression fracture  2. 11 mm cystic pancreatic lesion  3. Hypertension  4. CAD  5. Anxiety    Appreciate Neurosurgery input. For MRI today and possible Kyphoplasty after.     Queenei Mayorga DO     Electronically signed by Queenie Mayorga DO on 10/9/20 at 5:34 AM EDT

## 2020-10-09 NOTE — ANESTHESIA PRE PROCEDURE
Department of Anesthesiology  Preprocedure Note       Name:  Andreina Londono   Age:  80 y.o.  :  1926                                          MRN:  [id number]         Date:  10/9/2020      Surgeon: Edilia Emeryor):  Leela Askew MD    Procedure: Procedure(s):  T12, L1  KYPHOPLASTY    Medications prior to admission:   Prior to Admission medications    Medication Sig Start Date End Date Taking? Authorizing Provider   Probiotic Product (PROBIOTIC DAILY PO) Take by mouth   Yes Historical Provider, MD   docusate sodium (COLACE, DULCOLAX) 100 MG CAPS Take 100 mg by mouth daily 20  Yes Valentine Glover MD   Isosorbide Mononitrate (IMDUR PO) Take 30 mg by mouth daily    Yes Historical Provider, MD   ATENOLOL PO Take 50 mg by mouth daily    Yes Historical Provider, MD   aspirin 81 MG tablet Take 81 mg by mouth daily   Yes Historical Provider, MD   traMADol (ULTRAM) 50 MG tablet Take 50 mg by mouth every 6 hours as needed for Pain. .   Yes Historical Provider, MD   Atorvastatin Calcium (LIPITOR PO) Take 10 mg by mouth daily    Yes Historical Provider, MD   LORazepam (ATIVAN PO) Take 1 mg by mouth 2 times daily as needed    Yes Historical Provider, MD   hydroCHLOROthiazide (MICROZIDE) 12.5 MG capsule Take 12.5 mg by mouth daily    Historical Provider, MD   oxybutynin (DITROPAN XL) 15 MG extended release tablet Take 15 mg by mouth Twice a Week    Historical Provider, MD       Current medications:    Current Facility-Administered Medications   Medication Dose Route Frequency Provider Last Rate Last Dose    oxyCODONE (ROXICODONE) immediate release tablet 5 mg  5 mg Oral Q6H Danita MELANIA India, DO   5 mg at 10/09/20 1622    oxyCODONE-acetaminophen (PERCOCET) 5-325 MG per tablet 1 tablet  1 tablet Oral Q6H PRN Danita E India, DO   1 tablet at 10/08/20 1905    LORazepam (ATIVAN) tablet 1 mg  1 mg Oral BID PRN Danita MELANIA India, DO   1 mg at 10/09/20 1233    sodium chloride flush 0.9 % injection 10 mL  10 mL Intravenous 2 times per day Danita E India, DO   10 mL at 10/09/20 0909    sodium chloride flush 0.9 % injection 10 mL  10 mL Intravenous PRN Danita E India, DO        promethazine (PHENERGAN) tablet 12.5 mg  12.5 mg Oral Q6H PRN Danita E India, DO        Or    ondansetron (ZOFRAN) injection 4 mg  4 mg Intravenous Q6H PRN Danita E India, DO        sodium chloride flush 0.9 % injection 10 mL  10 mL Intravenous 2 times per day Danita E India, DO   10 mL at 10/08/20 2044    sodium chloride flush 0.9 % injection 10 mL  10 mL Intravenous PRN Danita E India, DO   10 mL at 10/08/20 1418    acetaminophen (TYLENOL) tablet 650 mg  650 mg Oral Q6H PRN Danita E India, DO        Or    acetaminophen (TYLENOL) suppository 650 mg  650 mg Rectal Q6H PRN Danita E India, DO        senna (SENOKOT) tablet 8.6 mg  1 tablet Oral Daily PRN Danita E India, DO   8.6 mg at 10/09/20 1622    morphine (PF) injection 2 mg  2 mg Intravenous Q4H PRN Danita E India, DO   2 mg at 10/09/20 1422    morphine (PF) injection 1 mg  1 mg Intravenous Q4H PRN Danita E India, DO        isosorbide mononitrate (IMDUR) extended release tablet 30 mg  30 mg Oral Daily Danita E India, DO   30 mg at 10/09/20 1622    atenolol (TENORMIN) tablet 50 mg  50 mg Oral Daily Danita E India, DO   50 mg at 10/09/20 1622    traMADol (ULTRAM) tablet 50 mg  50 mg Oral Q6H PRN Danita E India, DO           Allergies:     Allergies   Allergen Reactions    Sulfa Antibiotics Hives       Problem List:    Patient Active Problem List   Diagnosis Code    ESBL (extended spectrum beta-lactamase) producing bacteria infection A49.9, Z16.12    [facility] S22.000A       Past Medical History:        Diagnosis Date    Arthritis     Hypertension     [facility]     UTI (urinary tract infection)        Past Surgical History:        Procedure Laterality Date    APPENDECTOMY      PHART, PO2ART, BBY0UJU, TOE4WSJ, BEART, B2MZDRJH     Type & Screen (If Applicable):  No results found for: LABABO, LABRH    Drug/Infectious Status (If Applicable):  No results found for: HIV, HEPCAB    COVID-19 Screening (If Applicable):   Lab Results   Component Value Date    COVID19 Not Detected 07/22/2020     EKG 10-9-20    Component  Value  Ref Range & Units  Status  Collected  Lab    Ventricular Rate  75  BPM  Final  10/09/2020  2:32 PM  [facility]    Atrial Rate  75  BPM  Final  10/09/2020  2:32 PM  [facility]    P-R Interval  206  ms  Final  10/09/2020  2:32 PM  [facility]    QRS Duration  160  ms  Final  10/09/2020  2:32 PM  [facility]    Q-T Interval  458  ms  Final  10/09/2020  2:32 PM  [facility]    QTc Calculation (Bazett)  511  ms  Final  10/09/2020  2:32 PM  [facility]    P Axis  80  degrees  Final  10/09/2020  2:32 PM  [facility]    R Axis  -45  degrees  Final  10/09/2020  2:32 PM  [facility]    T Axis  116  degrees  Final  10/09/2020  2:32 PM  [facility]    Testing Performed By     Lab - Abbreviation  Name  Director  Address  Valid Date Range    360-[facility]  [facility] MUSE  Unknown  Unknown  04/18/16 0721-Present    Narrative & Impression     Normal sinus rhythm with 1st degree block  Left axis deviation  Left bundle branch block  Abnormal ECG  When compared with ECG of 16-JUL-2020 15:59,  No significant change was found  Confirmed by Markos Brewster ([id number]) on 10/9/2020 4:04:01 PM     CT ABD 10-7-20      Impression    1. Diverticulosis without diverticulitis. 2. 11 mm pancreatic cystic lesion likely the result of an incidental IPMN. Attention on follow-up studies recommended as a cystic neoplasm cannot be    excluded. 3. Hiatal hernia.      Pt is a poor historian, medical hx obtained mostly from chart    Anesthesia Evaluation  Patient summary reviewed and Nursing notes reviewed no history of anesthetic complications:   Airway: Mallampati: II  TM distance: >3 FB   Neck ROM: limited  Mouth opening: < 3 FB Dental: (+) upper dentures and lower dentures      Pulmonary:Negative Pulmonary ROS   (+) decreased breath sounds,                             Cardiovascular:    (+) hypertension:, past MI:,       ECG reviewed  Rhythm: regular  Rate: normal           Beta Blocker:  Dose within 24 Hrs      ROS comment: LBBB     Neuro/Psych:                ROS comment: [place]- hearing aids GI/Hepatic/Renal:   (+) hiatal hernia,          ROS comment: UTI- recent admission  Pancreatic cystic lesion. Endo/Other:    (+) : arthritis: OA., electrolyte abnormalities, . Abdominal:           Vascular: negative vascular ROS. Anesthesia Plan      general     ASA 3       Induction: intravenous. BIS  MIPS: Postoperative opioids intended, Prophylactic antiemetics administered and Postoperative trial extubation. Anesthetic plan and risks discussed with patient. Plan discussed with CRNA and attending. Bladimir Crespo RN   10/9/2020    Pt seen, examined, chart reviewed, plan discussed.   [facility]  10/10/2020  7:16 AM

## 2020-10-10 ENCOUNTER — APPOINTMENT (OUTPATIENT)
Dept: GENERAL RADIOLOGY | Age: 85
DRG: 479 | End: 2020-10-10
Payer: MEDICARE

## 2020-10-10 VITALS
RESPIRATION RATE: 1 BRPM | DIASTOLIC BLOOD PRESSURE: 68 MMHG | SYSTOLIC BLOOD PRESSURE: 116 MMHG | OXYGEN SATURATION: 100 %

## 2020-10-10 LAB
ALBUMIN SERPL-MCNC: 3.7 G/DL (ref 3.5–5.2)
ALP BLD-CCNC: 99 U/L (ref 35–104)
ALT SERPL-CCNC: 13 U/L (ref 0–32)
ANION GAP SERPL CALCULATED.3IONS-SCNC: 16 MMOL/L (ref 7–16)
AST SERPL-CCNC: 24 U/L (ref 0–31)
BASOPHILS ABSOLUTE: 0.07 E9/L (ref 0–0.2)
BASOPHILS RELATIVE PERCENT: 0.9 % (ref 0–2)
BILIRUB SERPL-MCNC: 0.9 MG/DL (ref 0–1.2)
BUN BLDV-MCNC: 15 MG/DL (ref 8–23)
CALCIUM SERPL-MCNC: 9.5 MG/DL (ref 8.6–10.2)
CHLORIDE BLD-SCNC: 91 MMOL/L (ref 98–107)
CO2: 26 MMOL/L (ref 22–29)
CREAT SERPL-MCNC: 1 MG/DL (ref 0.5–1)
EOSINOPHILS ABSOLUTE: 0.3 E9/L (ref 0.05–0.5)
EOSINOPHILS RELATIVE PERCENT: 4.1 % (ref 0–6)
GFR AFRICAN AMERICAN: >60
GFR NON-AFRICAN AMERICAN: 52 ML/MIN/1.73
GLUCOSE BLD-MCNC: 132 MG/DL (ref 74–99)
HCT VFR BLD CALC: 43.6 % (ref 34–48)
HEMOGLOBIN: 14.9 G/DL (ref 11.5–15.5)
IMMATURE GRANULOCYTES #: 0.03 E9/L
IMMATURE GRANULOCYTES %: 0.4 % (ref 0–5)
LYMPHOCYTES ABSOLUTE: 1.13 E9/L (ref 1.5–4)
LYMPHOCYTES RELATIVE PERCENT: 15.3 % (ref 20–42)
MAGNESIUM: 2 MG/DL (ref 1.6–2.6)
MCH RBC QN AUTO: 30.8 PG (ref 26–35)
MCHC RBC AUTO-ENTMCNC: 34.2 % (ref 32–34.5)
MCV RBC AUTO: 90.1 FL (ref 80–99.9)
MONOCYTES ABSOLUTE: 0.92 E9/L (ref 0.1–0.95)
MONOCYTES RELATIVE PERCENT: 12.5 % (ref 2–12)
NEUTROPHILS ABSOLUTE: 4.92 E9/L (ref 1.8–7.3)
NEUTROPHILS RELATIVE PERCENT: 66.8 % (ref 43–80)
PDW BLD-RTO: 13 FL (ref 11.5–15)
PLATELET # BLD: 230 E9/L (ref 130–450)
PMV BLD AUTO: 9.7 FL (ref 7–12)
POTASSIUM REFLEX MAGNESIUM: 3.5 MMOL/L (ref 3.5–5)
RBC # BLD: 4.84 E12/L (ref 3.5–5.5)
SODIUM BLD-SCNC: 133 MMOL/L (ref 132–146)
TOTAL PROTEIN: 6.8 G/DL (ref 6.4–8.3)
WBC # BLD: 7.4 E9/L (ref 4.5–11.5)

## 2020-10-10 PROCEDURE — 6360000002 HC RX W HCPCS: Performed by: NEUROLOGICAL SURGERY

## 2020-10-10 PROCEDURE — 3209999900 FLUORO FOR SURGICAL PROCEDURES

## 2020-10-10 PROCEDURE — 2580000003 HC RX 258: Performed by: NEUROLOGICAL SURGERY

## 2020-10-10 PROCEDURE — 3700000000 HC ANESTHESIA ATTENDED CARE: Performed by: NEUROLOGICAL SURGERY

## 2020-10-10 PROCEDURE — 0PB40ZX EXCISION OF THORACIC VERTEBRA, OPEN APPROACH, DIAGNOSTIC: ICD-10-PCS | Performed by: NEUROLOGICAL SURGERY

## 2020-10-10 PROCEDURE — 0PS43ZZ REPOSITION THORACIC VERTEBRA, PERCUTANEOUS APPROACH: ICD-10-PCS | Performed by: NEUROLOGICAL SURGERY

## 2020-10-10 PROCEDURE — 85025 COMPLETE CBC W/AUTO DIFF WBC: CPT

## 2020-10-10 PROCEDURE — 2580000003 HC RX 258

## 2020-10-10 PROCEDURE — 3600000004 HC SURGERY LEVEL 4 BASE: Performed by: NEUROLOGICAL SURGERY

## 2020-10-10 PROCEDURE — 80053 COMPREHEN METABOLIC PANEL: CPT

## 2020-10-10 PROCEDURE — 6360000002 HC RX W HCPCS

## 2020-10-10 PROCEDURE — 6370000000 HC RX 637 (ALT 250 FOR IP): Performed by: INTERNAL MEDICINE

## 2020-10-10 PROCEDURE — 36415 COLL VENOUS BLD VENIPUNCTURE: CPT

## 2020-10-10 PROCEDURE — 3600000014 HC SURGERY LEVEL 4 ADDTL 15MIN: Performed by: NEUROLOGICAL SURGERY

## 2020-10-10 PROCEDURE — 87088 URINE BACTERIA CULTURE: CPT

## 2020-10-10 PROCEDURE — 6360000002 HC RX W HCPCS: Performed by: INTERNAL MEDICINE

## 2020-10-10 PROCEDURE — 2580000003 HC RX 258: Performed by: INTERNAL MEDICINE

## 2020-10-10 PROCEDURE — 7100000001 HC PACU RECOVERY - ADDTL 15 MIN: Performed by: NEUROLOGICAL SURGERY

## 2020-10-10 PROCEDURE — C1713 ANCHOR/SCREW BN/BN,TIS/BN: HCPCS | Performed by: NEUROLOGICAL SURGERY

## 2020-10-10 PROCEDURE — 2709999900 HC NON-CHARGEABLE SUPPLY: Performed by: NEUROLOGICAL SURGERY

## 2020-10-10 PROCEDURE — 7100000000 HC PACU RECOVERY - FIRST 15 MIN: Performed by: NEUROLOGICAL SURGERY

## 2020-10-10 PROCEDURE — 83735 ASSAY OF MAGNESIUM: CPT

## 2020-10-10 PROCEDURE — 0QU03JZ SUPPLEMENT LUMBAR VERTEBRA WITH SYNTHETIC SUBSTITUTE, PERCUTANEOUS APPROACH: ICD-10-PCS | Performed by: NEUROLOGICAL SURGERY

## 2020-10-10 PROCEDURE — 3700000001 HC ADD 15 MINUTES (ANESTHESIA): Performed by: NEUROLOGICAL SURGERY

## 2020-10-10 PROCEDURE — 2500000003 HC RX 250 WO HCPCS: Performed by: NEUROLOGICAL SURGERY

## 2020-10-10 PROCEDURE — 2500000003 HC RX 250 WO HCPCS

## 2020-10-10 PROCEDURE — 0PU43JZ SUPPLEMENT THORACIC VERTEBRA WITH SYNTHETIC SUBSTITUTE, PERCUTANEOUS APPROACH: ICD-10-PCS | Performed by: NEUROLOGICAL SURGERY

## 2020-10-10 PROCEDURE — 1200000000 HC SEMI PRIVATE

## 2020-10-10 PROCEDURE — 72100 X-RAY EXAM L-S SPINE 2/3 VWS: CPT

## 2020-10-10 PROCEDURE — 0QS03ZZ REPOSITION LUMBAR VERTEBRA, PERCUTANEOUS APPROACH: ICD-10-PCS | Performed by: NEUROLOGICAL SURGERY

## 2020-10-10 PROCEDURE — 6370000000 HC RX 637 (ALT 250 FOR IP): Performed by: NEUROLOGICAL SURGERY

## 2020-10-10 DEVICE — BONE CEMENT C01B HV-R WITH MIXER  US
Type: IMPLANTABLE DEVICE | Status: FUNCTIONAL
Brand: KYPHON® HV-R® BONE CEMENT AND KYPHON® MIXER PACK

## 2020-10-10 RX ORDER — NEOSTIGMINE METHYLSULFATE 1 MG/ML
INJECTION, SOLUTION INTRAVENOUS PRN
Status: DISCONTINUED | OUTPATIENT
Start: 2020-10-10 | End: 2020-10-10 | Stop reason: SDUPTHER

## 2020-10-10 RX ORDER — LIDOCAINE HYDROCHLORIDE 20 MG/ML
INJECTION, SOLUTION INTRAVENOUS PRN
Status: DISCONTINUED | OUTPATIENT
Start: 2020-10-10 | End: 2020-10-10 | Stop reason: SDUPTHER

## 2020-10-10 RX ORDER — LIDOCAINE HYDROCHLORIDE AND EPINEPHRINE 10; 10 MG/ML; UG/ML
INJECTION, SOLUTION INFILTRATION; PERINEURAL PRN
Status: DISCONTINUED | OUTPATIENT
Start: 2020-10-10 | End: 2020-10-10 | Stop reason: ALTCHOICE

## 2020-10-10 RX ORDER — PROPOFOL 10 MG/ML
INJECTION, EMULSION INTRAVENOUS PRN
Status: DISCONTINUED | OUTPATIENT
Start: 2020-10-10 | End: 2020-10-10 | Stop reason: SDUPTHER

## 2020-10-10 RX ORDER — PHENYLEPHRINE HCL IN 0.9% NACL 1 MG/10 ML
SYRINGE (ML) INTRAVENOUS PRN
Status: DISCONTINUED | OUTPATIENT
Start: 2020-10-10 | End: 2020-10-10 | Stop reason: SDUPTHER

## 2020-10-10 RX ORDER — ONDANSETRON 2 MG/ML
INJECTION INTRAMUSCULAR; INTRAVENOUS PRN
Status: DISCONTINUED | OUTPATIENT
Start: 2020-10-10 | End: 2020-10-10 | Stop reason: SDUPTHER

## 2020-10-10 RX ORDER — PROMETHAZINE HYDROCHLORIDE 25 MG/ML
25 INJECTION, SOLUTION INTRAMUSCULAR; INTRAVENOUS PRN
Status: DISCONTINUED | OUTPATIENT
Start: 2020-10-10 | End: 2020-10-10 | Stop reason: HOSPADM

## 2020-10-10 RX ORDER — LABETALOL HYDROCHLORIDE 5 MG/ML
5 INJECTION, SOLUTION INTRAVENOUS EVERY 10 MIN PRN
Status: DISCONTINUED | OUTPATIENT
Start: 2020-10-10 | End: 2020-10-10 | Stop reason: HOSPADM

## 2020-10-10 RX ORDER — ROCURONIUM BROMIDE 10 MG/ML
INJECTION, SOLUTION INTRAVENOUS PRN
Status: DISCONTINUED | OUTPATIENT
Start: 2020-10-10 | End: 2020-10-10 | Stop reason: SDUPTHER

## 2020-10-10 RX ORDER — FENTANYL CITRATE 50 UG/ML
INJECTION, SOLUTION INTRAMUSCULAR; INTRAVENOUS PRN
Status: DISCONTINUED | OUTPATIENT
Start: 2020-10-10 | End: 2020-10-10 | Stop reason: SDUPTHER

## 2020-10-10 RX ORDER — CEFAZOLIN SODIUM 2 G/50ML
SOLUTION INTRAVENOUS PRN
Status: DISCONTINUED | OUTPATIENT
Start: 2020-10-10 | End: 2020-10-10 | Stop reason: SDUPTHER

## 2020-10-10 RX ORDER — METHYLPREDNISOLONE ACETATE 80 MG/ML
INJECTION, SUSPENSION INTRA-ARTICULAR; INTRALESIONAL; INTRAMUSCULAR; SOFT TISSUE PRN
Status: DISCONTINUED | OUTPATIENT
Start: 2020-10-10 | End: 2020-10-10 | Stop reason: ALTCHOICE

## 2020-10-10 RX ORDER — GLYCOPYRROLATE 1 MG/5 ML
SYRINGE (ML) INTRAVENOUS PRN
Status: DISCONTINUED | OUTPATIENT
Start: 2020-10-10 | End: 2020-10-10 | Stop reason: SDUPTHER

## 2020-10-10 RX ORDER — SODIUM CHLORIDE 9 MG/ML
INJECTION, SOLUTION INTRAVENOUS CONTINUOUS PRN
Status: DISCONTINUED | OUTPATIENT
Start: 2020-10-10 | End: 2020-10-10 | Stop reason: SDUPTHER

## 2020-10-10 RX ORDER — ATORVASTATIN CALCIUM 10 MG/1
10 TABLET, FILM COATED ORAL DAILY
Status: DISCONTINUED | OUTPATIENT
Start: 2020-10-10 | End: 2020-10-14 | Stop reason: HOSPADM

## 2020-10-10 RX ORDER — MEPERIDINE HYDROCHLORIDE 25 MG/ML
12.5 INJECTION INTRAMUSCULAR; INTRAVENOUS; SUBCUTANEOUS EVERY 5 MIN PRN
Status: DISCONTINUED | OUTPATIENT
Start: 2020-10-10 | End: 2020-10-10 | Stop reason: HOSPADM

## 2020-10-10 RX ORDER — PANTOPRAZOLE SODIUM 40 MG/1
40 TABLET, DELAYED RELEASE ORAL
Status: DISCONTINUED | OUTPATIENT
Start: 2020-10-11 | End: 2020-10-14 | Stop reason: HOSPADM

## 2020-10-10 RX ORDER — DEXAMETHASONE SODIUM PHOSPHATE 10 MG/ML
INJECTION, SOLUTION INTRAMUSCULAR; INTRAVENOUS PRN
Status: DISCONTINUED | OUTPATIENT
Start: 2020-10-10 | End: 2020-10-10 | Stop reason: SDUPTHER

## 2020-10-10 RX ADMIN — SODIUM CHLORIDE, PRESERVATIVE FREE 10 ML: 5 INJECTION INTRAVENOUS at 04:58

## 2020-10-10 RX ADMIN — LIDOCAINE HYDROCHLORIDE 50 MG: 20 INJECTION, SOLUTION INTRAVENOUS at 07:52

## 2020-10-10 RX ADMIN — Medication 3 MG: at 08:35

## 2020-10-10 RX ADMIN — OXYCODONE 5 MG: 5 TABLET ORAL at 03:44

## 2020-10-10 RX ADMIN — LORAZEPAM 1 MG: 1 TABLET ORAL at 20:14

## 2020-10-10 RX ADMIN — FENTANYL CITRATE 50 MCG: 50 INJECTION, SOLUTION INTRAMUSCULAR; INTRAVENOUS at 07:52

## 2020-10-10 RX ADMIN — Medication 2 MG: at 04:57

## 2020-10-10 RX ADMIN — Medication 100 MCG: at 08:23

## 2020-10-10 RX ADMIN — ATENOLOL 50 MG: 50 TABLET ORAL at 15:32

## 2020-10-10 RX ADMIN — PROPOFOL 80 MG: 10 INJECTION, EMULSION INTRAVENOUS at 07:52

## 2020-10-10 RX ADMIN — Medication 100 MCG: at 08:20

## 2020-10-10 RX ADMIN — Medication 50 MCG: at 08:11

## 2020-10-10 RX ADMIN — ATORVASTATIN CALCIUM 10 MG: 10 TABLET, FILM COATED ORAL at 15:32

## 2020-10-10 RX ADMIN — ROCURONIUM BROMIDE 25 MG: 10 INJECTION, SOLUTION INTRAVENOUS at 07:52

## 2020-10-10 RX ADMIN — ISOSORBIDE MONONITRATE 30 MG: 30 TABLET ORAL at 15:33

## 2020-10-10 RX ADMIN — FENTANYL CITRATE 50 MCG: 50 INJECTION, SOLUTION INTRAMUSCULAR; INTRAVENOUS at 08:00

## 2020-10-10 RX ADMIN — DEXAMETHASONE SODIUM PHOSPHATE 10 MG: 10 INJECTION, SOLUTION INTRAMUSCULAR; INTRAVENOUS at 07:52

## 2020-10-10 RX ADMIN — Medication 100 MCG: at 08:29

## 2020-10-10 RX ADMIN — Medication 2 MG: at 00:24

## 2020-10-10 RX ADMIN — SODIUM CHLORIDE: 9 INJECTION, SOLUTION INTRAVENOUS at 07:43

## 2020-10-10 RX ADMIN — SODIUM CHLORIDE, PRESERVATIVE FREE 10 ML: 5 INJECTION INTRAVENOUS at 20:15

## 2020-10-10 RX ADMIN — CEFAZOLIN SODIUM 2 G: 2 SOLUTION INTRAVENOUS at 08:00

## 2020-10-10 RX ADMIN — SODIUM CHLORIDE, PRESERVATIVE FREE 10 ML: 5 INJECTION INTRAVENOUS at 00:25

## 2020-10-10 RX ADMIN — Medication 0.6 MG: at 08:35

## 2020-10-10 RX ADMIN — ONDANSETRON HYDROCHLORIDE 4 MG: 2 INJECTION, SOLUTION INTRAMUSCULAR; INTRAVENOUS at 07:52

## 2020-10-10 RX ADMIN — LORAZEPAM 1 MG: 1 TABLET ORAL at 03:47

## 2020-10-10 RX ADMIN — Medication 10 ML: at 10:43

## 2020-10-10 ASSESSMENT — PULMONARY FUNCTION TESTS
PIF_VALUE: 22
PIF_VALUE: 20
PIF_VALUE: 22
PIF_VALUE: 21
PIF_VALUE: 1
PIF_VALUE: 21
PIF_VALUE: 21
PIF_VALUE: 1
PIF_VALUE: 21
PIF_VALUE: 0
PIF_VALUE: 19
PIF_VALUE: 20
PIF_VALUE: 1
PIF_VALUE: 21
PIF_VALUE: 3
PIF_VALUE: 19
PIF_VALUE: 21
PIF_VALUE: 22
PIF_VALUE: 21
PIF_VALUE: 21
PIF_VALUE: 22
PIF_VALUE: 0
PIF_VALUE: 1
PIF_VALUE: 1
PIF_VALUE: 21
PIF_VALUE: 21
PIF_VALUE: 34
PIF_VALUE: 22
PIF_VALUE: 21
PIF_VALUE: 3
PIF_VALUE: 30
PIF_VALUE: 20
PIF_VALUE: 21
PIF_VALUE: 22
PIF_VALUE: 20
PIF_VALUE: 20
PIF_VALUE: 21
PIF_VALUE: 1
PIF_VALUE: 17
PIF_VALUE: 18
PIF_VALUE: 31
PIF_VALUE: 22
PIF_VALUE: 21
PIF_VALUE: 21
PIF_VALUE: 1
PIF_VALUE: 20
PIF_VALUE: 21
PIF_VALUE: 19
PIF_VALUE: 2
PIF_VALUE: 20
PIF_VALUE: 22
PIF_VALUE: 0
PIF_VALUE: 21
PIF_VALUE: 21
PIF_VALUE: 12

## 2020-10-10 ASSESSMENT — PAIN DESCRIPTION - LOCATION
LOCATION: BACK
LOCATION: BACK

## 2020-10-10 ASSESSMENT — PAIN DESCRIPTION - FREQUENCY
FREQUENCY: INTERMITTENT
FREQUENCY: INTERMITTENT

## 2020-10-10 ASSESSMENT — PAIN DESCRIPTION - ONSET
ONSET: GRADUAL
ONSET: GRADUAL

## 2020-10-10 ASSESSMENT — PAIN SCALES - GENERAL
PAINLEVEL_OUTOF10: 0
PAINLEVEL_OUTOF10: 10
PAINLEVEL_OUTOF10: 8
PAINLEVEL_OUTOF10: 9

## 2020-10-10 ASSESSMENT — PAIN DESCRIPTION - PROGRESSION
CLINICAL_PROGRESSION: NOT CHANGED

## 2020-10-10 ASSESSMENT — ENCOUNTER SYMPTOMS
SHORTNESS OF BREATH: 0
DIARRHEA: 0
NAUSEA: 0
COUGH: 0
VOMITING: 0

## 2020-10-10 ASSESSMENT — PAIN DESCRIPTION - ORIENTATION
ORIENTATION: LOWER
ORIENTATION: LOWER

## 2020-10-10 ASSESSMENT — PAIN DESCRIPTION - PAIN TYPE
TYPE: ACUTE PAIN
TYPE: ACUTE PAIN

## 2020-10-10 ASSESSMENT — PAIN - FUNCTIONAL ASSESSMENT
PAIN_FUNCTIONAL_ASSESSMENT: PREVENTS OR INTERFERES SOME ACTIVE ACTIVITIES AND ADLS
PAIN_FUNCTIONAL_ASSESSMENT: PREVENTS OR INTERFERES SOME ACTIVE ACTIVITIES AND ADLS

## 2020-10-10 ASSESSMENT — PAIN DESCRIPTION - DESCRIPTORS
DESCRIPTORS: DISCOMFORT;CONSTANT;SORE
DESCRIPTORS: DISCOMFORT;SORE;CONSTANT

## 2020-10-10 NOTE — PROGRESS NOTES
Physical Therapy  Name: Esdras Plascencia  Room: 5106/1349-O  Date: 10/10/20    PT consult received and appreciated. PT eval on hold, awaiting neurosurgery recs/POC.      Holli Sherman, PT, DPT  [id number]

## 2020-10-10 NOTE — OP NOTE
[address]                  [address] [facility]  [place]                                OPERATIVE REPORT    PATIENT NAME: Francisco Dela Cruz                    :        1926  MED REC NO:   [id number]                            ROOM:       1069  ACCOUNT NO:   [de-identified]                           ADMIT DATE: 10/07/2020  PROVIDER:     Anne-Marie Fonseca MD    DATE OF PROCEDURE:  10/10/2020    SURGEON:  Anne-Marie Fonseca MD    PREOPERATIVE DIAGNOSIS:  Compression fracture T12 and possibly L1 and  L2. POSTOPERATIVE DIAGNOSES:  Compression fracture T12 and possibly L1 and  L2, rule out metastasis and intractable pain. PROCEDURES:  Balloon osteoplasty T12, L1, and L2; bone biopsy T12, L1,  and L2; and injection of epidural steroid. TECHNIQUE:  The patient was placed on the operating room table in supine  position and after satisfactory endotracheal general anesthesia had been  administered, the patient was turned into prone position on the  operating room table. The lower back and mid back were prepared with  Betadine scrub and solution and routinely draped. Using AP and lateral  counting of the thoracic and lumbar spine, there was disparity between  the counting. T12 was counted at T11 on lateral while on the AP, that  counted at T12. So, it was decided to do the kyphoplasty of T12 which  was counted as T11 on the lateral counting as counting from the sacrum  up and L1 and L2. Using AP and lateral fluoroscopy, the level of T12 was marked on the  back. A point which was 3 cm from the midline on the right side was  infiltrated with 1% lidocaine solution. 3-mm incision made over this  point. Through this, a trocar and cannula from the Kyphon kit were  inserted under fluoroscopic guidance through the pedicle of T12 into the  body of T12. As the tip of the needle was lying at the junction of  pedicle and the body, trocar was removed.   Bone biopsy needle was  inserted and advanced into the body of T12 and a piece of bone was taken  which was sent to Pathology. The balloon was inserted into the body of  T12 and inflated to a volume of 3 mL. The balloon crossed the midline,  not necessitating insertion of second needle at this level. In a similar fashion, moving down to the level of L1, a point which was  3 cm from the midline on the left side was infiltrated with 1% lidocaine  solution. 3-mm incision made over this point. Through this, a trocar  and cannula followed by the biopsy needle followed by the balloon. Balloon was inflated to a volume of 2.5 mL. The balloon crossed the  midline, not necessitating insertion of second needle at this level. In a similar fashion, at the level of L2 on the right side, a point  which was 3 cm from the midline was infiltrated with 1% lidocaine  solution. 3-mm incision made over this point. Through this, a trocar  and cannula followed by the biopsy needle followed by the balloon. Balloon was inflated to a volume of 3 mL and the balloon crossed the  midline, not necessitating insertion of second needle at this level. Then, the balloon was removed and bone cement was injected passively  using injecting tube. 6 mL of the bone cement was injected into the  body of T12.  6 mL was injected into the body of L1, and 4.5 mL was  injected into the body of L2. Injection was monitored by AP and lateral  fluoroscopy. At the conclusion of the injection, injecting tubes were  removed and the cannulas were removed. Steri-Strips applied over the  incision area. The patient was left in this position for 10 minutes. During this time, a Tuohy needle from the epidural anesthesia tray was  inserted into the epidural space at the level of L3-L4. When the needle  was lying in the epidural space, stylets were removed. Epidural  catheter was inserted and advanced into the epidural space.   Hub was  connected to the catheter

## 2020-10-10 NOTE — PROGRESS NOTES
Progress Note  Date:10/10/2020       [id number]  Patient Radha Reading     YOB: 1926     Age:89 y.o. Patient seen early this am. She is awaiting to go to or for kyphoplasty. Pain better controlled this am. She denies any chest pains, shortness of breath, nausea, vomiting or diarrhea. Subjective    Subjective:  Symptoms:  No shortness of breath, cough, chest pain, headache, chest pressure or diarrhea. Diet:  No nausea or vomiting. Review of Systems   Respiratory: Negative for cough and shortness of breath. Cardiovascular: Negative for chest pain. Gastrointestinal: Negative for diarrhea, nausea and vomiting. Objective         Vitals Last 24 Hours:  TEMPERATURE:  Temp  Av.7 °F (37.1 °C)  Min: 98.2 °F (36.8 °C)  Max: 98.9 °F (37.2 °C)  RESPIRATIONS RANGE: Resp  Av.8  Min: 16  Max: 17  PULSE OXIMETRY RANGE: SpO2  Av %  Min: 93 %  Max: 93 %  PULSE RANGE: Pulse  Av  Min: 70  Max: 86  BLOOD PRESSURE RANGE: Systolic (48UIS), VOK:232 , Min:149 , HAK:916   ; Diastolic (58PRZ), NWM:66, Min:76, Max:89    I/O (24Hr): Intake/Output Summary (Last 24 hours) at 10/10/2020 0527  Last data filed at 10/10/2020 0457  Gross per 24 hour   Intake 50 ml   Output --   Net 50 ml     Objective:  General Appearance:  Comfortable, well-appearing and in no acute distress. Vital signs: (most recent): Blood pressure (!) 141/68, pulse 73, temperature 97.6 °F (36.4 °C), temperature source Temporal, resp. rate 16, height 5' 3\" (1.6 m), weight 145 lb (65.8 kg), last menstrual period 2018, SpO2 96 %. Lungs:  Normal effort and normal respiratory rate. Breath sounds clear to auscultation. No rales or rhonchi. Heart: Normal rate. Regular rhythm. S1 normal and S2 normal.  No friction rub. Abdomen: Abdomen is soft and non-distended. Bowel sounds are normal.   There is no abdominal tenderness. There is no rebound tenderness. There is no guarding.      Extremities: intravenous contrast. COMPARISON: None. HISTORY: ORDERING SYSTEM PROVIDED HISTORY: CompressionFracture-sagittal views TECHNOLOGIST PROVIDED HISTORY: Reason for exam:->CompressionFracture-sagittal views What reading provider will be dictating this exam?->CRC FINDINGS: MRI THORACIC SPINE: There is transitional anatomy at the lumbosacral junction. There is an acute T12 compression fracture demonstrating 10% loss of height. There is 2 mm of retropulsion of the posterior margin of the T12 vertebral body into the spinal canal without significant spinal canal stenosis. No other acute fracture of the thoracic spine is identified. There is an exaggerated midthoracic kyphosis. There is multilevel disc desiccation and disc space narrowing within the midthoracic spine. There is no epidural hematoma identified. There is a levoconvex thoracolumbar scoliosis. MRI LUMBAR SPINE: There is transitional anatomy at the lumbosacral junction. There is partial sacralization of L5. There is grade 1 anterolisthesis of L4 relative to L5 measuring 3 mm. Alignment is otherwise normal.  There is no acute fracture. Bone marrow signal intensity is normal.  There is mild multilevel disc desiccation. There is no paraspinal mass identified. L1-L2: There is no disc bulge or protrusion present. There is no significant spinal canal stenosis or neural foraminal narrowing present. L2-L3: There is no disc bulge or protrusion present. There is no significant spinal canal stenosis or neural foraminal narrowing present. L3-L4: There is no disc bulge or protrusion present. There is no significant spinal canal stenosis or neural foraminal narrowing present. There is bilateral facet arthropathy. L4-L5: There is no disc bulge or protrusion present. There is no significant spinal canal stenosis or neural foraminal narrowing present. There is bilateral facet arthropathy. L5-S1: There is no disc bulge or protrusion present.   There is no significant spinal canal stenosis or neural foraminal narrowing present. There is bilateral facet arthropathy. 1. Acute T12 compression fracture demonstrating 10% loss of height. 2. No acute fracture of the lumbar spine evident. 3. Transitional 9 me at the lumbosacral junction with partial sacralization of L5. Close attention to the correct anatomic level is recommended prior to any potential intervention. Assessment//Plan           Hospital Problems           Last Modified POA    Compression fx, thoracic spine, closed, initial encounter ([facility] [address] 10/7/2020 Yes        Assessment & Plan  1. T12 compression fracture  2. 11 mm cystic pancreatic lesion  3. Hypertension  4. CAD  5. Anxiety    For Kyphoplasty this am. Continue current medications. Will resume home Protonix for her stomach. [place] for DVT prophylaxis when ok with neurosurgery. Will need PT/OT after surgery.      Rito Deal DO    Electronically signed by Rito Deal DO on 10/10/20 at 5:27 AM EDT

## 2020-10-10 NOTE — BRIEF OP NOTE
Brief Postoperative Note      Patient: Luis Cohen  YOB: 1926  MRN: [id number]    Date of Procedure: 10/10/2020    Pre-Op Diagnosis: . Compression Fracture T12, Possibly L1 & L2  Post-Op Diagnosis: Same       Procedure(s):  T12, L1 and L2  KYPHOPLASTY    Surgeon(s):  Jaki Perera MD    Assistant:  * No surgical staff found *    Anesthesia: General    Estimated Blood Loss (mL): Minimal    Complications: None    Specimens:   ID Type Source Tests Collected by Time Destination   A : T 12 and L1 and L2 kyphoplasty Bone Biopsy SURGICAL PATHOLOGY Jaki Perera MD 10/10/2020 0840        Implants:  Implant Name Type Inv.  Item Serial No.  Lot No. LRB No. Used Action   KIT CEMENT BONE W/KYPHON MIXER TUBE [facility] Cement KIT CEMENT BONE W/KYPHON MIXER TUBE [address] [id number] N/A 1 Implanted         Drains:   [REMOVED] Urethral Catheter 16 fr (Removed)       Findings: As expected    Electronically signed by Jaki Perera MD on 10/10/2020 at 8:55 AM

## 2020-10-11 LAB
ALBUMIN SERPL-MCNC: 3.3 G/DL (ref 3.5–5.2)
ALP BLD-CCNC: 81 U/L (ref 35–104)
ALT SERPL-CCNC: 13 U/L (ref 0–32)
ANION GAP SERPL CALCULATED.3IONS-SCNC: 10 MMOL/L (ref 7–16)
AST SERPL-CCNC: 23 U/L (ref 0–31)
BASOPHILS ABSOLUTE: 0.01 E9/L (ref 0–0.2)
BASOPHILS RELATIVE PERCENT: 0.1 % (ref 0–2)
BILIRUB SERPL-MCNC: 0.6 MG/DL (ref 0–1.2)
BUN BLDV-MCNC: 18 MG/DL (ref 8–23)
CALCIUM SERPL-MCNC: 8.8 MG/DL (ref 8.6–10.2)
CHLORIDE BLD-SCNC: 94 MMOL/L (ref 98–107)
CO2: 29 MMOL/L (ref 22–29)
CREAT SERPL-MCNC: 0.9 MG/DL (ref 0.5–1)
EOSINOPHILS ABSOLUTE: 0.02 E9/L (ref 0.05–0.5)
EOSINOPHILS RELATIVE PERCENT: 0.3 % (ref 0–6)
GFR AFRICAN AMERICAN: >60
GFR NON-AFRICAN AMERICAN: 58 ML/MIN/1.73
GLUCOSE BLD-MCNC: 133 MG/DL (ref 74–99)
HCT VFR BLD CALC: 36 % (ref 34–48)
HEMOGLOBIN: 12 G/DL (ref 11.5–15.5)
IMMATURE GRANULOCYTES #: 0.03 E9/L
IMMATURE GRANULOCYTES %: 0.4 % (ref 0–5)
LYMPHOCYTES ABSOLUTE: 0.99 E9/L (ref 1.5–4)
LYMPHOCYTES RELATIVE PERCENT: 13.3 % (ref 20–42)
MCH RBC QN AUTO: 30.2 PG (ref 26–35)
MCHC RBC AUTO-ENTMCNC: 33.3 % (ref 32–34.5)
MCV RBC AUTO: 90.7 FL (ref 80–99.9)
MONOCYTES ABSOLUTE: 0.65 E9/L (ref 0.1–0.95)
MONOCYTES RELATIVE PERCENT: 8.7 % (ref 2–12)
NEUTROPHILS ABSOLUTE: 5.76 E9/L (ref 1.8–7.3)
NEUTROPHILS RELATIVE PERCENT: 77.2 % (ref 43–80)
PDW BLD-RTO: 13 FL (ref 11.5–15)
PLATELET # BLD: 189 E9/L (ref 130–450)
PMV BLD AUTO: 10.7 FL (ref 7–12)
POTASSIUM REFLEX MAGNESIUM: 3.6 MMOL/L (ref 3.5–5)
RBC # BLD: 3.97 E12/L (ref 3.5–5.5)
SODIUM BLD-SCNC: 133 MMOL/L (ref 132–146)
TOTAL PROTEIN: 5.6 G/DL (ref 6.4–8.3)
WBC # BLD: 7.5 E9/L (ref 4.5–11.5)

## 2020-10-11 PROCEDURE — 97161 PT EVAL LOW COMPLEX 20 MIN: CPT

## 2020-10-11 PROCEDURE — 6370000000 HC RX 637 (ALT 250 FOR IP): Performed by: INTERNAL MEDICINE

## 2020-10-11 PROCEDURE — 6360000002 HC RX W HCPCS: Performed by: NEUROLOGICAL SURGERY

## 2020-10-11 PROCEDURE — 36415 COLL VENOUS BLD VENIPUNCTURE: CPT

## 2020-10-11 PROCEDURE — 2580000003 HC RX 258: Performed by: NEUROLOGICAL SURGERY

## 2020-10-11 PROCEDURE — 80053 COMPREHEN METABOLIC PANEL: CPT

## 2020-10-11 PROCEDURE — 88311 DECALCIFY TISSUE: CPT

## 2020-10-11 PROCEDURE — 6370000000 HC RX 637 (ALT 250 FOR IP): Performed by: NEUROLOGICAL SURGERY

## 2020-10-11 PROCEDURE — 1200000000 HC SEMI PRIVATE

## 2020-10-11 PROCEDURE — 85025 COMPLETE CBC W/AUTO DIFF WBC: CPT

## 2020-10-11 PROCEDURE — 97530 THERAPEUTIC ACTIVITIES: CPT

## 2020-10-11 PROCEDURE — 88307 TISSUE EXAM BY PATHOLOGIST: CPT

## 2020-10-11 PROCEDURE — 2700000000 HC OXYGEN THERAPY PER DAY

## 2020-10-11 RX ORDER — SODIUM PHOSPHATE, DIBASIC AND SODIUM PHOSPHATE, MONOBASIC 7; 19 G/133ML; G/133ML
1 ENEMA RECTAL
Status: DISCONTINUED | OUTPATIENT
Start: 2020-10-12 | End: 2020-10-11

## 2020-10-11 RX ORDER — SODIUM PHOSPHATE, DIBASIC AND SODIUM PHOSPHATE, MONOBASIC 7; 19 G/133ML; G/133ML
1 ENEMA RECTAL ONCE
Status: DISCONTINUED | OUTPATIENT
Start: 2020-10-12 | End: 2020-10-14 | Stop reason: HOSPADM

## 2020-10-11 RX ORDER — AMLODIPINE BESYLATE 5 MG/1
5 TABLET ORAL DAILY
Status: DISCONTINUED | OUTPATIENT
Start: 2020-10-11 | End: 2020-10-14 | Stop reason: HOSPADM

## 2020-10-11 RX ADMIN — PANTOPRAZOLE SODIUM 40 MG: 40 TABLET, DELAYED RELEASE ORAL at 05:52

## 2020-10-11 RX ADMIN — LORAZEPAM 1 MG: 1 TABLET ORAL at 21:35

## 2020-10-11 RX ADMIN — ATENOLOL 50 MG: 50 TABLET ORAL at 08:31

## 2020-10-11 RX ADMIN — ISOSORBIDE MONONITRATE 30 MG: 30 TABLET ORAL at 08:31

## 2020-10-11 RX ADMIN — SODIUM CHLORIDE, PRESERVATIVE FREE 10 ML: 5 INJECTION INTRAVENOUS at 20:24

## 2020-10-11 RX ADMIN — OXYCODONE AND ACETAMINOPHEN 1 TABLET: 5; 325 TABLET ORAL at 18:07

## 2020-10-11 RX ADMIN — LORAZEPAM 1 MG: 1 TABLET ORAL at 03:21

## 2020-10-11 RX ADMIN — SODIUM CHLORIDE, PRESERVATIVE FREE 10 ML: 5 INJECTION INTRAVENOUS at 08:31

## 2020-10-11 RX ADMIN — Medication 2 MG: at 12:25

## 2020-10-11 RX ADMIN — SODIUM CHLORIDE, PRESERVATIVE FREE 10 ML: 5 INJECTION INTRAVENOUS at 21:36

## 2020-10-11 RX ADMIN — AMLODIPINE BESYLATE 5 MG: 5 TABLET ORAL at 08:35

## 2020-10-11 RX ADMIN — ATORVASTATIN CALCIUM 10 MG: 10 TABLET, FILM COATED ORAL at 08:31

## 2020-10-11 RX ADMIN — MAGNESIUM HYDROXIDE 30 ML: 2400 SUSPENSION ORAL at 10:21

## 2020-10-11 RX ADMIN — OXYCODONE AND ACETAMINOPHEN 1 TABLET: 5; 325 TABLET ORAL at 10:20

## 2020-10-11 RX ADMIN — MORPHINE SULFATE 1 MG: 2 INJECTION, SOLUTION INTRAMUSCULAR; INTRAVENOUS at 21:35

## 2020-10-11 RX ADMIN — MAGNESIUM CITRATE 150 ML: 1.75 LIQUID ORAL at 13:33

## 2020-10-11 RX ADMIN — SENNOSIDES 8.6 MG: 8.6 TABLET, FILM COATED ORAL at 08:31

## 2020-10-11 ASSESSMENT — PAIN SCALES - GENERAL
PAINLEVEL_OUTOF10: 4
PAINLEVEL_OUTOF10: 0
PAINLEVEL_OUTOF10: 10
PAINLEVEL_OUTOF10: 7
PAINLEVEL_OUTOF10: 5
PAINLEVEL_OUTOF10: 3
PAINLEVEL_OUTOF10: 4
PAINLEVEL_OUTOF10: 4
PAINLEVEL_OUTOF10: 8
PAINLEVEL_OUTOF10: 7

## 2020-10-11 ASSESSMENT — PAIN DESCRIPTION - DESCRIPTORS
DESCRIPTORS: ACHING;CONSTANT;DISCOMFORT

## 2020-10-11 ASSESSMENT — PAIN DESCRIPTION - LOCATION
LOCATION: BACK

## 2020-10-11 ASSESSMENT — PAIN - FUNCTIONAL ASSESSMENT
PAIN_FUNCTIONAL_ASSESSMENT: PREVENTS OR INTERFERES WITH ALL ACTIVE AND SOME PASSIVE ACTIVITIES
PAIN_FUNCTIONAL_ASSESSMENT: PREVENTS OR INTERFERES WITH MANY ACTIVE NOT PASSIVE ACTIVITIES

## 2020-10-11 ASSESSMENT — PAIN DESCRIPTION - PAIN TYPE
TYPE: ACUTE PAIN;SURGICAL PAIN
TYPE: SURGICAL PAIN
TYPE: ACUTE PAIN;SURGICAL PAIN

## 2020-10-11 ASSESSMENT — PAIN DESCRIPTION - ONSET
ONSET: ON-GOING
ONSET: ON-GOING

## 2020-10-11 ASSESSMENT — PAIN DESCRIPTION - FREQUENCY
FREQUENCY: CONTINUOUS

## 2020-10-11 ASSESSMENT — PAIN DESCRIPTION - PROGRESSION
CLINICAL_PROGRESSION: GRADUALLY WORSENING
CLINICAL_PROGRESSION: GRADUALLY WORSENING

## 2020-10-11 ASSESSMENT — ENCOUNTER SYMPTOMS
VOMITING: 0
COUGH: 0
NAUSEA: 0
SHORTNESS OF BREATH: 0
DIARRHEA: 0

## 2020-10-11 NOTE — PROGRESS NOTES
Status post Kyphoplasty  Reviewed the xrays  1.  Limited exam due to generalized osteopenia.  CT follow-up could be    helpful for further evaluation.         2.  Status post kyphoplasty appearing at levels of T12, L1, and L2 with    satisfactory alignment.  CT follow-up could be helpful for further evaluation. Patient C/O low back discomfort & constipation. There is no change in patient's neurosurgical status. Will continue to follow along.

## 2020-10-11 NOTE — PROGRESS NOTES
Patient states she has no went the bathroom for 4 days. Asked if patient would like Milk of mag, she declined, saying that she wanted to wait until 8:30-9am to take it. Will let next shift know.

## 2020-10-11 NOTE — PROGRESS NOTES
slowly needing extended time. Exhibits guarded posture in standing but ambulates with no device. Flexed posture with slow, shuffled gait. Pt requested therapist hold onto her for safety. May benefit from assistive device to increase safety but pt prefers no AD at this time. Seated in chair to end session   Pt with all needs met and call light in reach. Pt would benefit from continued PT POC to address functional deficits described above. Treatment:  Patient practiced and was instructed in the following treatment:     Patient education provided continuously throughout session for sequencing, safety maintenance, and improving any deficits found during the evaluation.  Bed mobility training - pt given verbal and tactile cues to facilitate proper sequencing and safety during rolling and supine>sit    STS and pivot transfer training - pt educated on proper hand and foot placement, safety and sequencing, and use of proper technique to safely complete sit<>stand   Gait training- pt was given verbal and tactile cues to facilitate improved balance with increased step height during ambulation as well as provided with physical assistance to complete task. Pt's/ family goals   1. Return home     Patient and or family understand(s) diagnosis, prognosis, and plan of care. Yes     PLAN:    Current Treatment Recommendations   [] Strengthening     [] ROM   [x] Balance Training   [x] Endurance Training   [x] Transfer Training   [x] Gait Training   [x] Stair Training   [] Positioning   [] Safety and Education Training   [] Patient/Caregiver Education   [] HEP  [] Other     Frequency of treatments: 2-5x/week x 1-2 weeks.     Time in  0905  Time out  0925    Total Treatment Time 10 minutes     Evaluation Time includes thorough review of current medical information, gathering information on past medical history/social history and prior level of function, completion of standardized testing/informal observation of tasks, assessment of data and education on plan of care and goals.     CPT codes:  [x] Low Complexity PT evaluation 42444  [] Moderate Complexity PT evaluation 13230  [] High Complexity PT evaluation 97958  [] PT Re-evaluation 67954  [] Gait training 13495 - minutes  [] Manual therapy 39310 - minutes  [x] Therapeutic activities 79347 10 minutes  [] Therapeutic exercises 25027 - minutes  [] Neuromuscular reeducation 80545 - minutes     Naif Nick, PT, DPT  [id number]

## 2020-10-11 NOTE — PROGRESS NOTES
Progress Note  Date:10/11/2020       IFEN:2302/4167-E  Patient Gio Solis     YOB: 1926     Age:89 y.o. Patient seen for follow up of Acute T12 compression fracture. Patient states that she was worried as last night her pressure was in the 481W systolic. She had missed a dosage of ativan yesterday and was getting shakey. She denies any chest pains, shortness of breath, nausea, vomiting or diarrhea. Subjective    Subjective:  Symptoms:  No shortness of breath, cough, chest pain, headache, chest pressure or diarrhea. Diet:  No nausea or vomiting. Review of Systems   Respiratory: Negative for cough and shortness of breath. Cardiovascular: Negative for chest pain. Gastrointestinal: Negative for diarrhea, nausea and vomiting. Objective         Vitals Last 24 Hours:  TEMPERATURE:  Temp  Av.7 °F (36.5 °C)  Min: 97 °F (36.1 °C)  Max: 98.2 °F (36.8 °C)  RESPIRATIONS RANGE: Resp  Avg: 10.2  Min: 0  Max: 32  PULSE OXIMETRY RANGE: SpO2  Av %  Min: 90 %  Max: 100 %  PULSE RANGE: Pulse  Av.1  Min: 68  Max: 85  BLOOD PRESSURE RANGE: Systolic (14UZM), QLQ:928 , Min:74 , JGV:721   ; Diastolic (30YAR), PLL:61, Min:48, Max:120    I/O (24Hr): Intake/Output Summary (Last 24 hours) at 10/11/2020 0517  Last data filed at 10/10/2020 1906  Gross per 24 hour   Intake 860 ml   Output 10 ml   Net 850 ml     Objective:  General Appearance:  Comfortable, well-appearing and in no acute distress. Vital signs: (most recent): Blood pressure (!) 184/92, pulse 83, temperature 97.8 °F (36.6 °C), temperature source Temporal, resp. rate 17, height 5' 3\" (1.6 m), weight 145 lb (65.8 kg), last menstrual period 2018, SpO2 93 %. Lungs:  Normal effort and normal respiratory rate. Breath sounds clear to auscultation. No rales or rhonchi. Heart: Normal rate. Regular rhythm. S1 normal and S2 normal.  No friction rub. Abdomen: Abdomen is soft and non-distended.   Bowel sounds SPINE WITHOUT CONTRAST, 10/9/2020 1:03 pm TECHNIQUE: Multiplanar multisequence MRI of the lumbar spine was performed without the administration of intravenous contrast.; Multiplanar multisequence MRI of the thoracic spine was performed without the administration of intravenous contrast. COMPARISON: None. HISTORY: ORDERING SYSTEM PROVIDED HISTORY: CompressionFracture-sagittal views TECHNOLOGIST PROVIDED HISTORY: Reason for exam:->CompressionFracture-sagittal views What reading provider will be dictating this exam?->CRC FINDINGS: MRI THORACIC SPINE: There is transitional anatomy at the lumbosacral junction. There is an acute T12 compression fracture demonstrating 10% loss of height. There is 2 mm of retropulsion of the posterior margin of the T12 vertebral body into the spinal canal without significant spinal canal stenosis. No other acute fracture of the thoracic spine is identified. There is an exaggerated midthoracic kyphosis. There is multilevel disc desiccation and disc space narrowing within the midthoracic spine. There is no epidural hematoma identified. There is a levoconvex thoracolumbar scoliosis. MRI LUMBAR SPINE: There is transitional anatomy at the lumbosacral junction. There is partial sacralization of L5. There is grade 1 anterolisthesis of L4 relative to L5 measuring 3 mm. Alignment is otherwise normal.  There is no acute fracture. Bone marrow signal intensity is normal.  There is mild multilevel disc desiccation. There is no paraspinal mass identified. L1-L2: There is no disc bulge or protrusion present. There is no significant spinal canal stenosis or neural foraminal narrowing present. L2-L3: There is no disc bulge or protrusion present. There is no significant spinal canal stenosis or neural foraminal narrowing present. L3-L4: There is no disc bulge or protrusion present. There is no significant spinal canal stenosis or neural foraminal narrowing present.   There is bilateral facet LUMBAR SPINE: There is transitional anatomy at the lumbosacral junction. There is partial sacralization of L5. There is grade 1 anterolisthesis of L4 relative to L5 measuring 3 mm. Alignment is otherwise normal.  There is no acute fracture. Bone marrow signal intensity is normal.  There is mild multilevel disc desiccation. There is no paraspinal mass identified. L1-L2: There is no disc bulge or protrusion present. There is no significant spinal canal stenosis or neural foraminal narrowing present. L2-L3: There is no disc bulge or protrusion present. There is no significant spinal canal stenosis or neural foraminal narrowing present. L3-L4: There is no disc bulge or protrusion present. There is no significant spinal canal stenosis or neural foraminal narrowing present. There is bilateral facet arthropathy. L4-L5: There is no disc bulge or protrusion present. There is no significant spinal canal stenosis or neural foraminal narrowing present. There is bilateral facet arthropathy. L5-S1: There is no disc bulge or protrusion present. There is no significant spinal canal stenosis or neural foraminal narrowing present. There is bilateral facet arthropathy. 1. Acute T12 compression fracture demonstrating 10% loss of height. 2. No acute fracture of the lumbar spine evident. 3. Transitional anatomy at the lumbosacral junction with partial sacralization of L5. Close attention to the correct anatomic level is recommended prior to any potential intervention. Fluoro For Surgical Procedures    Result Date: 10/10/2020  EXAMINATION: SPOT FLUOROSCOPIC IMAGES 10/10/2020 9:00 am TECHNIQUE: Fluoroscopy was provided by the radiology department for procedure. Radiologist was not present during examination.  FLUOROSCOPY DOSE AND TYPE OR TIME AND EXPOSURES: Fluoroscopy time 63.5 seconds Dose: 41.87 mGy COMPARISON: None HISTORY: ORDERING SYSTEM PROVIDED HISTORY: 3 level kypho TECHNOLOGIST PROVIDED HISTORY: Reason for exam:->3 level kypho What reading provider will be dictating this exam?->CRC Intraprocedural imaging. FINDINGS: Single spot images of the lumbar were obtained. Bone cement seen within the T12, L1 and L2 vertebral bodies. Intraprocedural fluoroscopic spot images as above. See separate procedure report for more information. Assessment//Plan           Hospital Problems           Last Modified POA    Compression fx, thoracic spine, closed, initial encounter ([facility], [place] [address] 10/7/2020 Yes        Assessment & Plan  1. T12 compression fracture   2. 11 mm cystic pancreatic lesion   3. Hypertension   4. CAD   5. Anxiety    S/p Kyphoplasty. BP elevated likely due to the withdrawal of benzos and increased anxiety. Will add norvasc for improved BP control. Monitor labs. PT/OT when ok with neurosurgery.      Panchito Tilley DO      Electronically signed by Panchito Tilley DO on 10/11/20 at 5:17 AM EDT

## 2020-10-11 NOTE — PROGRESS NOTES
Occupational Therapy    OT consult received to eval/treat and chart review complete. Patient politely refusing OT evaluation and treatment. Patient just returned to bed after being up in the chair with PT and is experiencing back pain. Patient recently medicated. OT to re-attempt at a later time as able and appropriate. Thank you.      Claudeen Rack, OTR/L  [id number]

## 2020-10-12 ENCOUNTER — APPOINTMENT (OUTPATIENT)
Dept: GENERAL RADIOLOGY | Age: 85
DRG: 479 | End: 2020-10-12
Payer: MEDICARE

## 2020-10-12 LAB
ALBUMIN SERPL-MCNC: 3.2 G/DL (ref 3.5–5.2)
ALP BLD-CCNC: 90 U/L (ref 35–104)
ALT SERPL-CCNC: 12 U/L (ref 0–32)
ANION GAP SERPL CALCULATED.3IONS-SCNC: 11 MMOL/L (ref 7–16)
AST SERPL-CCNC: 23 U/L (ref 0–31)
BASOPHILS ABSOLUTE: 0.04 E9/L (ref 0–0.2)
BASOPHILS RELATIVE PERCENT: 0.9 % (ref 0–2)
BILIRUB SERPL-MCNC: 0.6 MG/DL (ref 0–1.2)
BUN BLDV-MCNC: 15 MG/DL (ref 8–23)
CALCIUM SERPL-MCNC: 8.9 MG/DL (ref 8.6–10.2)
CHLORIDE BLD-SCNC: 96 MMOL/L (ref 98–107)
CO2: 28 MMOL/L (ref 22–29)
CREAT SERPL-MCNC: 0.8 MG/DL (ref 0.5–1)
EOSINOPHILS ABSOLUTE: 0.21 E9/L (ref 0.05–0.5)
EOSINOPHILS RELATIVE PERCENT: 4.5 % (ref 0–6)
GFR AFRICAN AMERICAN: >60
GFR NON-AFRICAN AMERICAN: >60 ML/MIN/1.73
GLUCOSE BLD-MCNC: 102 MG/DL (ref 74–99)
HCT VFR BLD CALC: 39.3 % (ref 34–48)
HEMOGLOBIN: 13.1 G/DL (ref 11.5–15.5)
IMMATURE GRANULOCYTES #: 0.02 E9/L
IMMATURE GRANULOCYTES %: 0.4 % (ref 0–5)
LYMPHOCYTES ABSOLUTE: 0.7 E9/L (ref 1.5–4)
LYMPHOCYTES RELATIVE PERCENT: 15.1 % (ref 20–42)
MAGNESIUM: 2.3 MG/DL (ref 1.6–2.6)
MCH RBC QN AUTO: 30 PG (ref 26–35)
MCHC RBC AUTO-ENTMCNC: 33.3 % (ref 32–34.5)
MCV RBC AUTO: 89.9 FL (ref 80–99.9)
MONOCYTES ABSOLUTE: 0.55 E9/L (ref 0.1–0.95)
MONOCYTES RELATIVE PERCENT: 11.9 % (ref 2–12)
NEUTROPHILS ABSOLUTE: 3.11 E9/L (ref 1.8–7.3)
NEUTROPHILS RELATIVE PERCENT: 67.2 % (ref 43–80)
PDW BLD-RTO: 12.9 FL (ref 11.5–15)
PLATELET # BLD: 198 E9/L (ref 130–450)
PMV BLD AUTO: 10.1 FL (ref 7–12)
POTASSIUM REFLEX MAGNESIUM: 3.3 MMOL/L (ref 3.5–5)
RBC # BLD: 4.37 E12/L (ref 3.5–5.5)
SODIUM BLD-SCNC: 135 MMOL/L (ref 132–146)
TOTAL PROTEIN: 5.9 G/DL (ref 6.4–8.3)
URINE CULTURE, ROUTINE: NORMAL
WBC # BLD: 4.6 E9/L (ref 4.5–11.5)

## 2020-10-12 PROCEDURE — 97530 THERAPEUTIC ACTIVITIES: CPT

## 2020-10-12 PROCEDURE — 36415 COLL VENOUS BLD VENIPUNCTURE: CPT

## 2020-10-12 PROCEDURE — 2700000000 HC OXYGEN THERAPY PER DAY

## 2020-10-12 PROCEDURE — 80053 COMPREHEN METABOLIC PANEL: CPT

## 2020-10-12 PROCEDURE — 6370000000 HC RX 637 (ALT 250 FOR IP): Performed by: INTERNAL MEDICINE

## 2020-10-12 PROCEDURE — 6370000000 HC RX 637 (ALT 250 FOR IP): Performed by: NEUROLOGICAL SURGERY

## 2020-10-12 PROCEDURE — 2580000003 HC RX 258: Performed by: NEUROLOGICAL SURGERY

## 2020-10-12 PROCEDURE — 6360000002 HC RX W HCPCS: Performed by: NEUROLOGICAL SURGERY

## 2020-10-12 PROCEDURE — 1200000000 HC SEMI PRIVATE

## 2020-10-12 PROCEDURE — 83735 ASSAY OF MAGNESIUM: CPT

## 2020-10-12 PROCEDURE — 97166 OT EVAL MOD COMPLEX 45 MIN: CPT

## 2020-10-12 PROCEDURE — 73502 X-RAY EXAM HIP UNI 2-3 VIEWS: CPT

## 2020-10-12 PROCEDURE — 85025 COMPLETE CBC W/AUTO DIFF WBC: CPT

## 2020-10-12 RX ADMIN — ISOSORBIDE MONONITRATE 30 MG: 30 TABLET ORAL at 08:40

## 2020-10-12 RX ADMIN — LORAZEPAM 1 MG: 1 TABLET ORAL at 22:42

## 2020-10-12 RX ADMIN — PANTOPRAZOLE SODIUM 40 MG: 40 TABLET, DELAYED RELEASE ORAL at 05:38

## 2020-10-12 RX ADMIN — Medication 2 MG: at 05:39

## 2020-10-12 RX ADMIN — Medication 10 ML: at 08:40

## 2020-10-12 RX ADMIN — SODIUM CHLORIDE, PRESERVATIVE FREE 10 ML: 5 INJECTION INTRAVENOUS at 21:03

## 2020-10-12 RX ADMIN — ATORVASTATIN CALCIUM 10 MG: 10 TABLET, FILM COATED ORAL at 08:40

## 2020-10-12 RX ADMIN — AMLODIPINE BESYLATE 5 MG: 5 TABLET ORAL at 08:40

## 2020-10-12 RX ADMIN — Medication 2 MG: at 10:06

## 2020-10-12 RX ADMIN — SODIUM CHLORIDE, PRESERVATIVE FREE 10 ML: 5 INJECTION INTRAVENOUS at 08:40

## 2020-10-12 RX ADMIN — OXYCODONE AND ACETAMINOPHEN 1 TABLET: 5; 325 TABLET ORAL at 08:39

## 2020-10-12 RX ADMIN — ATENOLOL 50 MG: 50 TABLET ORAL at 08:40

## 2020-10-12 RX ADMIN — OXYBUTYNIN CHLORIDE 15 MG: 5 TABLET, EXTENDED RELEASE ORAL at 08:39

## 2020-10-12 RX ADMIN — OXYCODONE AND ACETAMINOPHEN 1 TABLET: 5; 325 TABLET ORAL at 18:16

## 2020-10-12 RX ADMIN — MORPHINE SULFATE 1 MG: 2 INJECTION, SOLUTION INTRAMUSCULAR; INTRAVENOUS at 21:10

## 2020-10-12 RX ADMIN — Medication 2 MG: at 14:58

## 2020-10-12 ASSESSMENT — PAIN DESCRIPTION - FREQUENCY
FREQUENCY: INTERMITTENT

## 2020-10-12 ASSESSMENT — PAIN DESCRIPTION - LOCATION
LOCATION: BACK

## 2020-10-12 ASSESSMENT — PAIN - FUNCTIONAL ASSESSMENT
PAIN_FUNCTIONAL_ASSESSMENT: PREVENTS OR INTERFERES WITH ALL ACTIVE AND SOME PASSIVE ACTIVITIES
PAIN_FUNCTIONAL_ASSESSMENT: PREVENTS OR INTERFERES SOME ACTIVE ACTIVITIES AND ADLS

## 2020-10-12 ASSESSMENT — PAIN SCALES - GENERAL
PAINLEVEL_OUTOF10: 10
PAINLEVEL_OUTOF10: 8
PAINLEVEL_OUTOF10: 9
PAINLEVEL_OUTOF10: 7
PAINLEVEL_OUTOF10: 10
PAINLEVEL_OUTOF10: 7
PAINLEVEL_OUTOF10: 5
PAINLEVEL_OUTOF10: 3
PAINLEVEL_OUTOF10: 6

## 2020-10-12 ASSESSMENT — PAIN DESCRIPTION - ONSET
ONSET: ON-GOING

## 2020-10-12 ASSESSMENT — PAIN DESCRIPTION - PROGRESSION
CLINICAL_PROGRESSION: GRADUALLY WORSENING

## 2020-10-12 ASSESSMENT — PAIN DESCRIPTION - PAIN TYPE
TYPE: SURGICAL PAIN

## 2020-10-12 ASSESSMENT — PAIN DESCRIPTION - DESCRIPTORS
DESCRIPTORS: ACHING;CONSTANT;DISCOMFORT

## 2020-10-12 ASSESSMENT — PAIN DESCRIPTION - ORIENTATION
ORIENTATION: LOWER

## 2020-10-12 NOTE — PROGRESS NOTES
Patient complains of not having BM for several days, admits she has had \"little rabbit turds\" a couple times. She has had Mag citrate and MOM and has had no results.   Dr. Anna Bermudez called

## 2020-10-12 NOTE — PROGRESS NOTES
Physical Therapy    Name: Peggy Crawford  : 1926  MRN: [id number]     Referring Provider:  Palmira Owen DO      Date of Service: 10/12/2020     Evaluating PT:  Glimar Espinal PT, DPT. [id number]     Room #:  4402/6118-N  Diagnosis:  Compression fx   Reason for admission:  Back pain  Precautions:  Falls, spinal neutrality   Procedures: 10/10 T12-L2 kyphoplasty   Equipment Recommendations:  FWW for support.      SUBJECTIVE:  Pt lives alone in a 1 story home with 1 stair(s) to enter and 0 rail(s). Bed is on 1st floor and bath is on 1st floor. Pt ambulated with no AD PTA. Pt independent for ADL performance.     OBJECTIVE:    Initial Evaluation  Date: 10/11 Treatment  10/12    Short Term/ Long Term   Goals   AM-PAC 6 Clicks 69/54  65/34     Was pt agreeable to Eval/treatment? Yes  yes      Does pt have pain? Low back 7/10 Low back pain.      Bed Mobility  Rolling: NT  Supine to sit: SBA  Sit to supine: NT  Scooting: SBA NT up in chair with OT prior and post.   Independent    Transfers Sit to stand: SBA  Stand to sit: SBA  Stand pivot: NT Sit to stand: Min increased discomfort with fww for support.  .   Stand to sit: SBA  Stand pivot: Min A  Independent    Ambulation    50 feet with no AD Champ    40 feet with fww  Champ  >200 feet with AAD mod I vs independent    Stair negotiation: ascended and descended  NT  NT >1 steps with 0 rail SBA   ROM BUE:  See OT eval   BLE:  WFL       Strength BUE:  See OT eval   BLE:  knee ext 5/5  Ankle DF 5/5   Increase by 1/3 MMT grade    Balance Sitting EOB:  Independent  Dynamic Standing:  SBA   Sitting EOB:  indep  Dynamic Standing:  indep      -Pt is A & O x 4  -Sensation:  unremarkable   -Edema:  unremarkable      Therapeutic Exercises:  functional activity      Patient education  Pt educated on safety, sequencing of transfers, and role of PT     Patient response to education:   Pt verbalized understanding Pt demonstrated skill Pt requires further education in this area   Yes 0930     Total Treatment Time 25 minutes      Evaluation Time includes thorough review of current medical information, gathering information on past medical history/social history and prior level of function, completion of standardized testing/informal observation of tasks, assessment of data and education on plan of care and goals.     CPT codes:  []? Low Complexity PT evaluation [id number]  []? Moderate Complexity PT evaluation 88681  []? High Complexity PT evaluation [id number]  []? PT Re-evaluation [id number]  []? Gait training 10362 - minutes  []? Manual therapy 01259 - minutes  [x]? Therapeutic activities 47378 25 minutes  []? Therapeutic exercises 18453 - minutes  []?  Neuromuscular reeducation 31800 - minutes      Juan Rojas, [id number] [facility]

## 2020-10-12 NOTE — CARE COORDINATION
10/12/2020 social work transition of care planning  Pt plan is home, family to transport. Sw left message for pt's dtr, to confirm plan.   Electronically signed by TONY Valdes on 10/12/2020 at 10:38 AM

## 2020-10-12 NOTE — PROGRESS NOTES
Post Op Kyphoplasty  Doing well as far as back pain is concerned. C/O pain right hip area.   Will get Xray right hip for further management

## 2020-10-12 NOTE — PROGRESS NOTES
[facility] told this nurse that patient stated that she stated she kept putting her light on and no one was coming, [facility] stated that patient was clicking the side of the bed. Patient was educated that she needs to use the call button on her tv remote and the one on the bed does not work.

## 2020-10-12 NOTE — PROGRESS NOTES
Occupational Therapy  OCCUPATIONAL THERAPY INITIAL EVALUATION      Date:10/12/2020  Patient Name: Maria Isabel Camargo  MRN: [id number]  : 1926  Room: [address] [place] [address]    Evaluating OT: Alistair Bautista. Teetee Santos OTR/ELIO #[id number]    Referring physician: Lucrecia Beth DO   AM-PAC Daily Activity Raw Score: 1624  Recommended Adaptive Equipment: AE for LE dressing and bathing     Diagnosis: compression fx. Surgery: s/p T 12=L2 kyphoplasty  10/10/20  Pertinent Medical History:   Past Medical History:   Diagnosis Date    Arthritis     Hypertension     Myocardial infarct (HCC)     UTI (urinary tract infection)        Precautions:  Falls, spinal neutral mechanics, O2, [place] with aides B     Home Living: Pt lives alone in a one floor home  with one step(s) to enter and no rail(s); bed/bath on main floor   Bathroom setup: tub shower with seat and multiple HR's , elevated commode with HR   Equipment owned: ww shower seat  Prior Level of Function:   Independent with ADLs ,  Independent with IADLs; using ww for ambulation.    Driving: no                           Medication Management self  Occupation: enjoys reading the [employer]    Pain Level: 8/10 back pain   Cognition: A&O: 3/3; Follows multi step directions   Memory:  Fair+   Sequencing:  Fair+   Problem solving:  Fair+   Judgement/safety:  Fair+     Functional Assessment:   Initial Eval Status  Date: 10/12/20 Treatment Status  Date: Short Term Goals=LTG  Treatment frequency: PRN 2-4 x/week   Feeding Independent      Grooming Setup sitting for hair , face and hand care  Mod I     UB Dressing Min A   Independent   LB Dressing Mod A reviewed spinal neutral mechanics and possible use of AE able to cross up LE's  Mod I with AE prn    Bathing Simulated mod A   Mod I with seat and LHS    Toileting Mod A to BSC  Mod I with ww to BS   Bed Mobility  Supine to sit: mod A    Sit to supine: n/t  Supine to sit: mod I log rolling   Sit to supine: mod I    Functional Transfers Sit to stand: min   A with ww and increased time and cues  Stand to sit:  Mod I with ww  Stand pivot: mod I with ww  Mod I with ww   Functional Mobility Min A with ww with decreased pain   Mod I with ww   Balance Sitting:     Static:  SBA    Dynamic:CGA  Standing: min A      Activity Tolerance Fair limited by pain O2 RA 85% returned to 1L 96%/74 HR  good   Visual/  Perceptual Glasses: yes WFL         Safety Fair+                  good     Hand dominance: R   UE ROM: RUE: limited to 150* shoulders  Distal WFL  LUE: limited to 150* shoulders distal  WFL  Strength: RUE:  4-/5 LUE:  4-/5   Strength: R  WFL   L WFL   Fine Motor Coordination: R WFL  L WFL    Hearing: [place] with B aides  Sensation:  No c/o numbness or tingling  Tone:  WFL  Edema: none noted                            Comments: Nursing approval to work with patient given. Upon arrival, patient supine and agreeable to evaluation. OT evaluation performed with  education on benefits of mobility and safety with ADL completion. Patient concerned about multiple bowel movements and not having assistance, motivated to move. At end of session, patient sitting up in chair and  with call light and phone within reach, all lines and tubes intact. Nursing notified. OT treatment: OT for functional assessment of  ADL, Functional Transfer/Mobility Training, Equipment Needs, Energy Conservation Techniques, Pt/Family Education, Ther Ex- deep breathing for edema and pain control., OT role and POC reviewed. Patient  demo fair+  understanding of spinal neutral mechanics with ADL's. Skilled occupational therapy services provided include instruction/training on safety and adapted techniques for completion of therapeutic activities, ADLs/IADLs, and therapeutic exercise- deep breathing for endurance, and pain control. Therapist educated pt on spinal neutral precautions.  Therapist facilitated bed mobility- log rolling, functional transfers, graded functional activities and functional ambulation - Functional Kiln with ADLs, functional transfers/mobility    Rehab Potential: Good for established goals    Patient / Family Goal: decrease pain      Evaluation time includes thorough review of current medical information, gathering information on past medical & social history & PLOF, completion of standardized testing, informal observation of tasks, consultation with other medical professions/disciplines, assessment of data & development of POC/goals. Patient and/or family were instructed diagnosis, prognosis/goals and plan of care. yes Demonstrated fair+ understanding. Min Units   OT Eval Low 39726     OT Eval Medium 49098     OT Eval High [id number]     OT Re-Eval [id number]     Therapeutic Ex [id number]     Therapeutic Activities 26458 23 2   ADL/Self Care 84782     Orthotic Management 79730     Neuro Re-Ed 45259     Non-Billable Time     TOTAL TIMED TREATMENT 23 2       Time In: 8:45           Time Out: 9:18               [] Malnutrition indicators have been identified and nursing has been notified to ensure a dietitian consult is ordered. mod  OT Evaluation + 23  treatment minutes    Rajendra Bell.  Ruslan [address], Mikey [address]

## 2020-10-13 LAB
ALBUMIN SERPL-MCNC: 3.6 G/DL (ref 3.5–5.2)
ALP BLD-CCNC: 98 U/L (ref 35–104)
ALT SERPL-CCNC: 13 U/L (ref 0–32)
ANION GAP SERPL CALCULATED.3IONS-SCNC: 13 MMOL/L (ref 7–16)
AST SERPL-CCNC: 20 U/L (ref 0–31)
BASOPHILS ABSOLUTE: 0.07 E9/L (ref 0–0.2)
BASOPHILS RELATIVE PERCENT: 1.2 % (ref 0–2)
BILIRUB SERPL-MCNC: 0.7 MG/DL (ref 0–1.2)
BUN BLDV-MCNC: 13 MG/DL (ref 8–23)
CALCIUM SERPL-MCNC: 9.4 MG/DL (ref 8.6–10.2)
CHLORIDE BLD-SCNC: 93 MMOL/L (ref 98–107)
CO2: 28 MMOL/L (ref 22–29)
CREAT SERPL-MCNC: 0.9 MG/DL (ref 0.5–1)
EOSINOPHILS ABSOLUTE: 0.5 E9/L (ref 0.05–0.5)
EOSINOPHILS RELATIVE PERCENT: 8.8 % (ref 0–6)
GFR AFRICAN AMERICAN: >60
GFR NON-AFRICAN AMERICAN: 58 ML/MIN/1.73
GLUCOSE BLD-MCNC: 104 MG/DL (ref 74–99)
HCT VFR BLD CALC: 40.9 % (ref 34–48)
HEMOGLOBIN: 13.7 G/DL (ref 11.5–15.5)
IMMATURE GRANULOCYTES #: 0.03 E9/L
IMMATURE GRANULOCYTES %: 0.5 % (ref 0–5)
LYMPHOCYTES ABSOLUTE: 1.34 E9/L (ref 1.5–4)
LYMPHOCYTES RELATIVE PERCENT: 23.7 % (ref 20–42)
MAGNESIUM: 2 MG/DL (ref 1.6–2.6)
MCH RBC QN AUTO: 30.4 PG (ref 26–35)
MCHC RBC AUTO-ENTMCNC: 33.5 % (ref 32–34.5)
MCV RBC AUTO: 90.7 FL (ref 80–99.9)
MONOCYTES ABSOLUTE: 0.79 E9/L (ref 0.1–0.95)
MONOCYTES RELATIVE PERCENT: 14 % (ref 2–12)
NEUTROPHILS ABSOLUTE: 2.93 E9/L (ref 1.8–7.3)
NEUTROPHILS RELATIVE PERCENT: 51.8 % (ref 43–80)
PDW BLD-RTO: 13.1 FL (ref 11.5–15)
PLATELET # BLD: 215 E9/L (ref 130–450)
PMV BLD AUTO: 10.4 FL (ref 7–12)
POTASSIUM REFLEX MAGNESIUM: 3.5 MMOL/L (ref 3.5–5)
RBC # BLD: 4.51 E12/L (ref 3.5–5.5)
SODIUM BLD-SCNC: 134 MMOL/L (ref 132–146)
TOTAL PROTEIN: 6.6 G/DL (ref 6.4–8.3)
WBC # BLD: 5.7 E9/L (ref 4.5–11.5)

## 2020-10-13 PROCEDURE — 85025 COMPLETE CBC W/AUTO DIFF WBC: CPT

## 2020-10-13 PROCEDURE — 83735 ASSAY OF MAGNESIUM: CPT

## 2020-10-13 PROCEDURE — 2580000003 HC RX 258: Performed by: NEUROLOGICAL SURGERY

## 2020-10-13 PROCEDURE — 36415 COLL VENOUS BLD VENIPUNCTURE: CPT

## 2020-10-13 PROCEDURE — 1200000000 HC SEMI PRIVATE

## 2020-10-13 PROCEDURE — 97530 THERAPEUTIC ACTIVITIES: CPT

## 2020-10-13 PROCEDURE — 80053 COMPREHEN METABOLIC PANEL: CPT

## 2020-10-13 PROCEDURE — 6370000000 HC RX 637 (ALT 250 FOR IP): Performed by: INTERNAL MEDICINE

## 2020-10-13 PROCEDURE — 6370000000 HC RX 637 (ALT 250 FOR IP): Performed by: NEUROLOGICAL SURGERY

## 2020-10-13 PROCEDURE — 2700000000 HC OXYGEN THERAPY PER DAY

## 2020-10-13 RX ADMIN — ATENOLOL 50 MG: 50 TABLET ORAL at 08:57

## 2020-10-13 RX ADMIN — LORAZEPAM 1 MG: 1 TABLET ORAL at 22:38

## 2020-10-13 RX ADMIN — SODIUM CHLORIDE, PRESERVATIVE FREE 10 ML: 5 INJECTION INTRAVENOUS at 19:22

## 2020-10-13 RX ADMIN — SODIUM CHLORIDE, PRESERVATIVE FREE 10 ML: 5 INJECTION INTRAVENOUS at 08:58

## 2020-10-13 RX ADMIN — OXYCODONE AND ACETAMINOPHEN 1 TABLET: 5; 325 TABLET ORAL at 16:24

## 2020-10-13 RX ADMIN — AMLODIPINE BESYLATE 5 MG: 5 TABLET ORAL at 08:57

## 2020-10-13 RX ADMIN — ISOSORBIDE MONONITRATE 30 MG: 30 TABLET ORAL at 08:57

## 2020-10-13 RX ADMIN — OXYCODONE AND ACETAMINOPHEN 1 TABLET: 5; 325 TABLET ORAL at 10:17

## 2020-10-13 RX ADMIN — Medication 10 ML: at 08:59

## 2020-10-13 RX ADMIN — OXYCODONE AND ACETAMINOPHEN 1 TABLET: 5; 325 TABLET ORAL at 22:38

## 2020-10-13 RX ADMIN — ATORVASTATIN CALCIUM 10 MG: 10 TABLET, FILM COATED ORAL at 08:57

## 2020-10-13 RX ADMIN — LORAZEPAM 1 MG: 1 TABLET ORAL at 10:17

## 2020-10-13 RX ADMIN — OXYCODONE AND ACETAMINOPHEN 1 TABLET: 5; 325 TABLET ORAL at 03:54

## 2020-10-13 RX ADMIN — PANTOPRAZOLE SODIUM 40 MG: 40 TABLET, DELAYED RELEASE ORAL at 08:58

## 2020-10-13 RX ADMIN — SODIUM CHLORIDE, PRESERVATIVE FREE 10 ML: 5 INJECTION INTRAVENOUS at 08:59

## 2020-10-13 ASSESSMENT — PAIN DESCRIPTION - FREQUENCY
FREQUENCY: INTERMITTENT

## 2020-10-13 ASSESSMENT — PAIN DESCRIPTION - DESCRIPTORS
DESCRIPTORS: ACHING;DISCOMFORT;NAGGING
DESCRIPTORS: ACHING
DESCRIPTORS: ACHING;DISCOMFORT;NAGGING
DESCRIPTORS: ACHING;DISCOMFORT;NAGGING

## 2020-10-13 ASSESSMENT — PAIN SCALES - GENERAL
PAINLEVEL_OUTOF10: 6
PAINLEVEL_OUTOF10: 4
PAINLEVEL_OUTOF10: 7
PAINLEVEL_OUTOF10: 2
PAINLEVEL_OUTOF10: 10
PAINLEVEL_OUTOF10: 6

## 2020-10-13 ASSESSMENT — PAIN DESCRIPTION - PAIN TYPE
TYPE: SURGICAL PAIN

## 2020-10-13 ASSESSMENT — ENCOUNTER SYMPTOMS: SHORTNESS OF BREATH: 0

## 2020-10-13 ASSESSMENT — PAIN DESCRIPTION - ONSET
ONSET: ON-GOING

## 2020-10-13 ASSESSMENT — PAIN DESCRIPTION - LOCATION
LOCATION: BACK

## 2020-10-13 ASSESSMENT — PAIN DESCRIPTION - PROGRESSION
CLINICAL_PROGRESSION: NOT CHANGED
CLINICAL_PROGRESSION: GRADUALLY IMPROVING
CLINICAL_PROGRESSION: NOT CHANGED

## 2020-10-13 ASSESSMENT — PAIN DESCRIPTION - ORIENTATION
ORIENTATION: LOWER

## 2020-10-13 ASSESSMENT — PAIN - FUNCTIONAL ASSESSMENT
PAIN_FUNCTIONAL_ASSESSMENT: PREVENTS OR INTERFERES SOME ACTIVE ACTIVITIES AND ADLS
PAIN_FUNCTIONAL_ASSESSMENT: ACTIVITIES ARE NOT PREVENTED
PAIN_FUNCTIONAL_ASSESSMENT: ACTIVITIES ARE NOT PREVENTED

## 2020-10-13 ASSESSMENT — PAIN SCALES - WONG BAKER: WONGBAKER_NUMERICALRESPONSE: 0

## 2020-10-13 NOTE — PROGRESS NOTES
to education:   Pt verbalized understanding Pt demonstrated skill Pt requires further education in this area   Yes  With assist Yes       ASSESSMENT:     Comments:   Pt reports not in a lot of pain but with activity pt reports pain right lower quardrant of abdomen. Pt in bed upon arrival.  Pt instructed in log roll method of bed mobility and importance of. Pt able to roll and light assist for trunk to upright in bed. Pt ambulated to bathroom performed her hygiene. Pt required light assist to stand and sit lower surface. Pt the ambulated 75 feet with significant flexed posture. Pt performed steps with B rails. Pt has kyphotic posture making her higher risk for fall. Pt educated on call button for home, and she reports son looking into. Pt with all needs met and call light in reach and pt up in chair. Pt would benefit from continued PT POC to address functional deficits described above.      Treatment:  Patient practiced and was instructed in the following treatment:    · Patient education provided continuously throughout session for sequencing, safety maintenance, log roll   · Bed mobility training - pt given verbal and tactile cues to facilitate proper sequencing and safety during rolling and supine>sit   · STS and pivot transfer training - pt educated on proper hand and foot placement, safety and sequencing, and use of proper technique to safely complete sit<>stand  · Gait training- pt was given verbal and tactile cues to facilitate improved balance with increased step height during ambulation as well as provided with physical assistance to complete task. · Stair training -  sba due to pt posture     Pt's/ family goals   1. Return home      Patient and or family understand(s) diagnosis, prognosis, and plan of care. Yes      PLAN:     Current Treatment Recommendations   []? Strengthening     []? ROM   [x]? Balance Training   [x]? Endurance Training   [x]? Transfer Training   [x]?  Gait Training   [x]? Stair Training   []? Positioning   []? Safety and Education Training   []? Patient/Caregiver Education   []? HEP  []? Other      Frequency of treatments: 2-5x/week x 1-2 weeks.     Time in  310   Time out  335     Total Treatment Time 25 minutes         CPT codes:  []? Low Complexity PT evaluation [id number]  []? Moderate Complexity PT evaluation 16160  []? High Complexity PT evaluation [id number]  []? PT Re-evaluation [id number]  []? Gait training 84264 - minutes  []? Manual therapy 79285 - minutes  [x]? Therapeutic activities 87545 25 minutes  []? Therapeutic exercises 24016 - minutes  []?  Neuromuscular reeducation 80251 - minutes      [place], [place]  [address]

## 2020-10-13 NOTE — PROGRESS NOTES
12.9    198     CHEMISTRIES:  Recent Labs     10/11/20  0533 10/12/20  0645    135   K 3.6 3.3*   CL 94* 96*   CO2 29 28   BUN 18 15   CREATININE 0.9 0.8   GLUCOSE 133* 102*   MG  --  2.3     PT/INR:No results for input(s): PROTIME, INR in the last 72 hours. APTT:No results for input(s): APTT in the last 72 hours. LIVER PROFILE:  Recent Labs     10/11/20  0533 10/12/20  0645   AST 23 23   ALT 13 12   BILITOT 0.6 0.6   ALKPHOS 81 90       Imaging Last 24 Hours:  Xr Hip Right (2-3 Views)    Result Date: 10/12/2020  EXAMINATION: TWO XRAY VIEWS OF THE RIGHT HIP 10/12/2020 6:03 pm COMPARISON: None. HISTORY: ORDERING SYSTEM PROVIDED HISTORY: Right Hip pain TECHNOLOGIST PROVIDED HISTORY: Reason for exam:->Right Hip pain What reading provider will be dictating this exam?->CRC FINDINGS: No fracture or joint dislocation is seen. No pathological lesion is identified. There is degenerative loss of joint space medially in the right hip. 1.  No fracture or joint dislocation is seen. 2. Degenerative loss of joint space medially in the right hip. Assessment//Plan           Hospital Problems           Last Modified POA    Compression fx, thoracic spine, closed, initial encounter ([facility] [address] 10/7/2020 Yes        Assessment:  (1. T12 compression fracture   2. 11 mm cystic pancreatic lesion   3. Hypertension   4. CAD   5. Anxiety). Plan:   (Stable after surgery. Continue meds. PT/PT  Pain control. Placement soon. ).        Electronically signed by Luiza Biggs MD on 10/13/20 at 6:59 AM EDT

## 2020-10-13 NOTE — CARE COORDINATION
10/13/2020 social work transition of care planning  Pt plan is home,family will transport at discharge. Sw left message for My Gutierrez to re-confirm transition of care plan. Electronically signed by TONY Monte on 10/13/2020 at 10:06 AM     Addendum: Anna spoke with My Gutierrez. Plan remains for home. My Gutierrez chose [facility]-referral made for PT.   Electronically signed by TONY oMnte on 10/13/2020 at 10:25 AM

## 2020-10-13 NOTE — PROGRESS NOTES
Post Op Kyphoplasty  Doing well as far as back pain is concerned. States 90 % better today. Right hip area feeling better. X ray right hip  .  No fracture or joint dislocation is seen. 2. Degenerative loss of joint space medially in the right hip. The patient may be discharged from neurosurgical standpoint. Follow up in the office in 3-4 weeks.  [phone]

## 2020-10-14 VITALS
WEIGHT: 145 LBS | RESPIRATION RATE: 16 BRPM | TEMPERATURE: 97.6 F | OXYGEN SATURATION: 94 % | SYSTOLIC BLOOD PRESSURE: 155 MMHG | HEIGHT: 63 IN | DIASTOLIC BLOOD PRESSURE: 79 MMHG | HEART RATE: 70 BPM | BODY MASS INDEX: 25.69 KG/M2

## 2020-10-14 LAB
ALBUMIN SERPL-MCNC: 3.3 G/DL (ref 3.5–5.2)
ALP BLD-CCNC: 94 U/L (ref 35–104)
ALT SERPL-CCNC: 11 U/L (ref 0–32)
ANION GAP SERPL CALCULATED.3IONS-SCNC: 13 MMOL/L (ref 7–16)
AST SERPL-CCNC: 19 U/L (ref 0–31)
BASOPHILS ABSOLUTE: 0.06 E9/L (ref 0–0.2)
BASOPHILS RELATIVE PERCENT: 1.5 % (ref 0–2)
BILIRUB SERPL-MCNC: 0.5 MG/DL (ref 0–1.2)
BUN BLDV-MCNC: 12 MG/DL (ref 8–23)
CALCIUM SERPL-MCNC: 9 MG/DL (ref 8.6–10.2)
CHLORIDE BLD-SCNC: 97 MMOL/L (ref 98–107)
CO2: 27 MMOL/L (ref 22–29)
CREAT SERPL-MCNC: 0.9 MG/DL (ref 0.5–1)
EOSINOPHILS ABSOLUTE: 0.42 E9/L (ref 0.05–0.5)
EOSINOPHILS RELATIVE PERCENT: 10.3 % (ref 0–6)
GFR AFRICAN AMERICAN: >60
GFR NON-AFRICAN AMERICAN: 58 ML/MIN/1.73
GLUCOSE BLD-MCNC: 109 MG/DL (ref 74–99)
HCT VFR BLD CALC: 38.2 % (ref 34–48)
HEMOGLOBIN: 12.8 G/DL (ref 11.5–15.5)
IMMATURE GRANULOCYTES #: 0.02 E9/L
IMMATURE GRANULOCYTES %: 0.5 % (ref 0–5)
LYMPHOCYTES ABSOLUTE: 1.05 E9/L (ref 1.5–4)
LYMPHOCYTES RELATIVE PERCENT: 25.8 % (ref 20–42)
MCH RBC QN AUTO: 30.2 PG (ref 26–35)
MCHC RBC AUTO-ENTMCNC: 33.5 % (ref 32–34.5)
MCV RBC AUTO: 90.1 FL (ref 80–99.9)
MONOCYTES ABSOLUTE: 0.63 E9/L (ref 0.1–0.95)
MONOCYTES RELATIVE PERCENT: 15.5 % (ref 2–12)
NEUTROPHILS ABSOLUTE: 1.89 E9/L (ref 1.8–7.3)
NEUTROPHILS RELATIVE PERCENT: 46.4 % (ref 43–80)
PDW BLD-RTO: 13.1 FL (ref 11.5–15)
PLATELET # BLD: 191 E9/L (ref 130–450)
PMV BLD AUTO: 10.5 FL (ref 7–12)
POTASSIUM REFLEX MAGNESIUM: 3.6 MMOL/L (ref 3.5–5)
RBC # BLD: 4.24 E12/L (ref 3.5–5.5)
SODIUM BLD-SCNC: 137 MMOL/L (ref 132–146)
TOTAL PROTEIN: 5.8 G/DL (ref 6.4–8.3)
WBC # BLD: 4.1 E9/L (ref 4.5–11.5)

## 2020-10-14 PROCEDURE — 6370000000 HC RX 637 (ALT 250 FOR IP): Performed by: NEUROLOGICAL SURGERY

## 2020-10-14 PROCEDURE — 36415 COLL VENOUS BLD VENIPUNCTURE: CPT

## 2020-10-14 PROCEDURE — 6370000000 HC RX 637 (ALT 250 FOR IP): Performed by: INTERNAL MEDICINE

## 2020-10-14 PROCEDURE — 80053 COMPREHEN METABOLIC PANEL: CPT

## 2020-10-14 PROCEDURE — 2580000003 HC RX 258: Performed by: NEUROLOGICAL SURGERY

## 2020-10-14 PROCEDURE — 85025 COMPLETE CBC W/AUTO DIFF WBC: CPT

## 2020-10-14 RX ORDER — AMLODIPINE BESYLATE 5 MG/1
5 TABLET ORAL DAILY
Qty: 30 TABLET | Refills: 3 | Status: ON HOLD | OUTPATIENT
Start: 2020-10-14 | End: 2021-12-27

## 2020-10-14 RX ORDER — SENNA PLUS 8.6 MG/1
1 TABLET ORAL DAILY PRN
Qty: 30 TABLET | Refills: 1 | Status: SHIPPED | OUTPATIENT
Start: 2020-10-14 | End: 2020-11-13

## 2020-10-14 RX ORDER — PANTOPRAZOLE SODIUM 40 MG/1
40 TABLET, DELAYED RELEASE ORAL
Qty: 30 TABLET | Refills: 3 | Status: ON HOLD | OUTPATIENT
Start: 2020-10-15 | End: 2021-12-27

## 2020-10-14 RX ADMIN — OXYCODONE AND ACETAMINOPHEN 1 TABLET: 5; 325 TABLET ORAL at 09:11

## 2020-10-14 RX ADMIN — ATENOLOL 50 MG: 50 TABLET ORAL at 09:10

## 2020-10-14 RX ADMIN — SODIUM CHLORIDE, PRESERVATIVE FREE 10 ML: 5 INJECTION INTRAVENOUS at 09:11

## 2020-10-14 RX ADMIN — MAGNESIUM HYDROXIDE 30 ML: 2400 SUSPENSION ORAL at 09:10

## 2020-10-14 RX ADMIN — ISOSORBIDE MONONITRATE 30 MG: 30 TABLET ORAL at 09:11

## 2020-10-14 RX ADMIN — AMLODIPINE BESYLATE 5 MG: 5 TABLET ORAL at 09:11

## 2020-10-14 RX ADMIN — PANTOPRAZOLE SODIUM 40 MG: 40 TABLET, DELAYED RELEASE ORAL at 06:05

## 2020-10-14 RX ADMIN — Medication 10 ML: at 09:11

## 2020-10-14 RX ADMIN — LORAZEPAM 1 MG: 1 TABLET ORAL at 11:40

## 2020-10-14 RX ADMIN — ATORVASTATIN CALCIUM 10 MG: 10 TABLET, FILM COATED ORAL at 09:11

## 2020-10-14 ASSESSMENT — PAIN SCALES - GENERAL
PAINLEVEL_OUTOF10: 6
PAINLEVEL_OUTOF10: 0
PAINLEVEL_OUTOF10: 0
PAINLEVEL_OUTOF10: 6

## 2020-10-14 ASSESSMENT — PAIN SCALES - WONG BAKER: WONGBAKER_NUMERICALRESPONSE: 0

## 2020-10-14 NOTE — PROGRESS NOTES
Discharge instructions given. Daughter Warner Weston also given instructions over the telephone. Daughter stated she will be at hospital around noon.

## 2020-10-14 NOTE — DISCHARGE SUMMARY
Discharge Summary    Date: 10/14/2020  Patient Name: Randi Kelley YOB: 1926 Age: 80 y.o. Admit Date: 10/7/2020  Discharge Date: 10/14/2020  Discharge Condition: Stable    Admission Diagnosis  Compression fx, thoracic spine, closed, initial encounter ([facility] [address] (S22.000A); Compression fx, thoracic spine, closed, initial encounter ([facility] [address] (S22.000A)     Discharge Diagnosis  Active Problems: Compression fx, thoracic spine, closed, initial encounter ([facility])Resolved Problems: * No resolved hospital problems. [facility] Stay  Narrative of Hospital Course:  Patient admitted for   Compression fx, thoracic spine, closed, initial encounter ([facility] [address] 10/7/2020 Yes        Assessment:  (1. T12 compression fracture   2. 11 mm cystic pancreatic lesion   3. Hypertension   4. CAD   5. Anxiety). Had kyphoplasty which went well. Complained of some hip pain, imaging negative. Declined rehab. Consultants:  IP CONSULT TO INTERNAL MEDICINEIP CONSULT TO SOCIAL WORKIP CONSULT TO [id number] CONSULT TO SOCIAL WORKIP CONSULT TO NEUROSURGERY    Surgeries/procedures Performed:       Treatments:           Discharge Plan/Disposition:  Home    Hospital/Incidental Findings Requiring Follow Up:    Patient Instructions:    Diet: Cardiac Diet    Activity:Ambulate in House  For number of days (if applicable): Other Instructions:    Provider Follow-Up:   No follow-ups on file. Significant Diagnostic Studies:    Recent Labs:  Admission on 10/07/2020No results displayed because visit has over 200 results. ------------    Radiology last 7 days:  Xr Lumbar Spine (2-3 Views)Result Date: 10/10/56552. Limited exam due to generalized osteopenia. CT follow-up could be helpful for further evaluation. 2.  Status post kyphoplasty appearing at levels of T12, L1, and L2 with satisfactory alignment. CT follow-up could be helpful for further evaluation. Xr Hip Right (2-3 Views)Result Date: 10/12/05687.   No fracture or joint dislocation is by mouth dailyQty: 30 tablet Refills: 3senna (SENOKOT) 8.6 MG tabletTake 1 tablet by mouth daily as needed (Constipation)Qty: 30 tablet Refills: 1pantoprazole (PROTONIX) 40 MG tabletTake 1 tablet by mouth every morning (before breakfast)Qty: 30 tablet Refills: 3    Current Discharge Medication List    Current Discharge Medication ListCONTINUE these medications which have NOT CHANGEDProbiotic Product (PROBIOTIC DAILY PO)Take by mouthdocusate sodium (COLACE, DULCOLAX) 100 MG CAPSTake 100 mg by mouth dailyQty: 30 capsule Refills: 0Isosorbide Mononitrate (IMDUR PO)Take 30 mg by mouth daily ATENOLOL POTake 50 mg by mouth daily aspirin 81 MG tabletTake 81 mg by mouth dailytraMADol (ULTRAM) 50 MG tabletTake 50 mg by mouth every 6 hours as needed for Pain. .Atorvastatin Calcium (LIPITOR PO)Take 10 mg by mouth daily LORazepam (ATIVAN PO)Take 1 mg by mouth 2 times daily as needed oxybutynin (DITROPAN XL) 15 MG extended release tabletTake 15 mg by mouth Twice a Week    Current Discharge Medication ListSTOP taking these medicationshydroCHLOROthiazide (MICROZIDE) 12.5 MG capsuleComments:Reason for Stopping:    Time Spent on Discharge:1E] minutes were spent in patient examination, evaluation, counseling as well as medication reconciliation, prescriptions for required medications, discharge plan, and follow up.     Electronically signed by Mary Lazcano MD on 10/14/20 at 7:00 AM EDT

## 2020-12-03 ENCOUNTER — HOSPITAL ENCOUNTER (OUTPATIENT)
Dept: MRI IMAGING | Age: 85
Discharge: HOME | End: 2020-12-05
Payer: MEDICARE

## 2020-12-03 PROCEDURE — 72148 MRI LUMBAR SPINE W/O DYE: CPT

## 2020-12-31 ENCOUNTER — HOSPITAL ENCOUNTER (EMERGENCY)
Age: 85
Discharge: HOME | End: 2020-12-31
Attending: FAMILY MEDICINE
Payer: MEDICARE

## 2020-12-31 VITALS
TEMPERATURE: 97.3 F | DIASTOLIC BLOOD PRESSURE: 71 MMHG | BODY MASS INDEX: 27.38 KG/M2 | WEIGHT: 145 LBS | RESPIRATION RATE: 16 BRPM | HEIGHT: 61 IN | HEART RATE: 79 BPM | SYSTOLIC BLOOD PRESSURE: 111 MMHG | OXYGEN SATURATION: 99 %

## 2020-12-31 DIAGNOSIS — N39.0 URINARY TRACT INFECTION WITHOUT HEMATURIA, SITE UNSPECIFIED: Primary | ICD-10-CM

## 2020-12-31 LAB
BACTERIA: ABNORMAL /HPF
BILIRUBIN URINE: NEGATIVE
BLOOD, URINE: ABNORMAL
CLARITY: ABNORMAL
COLOR: YELLOW
EPITHELIAL CELLS, UA: ABNORMAL /HPF
GLUCOSE URINE: NEGATIVE MG/DL
KETONES, URINE: NEGATIVE MG/DL
LEUKOCYTE ESTERASE, URINE: ABNORMAL
NITRITE, URINE: POSITIVE
PH UA: 7 (ref 5–9)
PROTEIN UA: NEGATIVE MG/DL
RBC UA: ABNORMAL /HPF (ref 0–2)
SPECIFIC GRAVITY UA: 1.01 (ref 1–1.03)
UROBILINOGEN, URINE: 0.2 E.U./DL
WBC UA: >20 /HPF (ref 0–5)

## 2020-12-31 PROCEDURE — 87088 URINE BACTERIA CULTURE: CPT

## 2020-12-31 PROCEDURE — 99283 EMERGENCY DEPT VISIT LOW MDM: CPT

## 2020-12-31 PROCEDURE — 81001 URINALYSIS AUTO W/SCOPE: CPT

## 2020-12-31 PROCEDURE — 87186 SC STD MICRODIL/AGAR DIL: CPT

## 2020-12-31 RX ORDER — CEFDINIR 300 MG/1
300 CAPSULE ORAL 2 TIMES DAILY
Qty: 14 CAPSULE | Refills: 0 | Status: SHIPPED | OUTPATIENT
Start: 2020-12-31 | End: 2021-01-07

## 2020-12-31 NOTE — ED PROVIDER NOTES
[address] [facility]  Department of Emergency Medicine   ED  Encounter Note  Admit Date/RoomTime: 2020 12:50 PM  ED Room:     NAME: Carmelo Paul  : 1926  MRN: [id number]     Chief Complaint:  Urinary Tract Infection (sent in for uti, was told on Monday to see Dr. Lajuan Cowden, but first appt is February. Here for antibiotics until sees ID doctor next Thursday. )    History of Present Illness       Carmelo Paul is a 80 y.o. old female who presents to the emergency department by private vehicle, for dysuria, which occured 1 week(s) prior to arrival.  Since onset the symptoms have been persistent and moderate in severity. Symptoms are associated with nothing additional.  She has a history of recurrent UTI. ROS   Pertinent positives and negatives are stated within HPI, all other systems reviewed and are negative. Past Medical History:  has a past medical history of Arthritis, Hypertension, Myocardial infarct ([address] and UTI (urinary tract infection). Surgical History:  has a past surgical history that includes Hysterectomy; Appendectomy; and Kyphosis surgery (N/A, 10/10/2020). Social History:  reports that she has never smoked. She has never used smokeless tobacco. She reports that she does not drink alcohol. Family History: family history is not on file. Allergies: Sulfa antibiotics    Physical Exam   Oxygen Saturation Interpretation: Normal.        ED Triage Vitals [20 1247]   BP Temp Temp Source Pulse Resp SpO2 Height Weight   111/71 97.3 °F (36.3 °C) Infrared 79 16 99 % -- --         Constitutional:  Alert, development consistent with age. HEENT:  NC/NT. Airway patent. Neck:  Normal ROM. Supple. Respiratory:  Lungs Clear to auscultation and breath sounds equal.  CV:  Regular rate and rhythm, normal heart sounds, without pathological murmurs, ectopy, gallops, or rubs. GI:  normal appearing, non-distended with no visible hernias.        Bowel sounds: normal bowel sounds. Tenderness: No abdominal tenderness, guarding, rebound, rigidity or pulsatile mass. .        Liver: non-tender. Spleen:  non-tender. : (chaperone present during examination). not indicated / deferred. Back: CVA Tenderness: No.  Integument:  Normal turgor. Warm, dry, without visible rash, unless noted elsewhere. Lymphatics: No lymphangitis or adenopathy noted. Neurological:  Oriented. Motor functions intact. Lab / Imaging Results   (All laboratory and radiology results have been personally reviewed by myself)  Labs:  Results for orders placed or performed during the hospital encounter of 12/31/20   Urinalysis with Microscopic   Result Value Ref Range    Color, UA Yellow Straw/Yellow    Clarity, UA TURBID (A) Clear    Glucose, Ur Negative Negative mg/dL    Bilirubin Urine Negative Negative    Ketones, Urine Negative Negative mg/dL    Specific Gravity, UA 1.010 1.005 - 1.030    Blood, Urine SMALL (A) Negative    pH, UA 7.0 5.0 - 9.0    Protein, UA Negative Negative mg/dL    Urobilinogen, Urine 0.2 <2.0 E.U./dL    Nitrite, Urine POSITIVE (A) Negative    Leukocyte Esterase, Urine LARGE (A) Negative    WBC, UA >20 (A) 0 - 5 /HPF    RBC, UA 10-20 (A) 0 - 2 /HPF    Epithelial Cells, UA MODERATE /HPF    Bacteria, UA MODERATE (A) None Seen /HPF       Imaging: All Radiology results interpreted by Radiologist unless otherwise noted. No orders to display     ED Course / Medical Decision Making   Medications - No data to display       Consults:   None    Procedures:   none    Medical Decision Making:    Patient is well appearing, non toxic and appropriate for outpatient management. Plan is for symptom management and PCP follow up. Counseling:  Myself and I reviewed today's visit with the patient and family member patient and daughter in addition to providing specific details for the plan of care and counseling regarding the diagnosis and prognosis.   Questions are

## 2021-01-03 LAB
ORGANISM: ABNORMAL
URINE CULTURE, ROUTINE: ABNORMAL

## 2021-01-15 ENCOUNTER — APPOINTMENT (OUTPATIENT)
Dept: GENERAL RADIOLOGY | Age: 86
End: 2021-01-15
Payer: MEDICARE

## 2021-01-15 ENCOUNTER — HOSPITAL ENCOUNTER (EMERGENCY)
Age: 86
Discharge: HOME | End: 2021-01-15
Attending: FAMILY MEDICINE
Payer: MEDICARE

## 2021-01-15 VITALS
HEIGHT: 62 IN | WEIGHT: 150 LBS | TEMPERATURE: 96.6 F | DIASTOLIC BLOOD PRESSURE: 84 MMHG | RESPIRATION RATE: 16 BRPM | SYSTOLIC BLOOD PRESSURE: 132 MMHG | OXYGEN SATURATION: 96 % | BODY MASS INDEX: 27.6 KG/M2 | HEART RATE: 78 BPM

## 2021-01-15 DIAGNOSIS — M54.50 LUMBAR PAIN: Primary | ICD-10-CM

## 2021-01-15 PROCEDURE — 72100 X-RAY EXAM L-S SPINE 2/3 VWS: CPT

## 2021-01-15 PROCEDURE — 99284 EMERGENCY DEPT VISIT MOD MDM: CPT

## 2021-01-15 PROCEDURE — 6370000000 HC RX 637 (ALT 250 FOR IP): Performed by: FAMILY MEDICINE

## 2021-01-15 RX ORDER — ESCITALOPRAM OXALATE 5 MG/1
5 TABLET ORAL DAILY
COMMUNITY

## 2021-01-15 RX ORDER — HYDROCODONE BITARTRATE AND ACETAMINOPHEN 5; 325 MG/1; MG/1
1 TABLET ORAL ONCE
Status: COMPLETED | OUTPATIENT
Start: 2021-01-15 | End: 2021-01-15

## 2021-01-15 RX ORDER — CEFDINIR 300 MG/1
300 CAPSULE ORAL 2 TIMES DAILY
Status: ON HOLD | COMMUNITY
End: 2021-01-26 | Stop reason: HOSPADM

## 2021-01-15 RX ORDER — HYDROCHLOROTHIAZIDE 12.5 MG/1
12.5 CAPSULE, GELATIN COATED ORAL EVERY MORNING
Status: ON HOLD | COMMUNITY
End: 2021-12-27

## 2021-01-15 RX ORDER — HYDROCODONE BITARTRATE AND ACETAMINOPHEN 5; 325 MG/1; MG/1
1 TABLET ORAL EVERY 8 HOURS PRN
COMMUNITY
End: 2021-01-15

## 2021-01-15 RX ORDER — HYDROCODONE BITARTRATE AND ACETAMINOPHEN 5; 325 MG/1; MG/1
1 TABLET ORAL EVERY 6 HOURS PRN
Qty: 12 TABLET | Refills: 0 | Status: SHIPPED | OUTPATIENT
Start: 2021-01-15 | End: 2021-01-18

## 2021-01-15 RX ADMIN — HYDROCODONE BITARTRATE AND ACETAMINOPHEN 1 TABLET: 5; 325 TABLET ORAL at 14:41

## 2021-01-15 ASSESSMENT — PAIN SCALES - GENERAL: PAINLEVEL_OUTOF10: 9

## 2021-01-15 ASSESSMENT — PAIN DESCRIPTION - ORIENTATION: ORIENTATION: LOWER

## 2021-01-15 ASSESSMENT — PAIN DESCRIPTION - PAIN TYPE: TYPE: ACUTE PAIN

## 2021-01-15 ASSESSMENT — PAIN DESCRIPTION - LOCATION: LOCATION: BACK

## 2021-01-15 NOTE — ED PROVIDER NOTES
HPI:  1/15/21,   Time: 2:11 PM LOR Thorne is a 80 y.o. female presenting to the ED for low back pain that started acutely 2 days ago. She complains of episodes of sharp pain with movement. The pain does not radiate. She has a history of thoracic and lumbar vertebral fractures. She underwent kyphoplasty by Dr. Leland Vilchis about 3 months ago. ROS:   Pertinent positives and negatives are stated within HPI, all other systems reviewed and are negative.  --------------------------------------------- PAST HISTORY ---------------------------------------------  Past Medical History:  has a past medical history of Arthritis, Hypertension, Myocardial infarct ([facility] [address] and UTI (urinary tract infection). Past Surgical History:  has a past surgical history that includes Hysterectomy; Appendectomy; Kyphosis surgery (N/A, 10/10/2020); and back surgery. Social History:  reports that she has never smoked. She has never used smokeless tobacco. She reports that she does not drink alcohol. Family History: family history is not on file. The patients home medications have been reviewed. Allergies: Sulfa antibiotics    -------------------------------------------------- RESULTS -------------------------------------------------  All laboratory and radiology results have been personally reviewed by myself   LABS:  No results found for this visit on 01/15/21. RADIOLOGY:  Interpreted by Radiologist.  XR LUMBAR SPINE (2-3 VIEWS)   Final Result   1. Severe osteopenia. Prior kyphoplasties at T11 through L1.   2. No definite acute fracture seen. However, if there is high clinical   suspicion for lumbar compression fracture MRI study could be done for further   evaluation. The study is severely compromised by osteopenia and patient's   body habitus. 3. Degenerative change.   Moderate levoscoliosis.          ------------------------- NURSING NOTES AND VITALS REVIEWED ---------------------------   The nursing notes within the ED encounter and vital signs as below have been reviewed. /84   Pulse 78   Temp 96.6 °F (35.9 °C) (Infrared)   Resp 16   Ht 5' 2\" (1.575 m)   Wt 150 lb (68 kg)   LMP 05/16/2018   SpO2 96%   BMI 27.44 kg/m²   Oxygen Saturation Interpretation: Normal      ---------------------------------------------------PHYSICAL EXAM--------------------------------------    Constitutional/General: Alert and oriented x3, well appearing, non toxic in NAD  Head: NC/AT  Eyes: PERRL, EOMI  Mouth: Oropharynx clear, handling secretions, no trismus  Neck: Supple, full ROM, no meningeal signs  Pulmonary: Lungs clear to auscultation bilaterally, no wheezes, rales, or rhonchi. Not in respiratory distress  Cardiovascular:  Regular rate and rhythm, no murmurs, gallops, or rubs. 2+ distal pulses  Abdomen: Soft, non tender, non distended,   Extremities: Moves all extremities x 4. Warm and well perfused  Skin: warm and dry without rash  Neurologic: GCS 15,  Psych: Normal Affect      ------------------------------ ED COURSE/MEDICAL DECISION MAKING----------------------  Medications   HYDROcodone-acetaminophen (NORCO) 5-325 MG per tablet 1 tablet (1 tablet Oral Given 1/15/21 1441)         Medical Decision Making:    Straightforward; the lumbar spine x-ray did not show definitive acute fracture. .  It did show severe osteopenia. An MRI was recommended. These findings were discussed with the patient and her daughter. They will contact the office of Dr. Reyna Grimaldo today to proceed with an MRI. She will be prescribed pain medications for symptoms. They were agreeable with this plan and she was discharged home in stable condition. Counseling: The emergency provider has spoken with the patient and discussed todays results, in addition to providing specific details for the plan of care and counseling regarding the diagnosis and prognosis.   Questions are answered at this time and they are agreeable with the plan.      --------------------------------- IMPRESSION AND DISPOSITION ---------------------------------    IMPRESSION  1.  Lumbar pain        DISPOSITION  Disposition: Discharge to home  Patient condition is stable                 Shari Boyd MD  01/18/21 6489

## 2021-01-21 ENCOUNTER — HOSPITAL ENCOUNTER (INPATIENT)
Age: 86
LOS: 5 days | Discharge: NURSING, REHAB OR LONG-TERM CARE | DRG: 552 | End: 2021-01-26
Attending: EMERGENCY MEDICINE | Admitting: FAMILY MEDICINE
Payer: MEDICARE

## 2021-01-21 ENCOUNTER — APPOINTMENT (OUTPATIENT)
Dept: GENERAL RADIOLOGY | Age: 86
DRG: 552 | End: 2021-01-21
Payer: MEDICARE

## 2021-01-21 DIAGNOSIS — M54.9 INTRACTABLE BACK PAIN: Primary | ICD-10-CM

## 2021-01-21 LAB
ANION GAP SERPL CALCULATED.3IONS-SCNC: 7 MMOL/L (ref 7–16)
BACTERIA: ABNORMAL /HPF
BASOPHILS ABSOLUTE: 0.06 E9/L (ref 0–0.2)
BASOPHILS RELATIVE PERCENT: 1.3 % (ref 0–2)
BILIRUBIN URINE: NEGATIVE
BLOOD, URINE: ABNORMAL
BUN BLDV-MCNC: 7 MG/DL (ref 8–23)
CALCIUM SERPL-MCNC: 8.9 MG/DL (ref 8.6–10.2)
CHLORIDE BLD-SCNC: 100 MMOL/L (ref 98–107)
CLARITY: ABNORMAL
CO2: 28 MMOL/L (ref 22–29)
COLOR: YELLOW
CREAT SERPL-MCNC: 1 MG/DL (ref 0.5–1)
EOSINOPHILS ABSOLUTE: 0.1 E9/L (ref 0.05–0.5)
EOSINOPHILS RELATIVE PERCENT: 2.2 % (ref 0–6)
GFR AFRICAN AMERICAN: >60
GFR NON-AFRICAN AMERICAN: 52 ML/MIN/1.73
GLUCOSE BLD-MCNC: 104 MG/DL (ref 74–99)
GLUCOSE URINE: NEGATIVE MG/DL
HCT VFR BLD CALC: 38.9 % (ref 34–48)
HEMOGLOBIN: 12.8 G/DL (ref 11.5–15.5)
IMMATURE GRANULOCYTES #: 0.01 E9/L
IMMATURE GRANULOCYTES %: 0.2 % (ref 0–5)
KETONES, URINE: NEGATIVE MG/DL
LEUKOCYTE ESTERASE, URINE: ABNORMAL
LYMPHOCYTES ABSOLUTE: 0.94 E9/L (ref 1.5–4)
LYMPHOCYTES RELATIVE PERCENT: 21.1 % (ref 20–42)
MCH RBC QN AUTO: 30.6 PG (ref 26–35)
MCHC RBC AUTO-ENTMCNC: 32.9 % (ref 32–34.5)
MCV RBC AUTO: 93.1 FL (ref 80–99.9)
MONOCYTES ABSOLUTE: 0.49 E9/L (ref 0.1–0.95)
MONOCYTES RELATIVE PERCENT: 11 % (ref 2–12)
NEUTROPHILS ABSOLUTE: 2.85 E9/L (ref 1.8–7.3)
NEUTROPHILS RELATIVE PERCENT: 64.2 % (ref 43–80)
NITRITE, URINE: NEGATIVE
PDW BLD-RTO: 14.1 FL (ref 11.5–15)
PH UA: 6 (ref 5–9)
PLATELET # BLD: 216 E9/L (ref 130–450)
PMV BLD AUTO: 9.8 FL (ref 7–12)
POTASSIUM SERPL-SCNC: 3.6 MMOL/L (ref 3.5–5)
PROTEIN UA: ABNORMAL MG/DL
RBC # BLD: 4.18 E12/L (ref 3.5–5.5)
RBC UA: ABNORMAL /HPF (ref 0–2)
SODIUM BLD-SCNC: 135 MMOL/L (ref 132–146)
SPECIFIC GRAVITY UA: 1.01 (ref 1–1.03)
UROBILINOGEN, URINE: 0.2 E.U./DL
WBC # BLD: 4.5 E9/L (ref 4.5–11.5)
WBC UA: ABNORMAL /HPF (ref 0–5)

## 2021-01-21 PROCEDURE — 81001 URINALYSIS AUTO W/SCOPE: CPT

## 2021-01-21 PROCEDURE — 2580000003 HC RX 258: Performed by: FAMILY MEDICINE

## 2021-01-21 PROCEDURE — 73502 X-RAY EXAM HIP UNI 2-3 VIEWS: CPT

## 2021-01-21 PROCEDURE — 6370000000 HC RX 637 (ALT 250 FOR IP): Performed by: EMERGENCY MEDICINE

## 2021-01-21 PROCEDURE — 80048 BASIC METABOLIC PNL TOTAL CA: CPT

## 2021-01-21 PROCEDURE — 6370000000 HC RX 637 (ALT 250 FOR IP): Performed by: FAMILY MEDICINE

## 2021-01-21 PROCEDURE — 96374 THER/PROPH/DIAG INJ IV PUSH: CPT

## 2021-01-21 PROCEDURE — 6360000002 HC RX W HCPCS: Performed by: FAMILY MEDICINE

## 2021-01-21 PROCEDURE — 1200000000 HC SEMI PRIVATE

## 2021-01-21 PROCEDURE — 6360000002 HC RX W HCPCS: Performed by: EMERGENCY MEDICINE

## 2021-01-21 PROCEDURE — 99284 EMERGENCY DEPT VISIT MOD MDM: CPT

## 2021-01-21 PROCEDURE — 87186 SC STD MICRODIL/AGAR DIL: CPT

## 2021-01-21 PROCEDURE — 87088 URINE BACTERIA CULTURE: CPT

## 2021-01-21 PROCEDURE — 85025 COMPLETE CBC W/AUTO DIFF WBC: CPT

## 2021-01-21 RX ORDER — PANTOPRAZOLE SODIUM 40 MG/1
40 TABLET, DELAYED RELEASE ORAL
Status: DISCONTINUED | OUTPATIENT
Start: 2021-01-22 | End: 2021-01-26 | Stop reason: HOSPADM

## 2021-01-21 RX ORDER — ATORVASTATIN CALCIUM 10 MG/1
10 TABLET, FILM COATED ORAL NIGHTLY
Status: DISCONTINUED | OUTPATIENT
Start: 2021-01-21 | End: 2021-01-26 | Stop reason: HOSPADM

## 2021-01-21 RX ORDER — LACTOBACILLUS RHAMNOSUS GG 10B CELL
1 CAPSULE ORAL DAILY
Status: DISCONTINUED | OUTPATIENT
Start: 2021-01-21 | End: 2021-01-26 | Stop reason: HOSPADM

## 2021-01-21 RX ORDER — AMLODIPINE BESYLATE 5 MG/1
5 TABLET ORAL DAILY
Status: DISCONTINUED | OUTPATIENT
Start: 2021-01-21 | End: 2021-01-26 | Stop reason: HOSPADM

## 2021-01-21 RX ORDER — ATENOLOL 50 MG/1
50 TABLET ORAL DAILY
Status: DISCONTINUED | OUTPATIENT
Start: 2021-01-21 | End: 2021-01-26 | Stop reason: HOSPADM

## 2021-01-21 RX ORDER — ESCITALOPRAM OXALATE 10 MG/1
5 TABLET ORAL DAILY
Status: DISCONTINUED | OUTPATIENT
Start: 2021-01-21 | End: 2021-01-26 | Stop reason: HOSPADM

## 2021-01-21 RX ORDER — CEFDINIR 300 MG/1
300 CAPSULE ORAL 2 TIMES DAILY
Status: DISCONTINUED | OUTPATIENT
Start: 2021-01-21 | End: 2021-01-25 | Stop reason: ALTCHOICE

## 2021-01-21 RX ORDER — LIDOCAINE 4 G/G
1 PATCH TOPICAL DAILY
Status: DISCONTINUED | OUTPATIENT
Start: 2021-01-21 | End: 2021-01-26 | Stop reason: HOSPADM

## 2021-01-21 RX ORDER — ISOSORBIDE MONONITRATE 30 MG/1
30 TABLET, EXTENDED RELEASE ORAL DAILY
COMMUNITY

## 2021-01-21 RX ORDER — MORPHINE SULFATE 2 MG/ML
2 INJECTION, SOLUTION INTRAMUSCULAR; INTRAVENOUS ONCE
Status: COMPLETED | OUTPATIENT
Start: 2021-01-21 | End: 2021-01-21

## 2021-01-21 RX ORDER — LORAZEPAM 1 MG/1
1 TABLET ORAL EVERY 6 HOURS PRN
Status: DISCONTINUED | OUTPATIENT
Start: 2021-01-21 | End: 2021-01-26 | Stop reason: HOSPADM

## 2021-01-21 RX ORDER — ISOSORBIDE MONONITRATE 30 MG/1
30 TABLET, EXTENDED RELEASE ORAL DAILY
Status: DISCONTINUED | OUTPATIENT
Start: 2021-01-21 | End: 2021-01-26 | Stop reason: HOSPADM

## 2021-01-21 RX ORDER — TRAMADOL HYDROCHLORIDE 50 MG/1
50 TABLET ORAL EVERY 6 HOURS PRN
Status: DISCONTINUED | OUTPATIENT
Start: 2021-01-21 | End: 2021-01-26 | Stop reason: HOSPADM

## 2021-01-21 RX ORDER — HYDROCHLOROTHIAZIDE 12.5 MG/1
12.5 TABLET ORAL EVERY MORNING
Status: DISCONTINUED | OUTPATIENT
Start: 2021-01-22 | End: 2021-01-26 | Stop reason: HOSPADM

## 2021-01-21 RX ORDER — DOCUSATE SODIUM 100 MG/1
100 CAPSULE, LIQUID FILLED ORAL DAILY
Status: DISCONTINUED | OUTPATIENT
Start: 2021-01-21 | End: 2021-01-26 | Stop reason: HOSPADM

## 2021-01-21 RX ORDER — MORPHINE SULFATE 2 MG/ML
2 INJECTION, SOLUTION INTRAMUSCULAR; INTRAVENOUS EVERY 4 HOURS PRN
Status: DISCONTINUED | OUTPATIENT
Start: 2021-01-21 | End: 2021-01-26 | Stop reason: HOSPADM

## 2021-01-21 RX ADMIN — LORAZEPAM 1 MG: 1 TABLET ORAL at 22:48

## 2021-01-21 RX ADMIN — MORPHINE SULFATE 2 MG: 2 INJECTION, SOLUTION INTRAMUSCULAR; INTRAVENOUS at 19:57

## 2021-01-21 RX ADMIN — ATORVASTATIN CALCIUM 10 MG: 10 TABLET, FILM COATED ORAL at 21:00

## 2021-01-21 RX ADMIN — CEFDINIR 300 MG: 300 CAPSULE ORAL at 21:00

## 2021-01-21 RX ADMIN — ESCITALOPRAM 5 MG: 10 TABLET, FILM COATED ORAL at 21:00

## 2021-01-21 RX ADMIN — Medication 1 CAPSULE: at 21:01

## 2021-01-21 RX ADMIN — CEFTRIAXONE SODIUM 1000 MG: 1 INJECTION, POWDER, FOR SOLUTION INTRAMUSCULAR; INTRAVENOUS at 21:01

## 2021-01-21 RX ADMIN — MORPHINE SULFATE 2 MG: 2 INJECTION, SOLUTION INTRAMUSCULAR; INTRAVENOUS at 13:12

## 2021-01-21 RX ADMIN — MORPHINE SULFATE 2 MG: 2 INJECTION, SOLUTION INTRAMUSCULAR; INTRAVENOUS at 15:30

## 2021-01-21 RX ADMIN — DOCUSATE SODIUM 100 MG: 100 CAPSULE, LIQUID FILLED ORAL at 20:59

## 2021-01-21 RX ADMIN — ISOSORBIDE MONONITRATE 30 MG: 30 TABLET ORAL at 20:59

## 2021-01-21 RX ADMIN — TRAMADOL HYDROCHLORIDE 50 MG: 50 TABLET, FILM COATED ORAL at 21:00

## 2021-01-21 RX ADMIN — AMLODIPINE BESYLATE 5 MG: 5 TABLET ORAL at 21:01

## 2021-01-21 RX ADMIN — ATENOLOL 50 MG: 50 TABLET ORAL at 20:59

## 2021-01-21 ASSESSMENT — PAIN DESCRIPTION - FREQUENCY
FREQUENCY: CONTINUOUS
FREQUENCY: CONTINUOUS

## 2021-01-21 ASSESSMENT — PAIN SCALES - GENERAL
PAINLEVEL_OUTOF10: 10
PAINLEVEL_OUTOF10: 10
PAINLEVEL_OUTOF10: 6
PAINLEVEL_OUTOF10: 6

## 2021-01-21 ASSESSMENT — PAIN DESCRIPTION - PAIN TYPE
TYPE: ACUTE PAIN

## 2021-01-21 ASSESSMENT — PAIN DESCRIPTION - PROGRESSION
CLINICAL_PROGRESSION: GRADUALLY WORSENING
CLINICAL_PROGRESSION: GRADUALLY WORSENING

## 2021-01-21 ASSESSMENT — PAIN DESCRIPTION - LOCATION: LOCATION: BACK

## 2021-01-21 ASSESSMENT — PAIN DESCRIPTION - ORIENTATION: ORIENTATION: LOWER

## 2021-01-21 ASSESSMENT — PAIN DESCRIPTION - DESCRIPTORS
DESCRIPTORS: RADIATING
DESCRIPTORS: THROBBING;STABBING;SHOOTING

## 2021-01-21 ASSESSMENT — PAIN DESCRIPTION - ONSET: ONSET: ON-GOING

## 2021-01-21 ASSESSMENT — PAIN SCALES - WONG BAKER: WONGBAKER_NUMERICALRESPONSE: 2

## 2021-01-21 ASSESSMENT — PAIN - FUNCTIONAL ASSESSMENT: PAIN_FUNCTIONAL_ASSESSMENT: INTOLERABLE, UNABLE TO DO ANY ACTIVE OR PASSIVE ACTIVITIES

## 2021-01-21 NOTE — ED PROVIDER NOTES
[facility]  Department of Emergency Medicine   ED  Encounter Note  Admit Date/RoomTime: 2021 12:35 PM  ED Room:     NAME: Ac Sargent  : 1926  MRN: [id number]     Chief Complaint:  Back Pain (chronic issue/ radiating down left buttock. No new injury.)    History of Present Illness        Ac Sargent is a 80 y.o. old female with a prior history of osteoporosis status post kyphoplasty in 2020 with Dr. Annamarie Kurtz who presents for worsening back pain. She has radiation down her left buttock and leg. She lives at home with her daughter currently. Her daughter took her to see Dr. Annamarie Kurtz in the office a few days ago. He recommended left hip x-ray and pain injections at that time. It was recommended that patient be admitted to the hospital if symptoms worsened and could not be done in a timely manner outpatient. Patient denies any new falls or trauma. Her MRI was reviewed from 2020. Additionally, patient's daughter is requesting a urinalysis to be performed. Parent, patient has had recurrent UTIs and follows with Dr. Vikram Mayfield. Patient currently denies any symptoms of UTI at this time. .  ROS   Pertinent positives and negatives are stated within HPI, all other systems reviewed and are negative. Past Medical History:  has a past medical history of Arthritis, Hypertension, Myocardial infarct ([address] and UTI (urinary tract infection). Surgical History:  has a past surgical history that includes Hysterectomy; Appendectomy; Kyphosis surgery (N/A, 10/10/2020); and back surgery. Social History:  reports that she has never smoked. She has never used smokeless tobacco. She reports that she does not drink alcohol or use drugs. Family History: family history is not on file.      Allergies: Chlorzoxazone and Sulfa antibiotics    Physical Exam   Oxygen Saturation Interpretation: Normal.        ED Triage Vitals [21 1235]   BP Temp Temp src Pulse Resp SpO2 Height Weight   139/86 97.3 °F (36.3 °C) -- 77 16 95 % -- 145 lb (65.8 kg)         Constitutional:  Alert, development consistent with age. HEENT:  NC/NT. Airway patent. Neck:  Normal ROM. Supple. Respiratory:  Clear to auscultation and breath sounds equal.  CV:  Regular rate and rhythm, normal heart sounds, without pathological murmurs, ectopy, gallops, or rubs. GI:  Abdomen Soft, nontender, good bowel sounds. No firm or pulsatile mass. Back: lower lumbar spine left greater than right. Tenderness: Mild. Swelling: no.              Range of Motion: diminished range with pain. Skin:  no erythema, rash or swelling noted. Distal Function:              Motor deficit: none. Sensory deficit: none. Pulse deficit: none. Calf Tenderness:  No Bilateral.               Edema:  none Both lower extremity(s). Integument:  Normal turgor. Warm, dry, without visible rash. Lymphatics: No lymphangitis or adenopathy noted. Neurological:  Oriented. Motor functions intact.     Lab / Imaging Results   (All laboratory and radiology results have been personally reviewed by myself)  Labs:  Results for orders placed or performed during the hospital encounter of 01/21/21   CBC Auto Differential   Result Value Ref Range    WBC 4.5 4.5 - 11.5 E9/L    RBC 4.18 3.50 - 5.50 E12/L    Hemoglobin 12.8 11.5 - 15.5 g/dL    Hematocrit 38.9 34.0 - 48.0 %    MCV 93.1 80.0 - 99.9 fL    MCH 30.6 26.0 - 35.0 pg    MCHC 32.9 32.0 - 34.5 %    RDW 14.1 11.5 - 15.0 fL    Platelets 354 909 - 506 E9/L    MPV 9.8 7.0 - 12.0 fL    Neutrophils % 64.2 43.0 - 80.0 %    Immature Granulocytes % 0.2 0.0 - 5.0 %    Lymphocytes % 21.1 20.0 - 42.0 %    Monocytes % 11.0 2.0 - 12.0 %    Eosinophils % 2.2 0.0 - 6.0 %    Basophils % 1.3 0.0 - 2.0 %    Neutrophils Absolute 2.85 1.80 - 7.30 E9/L    Immature Granulocytes # 0.01 E9/L    Lymphocytes Absolute 0.94 (L) 1.50 - 4.00 E9/L Monocytes Absolute 0.49 0.10 - 0.95 E9/L    Eosinophils Absolute 0.10 0.05 - 0.50 E9/L    Basophils Absolute 0.06 0.00 - 0.20 Z7/A   Basic metabolic panel   Result Value Ref Range    Sodium 135 132 - 146 mmol/L    Potassium 3.6 3.5 - 5.0 mmol/L    Chloride 100 98 - 107 mmol/L    CO2 28 22 - 29 mmol/L    Anion Gap 7 7 - 16 mmol/L    Glucose 104 (H) 74 - 99 mg/dL    BUN 7 (L) 8 - 23 mg/dL    CREATININE 1.0 0.5 - 1.0 mg/dL    GFR Non-African American 52 >=60 mL/min/1.73    GFR African American >60     Calcium 8.9 8.6 - 10.2 mg/dL   Urinalysis   Result Value Ref Range    Color, UA Yellow Straw/Yellow    Clarity, UA SL CLOUDY Clear    Glucose, Ur Negative Negative mg/dL    Bilirubin Urine Negative Negative    Ketones, Urine Negative Negative mg/dL    Specific Gravity, UA 1.010 1.005 - 1.030    Blood, Urine MODERATE (A) Negative    pH, UA 6.0 5.0 - 9.0    Protein, UA TRACE Negative mg/dL    Urobilinogen, Urine 0.2 <2.0 E.U./dL    Nitrite, Urine Negative Negative    Leukocyte Esterase, Urine MODERATE (A) Negative   Microscopic Urinalysis   Result Value Ref Range    WBC, UA 10-20 (A) 0 - 5 /HPF    RBC, UA 10-20 (A) 0 - 2 /HPF    Bacteria, UA MODERATE (A) None Seen /HPF       Imaging: All Radiology results interpreted by Radiologist unless otherwise noted. XR HIP 2-3 VW W PELVIS LEFT   Final Result   1. No acute fracture or dislocation. 2. Osteopenia. Mild degenerative change. ED Course / Medical Decision Making     Medications   lidocaine 4 % external patch 1 patch (1 patch Transdermal Patch Applied 1/21/21 1311)   morphine (PF) injection 2 mg (2 mg Intravenous Given 1/21/21 1312)   morphine (PF) injection 2 mg (2 mg Intravenous Given 1/21/21 1530)        Re-examination:  1/21/21       Patients condition is improving.     Consult(s):   IP CONSULT TO INTERNAL MEDICINE  IP CONSULT TO NEUROSURGERY    Procedure(s):   none    Medical Decision Making/Counseling:    Patient with history of osteoporosis, spinal stenosis, multiple compression fractures status post kyphoplasty presents for worsening back and left hip pain. X-ray of the left hip shows no fracture, she does have degenerative changes. Her symptoms are much improved after morphine. Patient has history of recurrent UTIs, urinalysis was requested by her daughter. Patient is currently exhibiting no symptoms of UTI. UA and culture was ordered. Consult placed to Dr. Nereida Carey regarding pain injections. Patient accepted for admission by Dr. Sarah Alicia. Assessment      1. Intractable back pain      Plan   Admission Inpatient to General Medical Floor. Patient condition is stable    New Medications     New Prescriptions    No medications on file     Electronically signed by Dao Sutton DO   DD: 1/21/21  **This report was transcribed using voice recognition software. Every effort was made to ensure accuracy; however, inadvertent computerized transcription errors may be present.   END OF ED PROVIDER NOTE        Dao Sutton DO  01/21/21 4525

## 2021-01-22 ENCOUNTER — APPOINTMENT (OUTPATIENT)
Dept: GENERAL RADIOLOGY | Age: 86
DRG: 552 | End: 2021-01-22
Payer: MEDICARE

## 2021-01-22 LAB
ANION GAP SERPL CALCULATED.3IONS-SCNC: 12 MMOL/L (ref 7–16)
BUN BLDV-MCNC: 6 MG/DL (ref 8–23)
CALCIUM SERPL-MCNC: 8.7 MG/DL (ref 8.6–10.2)
CHLORIDE BLD-SCNC: 96 MMOL/L (ref 98–107)
CO2: 26 MMOL/L (ref 22–29)
CREAT SERPL-MCNC: 0.9 MG/DL (ref 0.5–1)
GFR AFRICAN AMERICAN: >60
GFR NON-AFRICAN AMERICAN: 58 ML/MIN/1.73
GLUCOSE BLD-MCNC: 104 MG/DL (ref 74–99)
HCT VFR BLD CALC: 37.7 % (ref 34–48)
HEMOGLOBIN: 12.2 G/DL (ref 11.5–15.5)
MCH RBC QN AUTO: 30.3 PG (ref 26–35)
MCHC RBC AUTO-ENTMCNC: 32.4 % (ref 32–34.5)
MCV RBC AUTO: 93.5 FL (ref 80–99.9)
PDW BLD-RTO: 14.1 FL (ref 11.5–15)
PLATELET # BLD: 217 E9/L (ref 130–450)
PMV BLD AUTO: 10.5 FL (ref 7–12)
POTASSIUM SERPL-SCNC: 3 MMOL/L (ref 3.5–5)
RBC # BLD: 4.03 E12/L (ref 3.5–5.5)
SODIUM BLD-SCNC: 134 MMOL/L (ref 132–146)
WBC # BLD: 4.2 E9/L (ref 4.5–11.5)

## 2021-01-22 PROCEDURE — 6370000000 HC RX 637 (ALT 250 FOR IP): Performed by: FAMILY MEDICINE

## 2021-01-22 PROCEDURE — 80048 BASIC METABOLIC PNL TOTAL CA: CPT

## 2021-01-22 PROCEDURE — 85027 COMPLETE CBC AUTOMATED: CPT

## 2021-01-22 PROCEDURE — 36415 COLL VENOUS BLD VENIPUNCTURE: CPT

## 2021-01-22 PROCEDURE — 6360000002 HC RX W HCPCS: Performed by: FAMILY MEDICINE

## 2021-01-22 PROCEDURE — 2580000003 HC RX 258: Performed by: FAMILY MEDICINE

## 2021-01-22 PROCEDURE — 64483 NJX AA&/STRD TFRM EPI L/S 1: CPT

## 2021-01-22 PROCEDURE — 77002 NEEDLE LOCALIZATION BY XRAY: CPT

## 2021-01-22 PROCEDURE — 3E0T3BZ INTRODUCTION OF ANESTHETIC AGENT INTO PERIPHERAL NERVES AND PLEXI, PERCUTANEOUS APPROACH: ICD-10-PCS | Performed by: RADIOLOGY

## 2021-01-22 PROCEDURE — 2580000003 HC RX 258

## 2021-01-22 PROCEDURE — 1200000000 HC SEMI PRIVATE

## 2021-01-22 RX ORDER — MORPHINE SULFATE 1 MG/ML
4 INJECTION, SOLUTION EPIDURAL; INTRATHECAL; INTRAVENOUS ONCE
Status: COMPLETED | OUTPATIENT
Start: 2021-01-23 | End: 2021-01-23

## 2021-01-22 RX ORDER — SODIUM CHLORIDE 0.9 % (FLUSH) 0.9 %
SYRINGE (ML) INJECTION
Status: COMPLETED
Start: 2021-01-22 | End: 2021-01-22

## 2021-01-22 RX ADMIN — DOCUSATE SODIUM 100 MG: 100 CAPSULE, LIQUID FILLED ORAL at 09:15

## 2021-01-22 RX ADMIN — ISOSORBIDE MONONITRATE 30 MG: 30 TABLET ORAL at 09:18

## 2021-01-22 RX ADMIN — CEFDINIR 300 MG: 300 CAPSULE ORAL at 21:12

## 2021-01-22 RX ADMIN — LORAZEPAM 1 MG: 1 TABLET ORAL at 06:06

## 2021-01-22 RX ADMIN — TRAMADOL HYDROCHLORIDE 50 MG: 50 TABLET, FILM COATED ORAL at 09:19

## 2021-01-22 RX ADMIN — MORPHINE SULFATE 2 MG: 2 INJECTION, SOLUTION INTRAMUSCULAR; INTRAVENOUS at 01:30

## 2021-01-22 RX ADMIN — Medication 1 CAPSULE: at 09:16

## 2021-01-22 RX ADMIN — SODIUM CHLORIDE, PRESERVATIVE FREE 10 ML: 5 INJECTION INTRAVENOUS at 01:30

## 2021-01-22 RX ADMIN — ATENOLOL 50 MG: 50 TABLET ORAL at 12:20

## 2021-01-22 RX ADMIN — CEFDINIR 300 MG: 300 CAPSULE ORAL at 09:16

## 2021-01-22 RX ADMIN — AMLODIPINE BESYLATE 5 MG: 5 TABLET ORAL at 09:17

## 2021-01-22 RX ADMIN — TRAMADOL HYDROCHLORIDE 50 MG: 50 TABLET, FILM COATED ORAL at 03:59

## 2021-01-22 RX ADMIN — HYDROCHLOROTHIAZIDE 12.5 MG: 12.5 TABLET ORAL at 09:18

## 2021-01-22 RX ADMIN — CEFTRIAXONE SODIUM 1000 MG: 1 INJECTION, POWDER, FOR SOLUTION INTRAMUSCULAR; INTRAVENOUS at 21:12

## 2021-01-22 RX ADMIN — ATORVASTATIN CALCIUM 10 MG: 10 TABLET, FILM COATED ORAL at 21:12

## 2021-01-22 RX ADMIN — ESCITALOPRAM 5 MG: 10 TABLET, FILM COATED ORAL at 09:18

## 2021-01-22 RX ADMIN — PANTOPRAZOLE SODIUM 40 MG: 40 TABLET, DELAYED RELEASE ORAL at 06:03

## 2021-01-22 ASSESSMENT — PAIN DESCRIPTION - PAIN TYPE
TYPE: ACUTE PAIN
TYPE: ACUTE PAIN

## 2021-01-22 ASSESSMENT — PAIN DESCRIPTION - DESCRIPTORS
DESCRIPTORS: CONSTANT;ACHING;DISCOMFORT
DESCRIPTORS: CONSTANT;THROBBING;SHOOTING

## 2021-01-22 ASSESSMENT — PAIN DESCRIPTION - ONSET
ONSET: PROGRESSIVE
ONSET: ON-GOING

## 2021-01-22 ASSESSMENT — PAIN DESCRIPTION - LOCATION
LOCATION: BACK
LOCATION: BACK

## 2021-01-22 ASSESSMENT — PAIN DESCRIPTION - FREQUENCY
FREQUENCY: CONTINUOUS
FREQUENCY: CONTINUOUS

## 2021-01-22 ASSESSMENT — ENCOUNTER SYMPTOMS
SHORTNESS OF BREATH: 0
BACK PAIN: 1
ABDOMINAL PAIN: 0

## 2021-01-22 ASSESSMENT — PAIN DESCRIPTION - PROGRESSION
CLINICAL_PROGRESSION: NOT CHANGED
CLINICAL_PROGRESSION: NOT CHANGED

## 2021-01-22 ASSESSMENT — PAIN SCALES - GENERAL
PAINLEVEL_OUTOF10: 0
PAINLEVEL_OUTOF10: 4
PAINLEVEL_OUTOF10: 8
PAINLEVEL_OUTOF10: 9

## 2021-01-22 ASSESSMENT — PAIN SCALES - WONG BAKER: WONGBAKER_NUMERICALRESPONSE: 4

## 2021-01-22 NOTE — PROGRESS NOTES
Date: 2021       Patient Name: Jesse Mar  : 1926      MRN: [id number]    PT order received and chart reviewed. PT evaluation held await neurosurgery recommendations.    Will follow up as appropriate    Jackie Montoya PT, DPT  [id number]

## 2021-01-22 NOTE — PROGRESS NOTES
OT consult received and appreciated. Chart reviewed. Will hold evaluation due to NS recommendations pending . Will evaluate at a later time. Thank you.  Raghav Huang, OTR/L #[id number]

## 2021-01-22 NOTE — CARE COORDINATION
1/22/2021 social work transition of care planning  Pt is from home alone and uses a w/w,patient reported that her family and friends check on her daily. Pt reported that she has a cane and s/c. Pt pcp is  and pharmacy is [facility] in [place]. Pt reported hx of snf at [facility]-no beds and ixa-Ghlmgwr-ugnfhjmn made. Pt gave verbal permission for this sw to call pt's dtr-Kerri. Explained sw role in transition of care planning. Pt would like to go home, but if on Iv atb, will need elvira. Jade Marlow gave [employer] as a choice-referral made.   Electronically signed by TONY Vega on 1/22/2021 at 11:47 AM

## 2021-01-22 NOTE — PROGRESS NOTES
Patient signed epidural consent form. Patient's daughter, Elvis April, was called and updated of pending procedure.

## 2021-01-22 NOTE — H&P
tablet Take 50 mg by mouth daily     Historical Provider, MD   aspirin 81 MG tablet Take 81 mg by mouth daily    Historical Provider, MD   traMADol (ULTRAM) 50 MG tablet Take 50 mg by mouth every 6 hours as needed for Pain. Emanuel Houser Historical Provider, MD   atorvastatin (LIPITOR) 10 MG tablet Take 10 mg by mouth daily     Historical Provider, MD   LORazepam (ATIVAN) 1 MG tablet Take 1 mg by mouth 2 times daily as needed. Historical Provider, MD        Allergies   Chlorzoxazone and Sulfa antibiotics    Social History     Social History     Tobacco History     Smoking Status  Never Smoker    Smokeless Tobacco Use  Never Used          Alcohol History     Alcohol Use Status  No          Drug Use     Drug Use Status  Never          Sexual Activity     Sexually Active  Not Currently                Family History   History reviewed. No pertinent family history. Review of Systems   Review of Systems   Constitutional: Positive for activity change. Respiratory: Negative for shortness of breath. Cardiovascular: Negative for chest pain. Gastrointestinal: Negative for abdominal pain. Musculoskeletal: Positive for back pain and gait problem. Neurological: Negative for dizziness. Hematological: Negative for adenopathy. Psychiatric/Behavioral: Negative for agitation. Physical Exam   /62   Pulse 63   Temp 97.9 °F (36.6 °C) (Temporal)   Resp 16   Ht 5' 6\" (1.676 m)   Wt 145 lb (65.8 kg)   LMP 05/16/2018   SpO2 94%   BMI 23.40 kg/m²     Physical Exam  Vitals signs reviewed. HENT:      Mouth/Throat:      Mouth: Mucous membranes are moist.   Eyes:      Pupils: Pupils are equal, round, and reactive to light. Cardiovascular:      Rate and Rhythm: Normal rate and regular rhythm. Pulses: Normal pulses. Heart sounds: Normal heart sounds. Pulmonary:      Effort: Pulmonary effort is normal. No respiratory distress. Breath sounds: Normal breath sounds. No wheezing.    Abdominal:

## 2021-01-22 NOTE — CONSULTS
Stephy Serrano was recently seen in follow up in the office. She is status post Balloon osteoplasty T12, L1, and L2; bone biopsy T12, L1, and L2; and injection of epidural steroid Kyphoplasty on 10/10/20 Was last seen on 11/3/20 with complaints of new onset pain in lower back that is constant. Recommended kyphoplasty at L3, L4, and L5. Patient presents today with complaints of lower back pain that is severe and constant. Also C/O worsened pain in the entire LT side of body. Patient in wheelchair at today's OV. [facility] No numbness or tingling in the bilateral legs. Patient admitted with back pain & bladder symptoms. Patient is also currently suffering from UTI.          Pain is rated at a 10/10. Taking:  [place]     Diagnostic testing done since last visit:  XRAY @ [facility] 1/15/21, [place] [facility]. 2. MR @ [facility] 12/3/20        Past Medical History        Past Medical History:   Diagnosis Date    Arthritis      Hypertension      Myocardial infarct ([address]      UTI (urinary tract infection)           Past Surgical History         Past Surgical History:   Procedure Laterality Date    APPENDECTOMY        BACK SURGERY         cement t12, l1, l2-oct 2020    HYSTERECTOMY        KYPHOSIS SURGERY N/A 10/10/2020     T12, L1 and L2  KYPHOPLASTY performed by Woody Gallegos [facility] at [facility] OR              Allergies   Allergen Reactions    Sulfa Antibiotics Hives      Current Facility-Administered Medications          Current Outpatient Medications   Medication Sig Dispense Refill    cefdinir (OMNICEF) 300 MG capsule Take 300 mg by mouth 2 times daily        escitalopram (LEXAPRO) 5 MG tablet Take 5 mg by mouth daily        hydroCHLOROthiazide (MICROZIDE) 12.5 MG capsule Take 12.5 mg by mouth every morning        HYDROcodone-acetaminophen (NORCO) 5-325 MG per tablet Take 1 tablet by mouth every 6 hours as needed for Pain for up to 3 days. Intended supply: 3 days.  Take lowest dose possible to manage pain 12 tablet 0    amLODIPine (NORVASC) 5 MG tablet Take 1 tablet by mouth daily 30 tablet 3    pantoprazole (PROTONIX) 40 MG tablet Take 1 tablet by mouth every morning (before breakfast) 30 tablet 3    Probiotic Product (PROBIOTIC DAILY PO) Take by mouth        docusate sodium (COLACE, DULCOLAX) 100 MG CAPS Take 100 mg by mouth daily 30 capsule 0    Isosorbide Mononitrate (IMDUR PO) Take 30 mg by mouth daily         ATENOLOL PO Take 50 mg by mouth daily         aspirin 81 MG tablet Take 81 mg by mouth daily        traMADol (ULTRAM) 50 MG tablet Take 50 mg by mouth every 6 hours as needed for Pain. .        Atorvastatin Calcium (LIPITOR PO) Take 10 mg by mouth daily         LORazepam (ATIVAN PO) Take 1 mg by mouth 2 times daily as needed           No current facility-administered medications for this visit. Social History           Tobacco Use    Smoking status: Never Smoker    Smokeless tobacco: Never Used   Substance Use Topics    Alcohol use: No    Drug use: Not on file      Exam:   Alert, awake and oriented  Gait - wheelchair  Station [place]  Cervical muscle spasm WNL  Tandem gait - unremarkable  Focal motor deficit - minimum  Focal sensory deficit - minimum        Studies  reviewed X-Ray:  of lumbar spine     Assessment/Plan:  S/P Kyphoplasty      Severe low back pain  UTI                                          Patient is having pain in left hip. Reviewed xrays of pelvis:  1. No acute fracture or dislocation.     Reviewed Dec 2020 lumbar MRI which shows spondylolisthesis and no new fractures. Discussed results with patient. I recommend  nerve block.   I              Impression and Plan:   spoke with Amy Costa on phone [phone]  We have discussed the various options including conservative treatment. I have offered her nerve block . I have explained the procedures as well as the benefits and risks.   I have answered all of her questions and she will be scheduling the procedure today.

## 2021-01-23 LAB
ANION GAP SERPL CALCULATED.3IONS-SCNC: 12 MMOL/L (ref 7–16)
BUN BLDV-MCNC: 7 MG/DL (ref 8–23)
CALCIUM SERPL-MCNC: 9.2 MG/DL (ref 8.6–10.2)
CHLORIDE BLD-SCNC: 95 MMOL/L (ref 98–107)
CO2: 29 MMOL/L (ref 22–29)
CREAT SERPL-MCNC: 0.9 MG/DL (ref 0.5–1)
GFR AFRICAN AMERICAN: >60
GFR NON-AFRICAN AMERICAN: 58 ML/MIN/1.73
GLUCOSE BLD-MCNC: 124 MG/DL (ref 74–99)
HCT VFR BLD CALC: 40.5 % (ref 34–48)
HEMOGLOBIN: 13.8 G/DL (ref 11.5–15.5)
MCH RBC QN AUTO: 30.9 PG (ref 26–35)
MCHC RBC AUTO-ENTMCNC: 34.1 % (ref 32–34.5)
MCV RBC AUTO: 90.8 FL (ref 80–99.9)
PDW BLD-RTO: 13.9 FL (ref 11.5–15)
PLATELET # BLD: 216 E9/L (ref 130–450)
PMV BLD AUTO: 10.3 FL (ref 7–12)
POTASSIUM SERPL-SCNC: 3.4 MMOL/L (ref 3.5–5)
RBC # BLD: 4.46 E12/L (ref 3.5–5.5)
SODIUM BLD-SCNC: 136 MMOL/L (ref 132–146)
WBC # BLD: 5.2 E9/L (ref 4.5–11.5)

## 2021-01-23 PROCEDURE — 97530 THERAPEUTIC ACTIVITIES: CPT

## 2021-01-23 PROCEDURE — 97166 OT EVAL MOD COMPLEX 45 MIN: CPT

## 2021-01-23 PROCEDURE — 97535 SELF CARE MNGMENT TRAINING: CPT

## 2021-01-23 PROCEDURE — 1200000000 HC SEMI PRIVATE

## 2021-01-23 PROCEDURE — 85027 COMPLETE CBC AUTOMATED: CPT

## 2021-01-23 PROCEDURE — 6370000000 HC RX 637 (ALT 250 FOR IP): Performed by: FAMILY MEDICINE

## 2021-01-23 PROCEDURE — 80048 BASIC METABOLIC PNL TOTAL CA: CPT

## 2021-01-23 PROCEDURE — 6360000002 HC RX W HCPCS: Performed by: NEUROLOGICAL SURGERY

## 2021-01-23 PROCEDURE — 36415 COLL VENOUS BLD VENIPUNCTURE: CPT

## 2021-01-23 PROCEDURE — 2580000003 HC RX 258: Performed by: FAMILY MEDICINE

## 2021-01-23 PROCEDURE — 97162 PT EVAL MOD COMPLEX 30 MIN: CPT

## 2021-01-23 PROCEDURE — 6360000002 HC RX W HCPCS: Performed by: FAMILY MEDICINE

## 2021-01-23 RX ORDER — METHYLPREDNISOLONE ACETATE 80 MG/ML
80 INJECTION, SUSPENSION INTRA-ARTICULAR; INTRALESIONAL; INTRAMUSCULAR; SOFT TISSUE ONCE
Status: COMPLETED | OUTPATIENT
Start: 2021-01-24 | End: 2021-01-24

## 2021-01-23 RX ORDER — DIPHENHYDRAMINE HYDROCHLORIDE 50 MG/ML
25 INJECTION INTRAMUSCULAR; INTRAVENOUS EVERY 6 HOURS PRN
Status: DISCONTINUED | OUTPATIENT
Start: 2021-01-23 | End: 2021-01-26 | Stop reason: HOSPADM

## 2021-01-23 RX ORDER — NALOXONE HYDROCHLORIDE 0.4 MG/ML
0.4 INJECTION, SOLUTION INTRAMUSCULAR; INTRAVENOUS; SUBCUTANEOUS PRN
Status: DISCONTINUED | OUTPATIENT
Start: 2021-01-23 | End: 2021-01-26 | Stop reason: HOSPADM

## 2021-01-23 RX ORDER — MORPHINE SULFATE 1 MG/ML
4 INJECTION, SOLUTION EPIDURAL; INTRATHECAL; INTRAVENOUS ONCE
Status: COMPLETED | OUTPATIENT
Start: 2021-01-24 | End: 2021-01-24

## 2021-01-23 RX ADMIN — MORPHINE SULFATE 4 MG: 1 INJECTION, SOLUTION EPIDURAL; INTRATHECAL; INTRAVENOUS at 11:48

## 2021-01-23 RX ADMIN — ENOXAPARIN SODIUM 40 MG: 40 INJECTION SUBCUTANEOUS at 08:58

## 2021-01-23 RX ADMIN — ISOSORBIDE MONONITRATE 30 MG: 30 TABLET ORAL at 08:59

## 2021-01-23 RX ADMIN — ATENOLOL 50 MG: 50 TABLET ORAL at 08:59

## 2021-01-23 RX ADMIN — ATORVASTATIN CALCIUM 10 MG: 10 TABLET, FILM COATED ORAL at 20:56

## 2021-01-23 RX ADMIN — CEFTRIAXONE SODIUM 1000 MG: 1 INJECTION, POWDER, FOR SOLUTION INTRAMUSCULAR; INTRAVENOUS at 20:56

## 2021-01-23 RX ADMIN — LORAZEPAM 1 MG: 1 TABLET ORAL at 20:56

## 2021-01-23 RX ADMIN — Medication 1 CAPSULE: at 08:59

## 2021-01-23 RX ADMIN — CEFDINIR 300 MG: 300 CAPSULE ORAL at 08:58

## 2021-01-23 RX ADMIN — PANTOPRAZOLE SODIUM 40 MG: 40 TABLET, DELAYED RELEASE ORAL at 06:11

## 2021-01-23 RX ADMIN — AMLODIPINE BESYLATE 5 MG: 5 TABLET ORAL at 08:59

## 2021-01-23 RX ADMIN — DOCUSATE SODIUM 100 MG: 100 CAPSULE, LIQUID FILLED ORAL at 08:58

## 2021-01-23 RX ADMIN — ESCITALOPRAM 5 MG: 10 TABLET, FILM COATED ORAL at 09:06

## 2021-01-23 RX ADMIN — CEFDINIR 300 MG: 300 CAPSULE ORAL at 20:56

## 2021-01-23 RX ADMIN — HYDROCHLOROTHIAZIDE 12.5 MG: 12.5 TABLET ORAL at 08:59

## 2021-01-23 ASSESSMENT — PAIN DESCRIPTION - DESCRIPTORS: DESCRIPTORS: ACHING;DISCOMFORT

## 2021-01-23 ASSESSMENT — PAIN SCALES - GENERAL
PAINLEVEL_OUTOF10: 8
PAINLEVEL_OUTOF10: 6

## 2021-01-23 ASSESSMENT — PAIN DESCRIPTION - PAIN TYPE: TYPE: CHRONIC PAIN

## 2021-01-23 ASSESSMENT — ENCOUNTER SYMPTOMS
SHORTNESS OF BREATH: 0
ABDOMINAL PAIN: 0
BACK PAIN: 1

## 2021-01-23 ASSESSMENT — PAIN DESCRIPTION - LOCATION: LOCATION: BACK

## 2021-01-23 NOTE — PROGRESS NOTES
A   Grooming Min A  Seated, simulated    Set up A while seated/standing at sink    UB Dressing Min A  simulated    Set up A   LB Dressing DEP  simulated    Max A   Bathing Max A  simulated    Mod A   Toileting Max A  Mod A simulated elevated toilet tfr on bedside chair with bedpan, assistance for perineal hygiene- max cues for hand placement and body alignment    Mod A   Bed Mobility  Rolling: NT  Supine to sit: Min A  Sit to supine: Mod A  Min A    Functional Transfers Sit to stand: Mod A  Stand to sit: Mod A  Max cues for hand placement  Min A   Functional Mobility Mod A ww  For short steps to pivot to/from bedside chair- assistance with ww mgmt  Min A   Balance Sitting:     Static:  supervision    Dynamic: CGA  Standing: Mod A with ww     Endurance/Activity Tolerance fair  good   Visual/  Perceptual Glasses: yes, says she doesn't need              Hand dominance: R  UE ROM: RUE: WFL  LUE: WFL  Strength: RUE: grossly WFL LUE: grossly WFL   Strength: WFL  Fine Motor Coordination: WFL    Hearing: WFL  Sensation:  No c/o numbness or tingling  Tone: WNL  Edema: unremarkable                            Comments: Upon arrival patient supine with HOB slightly elevated. Bed mobility completed. Patient wishing to make BM- functional transfers, functional mobility, toileting, grooming addressed. After session, patient long sitting with all devices within reach, all lines and tubes intact. Bed alarm on. RN notified of session events- need for new purewick catheter and likely benefit of BSC in room. Pt required cues and education as noted above for safe facilitation and completion of tasks. Therapist provided skilled monitoring of patient's response during treatment session. Prior to and at the end of session, environmental modifications/line management completed for patients safety and efficiency of treatment session. Overall, patient demonstrates significant difficulties with completion of BADLs and IADLs.  Factors contributing to these difficulties include impaired mobility, impaired safety, Circle, spinal neutrality, decreased endurance, and generalized weakness. As noted above, patient likely to benefit from further OT intervention to increase independence, safety, and overall quality of life. Treatment:   · Bed mobility: Facilitated bed mobility with cues for proper body mechanics and sequencing to prepare for ADL completion. · Functional transfers: Facilitated transfers from various surfaces with cues for body alignment, safety and hand placement. · ADL completion: Self-care retraining for the above-mentioned ADLs; training on proper hand placement, safety technique, sequencing, and energy conservation techniques. · Postural Balance: Sitting and standing balance retraining to improve righting reactions with postural changes during ADLs. Eval Complexity:   · Medium Complexity  · History: Expanded review of medical records and additional review of physical, cognitive, or psychosocial history related to current functional performance  · Exam: 3+ performance deficits  · Assistance/Modification: Min/mod assistance or modifications required to perform tasks. May have comorbidities that affect occupational performance.     Assessment of current deficits   Functional mobility [x]  ADLs [x] Strength [x]  Cognition [x]  Functional transfers  [x] IADLs [x] Safety Awareness [x]  Endurance [x]  Fine Motor Coordination [] Balance [x] Vision/perception [] Sensation []   Gross Motor Coordination [] ROM [] Delirium []                  Motor Control []    Plan of Care: 1-3 days/week for 1-2 weeks PRN   [x]ADL retraining/adapted techniques and AE recommendations to increase functional independence within precautions                    [x]Energy conservation techniques to improve tolerance for selfcare routine   [x]Functional transfer/mobility training/DME recommendations for increased independence, safety and fall prevention [x]Patient/family education to increase safety and functional independence             [x]Environmental modifications for safe mobility and completion of ADLs                             [x]Cognitive retraining ex's to improve problem solving skills & safe participation in ADLs/IADLs     []Sensory re-education techniques to improve extremity awareness, maintain skin integrity and improve hand function                             []Visual/Perceptual retraining  to improve body awareness and safety during transfers and ADLs  []Splinting/positioning needs to maintain joint/skin integrity and prevent contractures  [x]Therapeutic activity to improve functional performance during ADLs. [x]Therapeutic exercise to improve tolerance and functional strength for ADLs   [x]Balance retraining/tolerance tasks for facilitation of postural control with dynamic challenges during ADLs . []Neuromuscular re-education: facilitation of righting/equilibrium reactions, midline orientation, scapular stability/mobility, Normalization muscle     tone and facilitation active functional movement/Attention                         []Delirium prevention/treatment    []Positioning to improve functional independence  []Other:       Rehab Potential: Good for established goals     Patient / Family Goal: None stated      Patient and/or family were instructed on functional diagnosis, prognosis/goals and OT plan of care. Demonstrated fair- understanding.      Eval Complexity: Medium      Time In: 0908  Time Out: 2340  Total Time: 30 minutes    Min Units   OT Eval Low 20737       OT Eval Medium 97166  X 1   OT Eval High 37831       OT Re-Eval [id number]       Therapeutic Ex 45094       Therapeutic Activities 97567       ADL/Self Care 96896  20 1    Orthotic Management 33166       Neuro Re-Ed 36538       Non-Billable Time          Evaluation Time includes thorough review of current medical information, gathering information on past medical history/social history and prior level of function, completion of standardized testing/informal observation of tasks, assessment of data and education on plan of care and goals.     Yessica Sears OTR/L  [id number]

## 2021-01-23 NOTE — PROGRESS NOTES
Doing better than yesterday  3 mg preservative free morphine given via epidural catheter. Plan 3rd injection in am.    Stable from a neurosurgical standpoint.

## 2021-01-23 NOTE — PROGRESS NOTES
Progress Note  Date:2021       [facility]:[id number]  Patient Jayleen Cruz     YOB: 1926     Age:89 y.o. Patient resting comfortably. Subjective    Subjective:  Symptoms:  Stable. No shortness of breath or chest pain. Diet:  Adequate intake. Activity level: Impaired due to pain. Pain:  She complains of pain that is mild. Review of Systems   Constitutional: Positive for activity change. Respiratory: Negative for shortness of breath. Cardiovascular: Negative for chest pain. Gastrointestinal: Negative for abdominal pain. Musculoskeletal: Positive for back pain and gait problem. Neurological: Negative for dizziness. Hematological: Negative for adenopathy. Psychiatric/Behavioral: Negative for agitation. Objective         Vitals Last 24 Hours:  TEMPERATURE:  Temp  Av.8 °F (36.6 °C)  Min: 97.6 °F (36.4 °C)  Max: 98.3 °F (36.8 °C)  RESPIRATIONS RANGE: Resp  Av.2  Min: 16  Max: 17  PULSE OXIMETRY RANGE: SpO2  Av.3 %  Min: 93 %  Max: 96 %  PULSE RANGE: Pulse  Av.3  Min: 60  Max: 70  BLOOD PRESSURE RANGE: Systolic (96YUB), LXO:178 , Min:128 , JMM:290   ; Diastolic (50GML), SJX:59, Min:58, Max:85    I/O (24Hr): Intake/Output Summary (Last 24 hours) at 2021 0724  Last data filed at 2021 0612  Gross per 24 hour   Intake 720 ml   Output 900 ml   Net -180 ml     Objective:  General Appearance:  Comfortable. Vital signs: (most recent): Blood pressure (!) 169/85, pulse 70, temperature 98.1 °F (36.7 °C), temperature source Temporal, resp. rate 17, height 5' 6\" (1.676 m), weight 145 lb (65.8 kg), last menstrual period 2018, SpO2 95 %. Lungs:  Normal effort and normal respiratory rate. Breath sounds clear to auscultation. Heart: Normal rate. Regular rhythm.   S1 normal and S2 normal.      Labs/Imaging/Diagnostics    Labs:  CBC:  Recent Labs     21  1309 21  0528 21  0523   WBC 4.5 4.2* 5.2   RBC 4.18 4.03 4.46   HGB 12.8 12.2 13.8   HCT 38.9 37.7 40.5   MCV 93.1 93.5 90.8   RDW 14.1 14.1 13.9    217 216     CHEMISTRIES:  Recent Labs     01/21/21  1309 01/22/21  0528 01/23/21  0523    134 136   K 3.6 3.0* 3.4*    96* 95*   CO2 28 26 29   BUN 7* 6* 7*   CREATININE 1.0 0.9 0.9   GLUCOSE 104* 104* 124*     PT/INR:No results for input(s): PROTIME, INR in the last 72 hours. APTT:No results for input(s): APTT in the last 72 hours. LIVER PROFILE:No results for input(s): AST, ALT, BILIDIR, BILITOT, ALKPHOS in the last 72 hours. Imaging Last 24 Hours:  Xr Hip 2-3 Vw W Pelvis Left    Result Date: 1/21/2021  EXAMINATION: ONE XRAY VIEW OF THE PELVIS AND TWO XRAY VIEWS LEFT HIP 1/21/2021 2:53 pm COMPARISON: None. HISTORY: ORDERING SYSTEM PROVIDED HISTORY: pain TECHNOLOGIST PROVIDED HISTORY: Reason for exam:->pain What reading provider will be dictating this exam?->CRC FINDINGS: No acute fracture or dislocation is seen. There is generalized osteopenia. Mild degenerative changes seen about both hips and SI joints. There is degenerative change and levoscoliosis in visualized portion of spine. No erosive changes are seen in the visualized soft tissues are.    1. No acute fracture or dislocation. 2. Osteopenia. Mild degenerative change. Assessment//Plan           Hospital Problems           Last Modified POA    Intractable back pain 1/21/2021 Yes        Assessment:  (Intractable back pain    Lumbar stenosis. Lumbar compression fractures  HTN  UTI     ). Plan:   (Had epidural yesterday, tolerated well  PT/OT  Rocephin for UTI).        Electronically signed by Elijah Wilson MD on 1/23/21 at 7:24 AM EST

## 2021-01-24 LAB
ANION GAP SERPL CALCULATED.3IONS-SCNC: 11 MMOL/L (ref 7–16)
BUN BLDV-MCNC: 8 MG/DL (ref 8–23)
CALCIUM SERPL-MCNC: 9.3 MG/DL (ref 8.6–10.2)
CHLORIDE BLD-SCNC: 95 MMOL/L (ref 98–107)
CO2: 29 MMOL/L (ref 22–29)
CREAT SERPL-MCNC: 0.8 MG/DL (ref 0.5–1)
GFR AFRICAN AMERICAN: >60
GFR NON-AFRICAN AMERICAN: >60 ML/MIN/1.73
GLUCOSE BLD-MCNC: 118 MG/DL (ref 74–99)
HCT VFR BLD CALC: 38.7 % (ref 34–48)
HEMOGLOBIN: 13.4 G/DL (ref 11.5–15.5)
MCH RBC QN AUTO: 31.3 PG (ref 26–35)
MCHC RBC AUTO-ENTMCNC: 34.6 % (ref 32–34.5)
MCV RBC AUTO: 90.4 FL (ref 80–99.9)
ORGANISM: ABNORMAL
PDW BLD-RTO: 13.7 FL (ref 11.5–15)
PLATELET # BLD: 212 E9/L (ref 130–450)
PMV BLD AUTO: 10.4 FL (ref 7–12)
POTASSIUM SERPL-SCNC: 3.3 MMOL/L (ref 3.5–5)
RBC # BLD: 4.28 E12/L (ref 3.5–5.5)
SODIUM BLD-SCNC: 135 MMOL/L (ref 132–146)
URINE CULTURE, ROUTINE: ABNORMAL
WBC # BLD: 5.3 E9/L (ref 4.5–11.5)

## 2021-01-24 PROCEDURE — 80048 BASIC METABOLIC PNL TOTAL CA: CPT

## 2021-01-24 PROCEDURE — 2580000003 HC RX 258: Performed by: FAMILY MEDICINE

## 2021-01-24 PROCEDURE — 1200000000 HC SEMI PRIVATE

## 2021-01-24 PROCEDURE — 85027 COMPLETE CBC AUTOMATED: CPT

## 2021-01-24 PROCEDURE — 36415 COLL VENOUS BLD VENIPUNCTURE: CPT

## 2021-01-24 PROCEDURE — 6370000000 HC RX 637 (ALT 250 FOR IP): Performed by: FAMILY MEDICINE

## 2021-01-24 PROCEDURE — 6360000002 HC RX W HCPCS: Performed by: FAMILY MEDICINE

## 2021-01-24 PROCEDURE — 6360000002 HC RX W HCPCS: Performed by: NEUROLOGICAL SURGERY

## 2021-01-24 RX ADMIN — ENOXAPARIN SODIUM 40 MG: 40 INJECTION SUBCUTANEOUS at 08:48

## 2021-01-24 RX ADMIN — ATORVASTATIN CALCIUM 10 MG: 10 TABLET, FILM COATED ORAL at 20:40

## 2021-01-24 RX ADMIN — ISOSORBIDE MONONITRATE 30 MG: 30 TABLET ORAL at 08:48

## 2021-01-24 RX ADMIN — DOCUSATE SODIUM 100 MG: 100 CAPSULE, LIQUID FILLED ORAL at 08:48

## 2021-01-24 RX ADMIN — ATENOLOL 50 MG: 50 TABLET ORAL at 08:48

## 2021-01-24 RX ADMIN — ESCITALOPRAM 5 MG: 10 TABLET, FILM COATED ORAL at 08:48

## 2021-01-24 RX ADMIN — PANTOPRAZOLE SODIUM 40 MG: 40 TABLET, DELAYED RELEASE ORAL at 05:30

## 2021-01-24 RX ADMIN — CEFTRIAXONE SODIUM 1000 MG: 1 INJECTION, POWDER, FOR SOLUTION INTRAMUSCULAR; INTRAVENOUS at 20:40

## 2021-01-24 RX ADMIN — METHYLPREDNISOLONE ACETATE 80 MG: 80 INJECTION, SUSPENSION INTRA-ARTICULAR; INTRALESIONAL; INTRAMUSCULAR; SOFT TISSUE at 12:50

## 2021-01-24 RX ADMIN — CEFDINIR 300 MG: 300 CAPSULE ORAL at 08:48

## 2021-01-24 RX ADMIN — HYDROCHLOROTHIAZIDE 12.5 MG: 12.5 TABLET ORAL at 08:49

## 2021-01-24 RX ADMIN — MORPHINE SULFATE 4 MG: 1 INJECTION, SOLUTION EPIDURAL; INTRATHECAL; INTRAVENOUS at 12:50

## 2021-01-24 RX ADMIN — Medication 1 CAPSULE: at 08:49

## 2021-01-24 RX ADMIN — AMLODIPINE BESYLATE 5 MG: 5 TABLET ORAL at 08:48

## 2021-01-24 ASSESSMENT — ENCOUNTER SYMPTOMS
ABDOMINAL PAIN: 0
SHORTNESS OF BREATH: 0
BACK PAIN: 0

## 2021-01-24 ASSESSMENT — PAIN SCALES - GENERAL
PAINLEVEL_OUTOF10: 0

## 2021-01-24 NOTE — PROGRESS NOTES
Doing better  4 mg preservative free morphine  80 mg of Depomedrol given via epidural catheter. Epidural catheter discontinued. Stable from a neurosurgical standpoint.

## 2021-01-24 NOTE — PROGRESS NOTES
Progress Note  Date:2021       [id number]  Patient Nancy Whaley     YOB: 1926     Age:89 y.o. Patient resting comfortably. She says back pain is much improved and she can not walk with her walker. Subjective    Subjective:  Symptoms:  Stable. No shortness of breath or chest pain. Diet:  Adequate intake. Activity level: Impaired due to pain. Pain:  She complains of pain that is mild. Review of Systems   Constitutional: Positive for activity change. Respiratory: Negative for shortness of breath. Cardiovascular: Negative for chest pain. Gastrointestinal: Negative for abdominal pain. Musculoskeletal: Negative for back pain and gait problem. Neurological: Negative for dizziness. Hematological: Negative for adenopathy. Psychiatric/Behavioral: Negative for agitation. Objective         Vitals Last 24 Hours:  TEMPERATURE:  Temp  Av.7 °F (36.5 °C)  Min: 96.8 °F (36 °C)  Max: 99.3 °F (37.4 °C)  RESPIRATIONS RANGE: Resp  Av  Min: 16  Max: 16  PULSE OXIMETRY RANGE: SpO2  Av.2 %  Min: 93 %  Max: 98 %  PULSE RANGE: Pulse  Av.8  Min: 63  Max: 70  BLOOD PRESSURE RANGE: Systolic (81JQI), ECW:716 , Min:130 , EUV:530   ; Diastolic (88TWM), UOI:43, Min:61, Max:95    I/O (24Hr): Intake/Output Summary (Last 24 hours) at 2021 0719  Last data filed at 2021 0659  Gross per 24 hour   Intake 360 ml   Output 2000 ml   Net -1640 ml     Objective:  General Appearance:  Comfortable. Vital signs: (most recent): Blood pressure 130/61, pulse 63, temperature 96.8 °F (36 °C), temperature source Temporal, resp. rate 16, height 5' 6\" (1.676 m), weight 145 lb (65.8 kg), last menstrual period 2018, SpO2 93 %. Lungs:  Normal effort and normal respiratory rate. Breath sounds clear to auscultation. Heart: Normal rate. Regular rhythm.   S1 normal and S2 normal.      Labs/Imaging/Diagnostics    Labs:  CBC:  Recent Labs     21  0513 01/23/21  0523 01/24/21  0501   WBC 4.2* 5.2 5.3   RBC 4.03 4.46 4.28   HGB 12.2 13.8 13.4   HCT 37.7 40.5 38.7   MCV 93.5 90.8 90.4   RDW 14.1 13.9 13.7    216 212     CHEMISTRIES:  Recent Labs     01/22/21  0528 01/23/21  0523 01/24/21  0501    136 135   K 3.0* 3.4* 3.3*   CL 96* 95* 95*   CO2 26 29 29   BUN 6* 7* 8   CREATININE 0.9 0.9 0.8   GLUCOSE 104* 124* 118*     PT/INR:No results for input(s): PROTIME, INR in the last 72 hours. APTT:No results for input(s): APTT in the last 72 hours. LIVER PROFILE:No results for input(s): AST, ALT, BILIDIR, BILITOT, ALKPHOS in the last 72 hours. Imaging Last 24 Hours:  Xr Hip 2-3 Vw W Pelvis Left    Result Date: 1/21/2021  EXAMINATION: ONE XRAY VIEW OF THE PELVIS AND TWO XRAY VIEWS LEFT HIP 1/21/2021 2:53 pm COMPARISON: None. HISTORY: ORDERING SYSTEM PROVIDED HISTORY: pain TECHNOLOGIST PROVIDED HISTORY: Reason for exam:->pain What reading provider will be dictating this exam?->CRC FINDINGS: No acute fracture or dislocation is seen. There is generalized osteopenia. Mild degenerative changes seen about both hips and SI joints. There is degenerative change and levoscoliosis in visualized portion of spine. No erosive changes are seen in the visualized soft tissues are.    1. No acute fracture or dislocation. 2. Osteopenia. Mild degenerative change. Assessment//Plan           Hospital Problems           Last Modified POA    Intractable back pain 1/21/2021 Yes        Assessment:  (Intractable back pain    Lumbar stenosis. Lumbar compression fractures  HTN  UTI     ). Plan:   (Had second epidural yesterday, tolerated well  PT/OT  Rocephin for UTI  Third epidural today).

## 2021-01-24 NOTE — PLAN OF CARE
Problem: Skin Integrity:  Goal: Absence of new skin breakdown  Description: Absence of new skin breakdown  Outcome: Met This Shift     Problem: Pain:  Goal: Control of acute pain  Description: Control of acute pain  Outcome: Met This Shift     Problem: Falls - Risk of:  Goal: Will remain free from falls  Description: Will remain free from falls  Outcome: Ongoing

## 2021-01-25 LAB
ANION GAP SERPL CALCULATED.3IONS-SCNC: 15 MMOL/L (ref 7–16)
BUN BLDV-MCNC: 9 MG/DL (ref 8–23)
CALCIUM SERPL-MCNC: 9.4 MG/DL (ref 8.6–10.2)
CHLORIDE BLD-SCNC: 95 MMOL/L (ref 98–107)
CO2: 27 MMOL/L (ref 22–29)
CREAT SERPL-MCNC: 0.9 MG/DL (ref 0.5–1)
GFR AFRICAN AMERICAN: >60
GFR NON-AFRICAN AMERICAN: 58 ML/MIN/1.73
GLUCOSE BLD-MCNC: 114 MG/DL (ref 74–99)
HCT VFR BLD CALC: 41.5 % (ref 34–48)
HEMOGLOBIN: 13.9 G/DL (ref 11.5–15.5)
MCH RBC QN AUTO: 30.7 PG (ref 26–35)
MCHC RBC AUTO-ENTMCNC: 33.5 % (ref 32–34.5)
MCV RBC AUTO: 91.6 FL (ref 80–99.9)
PDW BLD-RTO: 13.7 FL (ref 11.5–15)
PLATELET # BLD: 250 E9/L (ref 130–450)
PMV BLD AUTO: 10.6 FL (ref 7–12)
POTASSIUM SERPL-SCNC: 3.2 MMOL/L (ref 3.5–5)
RBC # BLD: 4.53 E12/L (ref 3.5–5.5)
SARS-COV-2, NAAT: NOT DETECTED
SODIUM BLD-SCNC: 137 MMOL/L (ref 132–146)
WBC # BLD: 5.1 E9/L (ref 4.5–11.5)

## 2021-01-25 PROCEDURE — U0002 COVID-19 LAB TEST NON-CDC: HCPCS

## 2021-01-25 PROCEDURE — 97535 SELF CARE MNGMENT TRAINING: CPT

## 2021-01-25 PROCEDURE — 2580000003 HC RX 258: Performed by: INTERNAL MEDICINE

## 2021-01-25 PROCEDURE — 97530 THERAPEUTIC ACTIVITIES: CPT

## 2021-01-25 PROCEDURE — 6370000000 HC RX 637 (ALT 250 FOR IP): Performed by: FAMILY MEDICINE

## 2021-01-25 PROCEDURE — 80048 BASIC METABOLIC PNL TOTAL CA: CPT

## 2021-01-25 PROCEDURE — 36415 COLL VENOUS BLD VENIPUNCTURE: CPT

## 2021-01-25 PROCEDURE — 6360000002 HC RX W HCPCS: Performed by: INTERNAL MEDICINE

## 2021-01-25 PROCEDURE — 85027 COMPLETE CBC AUTOMATED: CPT

## 2021-01-25 PROCEDURE — 1200000000 HC SEMI PRIVATE

## 2021-01-25 RX ADMIN — Medication 1 CAPSULE: at 08:58

## 2021-01-25 RX ADMIN — CEFDINIR 300 MG: 300 CAPSULE ORAL at 02:08

## 2021-01-25 RX ADMIN — DOCUSATE SODIUM 100 MG: 100 CAPSULE, LIQUID FILLED ORAL at 08:57

## 2021-01-25 RX ADMIN — ISOSORBIDE MONONITRATE 30 MG: 30 TABLET ORAL at 08:58

## 2021-01-25 RX ADMIN — ATORVASTATIN CALCIUM 10 MG: 10 TABLET, FILM COATED ORAL at 19:54

## 2021-01-25 RX ADMIN — PANTOPRAZOLE SODIUM 40 MG: 40 TABLET, DELAYED RELEASE ORAL at 05:55

## 2021-01-25 RX ADMIN — MEROPENEM 1000 MG: 1 INJECTION, POWDER, FOR SOLUTION INTRAVENOUS at 14:50

## 2021-01-25 RX ADMIN — TRAMADOL HYDROCHLORIDE 50 MG: 50 TABLET, FILM COATED ORAL at 19:58

## 2021-01-25 RX ADMIN — ESCITALOPRAM 5 MG: 10 TABLET, FILM COATED ORAL at 10:56

## 2021-01-25 RX ADMIN — HYDROCHLOROTHIAZIDE 12.5 MG: 12.5 TABLET ORAL at 08:58

## 2021-01-25 RX ADMIN — LORAZEPAM 1 MG: 1 TABLET ORAL at 10:56

## 2021-01-25 RX ADMIN — CEFDINIR 300 MG: 300 CAPSULE ORAL at 13:11

## 2021-01-25 RX ADMIN — AMLODIPINE BESYLATE 5 MG: 5 TABLET ORAL at 08:58

## 2021-01-25 RX ADMIN — ATENOLOL 50 MG: 50 TABLET ORAL at 08:58

## 2021-01-25 RX ADMIN — LORAZEPAM 1 MG: 1 TABLET ORAL at 22:38

## 2021-01-25 ASSESSMENT — PAIN DESCRIPTION - FREQUENCY: FREQUENCY: CONTINUOUS

## 2021-01-25 ASSESSMENT — PAIN DESCRIPTION - ORIENTATION
ORIENTATION: LOWER
ORIENTATION: LOWER

## 2021-01-25 ASSESSMENT — ENCOUNTER SYMPTOMS
ABDOMINAL PAIN: 0
BACK PAIN: 0
SHORTNESS OF BREATH: 0

## 2021-01-25 ASSESSMENT — PAIN SCALES - GENERAL
PAINLEVEL_OUTOF10: 0
PAINLEVEL_OUTOF10: 7

## 2021-01-25 ASSESSMENT — PAIN DESCRIPTION - ONSET: ONSET: ON-GOING

## 2021-01-25 ASSESSMENT — PAIN DESCRIPTION - LOCATION: LOCATION: BACK

## 2021-01-25 ASSESSMENT — PAIN DESCRIPTION - DESCRIPTORS: DESCRIPTORS: DISCOMFORT

## 2021-01-25 ASSESSMENT — PAIN DESCRIPTION - PROGRESSION: CLINICAL_PROGRESSION: NOT CHANGED

## 2021-01-25 NOTE — PROGRESS NOTES
OT BEDSIDE TREATMENT NOTE      Date:2021  Patient Name: Baron Griffin  MRN: [id number]  : 1926  Room: [address] [place] [address]     Evaluating OT: EUGENIO Murphy/L  Referring Provider: Conchis Oviedo MD     AM-PAC Daily Activity Raw Score: 15/24  Recommended Adaptive Equipment: to be determined      Comments: Based on patient's functional performance as stated below and level of assistance needed prior to admission, this therapist believes that the patient would benefit from further skilled OT following hospital stay in an effort to increase safety, functional independence, and quality of life.     Diagnosis: Intractable back pain [M54.9]   Surgery: Balloon osteoplasty T12, L1, and L2; bone biopsy T12, L1, and L2; injection of epidural steroid kyphoplasty (10/10/20) ; Lumbar epidural catheter insertion ()  Pertinent Medical History:   Past Medical History        Past Medical History:   Diagnosis Date    Arthritis      Hypertension      Myocardial infarct (HCC)      UTI (urinary tract infection)              Precautions:  Falls, spinal neutrality, Purewick, bed alarm, B hearing aids, [place]     Home Living: Pt lives alone in a 1 story with 1 step(s) to enter and 1 rail(s); bed/bath on main floor  Bathroom setup: tub shower with SB and [address] [place] [address], elevated toilet with safety bars  Equipment owned: ww, shower chair     Prior Level of Function: Patient is questionable historian- reports moslty independent with ADLs and assistance with IADLs; using ww for ambulation.  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

## 2021-01-25 NOTE — PROGRESS NOTES
Physical Therapy  Physical Therapy      Name: Juwan Everett  : 1926  MRN: [id number]    Referring Provider: Shay Henao MD    Date of Service: 2021    Evaluating PT: Janessa Cody, PT, DPT, [id number]    Room #: 1936/1253-G  Diagnosis: Intractable back pain  PMHx/PSHx: MI, OA, HTN  Pertinent Information: Balloon osteoplasty T12, L1, and L2; bone biopsy T12, L1, and L2; injection of epidural steroid kyphoplasty (10/10/20)  Procedure/Surgery: Lumbar epidural catheter insertion ()  Precautions: Fall risk, spinal neutrality, PureWick, alarm    SUBJECTIVE:    Pt lives alone in a single story house with 2 stair(s) and 1 rail(s) to enter. Bed is on first floor and bath is on first floor. Pt ambulated with [facility] prior to admission. OBJECTIVE:   Initial Evaluation  Date: 21 Treatment Date: 21 Short Term/ Long Term   Goals   AM-PAC 6 Clicks  80/85    Was pt agreeable to Eval/treatment? Yes yes    Does pt have pain? 9/10 back pain; recently medicated 5/10 L leg    Bed Mobility  Rolling: Mod A  Supine to sit: Mod A  Sit to supine: Mod A  Scooting: Mod A to EOB Rolling Champ  Supine to sit ModA  Sit to supine ModA  Scooting Champ to EOB Rolling: Supervision  Supine to sit: Supervision  Sit to supine: Supervision  Scooting: Supervision   Transfers Sit to stand: Mod A  Stand to sit: Mod A  Stand pivot: NT Sit to stand ModA  Stand to sit ModA  Stand pivot N/T Sit to stand: SBA  Stand to sit: SBA  Stand pivot: Min A with [facility]   Ambulation   Unable to sidestep  4 sidesteps with ww ModA >50 feet with WW with Min A   Stair negotiation: ascended and descended NT N/T 2 step(s) with 1 rail(s) with Min A   ROM BUE: Refer to OT note  BLE: WFL     Strength BUE: Refer to OT note  BLE: WFL     Balance Sitting EOB: SBA  Dynamic Standing:  Mod A with [facility] Sitting EOB SBA  Dynamic standing ModA with ww Sitting EOB: Independent   Dynamic Standing: Min A with [facility]     Pt is A & O x: 4 to person, place, month/year, and situation. Sensation: Pt denies numbness and tingling of extremities. Edema: Unremarkable. Patient education  Pt educated on importance of OOB activity and positioning in bed. Patient response to education:   Pt verbalized understanding Pt demonstrated skill Pt requires further education in this area   Yes Yes Reinforce     ASSESSMENT:    Comments:   Pt was supine in bed upon room entry, agreeable to PT tx. Pt log rolled and sidelyed to sit ModA. Pt reports decreased back pain but L leg pain. Donned socks EOB. Pt required physical assistance for sit <> stand transfers. Pt able to sidestep toward [facility] with ww ModA. Pt requested to use bedpan. Pt assisted back to bed. Pt rolled and bedpan placed under pt. Pt given call light. Treatment:  Patient practiced and was instructed in the following treatment:     Therapeutic activities: Pt completed all therapeutic activities noted above. Pt was cued for technique using log roll during supine to sit transfer. Pt sat at EOB for 10+ minutes throughout session as sitting balance, posture, and OOB tolerance were challenged. Pt able to advance B LEs when sidestepping but required assistance to advance ww. PLAN:    Current Treatment Recommendations   [x] Strengthening     [] ROM   [x] Balance Training   [x] Endurance Training   [x] Transfer Training   [x] Gait Training   [x] Stair Training   [x] Positioning   [x] Safety and Education Training   [x] Patient/Caregiver Education   [x] HEP  [] Other     PT care will be provided in accordance with the objectives noted above. Exercises and functional mobility practice will be used as well as appropriate assistive devices or modalities to obtain goals. Patient and family education will also be administered as needed. Frequency of treatments: 2-5x/week x 3-5 days.     Time in:15:00  Time out:15:24    Total Treatment Time 24 minutes         CPT codes:  [] Low Complexity PT evaluation 63214  [] Moderate Complexity PT evaluation [id number]  [] High Complexity PT evaluation [id number]  [] PT Re-evaluation [id number]  [] Gait training 56484 0 minutes  [] Manual therapy 23214 0 minutes  [x] Therapeutic activities 28366 24 minutes  [] Therapeutic exercises 19917 0 minutes  [] Neuromuscular reeducation 38374 0 minutes     Harley [id number]

## 2021-01-25 NOTE — PROGRESS NOTES
Since 3-day epidural nerve block,  She is doing well. Nothing new to add from neurosurgical stand point at this time. The patient may be discharged from neurosurgical standpoint. Follow up in the office in 3-4 weeks.  [phone]

## 2021-01-25 NOTE — PROGRESS NOTES
Attempted to reach patient's daughter, gloria, to update her on patient status per patient request. Daughter, Bette Lara did not answer. Will continue to monitor.

## 2021-01-25 NOTE — PROGRESS NOTES
Clinical Pharmacy Note    Pharmacy was consulted by Dr. Robles Raymond for meropenem dosing in this 89 yo/F, 167.6 cm, 65.8 kg, Cr = 0.8-1 (baseline). Growing ESBL-positive, MDR E coli from Urine Cx. Estimated creatinine clearance for her is ~36 mL/min. Meropenem 1 gram IV q12h as ordered by Dr. Robles Raymond is appropriate based on her creatinine clearance. If her creatinine clearance should decrease to 10-25 mL/min, decrease meropenem to 500 mg IV q12h. Clinical Pharmacy sign off.     Simon Chamorro, PharmD  1/25/2021  2:14 PM  Pager: [phone]

## 2021-01-25 NOTE — CONSULTS
History    None   Social Needs    Financial resource strain: None    Food insecurity     Worry: None     Inability: None    Transportation needs     Medical: None     Non-medical: None   Tobacco Use    Smoking status: Never Smoker    Smokeless tobacco: Never Used   Substance and Sexual Activity    Alcohol use: No    Drug use: Never    Sexual activity: Not Currently   Lifestyle    Physical activity     Days per week: None     Minutes per session: None    Stress: None   Relationships    Social connections     Talks on phone: None     Gets together: None     Attends Anabaptism service: None     Active member of club or organization: None     Attends meetings of clubs or organizations: None     Relationship status: None    Intimate partner violence     Fear of current or ex partner: None     Emotionally abused: None     Physically abused: None     Forced sexual activity: None   Other Topics Concern    None   Social History Narrative    None     Tobacco: No  Alcohol: No  Pets: No  Travel: No    Family History:   History reviewed. No pertinent family history. . Otherwise non-pertinent to the chief complaint. REVIEW OF SYSTEMS:    CONSTITUTIONAL:  No chills, fevers or night sweats. No loss of weight. EYES:  No double vision or drainage from eyes, ears or throat. HEENT:  No neck stiffness. No dysphagia. No drainage from eyes, ears or throat  RESPIRATORY:  No cough, productive sputum or hemoptysis. CARDIOVASCULAR:  No chest pain, palpitations, orthopnea or dyspnea on exertion. GASTROINTESTINAL:  No nausea, vomiting, diarrhea or constipation or hematochezia   GENITOURINARY:  No frequency burning dysuria or hematuria. INTEGUMENT/BREAST:  No rash or breast masses. HEMATOLOGIC/LYMPHATIC:  No lymphadenopathy or blood dyscrasics. ALLERGIC/IMMUNOLOGIC:  No anaphylaxis. ENDOCRINE:  No polyuria or polydipsia or temperature intolerance. MUSCULOSKELETAL:  No myalgia or arthralgia. Full ROM.   NEUROLOGICAL: No focal motor sensory deficit. BEHAVIOR/PSYCH:  No psychosis. PHYSICAL EXAM:    Vitals:    BP (!) 125/96   Pulse 62   Temp 97.3 °F (36.3 °C) (Temporal)   Resp 17   Ht 5' 6\" (1.676 m)   Wt 145 lb (65.8 kg)   LMP 05/16/2018   SpO2 96%   BMI 23.40 kg/m²   Constitutional: The patient is awake, alert, and oriented. Skin: Warm and dry. No rashes were noted. HEENT: Eyes show round, and reactive pupils. No jaundice. Moist mucous membranes, no ulcerations, no thrush. Neck: Supple to movements. No lymphadenopathy. Chest: No use of accessory muscles to breathe. Symmetrical expansion. Auscultation reveals no wheezing, crackles, or rhonchi. Cardiovascular: S1 and S2 are rhythmic and regular. No murmurs appreciated. Abdomen: Positive bowel sounds to auscultation. Benign to palpation. No masses felt. No hepatosplenomegaly. Extremities: No clubbing, no cyanosis, no edema.   Lines: peripheral      CBC+dif:  Recent Labs     01/23/21  0523 01/24/21  0501 01/25/21  0544   WBC 5.2 5.3 5.1   HGB 13.8 13.4 13.9   HCT 40.5 38.7 41.5   MCV 90.8 90.4 91.6    212 250     No results found for: CRP  No results found for: CRPHS  No results found for: SEDRATE  Lab Results   Component Value Date    ALT 11 10/14/2020    AST 19 10/14/2020    ALKPHOS 94 10/14/2020    BILITOT 0.5 10/14/2020     Lab Results   Component Value Date     01/25/2021    K 3.2 01/25/2021    K 3.6 10/14/2020    CL 95 01/25/2021    CO2 27 01/25/2021    BUN 9 01/25/2021    CREATININE 0.9 01/25/2021    GFRAA >60 01/25/2021    LABGLOM 58 01/25/2021    GLUCOSE 114 01/25/2021    PROT 5.8 10/14/2020    LABALBU 3.3 10/14/2020    CALCIUM 9.4 01/25/2021    BILITOT 0.5 10/14/2020    ALKPHOS 94 10/14/2020    AST 19 10/14/2020    ALT 11 10/14/2020       No results found for: PROTIME, INR    No results found for: TSH    Lab Results   Component Value Date    COLORU Yellow 01/21/2021    PHUR 6.0 01/21/2021    WBCUA 10-20 01/21/2021    RBCUA 10-20 01/21/2021    BACTERIA MODERATE 01/21/2021    CLARITYU SL CLOUDY 01/21/2021    SPECGRAV 1.010 01/21/2021    LEUKOCYTESUR MODERATE 01/21/2021    UROBILINOGEN 0.2 01/21/2021    BILIRUBINUR Negative 01/21/2021    BLOODU MODERATE 01/21/2021    GLUCOSEU Negative 01/21/2021       No results found for: PHE6JWS, BEART, N8JSSMVN, PHART, THGBART, MJF1MGV, PO2ART, GEE3LDB  Radiology:  XR HIP 2-3 VW W PELVIS LEFT   Final Result   1. No acute fracture or dislocation. 2. Osteopenia. Mild degenerative change. FL NERVE BLOCK LUMBOSACRAL 1ST    (Results Pending)       Microbiology:  Pending  No results for input(s): BC in the last 72 hours. No results for input(s): ORG in the last 72 hours. No results for input(s): Cowan Haste in the last 72 hours. No results for input(s): STREPNEUMAGU in the last 72 hours. No results for input(s): LP1UAG in the last 72 hours. No results for input(s): ASO in the last 72 hours. No results for input(s): CULTRESP in the last 72 hours. Assessment:  · Bacteruria  Cannot ascertain any symptoms     · She has an ESBL which lends itself only to iv antibiotics  · WBC normal  · Afebrile  ·     Plan:    · Seven days of  merrem   · Check cultures  · Baseline ESR, CRP  · Monitor labs  Will follow with you  I saw her in July  For UTI   Thank you for having us see this patient in consultation. I will be discussing this case with the treating physicians. This is going to be an ongoing problem with her and she is most likely more colonized than infected. Hopefully she is on other things to mitigate recurrent UTI/asymptomatic bacteruria in post menopausal women  ?  Kefir, acidification of the urine  I believe she has been worked up by urology      Electronically signed by Lucy Mayes MD on 1/25/2021 at 1:04 PM

## 2021-01-25 NOTE — DISCHARGE INSTR - COC
Continuity of Care Form    Patient Name: Annette Ying   :  1926  MRN:  [id number]    Admit date:  2021  Discharge date:  21    Code Status Order: Prior   Advance Directives:   [place] Directive Type of Healthcare Directive Copy in [address] Agent's Name Healthcare Agent's Phone Number    21  Yes, patient has an advance directive for healthcare treatment  Health care treatment directive;Durable power of  for health care  --  Adult Darden Landau  [phone]            Admitting Physician:  Evans Miranda MD  PCP: Allan Phillips MD    Discharging Nurse: [facility] Unit/Room#: 1325/3817-R  Discharging Unit Phone Number: [phone]    Emergency Contact:   Extended Emergency Contact Information  Primary Emergency Contact: Kerri Banuelos  Address: [facility], [facility] [address] Phone: [phone]  Mobile Phone: [phone]  Relation: Child  Secondary Emergency Contact: Chris Banuelos  Address: [address] Phone: [phone]  Relation: Grandchild    Past Surgical History:  Past Surgical History:   Procedure Laterality Date    APPENDECTOMY      BACK SURGERY      cement t12, l1, l2-oct 2020    HYSTERECTOMY      KYPHOSIS SURGERY N/A 10/10/2020    T12, L1 and L2  KYPHOPLASTY performed by Malachy Saint, MD at [address]       Immunization History: There is no immunization history on file for this patient.     Active Problems:  Patient Active Problem List   Diagnosis Code    ESBL (extended spectrum beta-lactamase) producing bacteria infection A49.9, Z16.12    Compression fx, thoracic spine, closed, initial encounter ([facility] [address] S22.000A    Intractable back pain M54.9       Isolation/Infection:   Isolation            Contact          Patient Infection Status Infection Onset Added Last Indicated Last Indicated By Review Planned Expiration Resolved Resolved By    ESBL (Extended Spectrum Beta Lactamase) 05/08/20 05/10/20 01/21/21 Culture, Urine        E.coli urine 5/8/20, 6/9/20, 6/23/20, 7/16/20, 12/31/20, 1/21/21    Resolved    COVID-19 Rule Out 07/22/20 07/22/20 07/22/20 COVID-19 (Ordered)   07/22/20 Rule-Out Test Resulted    MDRO (multi-drug resistant organism)  06/14/20 06/14/20 Camila Beavers RN   10/09/20 Camila Beavers RN    Organism sensitivity to 3 antibiotic drug classes 10/9/20  E Coli Urine 6/9/20, 6/23/20, 7/16/20            Nurse Assessment:  Last Vital Signs: BP (!) 125/96   Pulse 62   Temp 97.3 °F (36.3 °C) (Temporal)   Resp 17   Ht 5' 6\" (1.676 m)   Wt 145 lb (65.8 kg)   LMP 05/16/2018   SpO2 96%   BMI 23.40 kg/m²     Last documented pain score (0-10 scale): Pain Level: 0  Last Weight:   Wt Readings from Last 1 Encounters:   01/21/21 145 lb (65.8 kg)     Mental Status:  oriented and alert    IV Access:  - PICC - site  R Upper Arm, insertion date: 01/26/21    Nursing Mobility/ADLs:  Walking   Assisted  Transfer  Assisted  Bathing  Assisted  Dressing  Assisted  Toileting  Assisted  Feeding  Independent  Med Admin  Independent  Med Delivery   whole    Wound Care Documentation and Therapy:        Elimination:  Continence:   · Bowel: No  · Bladder: No  Urinary Catheter: None   Colostomy/Ileostomy/Ileal Conduit: No       Date of Last BM: 01/25/21    Intake/Output Summary (Last 24 hours) at 1/25/2021 1327  Last data filed at 1/25/2021 1159  Gross per 24 hour   Intake 1620 ml   Output 1000 ml   Net 620 ml     I/O last 3 completed shifts: In: 18 [P.O.:1740]  Out: -     Safety Concerns:     History of Falls (last 30 days) and At Risk for Falls    Impairments/Disabilities:      Vision and Hearing    Nutrition Therapy:  Current Nutrition Therapy:   - Oral Diet:  Cardiac    Routes of Feeding: Oral  Liquids:  Thin Liquids  Daily Fluid Restriction: no  Last Modified Barium Swallow with Video (Video Swallowing Test): not done    Treatments at the Time of Hospital Discharge:   Respiratory Treatments: ***  Oxygen Therapy:  is not on home oxygen therapy. Ventilator:    - No ventilator support    Rehab Therapies: Physical Therapy and Occupational Therapy  Weight Bearing Status/Restrictions: No weight bearing restirctions  Other Medical Equipment (for information only, NOT a DME order):  walker, bath bench and bedside commode  Other Treatments: ***    Patient's personal belongings (please select all that are sent with patient):  Glasses, Hearing Aides bilateral, Dentures uppers    RN SIGNATURE:  Electronically signed by Masoud Caraballo on 1/26/21 at 2:17 PM EST    CASE MANAGEMENT/SOCIAL WORK SECTION    Inpatient Status Date: ***    Readmission Risk Assessment Score:  Readmission Risk              Risk of Unplanned Readmission:        16           Discharging to Facility/ Agency   · Name: [facility]  · Address:  · Phone:  · Fax:    Dialysis Facility (if applicable)   · Name:  · Address:  · Dialysis Schedule:  · Phone:  · Fax:    / signature: Electronically signed by TONY Thomas on 1/25/2021 at 1:27 PM      PHYSICIAN SECTION    Prognosis: {Prognosis:9627736428}    Condition at Discharge: [address] Patient Condition:059714292}    Rehab Potential (if transferring to Rehab): {Prognosis:0135913994}    Recommended Labs or Other Treatments After Discharge: ***    Physician Certification: I certify the above information and transfer of Lukasz Villeda  is necessary for the continuing treatment of the diagnosis listed and that she requires Jay Swanson for less 30 days.      Update Admission H&P: {P DME Changes in PXOUT:500985806}    PHYSICIAN SIGNATURE:  Brianna Lanza MD

## 2021-01-25 NOTE — PROGRESS NOTES
Patient refused to wear external catheter at beginning of shift for this RN. Patient expressed anxiety about frequent urination and incontinence. Patient educated about external catheter or bed pan usage. Patient had bed pad changed, as well for comfort. During the night, patient had voided in sleep and had multiple episodes of incontinence. This RN and unit PCA cleaned patient up on several occasions and patient was offered external catheter by both RN and PCA, but patient still did not want it. Upon hourly rounding, patient appeared to be upset and had an episode of incontinence . This RN gave patient a full bed bath, completely changed linens, washed patient's hair and applied lotion on skin. Patient refused to wear gripper socks this AM, but did allow this RN to place new external catheter to provide some comfort and maintain skin integrity. Patient very appreciative of care provided and pleasant at this time. Will continue to monitor patient.

## 2021-01-25 NOTE — PROGRESS NOTES
Progress Note  Date:2021       [facility]:0419/8330-L  Patient Ladonna Warren     YOB: 1926     Age:89 y.o. Patient resting comfortably. She says back pain is much improved and she can not walk with her walker. She says she did not sleep well last night. Subjective    Subjective:  Symptoms:  Stable. No shortness of breath or chest pain. Diet:  Adequate intake. Activity level: Impaired due to pain. Pain:  She complains of pain that is mild. Review of Systems   Constitutional: Positive for activity change. Respiratory: Negative for shortness of breath. Cardiovascular: Negative for chest pain. Gastrointestinal: Negative for abdominal pain. Musculoskeletal: Negative for back pain and gait problem. Neurological: Negative for dizziness. Hematological: Negative for adenopathy. Psychiatric/Behavioral: Negative for agitation. Objective         Vitals Last 24 Hours:  TEMPERATURE:  Temp  Av.3 °F (36.3 °C)  Min: 96.5 °F (35.8 °C)  Max: 97.8 °F (36.6 °C)  RESPIRATIONS RANGE: Resp  Av.2  Min: 16  Max: 17  PULSE OXIMETRY RANGE: SpO2  Av.5 %  Min: 92 %  Max: 98 %  PULSE RANGE: Pulse  Av.8  Min: 64  Max: 72  BLOOD PRESSURE RANGE: Systolic (08MWX), JID:262 , Min:124 , NRW:149   ; Diastolic (88QRP), KV, Min:60, Max:85    I/O (24Hr): Intake/Output Summary (Last 24 hours) at 2021 0738  Last data filed at 2021 7274  Gross per 24 hour   Intake 1740 ml   Output --   Net 1740 ml     Objective:  General Appearance:  Comfortable. Vital signs: (most recent): Blood pressure (!) 143/61, pulse 70, temperature 97.7 °F (36.5 °C), temperature source Temporal, resp. rate 17, height 5' 6\" (1.676 m), weight 145 lb (65.8 kg), last menstrual period 2018, SpO2 98 %. Lungs:  Normal effort and normal respiratory rate. Breath sounds clear to auscultation. Heart: Normal rate. Regular rhythm.   S1 normal and S2 normal.

## 2021-01-25 NOTE — CARE COORDINATION
1/25/2021 social work transition of care planning  Sw followed up with pt's dtr for more [facility] choices. [facility] unable to accept at this time. Pt's dtr stated that she wanted to stay in the [facility]. Sw made referral to [address] [place] [address]-checking beds, and [facility]. Await ID final recs. Electronically signed by Harold Phoenix, LSW on 1/25/2021 at 10:46 AM     Addendum: Anna notified that pt accepted at [facility],pt's dtr agreeable. Sw will check for discharge. Sw will complete hens and place envelope in soft chart. Will need updated PT/OT-cm called x [phone].   Electronically signed by Harold Phoenix, LSW on 1/25/2021 at 1:34 PM

## 2021-01-26 VITALS
TEMPERATURE: 97.1 F | BODY MASS INDEX: 23.3 KG/M2 | RESPIRATION RATE: 16 BRPM | SYSTOLIC BLOOD PRESSURE: 115 MMHG | OXYGEN SATURATION: 96 % | HEART RATE: 73 BPM | HEIGHT: 66 IN | DIASTOLIC BLOOD PRESSURE: 64 MMHG | WEIGHT: 145 LBS

## 2021-01-26 LAB
ANION GAP SERPL CALCULATED.3IONS-SCNC: 11 MMOL/L (ref 7–16)
BUN BLDV-MCNC: 11 MG/DL (ref 8–23)
CALCIUM SERPL-MCNC: 9.2 MG/DL (ref 8.6–10.2)
CHLORIDE BLD-SCNC: 92 MMOL/L (ref 98–107)
CO2: 30 MMOL/L (ref 22–29)
CREAT SERPL-MCNC: 0.8 MG/DL (ref 0.5–1)
GFR AFRICAN AMERICAN: >60
GFR NON-AFRICAN AMERICAN: >60 ML/MIN/1.73
GLUCOSE BLD-MCNC: 117 MG/DL (ref 74–99)
HCT VFR BLD CALC: 41.4 % (ref 34–48)
HEMOGLOBIN: 14.5 G/DL (ref 11.5–15.5)
MCH RBC QN AUTO: 31.3 PG (ref 26–35)
MCHC RBC AUTO-ENTMCNC: 35 % (ref 32–34.5)
MCV RBC AUTO: 89.2 FL (ref 80–99.9)
PDW BLD-RTO: 13.5 FL (ref 11.5–15)
PLATELET # BLD: 266 E9/L (ref 130–450)
PMV BLD AUTO: 10.6 FL (ref 7–12)
POTASSIUM SERPL-SCNC: 3.1 MMOL/L (ref 3.5–5)
RBC # BLD: 4.64 E12/L (ref 3.5–5.5)
SODIUM BLD-SCNC: 133 MMOL/L (ref 132–146)
WBC # BLD: 4.8 E9/L (ref 4.5–11.5)

## 2021-01-26 PROCEDURE — 36415 COLL VENOUS BLD VENIPUNCTURE: CPT

## 2021-01-26 PROCEDURE — 36569 INSJ PICC 5 YR+ W/O IMAGING: CPT

## 2021-01-26 PROCEDURE — C1751 CATH, INF, PER/CENT/MIDLINE: HCPCS

## 2021-01-26 PROCEDURE — 6360000002 HC RX W HCPCS: Performed by: FAMILY MEDICINE

## 2021-01-26 PROCEDURE — 80048 BASIC METABOLIC PNL TOTAL CA: CPT

## 2021-01-26 PROCEDURE — 2580000003 HC RX 258: Performed by: INTERNAL MEDICINE

## 2021-01-26 PROCEDURE — 05HY33Z INSERTION OF INFUSION DEVICE INTO UPPER VEIN, PERCUTANEOUS APPROACH: ICD-10-PCS | Performed by: FAMILY MEDICINE

## 2021-01-26 PROCEDURE — 85027 COMPLETE CBC AUTOMATED: CPT

## 2021-01-26 PROCEDURE — 6360000002 HC RX W HCPCS: Performed by: INTERNAL MEDICINE

## 2021-01-26 PROCEDURE — 76937 US GUIDE VASCULAR ACCESS: CPT

## 2021-01-26 PROCEDURE — 6370000000 HC RX 637 (ALT 250 FOR IP): Performed by: FAMILY MEDICINE

## 2021-01-26 RX ORDER — SODIUM CHLORIDE 0.9 % (FLUSH) 0.9 %
10 SYRINGE (ML) INJECTION PRN
Status: DISCONTINUED | OUTPATIENT
Start: 2021-01-26 | End: 2021-01-26 | Stop reason: HOSPADM

## 2021-01-26 RX ORDER — MEROPENEM 500 MG/1
1 INJECTION, POWDER, FOR SOLUTION INTRAVENOUS EVERY 12 HOURS
Qty: 12000 MG | Refills: 0 | Status: SHIPPED | OUTPATIENT
Start: 2021-01-26 | End: 2021-02-01

## 2021-01-26 RX ORDER — LIDOCAINE HYDROCHLORIDE 10 MG/ML
5 INJECTION, SOLUTION EPIDURAL; INFILTRATION; INTRACAUDAL; PERINEURAL ONCE
Status: DISCONTINUED | OUTPATIENT
Start: 2021-01-26 | End: 2021-01-26 | Stop reason: HOSPADM

## 2021-01-26 RX ORDER — LIDOCAINE 4 G/G
1 PATCH TOPICAL DAILY
Qty: 30 PATCH | Refills: 0 | Status: ON HOLD | OUTPATIENT
Start: 2021-01-27 | End: 2021-12-27

## 2021-01-26 RX ORDER — HEPARIN SODIUM (PORCINE) LOCK FLUSH IV SOLN 100 UNIT/ML 100 UNIT/ML
3 SOLUTION INTRAVENOUS EVERY 12 HOURS SCHEDULED
Status: DISCONTINUED | OUTPATIENT
Start: 2021-01-26 | End: 2021-01-26 | Stop reason: HOSPADM

## 2021-01-26 RX ORDER — HEPARIN SODIUM (PORCINE) LOCK FLUSH IV SOLN 100 UNIT/ML 100 UNIT/ML
3 SOLUTION INTRAVENOUS PRN
Status: DISCONTINUED | OUTPATIENT
Start: 2021-01-26 | End: 2021-01-26 | Stop reason: HOSPADM

## 2021-01-26 RX ADMIN — TRAMADOL HYDROCHLORIDE 50 MG: 50 TABLET, FILM COATED ORAL at 11:23

## 2021-01-26 RX ADMIN — PANTOPRAZOLE SODIUM 40 MG: 40 TABLET, DELAYED RELEASE ORAL at 05:49

## 2021-01-26 RX ADMIN — AMLODIPINE BESYLATE 5 MG: 5 TABLET ORAL at 08:50

## 2021-01-26 RX ADMIN — LORAZEPAM 1 MG: 1 TABLET ORAL at 12:43

## 2021-01-26 RX ADMIN — ATENOLOL 50 MG: 50 TABLET ORAL at 08:50

## 2021-01-26 RX ADMIN — MEROPENEM 1000 MG: 1 INJECTION, POWDER, FOR SOLUTION INTRAVENOUS at 02:10

## 2021-01-26 RX ADMIN — MEROPENEM 1000 MG: 1 INJECTION, POWDER, FOR SOLUTION INTRAVENOUS at 13:48

## 2021-01-26 RX ADMIN — ISOSORBIDE MONONITRATE 30 MG: 30 TABLET ORAL at 08:50

## 2021-01-26 RX ADMIN — MORPHINE SULFATE 2 MG: 2 INJECTION, SOLUTION INTRAMUSCULAR; INTRAVENOUS at 08:50

## 2021-01-26 RX ADMIN — DOCUSATE SODIUM 100 MG: 100 CAPSULE, LIQUID FILLED ORAL at 08:50

## 2021-01-26 RX ADMIN — HYDROCHLOROTHIAZIDE 12.5 MG: 12.5 TABLET ORAL at 08:50

## 2021-01-26 RX ADMIN — ENOXAPARIN SODIUM 40 MG: 40 INJECTION SUBCUTANEOUS at 08:50

## 2021-01-26 RX ADMIN — TRAMADOL HYDROCHLORIDE 50 MG: 50 TABLET, FILM COATED ORAL at 02:10

## 2021-01-26 RX ADMIN — ESCITALOPRAM 5 MG: 10 TABLET, FILM COATED ORAL at 08:50

## 2021-01-26 RX ADMIN — LORAZEPAM 1 MG: 1 TABLET ORAL at 04:47

## 2021-01-26 RX ADMIN — Medication 1 CAPSULE: at 08:50

## 2021-01-26 ASSESSMENT — PAIN SCALES - GENERAL
PAINLEVEL_OUTOF10: 7
PAINLEVEL_OUTOF10: 0
PAINLEVEL_OUTOF10: 5

## 2021-01-26 ASSESSMENT — ENCOUNTER SYMPTOMS
ABDOMINAL PAIN: 0
SHORTNESS OF BREATH: 0
BACK PAIN: 0

## 2021-01-26 NOTE — PROGRESS NOTES
[address] [place] [address] [facility]    Consultation Note     Admit Date: 1/21/2021 12:35 PM    Reason for Consult:   UTI    Attending Physician:  Denise Ordaz MD     Chief Complaint:  No complaint really     HISTORY OF PRESENT ILLNESS:   The patient is a 80 y.o.  female known to the Infectious Diseases service. The patient has the below past medical history. She was seen on jan 22 by Dr. Umberto Hernandez for severe low back pain.   She is status post ballon osteoplasty T12, L1, L2  bonce bx  T12 , L1 L2   Injection of of epidural steroid kypoplasty   ID was asked to address her urine   Afebrile   Denies dysuria    Past Medical History:        Diagnosis Date    Arthritis     Hypertension     Myocardial infarct ([address]     UTI (urinary tract infection)      Past Surgical History:        Procedure Laterality Date    APPENDECTOMY      BACK SURGERY      cement t12, l1, l2-oct 2020    HYSTERECTOMY      KYPHOSIS SURGERY N/A 10/10/2020    T12, L1 and L2  KYPHOPLASTY performed by Susan Romero MD at [address] [place] [address]     Current Medications:   Scheduled Meds:   lidocaine PF  5 mL Intradermal Once    heparin flush  3 mL Intravenous 2 times per day    lidocaine PF  5 mL Intradermal Once    heparin flush  3 mL Intravenous 2 times per day    meropenem  1,000 mg Intravenous Q12H    lidocaine  1 patch Transdermal Daily    amLODIPine  5 mg Oral Daily    atenolol  50 mg Oral Daily    atorvastatin  10 mg Oral Nightly    docusate sodium  100 mg Oral Daily    escitalopram  5 mg Oral Daily    hydroCHLOROthiazide  12.5 mg Oral QAM    isosorbide mononitrate  30 mg Oral Daily    pantoprazole  40 mg Oral QAM AC    lactobacillus  1 capsule Oral Daily    enoxaparin  40 mg Subcutaneous Daily     Continuous Infusions:  PRN Meds:sodium chloride flush, heparin flush, sodium chloride flush, heparin flush, naloxone, diphenhydrAMINE, LORazepam, traMADol, morphine    Allergies:  Chlorzoxazone and Sulfa antibiotics    Social History:   Social History     Socioeconomic History    Marital status:      Spouse name: None    Number of children: None    Years of education: None    Highest education level: None   Occupational History    None   Social Needs    Financial resource strain: None    Food insecurity     Worry: None     Inability: None    Transportation needs     Medical: None     Non-medical: None   Tobacco Use    Smoking status: Never Smoker    Smokeless tobacco: Never Used   Substance and Sexual Activity    Alcohol use: No    Drug use: Never    Sexual activity: Not Currently   Lifestyle    Physical activity     Days per week: None     Minutes per session: None    Stress: None   Relationships    Social connections     Talks on phone: None     Gets together: None     Attends Yazidism service: None     Active member of club or organization: None     Attends meetings of clubs or organizations: None     Relationship status: None    Intimate partner violence     Fear of current or ex partner: None     Emotionally abused: None     Physically abused: None     Forced sexual activity: None   Other Topics Concern    None   Social History Narrative    None     Tobacco: No  Alcohol: No  Pets: No  Travel: No    Family History:   History reviewed. No pertinent family history. . Otherwise non-pertinent to the chief complaint. REVIEW OF SYSTEMS:    CONSTITUTIONAL:  No chills, fevers or night sweats. No loss of weight. EYES:  No double vision or drainage from eyes, ears or throat. HEENT:  No neck stiffness. No dysphagia. No drainage from eyes, ears or throat  RESPIRATORY:  No cough, productive sputum or hemoptysis. CARDIOVASCULAR:  No chest pain, palpitations, orthopnea or dyspnea on exertion. GASTROINTESTINAL:  No nausea, vomiting, diarrhea or constipation or hematochezia   GENITOURINARY:  No frequency burning dysuria or hematuria.   INTEGUMENT/BREAST:  No rash or breast masses. HEMATOLOGIC/LYMPHATIC:  No lymphadenopathy or blood dyscrasics. ALLERGIC/IMMUNOLOGIC:  No anaphylaxis. ENDOCRINE:  No polyuria or polydipsia or temperature intolerance. MUSCULOSKELETAL:  No myalgia or arthralgia. Full ROM. NEUROLOGICAL:  No focal motor sensory deficit. BEHAVIOR/PSYCH:  No psychosis. PHYSICAL EXAM:    Vitals:    BP (!) 160/73   Pulse 59   Temp 97.3 °F (36.3 °C) (Temporal)   Resp 16   Ht 5' 6\" (1.676 m)   Wt 145 lb (65.8 kg)   LMP 05/16/2018   SpO2 96%   BMI 23.40 kg/m²   Constitutional: The patient is awake, alert, and oriented. Skin: Warm and dry. No rashes were noted. HEENT: Eyes show round, and reactive pupils. No jaundice. Moist mucous membranes, no ulcerations, no thrush. Neck: Supple to movements. No lymphadenopathy. Chest: No use of accessory muscles to breathe. Symmetrical expansion. Auscultation reveals no wheezing, crackles, or rhonchi. Cardiovascular: S1 and S2 are rhythmic and regular. No murmurs appreciated. Abdomen: Positive bowel sounds to auscultation. Benign to palpation. No masses felt. No hepatosplenomegaly. Extremities: No clubbing, no cyanosis, no edema.   Lines: peripheral      CBC+dif:  Recent Labs     01/24/21  0501 01/25/21  0544 01/26/21  0447   WBC 5.3 5.1 4.8   HGB 13.4 13.9 14.5   HCT 38.7 41.5 41.4   MCV 90.4 91.6 89.2    250 266     No results found for: CRP  No results found for: CRPHS  No results found for: SEDRATE  Lab Results   Component Value Date    ALT 11 10/14/2020    AST 19 10/14/2020    ALKPHOS 94 10/14/2020    BILITOT 0.5 10/14/2020     Lab Results   Component Value Date     01/26/2021    K 3.1 01/26/2021    K 3.6 10/14/2020    CL 92 01/26/2021    CO2 30 01/26/2021    BUN 11 01/26/2021    CREATININE 0.8 01/26/2021    GFRAA >60 01/26/2021    LABGLOM >60 01/26/2021    GLUCOSE 117 01/26/2021    PROT 5.8 10/14/2020    LABALBU 3.3 10/14/2020    CALCIUM 9.2 01/26/2021    BILITOT 0.5 10/14/2020    ALKPHOS 94 10/14/2020    AST 19 10/14/2020    ALT 11 10/14/2020       No results found for: PROTIME, INR    No results found for: TSH    Lab Results   Component Value Date    COLORU Yellow 01/21/2021    PHUR 6.0 01/21/2021    WBCUA 10-20 01/21/2021    RBCUA 10-20 01/21/2021    BACTERIA MODERATE 01/21/2021    CLARITYU SL CLOUDY 01/21/2021    SPECGRAV 1.010 01/21/2021    LEUKOCYTESUR MODERATE 01/21/2021    UROBILINOGEN 0.2 01/21/2021    BILIRUBINUR Negative 01/21/2021    BLOODU MODERATE 01/21/2021    GLUCOSEU Negative 01/21/2021       No results found for: SFI2VIK, BEART, P6GIIIKF, PHART, THGBART, IGK5NFJ, PO2ART, ZST8DMC  Radiology:  [place] NERVE BLOCK LUMBOSACRAL 1ST   Final Result   1. Lumbosacral single level nerve block performed with fluoroscopic guidance. 2.  Please see procedure report for further details. XR HIP 2-3 VW W PELVIS LEFT   Final Result   1. No acute fracture or dislocation. 2. Osteopenia. Mild degenerative change. Microbiology:  Pending  No results for input(s): BC in the last 72 hours. No results for input(s): ORG in the last 72 hours. No results for input(s): Carter Mouse in the last 72 hours. No results for input(s): STREPNEUMAGU in the last 72 hours. No results for input(s): LP1UAG in the last 72 hours. No results for input(s): ASO in the last 72 hours. No results for input(s): CULTRESP in the last 72 hours. Assessment:  · Bacteruria  Cannot ascertain any symptoms     · She has an ESBL which lends itself only to iv antibiotics  · WBC normal  · Afebrile  ·     Plan:    · Seven days of  merrem   · Check cultures  · Baseline ESR, CRP  · Monitor labs  Will follow with you  I saw her in July  For UTI   Thank you for having us see this patient in consultation. I will be discussing this case with the treating physicians. This is going to be an ongoing problem with her and she is most likely more colonized than infected.     Hopefully she is on other things to mitigate recurrent UTI/asymptomatic bacteruria in post menopausal women  ?  Kefir, acidification of the urine  I believe she has been worked up by urology  Six more days of merrem    Reconciled       Electronically signed by Yung Kiser MD on 1/26/2021 at 11:27 AM

## 2021-01-26 NOTE — PROGRESS NOTES
Vascular Access Procedure Note    Procedure Date:   1/26/2021    Pre-procedure Verification/Time-Out:  The proposed risks versus benefits of this procedure were discussed in detail by the physician with patient. written consent was obtained from the patient. Relevant documentation was reviewed prior to procedure including signed consent form and medications. All necessary equipment for procedure is available at time of procedure yes. An audible time out was done at 1330PM by team members, correctly identifying patients name, medical record number, correct side, correct site, and correct procedure to be performed with registered nurse members of the procedure team all in agreement.         Indication for Procedure:   Reason for Insertion: intravenous antibiotics    ASA Assessment (Required for Moderate & Deep Sedation):      Procedure:   Reason for Consultation: power PICC line    Clinician Performing Procedure:   Tamara Restrepo rn    Assistant:  none    Sedation:   Analgesia Used: lidocaine 1%    Procedure Details/Findings:  Catheter Italian Size: 4 fr sl  Lot Number: [id number]  Product #: [id number]  Expiration Date: 10/31/2021  Maximum Barrier Precautions: cap, eye shield, full body drape, gloves, gown, handwashing and mask  Skin Prep: chlorhexidine  Technique: modified seldinger and ultrasound guided with VPS  Attempts: 1  Exposed (cm): 0  Total (cm): 40  Placement Site: right brachiall vein  Vessel Size: 0.55  Dressing: securement device, transparent dressing and biopatch  Blood Return: Yes   Ultrasound Guidance: Yes   Arm Circumference Mid-Bicep (cm): 25  Chest X-Ray Ordered: VasoNova VPS  End Placement: caj    Complications:   none     Post-operative Condition:  stable  Patient Tolerated Procedure: well     Comments/Post-operative Education:   Post Procedure Interventions: no blood pressure sign placed above bed    Nay Rodriguez  01/26/21  2:09 PM

## 2021-01-26 NOTE — DISCHARGE SUMMARY
Discharge Summary    Date: 1/26/2021  Patient Name: David Bee YOB: 1926 Age: 80 y.o. Admit Date: 1/21/2021  Discharge Date: 1/26/2021  Discharge Condition: Stable    Admission Diagnosis  Intractable back pain (M54.9)     Discharge Diagnosis  Active Problems: Intractable back painResolved Problems: * No resolved hospital problems. [facility] Stay  Narrative of Hospital Course:  Patient admitted for intractable lumbar pian. Improved with three epidural injections. Patient also had UTI requiring meropenem per ID. Discharged to facility. Consultants:  IP CONSULT TO INTERNAL MEDICINEIP CONSULT TO [place] CONSULT TO INFECTIOUS DISEASESIP CONSULT TO PHARMACY    Surgeries/procedures Performed:       Treatments:           Discharge Plan/Disposition:  To [facility]/Incidental Findings Requiring Follow Up:    Patient Instructions:    Diet: Cardiac Diet    Activity:Activity as Tolerated  For number of days (if applicable): Other Instructions:    Provider Follow-Up:   No follow-ups on file.      Significant Diagnostic Studies:    Recent Labs:  Admission on 01/21/2021WBC                                         Date: 01/21/2021Value: 4.5         Ref range: 4.5 - 11.5 E9/L    Status: FinalRBC                                           Date: 01/21/2021Value: 4.18        Ref range: 3.50 - 5.50 E12/L  Status: FinalHemoglobin                                    Date: 01/21/2021Value: 12.8        Ref range: 11.5 - 15.5 g/dL   Status: FinalHematocrit                                    Date: 01/21/2021Value: 38.9        Ref range: 34.0 - 48.0 %      Status: FinalMCV                                           Date: 01/21/2021Value: 93.1        Ref range: 80.0 - 99.9 fL     Status: [address]                                           Date: 01/21/2021Value: 30.6        Ref range: 26.0 - 35.0 pg     Status: [address]                                          Date: 01/21/2021Value: 32.9 Ref range: 32.0 - 34.5 %      Status: FinalRDW                                           Date: 01/21/2021Value: 14.1        Ref range: 11.5 - 15.0 fL     Status: FinalPlatelets                                     Date: 01/21/2021Value: 216         Ref range: 130 - 450 E9/L     Status: FinalMPV                                           Date: 01/21/2021Value: 9.8         Ref range: 7.0 - 12.0 fL      Status: FinalNeutrophils %                                 Date: 01/21/2021Value: 64.2        Ref range: 43.0 - 80.0 %      Status: FinalImmature Granulocytes %                       Date: 01/21/2021Value: 0.2         Ref range: 0.0 - 5.0 %        Status: FinalLymphocytes %                                 Date: 01/21/2021Value: 21.1        Ref range: 20.0 - 42.0 %      Status: FinalMonocytes %                                   Date: 01/21/2021Value: 11.0        Ref range: 2.0 - 12.0 %       Status: FinalEosinophils %                                 Date: 01/21/2021Value: 2.2         Ref range: 0.0 - 6.0 %        Status: FinalBasophils %                                   Date: 01/21/2021Value: 1.3         Ref range: 0.0 - 2.0 %        Status: FinalNeutrophils Absolute                          Date: 01/21/2021Value: 2.85        Ref range: 1.80 - 7.30 E9/L   Status: FinalImmature Granulocytes #                       Date: 01/21/2021Value: 0.01        Ref range: E9/L               Status: FinalLymphocytes Absolute                          Date: 01/21/2021Value: 0.94*       Ref range: 1.50 - 4.00 E9/L   Status: FinalMonocytes Absolute                            Date: 01/21/2021Value: 0.49        Ref range: 0.10 - 0.95 E9/L   Status: FinalEosinophils Absolute                          Date: 01/21/2021Value: 0.10        Ref range: 0.05 - 0.50 E9/L   Status: FinalBasophils Absolute                            Date: 01/21/2021Value: 0.06        Ref range: 0.00 - 0.20 E9/L   Status: FinalSodium Date: 01/21/2021Value: 135         Ref range: 132 - 146 mmol/L   Status: FinalPotassium                                     Date: 01/21/2021Value: 3.6         Ref range: 3.5 - 5.0 mmol/L   Status: FinalChloride                                      Date: 01/21/2021Value: 100         Ref range: 98 - 107 mmol/L    Status: FinalCO2                                           Date: 01/21/2021Value: 28          Ref range: 22 - 29 mmol/L     Status: FinalAnion Gap                                     Date: 01/21/2021Value: 7           Ref range: 7 - 16 mmol/L      Status: FinalGlucose                                       Date: 01/21/2021Value: 104*        Ref range: 74 - 99 mg/dL      Status: FinalBUN                                           Date: 01/21/2021Value: 7*          Ref range: 8 - 23 mg/dL       Status: FinalCREATININE                                    Date: 01/21/2021Value: 1.0         Ref range: 0.5 - 1.0 mg/dL    Status: FinalGFR Non-                      Date: 01/21/2021Value: 52          Ref range: >=60 mL/min/1.73   Status: Final              Comment: Chronic Kidney Disease: less than 60 ml/min/1.73 sq.m. Kidney Failure: less than 15 ml/min/1.73 sq. m. Results valid for patients 18 years and older. GFR                           Date: 01/21/2021Value: >60           Status: FinalCalcium                                       Date: 01/21/2021Value: 8.9         Ref range: 8.6 - 10.2 mg/dL   Status: FinalColor, UA                                     Date: 01/21/2021Value: Yellow      Ref range: Straw/Yellow       Status: FinalClarity, UA                                   Date: 01/21/2021Value: SL CLOUDY   Ref range: Clear              Status: FinalGlucose, Ur                                   Date: 01/21/2021Value: Negative    Ref range: Negative mg/dL     Status: FinalBilirubin Urine                               Date: 01/21/2021Value: Negative    Ref range: Negative Status: FinalKetones, Urine                                Date: 01/21/2021Value: Negative    Ref range: Negative mg/dL     Status: FinalSpecific Gravity, UA                          Date: 01/21/2021Value: 1.010       Ref range: 1.005 - 1.030      Status: FinalBlood, Urine                                  Date: 01/21/2021Value: MODERATE*   Ref range: Negative           Status: FinalpH, UA                                        Date: 01/21/2021Value: 6.0         Ref range: 5.0 - 9.0          Status: FinalProtein, UA                                   Date: 01/21/2021Value: TRACE       Ref range: Negative mg/dL     Status: FinalUrobilinogen, Urine                           Date: 01/21/2021Value: 0.2         Ref range: <2.0 E.U./dL       Status: FinalNitrite, Urine                                Date: 01/21/2021Value: Negative    Ref range: Negative           Status: FinalLeukocyte Esterase, Urine                     Date: 01/21/2021Value: MODERATE*   Ref range: Negative           Status: [address], UA                                       Date: 01/21/2021Value: 10-20*      Ref range: 0 - 5 /HPF         Status: FinalRBC, UA                                       Date: 01/21/2021Value: 10-20*      Ref range: 0 - 2 /HPF         Status: FinalBacteria, UA                                  Date: 01/21/2021Value: MODERATE*   Ref range: None Seen /HPF     Status: FinalOrganism                                      Date: 01/21/2021Value: Escherichia coli                     Status: FinalUrine Culture, Routine                        Date: 01/21/2021Value:               Status: Final                 Value:>100,000 CFU/mlThis organism possesses an Extended Spectrum Beta Lactamasethat is inhibited by Clavulanic Acid. WBC                                           Date: 01/22/2021Value: 4.2*        Ref range: 4.5 - 11.5 E9/L    Status: FinalRBC                                           Date: 01/22/2021Value: 4.03        Ref range: 01/22/2021Value: 0.9         Ref range: 0.5 - 1.0 mg/dL    Status: FinalGFR Non-                      Date: 01/22/2021Value: 58          Ref range: >=60 mL/min/1.73   Status: Final              Comment: Chronic Kidney Disease: less than 60 ml/min/1.73 sq.m. Kidney Failure: less than 15 ml/min/1.73 sq. m. Results valid for patients 18 years and older. GFR                           Date: 01/22/2021Value: >60           Status: FinalCalcium                                       Date: 01/22/2021Value: 8.7         Ref range: 8.6 - 10.2 mg/dL   Status: [address] [facility]                                           Date: 01/23/2021Value: 5.2         Ref range: 4.5 - 11.5 E9/L    Status: FinalRBC                                           Date: 01/23/2021Value: 4.46        Ref range: 3.50 - 5.50 E12/L  Status: FinalHemoglobin                                    Date: 01/23/2021Value: 13.8        Ref range: 11.5 - 15.5 g/dL   Status: FinalHematocrit                                    Date: 01/23/2021Value: 40.5        Ref range: 34.0 - 48.0 %      Status: FinalMCV                                           Date: 01/23/2021Value: 90.8        Ref range: 80.0 - 99.9 fL     Status: [address]                                           Date: 01/23/2021Value: 30.9        Ref range: 26.0 - 35.0 pg     Status: [address]                                          Date: 01/23/2021Value: 34.1        Ref range: 32.0 - 34.5 %      Status: FinalRDW                                           Date: 01/23/2021Value: 13.9        Ref range: 11.5 - 15.0 fL     Status: FinalPlatelets                                     Date: 01/23/2021Value: 216         Ref range: 130 - 450 E9/L     Status: FinalMPV                                           Date: 01/23/2021Value: 10.3        Ref range: 7.0 - 12.0 fL      Status: FinalSodium                                        Date: 01/23/2021Value: 136         Ref range: 132 - 146 mmol/L Status: FinalPotassium                                     Date: 01/23/2021Value: 3.4*        Ref range: 3.5 - 5.0 mmol/L   Status: FinalChloride                                      Date: 01/23/2021Value: 95*         Ref range: 98 - 107 mmol/L    Status: FinalCO2                                           Date: 01/23/2021Value: 29          Ref range: 22 - 29 mmol/L     Status: FinalAnion Gap                                     Date: 01/23/2021Value: 12          Ref range: 7 - 16 mmol/L      Status: FinalGlucose                                       Date: 01/23/2021Value: 124*        Ref range: 74 - 99 mg/dL      Status: FinalBUN                                           Date: 01/23/2021Value: 7*          Ref range: 8 - 23 mg/dL       Status: FinalCREATININE                                    Date: 01/23/2021Value: 0.9         Ref range: 0.5 - 1.0 mg/dL    Status: FinalGFR Non-                      Date: 01/23/2021Value: 58          Ref range: >=60 mL/min/1.73   Status: Final              Comment: Chronic Kidney Disease: less than 60 ml/min/1.73 sq.m. Kidney Failure: less than 15 ml/min/1.73 sq. m. Results valid for patients 18 years and older. GFR                           Date: 01/23/2021Value: >60           Status: FinalCalcium                                       Date: 01/23/2021Value: 9.2         Ref range: 8.6 - 10.2 mg/dL   Status: [address] [facility]                                           Date: 01/24/2021Value: 5.3         Ref range: 4.5 - 11.5 E9/L    Status: FinalRBC                                           Date: 01/24/2021Value: 4.28        Ref range: 3.50 - 5.50 E12/L  Status: FinalHemoglobin                                    Date: 01/24/2021Value: 13.4        Ref range: 11.5 - 15.5 g/dL   Status: FinalHematocrit                                    Date: 01/24/2021Value: 38.7        Ref range: 34.0 - 48.0 %      Status: FinalMCV                                           Date: 01/24/2021Value: 90.4        Ref range: 80.0 - 99.9 fL     Status: [facility]                                           Date: 01/24/2021Value: 31.3        Ref range: 26.0 - 35.0 pg     Status: [address]                                          Date: 01/24/2021Value: 34.6*       Ref range: 32.0 - 34.5 %      Status: FinalRDW                                           Date: 01/24/2021Value: 13.7        Ref range: 11.5 - 15.0 fL     Status: FinalPlatelets                                     Date: 01/24/2021Value: 212         Ref range: 130 - 450 E9/L     Status: FinalMPV                                           Date: 01/24/2021Value: 10.4        Ref range: 7.0 - 12.0 fL      Status: FinalSodium                                        Date: 01/24/2021Value: 135         Ref range: 132 - 146 mmol/L   Status: FinalPotassium                                     Date: 01/24/2021Value: 3.3*        Ref range: 3.5 - 5.0 mmol/L   Status: FinalChloride                                      Date: 01/24/2021Value: 95*         Ref range: 98 - 107 mmol/L    Status: FinalCO2                                           Date: 01/24/2021Value: 29          Ref range: 22 - 29 mmol/L     Status: FinalAnion Gap                                     Date: 01/24/2021Value: 11          Ref range: 7 - 16 mmol/L      Status: FinalGlucose                                       Date: 01/24/2021Value: 118*        Ref range: 74 - 99 mg/dL      Status: FinalBUN                                           Date: 01/24/2021Value: 8           Ref range: 8 - 23 mg/dL       Status: FinalCREATININE                                    Date: 01/24/2021Value: 0.8         Ref range: 0.5 - 1.0 mg/dL    Status: FinalGFR Non-                      Date: 01/24/2021Value: >60         Ref range: >=60 mL/min/1.73   Status: Final              Comment: Chronic Kidney Disease: less than 60 ml/min/1.73 sq.m. Kidney Failure: less than 15 ml/min/1.73 sq. m. Results valid for patients 18 years and older. GFR                           Date: 01/24/2021Value: >60           Status: FinalCalcium                                       Date: 01/24/2021Value: 9.3         Ref range: 8.6 - 10.2 mg/dL   Status: [address] [facility]                                           Date: 01/25/2021Value: 5.1         Ref range: 4.5 - 11.5 E9/L    Status: FinalRBC                                           Date: 01/25/2021Value: 4.53        Ref range: 3.50 - 5.50 E12/L  Status: FinalHemoglobin                                    Date: 01/25/2021Value: 13.9        Ref range: 11.5 - 15.5 g/dL   Status: FinalHematocrit                                    Date: 01/25/2021Value: 41.5        Ref range: 34.0 - 48.0 %      Status: FinalMCV                                           Date: 01/25/2021Value: 91.6        Ref range: 80.0 - 99.9 fL     Status: [address]                                           Date: 01/25/2021Value: 30.7        Ref range: 26.0 - 35.0 pg     Status: [address]                                          Date: 01/25/2021Value: 33.5        Ref range: 32.0 - 34.5 %      Status: FinalRDW                                           Date: 01/25/2021Value: 13.7        Ref range: 11.5 - 15.0 fL     Status: FinalPlatelets                                     Date: 01/25/2021Value: 250         Ref range: 130 - 450 E9/L     Status: FinalMPV                                           Date: 01/25/2021Value: 10.6        Ref range: 7.0 - 12.0 fL      Status: FinalSodium                                        Date: 01/25/2021Value: 137         Ref range: 132 - 146 mmol/L   Status: FinalPotassium                                     Date: 01/25/2021Value: 3.2*        Ref range: 3.5 - 5.0 mmol/L   Status: FinalChloride                                      Date: 01/25/2021Value: 95*         Ref range: 98 - 107 mmol/L    Status: FinalCO2                                           Date: 01/25/2021Value: 27 Ref range: 22 - 29 mmol/L     Status: FinalAnion Gap                                     Date: 01/25/2021Value: 15          Ref range: 7 - 16 mmol/L      Status: FinalGlucose                                       Date: 01/25/2021Value: 114*        Ref range: 74 - 99 mg/dL      Status: FinalBUN                                           Date: 01/25/2021Value: 9           Ref range: 8 - 23 mg/dL       Status: FinalCREATININE                                    Date: 01/25/2021Value: 0.9         Ref range: 0.5 - 1.0 mg/dL    Status: FinalGFR Non-                      Date: 01/25/2021Value: 58          Ref range: >=60 mL/min/1.73   Status: Final              Comment: Chronic Kidney Disease: less than 60 ml/min/1.73 sq.m. Kidney Failure: less than 15 ml/min/1.73 sq. m. Results valid for patients 18 years and older. GFR                           Date: 01/25/2021Value: >60           Status: FinalCalcium                                       Date: 01/25/2021Value: 9.4         Ref range: 8.6 - 10.2 mg/dL   Status: HazdgDACG-MeJ-9, NAAT                              Date: 01/25/2021Value: Not Detected                   Ref range: Not Detected       Status: Final              Comment: Rapid NAAT:   Negative results should be treated as presumptive and,if inconsistent with clinical signs and symptoms or necessary forpatient management, should be tested with an alternative molecularassay. Negative results do not preclude SARS-CoV-2 infection andshould not be used as the sole basis for patient management decisions. This test has been authorized by the FDA under an Emergency UseAuthorization (EUA) for use by authorized laboratories. Fact sheet for Healthcare [employer] sheet for Patients: Kiss[employer]mer.dk: Isothermal Nucleic Acid AmplificationWBC                                           Date: 01/26/2021Value: 4.8 Ref range: 4.5 - 11.5 E9/L    Status: FinalRBC                                           Date: 01/26/2021Value: 4.64        Ref range: 3.50 - 5.50 E12/L  Status: FinalHemoglobin                                    Date: 01/26/2021Value: 14.5        Ref range: 11.5 - 15.5 g/dL   Status: FinalHematocrit                                    Date: 01/26/2021Value: 41.4        Ref range: 34.0 - 48.0 %      Status: FinalMCV                                           Date: 01/26/2021Value: 89.2        Ref range: 80.0 - 99.9 fL     Status: [address]                                           Date: 01/26/2021Value: 31.3        Ref range: 26.0 - 35.0 pg     Status: [address]                                          Date: 01/26/2021Value: 35.0*       Ref range: 32.0 - 34.5 %      Status: FinalRDW                                           Date: 01/26/2021Value: 13.5        Ref range: 11.5 - 15.0 fL     Status: FinalPlatelets                                     Date: 01/26/2021Value: 266         Ref range: 130 - 450 E9/L     Status: FinalMPV                                           Date: 01/26/2021Value: 10.6        Ref range: 7.0 - 12.0 fL      Status: FinalSodium                                        Date: 01/26/2021Value: 133         Ref range: 132 - 146 mmol/L   Status: FinalPotassium                                     Date: 01/26/2021Value: 3.1*        Ref range: 3.5 - 5.0 mmol/L   Status: FinalChloride                                      Date: 01/26/2021Value: 92*         Ref range: 98 - 107 mmol/L    Status: FinalCO2                                           Date: 01/26/2021Value: 30*         Ref range: 22 - 29 mmol/L     Status: FinalAnion Gap                                     Date: 01/26/2021Value: 11          Ref range: 7 - 16 mmol/L      Status: FinalGlucose                                       Date: 01/26/2021Value: 117*        Ref range: 74 - 99 mg/dL      Status: Valentin Rivera Date: 01/26/2021Value: 11          Ref range: 8 - 23 mg/dL       Status: FinalCREATININE                                    Date: 01/26/2021Value: 0.8         Ref range: 0.5 - 1.0 mg/dL    Status: FinalGFR Non-                      Date: 01/26/2021Value: >60         Ref range: >=60 mL/min/1.73   Status: Final              Comment: Chronic Kidney Disease: less than 60 ml/min/1.73 sq.m. Kidney Failure: less than 15 ml/min/1.73 sq. m. Results valid for patients 18 years and older. GFR                           Date: 01/26/2021Value: >60           Status: FinalCalcium                                       Date: 01/26/2021Value: 9.2         Ref range: 8.6 - 10.2 mg/dL   Status: Final------------    Radiology last 7 days:  Fl Nerve Block Lumbosacral 1stResult Date: 1/25/20211. Lumbosacral single level nerve block performed with fluoroscopic guidance. 2.  Please see procedure report for further details. Xr Hip 2-3 Vw W Pelvis LeftResult Date: 1/21/20211. No acute fracture or dislocation. 2. Osteopenia. Mild degenerative change.      [unfilled]    Discharge Medications    Current Discharge Medication ListSTART taking these medicationslidocaine 4 % external patchPlace 1 patch onto the skin dailyQty: 30 patch Refills: 0    Current Discharge Medication List    Current Discharge Medication ListCONTINUE these medications which have NOT CHANGEDisosorbide mononitrate (IMDUR) 30 MG extended release tabletTake 30 mg by mouth dailyescitalopram (LEXAPRO) 5 MG tabletTake 5 mg by mouth dailyhydroCHLOROthiazide (MICROZIDE) 12.5 MG capsuleTake 12.5 mg by mouth every morningamLODIPine (NORVASC) 5 MG tabletTake 1 tablet by mouth dailyQty: 30 tablet Refills: 3pantoprazole (PROTONIX) 40 MG tabletTake 1 tablet by mouth every morning (before breakfast)Qty: 30 tablet Refills: 3Probiotic Product (PROBIOTIC DAILY) CAPSTake by mouth docusate sodium (COLACE, DULCOLAX) 100 MG CAPSTake 100 mg by mouth dailyQty: 30 capsule Refills: 0atenolol (TENORMIN) 50 MG tabletTake 50 mg by mouth daily aspirin 81 MG tabletTake 81 mg by mouth dailytraMADol (ULTRAM) 50 MG tabletTake 50 mg by mouth every 6 hours as needed for Pain. Alok Ortiz atorvastatin (LIPITOR) 10 MG tabletTake 10 mg by mouth daily LORazepam (ATIVAN) 1 MG tabletTake 1 mg by mouth 2 times daily as needed. Current Discharge Medication ListSTOP taking these medicationscefdinir (OMNICEF) 300 MG capsuleComments:Reason for Stopping:    Time Spent on Discharge:1E] minutes were spent in patient examination, evaluation, counseling as well as medication reconciliation, prescriptions for required medications, discharge plan, and follow up.     Electronically signed by Courtney Jane MD on 1/26/21 at 11:24 AM EST

## 2021-01-26 NOTE — PROGRESS NOTES
Called ID service to have  paged about needing medication script and orders for a picc line.  returned call she will be in around 11 am to do patients antibiotic script and do orders for picc line.

## 2021-01-26 NOTE — CARE COORDINATION
1/26/2021 social work transition of care planning  Pt plan is to [facility] today, once ID completes med rec.   Electronically signed by TONY Faria on 1/26/2021 at 11:02 AM

## 2021-01-27 LAB
ALBUMIN SERPL-MCNC: 3.4 G/DL (ref 3.5–5.2)
ALP BLD-CCNC: 167 U/L (ref 35–104)
ALT SERPL-CCNC: 12 U/L (ref 0–32)
ANION GAP SERPL CALCULATED.3IONS-SCNC: 14 MMOL/L (ref 7–16)
AST SERPL-CCNC: 23 U/L (ref 0–31)
BASOPHILS ABSOLUTE: 0.04 E9/L (ref 0–0.2)
BASOPHILS RELATIVE PERCENT: 0.8 % (ref 0–2)
BILIRUB SERPL-MCNC: 0.6 MG/DL (ref 0–1.2)
BUN BLDV-MCNC: 18 MG/DL (ref 8–23)
CALCIUM SERPL-MCNC: 8.9 MG/DL (ref 8.6–10.2)
CHLORIDE BLD-SCNC: 92 MMOL/L (ref 98–107)
CHOLESTEROL, TOTAL: 120 MG/DL (ref 0–199)
CO2: 27 MMOL/L (ref 22–29)
CREAT SERPL-MCNC: 0.9 MG/DL (ref 0.5–1)
EOSINOPHILS ABSOLUTE: 0.05 E9/L (ref 0.05–0.5)
EOSINOPHILS RELATIVE PERCENT: 0.9 % (ref 0–6)
GFR AFRICAN AMERICAN: >60
GFR NON-AFRICAN AMERICAN: 58 ML/MIN/1.73
GLUCOSE BLD-MCNC: 119 MG/DL (ref 74–99)
HCT VFR BLD CALC: 39.3 % (ref 34–48)
HDLC SERPL-MCNC: 66 MG/DL
HEMOGLOBIN: 13.6 G/DL (ref 11.5–15.5)
IMMATURE GRANULOCYTES #: 0.02 E9/L
IMMATURE GRANULOCYTES %: 0.4 % (ref 0–5)
LDL CHOLESTEROL CALCULATED: 33 MG/DL (ref 0–99)
LYMPHOCYTES ABSOLUTE: 1.25 E9/L (ref 1.5–4)
LYMPHOCYTES RELATIVE PERCENT: 23.6 % (ref 20–42)
MCH RBC QN AUTO: 30.9 PG (ref 26–35)
MCHC RBC AUTO-ENTMCNC: 34.6 % (ref 32–34.5)
MCV RBC AUTO: 89.3 FL (ref 80–99.9)
MONOCYTES ABSOLUTE: 0.71 E9/L (ref 0.1–0.95)
MONOCYTES RELATIVE PERCENT: 13.4 % (ref 2–12)
NEUTROPHILS ABSOLUTE: 3.23 E9/L (ref 1.8–7.3)
NEUTROPHILS RELATIVE PERCENT: 60.9 % (ref 43–80)
PDW BLD-RTO: 13.7 FL (ref 11.5–15)
PLATELET # BLD: 153 E9/L (ref 130–450)
PMV BLD AUTO: 11.4 FL (ref 7–12)
POTASSIUM SERPL-SCNC: 3.2 MMOL/L (ref 3.5–5)
RBC # BLD: 4.4 E12/L (ref 3.5–5.5)
SODIUM BLD-SCNC: 133 MMOL/L (ref 132–146)
TOTAL PROTEIN: 6.4 G/DL (ref 6.4–8.3)
TRIGL SERPL-MCNC: 106 MG/DL (ref 0–149)
VLDLC SERPL CALC-MCNC: 21 MG/DL
WBC # BLD: 5.3 E9/L (ref 4.5–11.5)

## 2021-01-29 LAB
SARS-COV-2: NOT DETECTED
SOURCE: NORMAL

## 2021-02-03 LAB
ANION GAP SERPL CALCULATED.3IONS-SCNC: 8 MMOL/L (ref 7–16)
BASOPHILS ABSOLUTE: 0.05 E9/L (ref 0–0.2)
BASOPHILS RELATIVE PERCENT: 0.8 % (ref 0–2)
BUN BLDV-MCNC: 20 MG/DL (ref 8–23)
CALCIUM SERPL-MCNC: 9.1 MG/DL (ref 8.6–10.2)
CHLORIDE BLD-SCNC: 92 MMOL/L (ref 98–107)
CO2: 29 MMOL/L (ref 22–29)
CREAT SERPL-MCNC: 0.8 MG/DL (ref 0.5–1)
EOSINOPHILS ABSOLUTE: 0.12 E9/L (ref 0.05–0.5)
EOSINOPHILS RELATIVE PERCENT: 1.9 % (ref 0–6)
GFR AFRICAN AMERICAN: >60
GFR NON-AFRICAN AMERICAN: >60 ML/MIN/1.73
GLUCOSE BLD-MCNC: 106 MG/DL (ref 74–99)
HCT VFR BLD CALC: 37.1 % (ref 34–48)
HEMOGLOBIN: 12.8 G/DL (ref 11.5–15.5)
IMMATURE GRANULOCYTES #: 0.05 E9/L
IMMATURE GRANULOCYTES %: 0.8 % (ref 0–5)
LYMPHOCYTES ABSOLUTE: 1.56 E9/L (ref 1.5–4)
LYMPHOCYTES RELATIVE PERCENT: 25.3 % (ref 20–42)
MCH RBC QN AUTO: 30.8 PG (ref 26–35)
MCHC RBC AUTO-ENTMCNC: 34.5 % (ref 32–34.5)
MCV RBC AUTO: 89.4 FL (ref 80–99.9)
MONOCYTES ABSOLUTE: 0.73 E9/L (ref 0.1–0.95)
MONOCYTES RELATIVE PERCENT: 11.9 % (ref 2–12)
NEUTROPHILS ABSOLUTE: 3.65 E9/L (ref 1.8–7.3)
NEUTROPHILS RELATIVE PERCENT: 59.3 % (ref 43–80)
PDW BLD-RTO: 13.3 FL (ref 11.5–15)
PLATELET # BLD: 181 E9/L (ref 130–450)
PMV BLD AUTO: 10.5 FL (ref 7–12)
POTASSIUM SERPL-SCNC: 4.1 MMOL/L (ref 3.5–5)
RBC # BLD: 4.15 E12/L (ref 3.5–5.5)
SODIUM BLD-SCNC: 129 MMOL/L (ref 132–146)
WBC # BLD: 6.2 E9/L (ref 4.5–11.5)

## 2021-02-05 LAB
ANION GAP SERPL CALCULATED.3IONS-SCNC: 14 MMOL/L (ref 7–16)
BUN BLDV-MCNC: 20 MG/DL (ref 8–23)
CALCIUM SERPL-MCNC: 8.9 MG/DL (ref 8.6–10.2)
CHLORIDE BLD-SCNC: 96 MMOL/L (ref 98–107)
CO2: 25 MMOL/L (ref 22–29)
CREAT SERPL-MCNC: 0.8 MG/DL (ref 0.5–1)
GFR AFRICAN AMERICAN: >60
GFR NON-AFRICAN AMERICAN: >60 ML/MIN/1.73
GLUCOSE BLD-MCNC: 120 MG/DL (ref 74–99)
POTASSIUM SERPL-SCNC: 4.4 MMOL/L (ref 3.5–5)
SODIUM BLD-SCNC: 135 MMOL/L (ref 132–146)

## 2021-02-10 LAB
ANION GAP SERPL CALCULATED.3IONS-SCNC: 9 MMOL/L (ref 7–16)
BASOPHILS ABSOLUTE: 0.04 E9/L (ref 0–0.2)
BASOPHILS RELATIVE PERCENT: 0.8 % (ref 0–2)
BUN BLDV-MCNC: 11 MG/DL (ref 8–23)
CALCIUM SERPL-MCNC: 8.9 MG/DL (ref 8.6–10.2)
CHLORIDE BLD-SCNC: 98 MMOL/L (ref 98–107)
CO2: 26 MMOL/L (ref 22–29)
CREAT SERPL-MCNC: 0.8 MG/DL (ref 0.5–1)
EOSINOPHILS ABSOLUTE: 0.14 E9/L (ref 0.05–0.5)
EOSINOPHILS RELATIVE PERCENT: 3 % (ref 0–6)
GFR AFRICAN AMERICAN: >60
GFR NON-AFRICAN AMERICAN: >60 ML/MIN/1.73
GLUCOSE BLD-MCNC: 116 MG/DL (ref 74–99)
HCT VFR BLD CALC: 35.5 % (ref 34–48)
HEMOGLOBIN: 11.8 G/DL (ref 11.5–15.5)
IMMATURE GRANULOCYTES #: 0.01 E9/L
IMMATURE GRANULOCYTES %: 0.2 % (ref 0–5)
LYMPHOCYTES ABSOLUTE: 1.14 E9/L (ref 1.5–4)
LYMPHOCYTES RELATIVE PERCENT: 24.1 % (ref 20–42)
MCH RBC QN AUTO: 30.6 PG (ref 26–35)
MCHC RBC AUTO-ENTMCNC: 33.2 % (ref 32–34.5)
MCV RBC AUTO: 92 FL (ref 80–99.9)
MONOCYTES ABSOLUTE: 0.49 E9/L (ref 0.1–0.95)
MONOCYTES RELATIVE PERCENT: 10.3 % (ref 2–12)
NEUTROPHILS ABSOLUTE: 2.92 E9/L (ref 1.8–7.3)
NEUTROPHILS RELATIVE PERCENT: 61.6 % (ref 43–80)
PDW BLD-RTO: 13.2 FL (ref 11.5–15)
PLATELET # BLD: 187 E9/L (ref 130–450)
PMV BLD AUTO: 10.1 FL (ref 7–12)
POTASSIUM SERPL-SCNC: 3.7 MMOL/L (ref 3.5–5)
RBC # BLD: 3.86 E12/L (ref 3.5–5.5)
SARS-COV-2: NOT DETECTED
SODIUM BLD-SCNC: 133 MMOL/L (ref 132–146)
SOURCE: NORMAL
WBC # BLD: 4.7 E9/L (ref 4.5–11.5)

## 2021-02-11 LAB
SARS-COV-2: NOT DETECTED
SOURCE: NORMAL

## 2021-02-13 LAB
SARS-COV-2: NOT DETECTED
SOURCE: NORMAL

## 2021-02-17 LAB
SARS-COV-2: NOT DETECTED
SOURCE: NORMAL

## 2021-06-25 ENCOUNTER — APPOINTMENT (OUTPATIENT)
Dept: ULTRASOUND IMAGING | Age: 86
End: 2021-06-25
Payer: MEDICARE

## 2021-06-25 ENCOUNTER — HOSPITAL ENCOUNTER (EMERGENCY)
Age: 86
Discharge: HOME | End: 2021-06-25
Attending: EMERGENCY MEDICINE
Payer: MEDICARE

## 2021-06-25 ENCOUNTER — APPOINTMENT (OUTPATIENT)
Dept: GENERAL RADIOLOGY | Age: 86
End: 2021-06-25
Payer: MEDICARE

## 2021-06-25 VITALS
DIASTOLIC BLOOD PRESSURE: 68 MMHG | HEART RATE: 79 BPM | SYSTOLIC BLOOD PRESSURE: 127 MMHG | BODY MASS INDEX: 24.16 KG/M2 | HEIGHT: 65 IN | TEMPERATURE: 97.3 F | RESPIRATION RATE: 20 BRPM | OXYGEN SATURATION: 96 % | WEIGHT: 145 LBS

## 2021-06-25 DIAGNOSIS — R60.9 PERIPHERAL EDEMA: ICD-10-CM

## 2021-06-25 DIAGNOSIS — M79.605 LEG PAIN, BILATERAL: ICD-10-CM

## 2021-06-25 DIAGNOSIS — I50.9 ACUTE ON CHRONIC CONGESTIVE HEART FAILURE, UNSPECIFIED HEART FAILURE TYPE (HCC): Primary | ICD-10-CM

## 2021-06-25 DIAGNOSIS — M79.604 LEG PAIN, BILATERAL: ICD-10-CM

## 2021-06-25 DIAGNOSIS — N17.9 AKI (ACUTE KIDNEY INJURY) (HCC): ICD-10-CM

## 2021-06-25 LAB
ANION GAP SERPL CALCULATED.3IONS-SCNC: 11 MMOL/L (ref 7–16)
BASOPHILS ABSOLUTE: 0.03 E9/L (ref 0–0.2)
BASOPHILS RELATIVE PERCENT: 0.6 % (ref 0–2)
BUN BLDV-MCNC: 24 MG/DL (ref 6–23)
CALCIUM SERPL-MCNC: 9.5 MG/DL (ref 8.6–10.2)
CHLORIDE BLD-SCNC: 97 MMOL/L (ref 98–107)
CO2: 29 MMOL/L (ref 22–29)
CREAT SERPL-MCNC: 1.2 MG/DL (ref 0.5–1)
EOSINOPHILS ABSOLUTE: 0.03 E9/L (ref 0.05–0.5)
EOSINOPHILS RELATIVE PERCENT: 0.6 % (ref 0–6)
GFR AFRICAN AMERICAN: 51
GFR NON-AFRICAN AMERICAN: 42 ML/MIN/1.73
GLUCOSE BLD-MCNC: 178 MG/DL (ref 74–99)
HCT VFR BLD CALC: 39.9 % (ref 34–48)
HEMOGLOBIN: 13.6 G/DL (ref 11.5–15.5)
IMMATURE GRANULOCYTES #: 0.01 E9/L
IMMATURE GRANULOCYTES %: 0.2 % (ref 0–5)
LYMPHOCYTES ABSOLUTE: 0.78 E9/L (ref 1.5–4)
LYMPHOCYTES RELATIVE PERCENT: 16.6 % (ref 20–42)
MCH RBC QN AUTO: 30 PG (ref 26–35)
MCHC RBC AUTO-ENTMCNC: 34.1 % (ref 32–34.5)
MCV RBC AUTO: 88.1 FL (ref 80–99.9)
MONOCYTES ABSOLUTE: 0.34 E9/L (ref 0.1–0.95)
MONOCYTES RELATIVE PERCENT: 7.2 % (ref 2–12)
NEUTROPHILS ABSOLUTE: 3.52 E9/L (ref 1.8–7.3)
NEUTROPHILS RELATIVE PERCENT: 74.8 % (ref 43–80)
PDW BLD-RTO: 13.4 FL (ref 11.5–15)
PLATELET # BLD: 258 E9/L (ref 130–450)
PMV BLD AUTO: 10.1 FL (ref 7–12)
POTASSIUM SERPL-SCNC: 4 MMOL/L (ref 3.5–5)
PRO-BNP: 1122 PG/ML (ref 0–450)
RBC # BLD: 4.53 E12/L (ref 3.5–5.5)
SODIUM BLD-SCNC: 137 MMOL/L (ref 132–146)
TROPONIN, HIGH SENSITIVITY: 44 NG/L (ref 0–9)
WBC # BLD: 4.7 E9/L (ref 4.5–11.5)

## 2021-06-25 PROCEDURE — 99282 EMERGENCY DEPT VISIT SF MDM: CPT

## 2021-06-25 PROCEDURE — 6360000002 HC RX W HCPCS: Performed by: EMERGENCY MEDICINE

## 2021-06-25 PROCEDURE — 73600 X-RAY EXAM OF ANKLE: CPT

## 2021-06-25 PROCEDURE — 84484 ASSAY OF TROPONIN QUANT: CPT

## 2021-06-25 PROCEDURE — 93970 EXTREMITY STUDY: CPT

## 2021-06-25 PROCEDURE — 93005 ELECTROCARDIOGRAM TRACING: CPT | Performed by: EMERGENCY MEDICINE

## 2021-06-25 PROCEDURE — 36415 COLL VENOUS BLD VENIPUNCTURE: CPT

## 2021-06-25 PROCEDURE — 6370000000 HC RX 637 (ALT 250 FOR IP): Performed by: EMERGENCY MEDICINE

## 2021-06-25 PROCEDURE — 85025 COMPLETE CBC W/AUTO DIFF WBC: CPT

## 2021-06-25 PROCEDURE — 80048 BASIC METABOLIC PNL TOTAL CA: CPT

## 2021-06-25 PROCEDURE — 73630 X-RAY EXAM OF FOOT: CPT

## 2021-06-25 PROCEDURE — 71045 X-RAY EXAM CHEST 1 VIEW: CPT

## 2021-06-25 PROCEDURE — 83880 ASSAY OF NATRIURETIC PEPTIDE: CPT

## 2021-06-25 PROCEDURE — 96374 THER/PROPH/DIAG INJ IV PUSH: CPT

## 2021-06-25 RX ORDER — FUROSEMIDE 20 MG/1
20 TABLET ORAL 2 TIMES DAILY
Qty: 14 TABLET | Refills: 0 | Status: ON HOLD | OUTPATIENT
Start: 2021-06-25 | End: 2021-12-27

## 2021-06-25 RX ORDER — HYDROCODONE BITARTRATE AND ACETAMINOPHEN 5; 325 MG/1; MG/1
1 TABLET ORAL ONCE
Status: COMPLETED | OUTPATIENT
Start: 2021-06-25 | End: 2021-06-25

## 2021-06-25 RX ORDER — FUROSEMIDE 20 MG/1
20 TABLET ORAL DAILY
COMMUNITY
End: 2021-06-25 | Stop reason: ALTCHOICE

## 2021-06-25 RX ORDER — FUROSEMIDE 10 MG/ML
40 INJECTION INTRAMUSCULAR; INTRAVENOUS ONCE
Status: COMPLETED | OUTPATIENT
Start: 2021-06-25 | End: 2021-06-25

## 2021-06-25 RX ORDER — HYDROCODONE BITARTRATE AND ACETAMINOPHEN 5; 325 MG/1; MG/1
1 TABLET ORAL EVERY 6 HOURS PRN
Qty: 12 TABLET | Refills: 0 | Status: SHIPPED | OUTPATIENT
Start: 2021-06-25 | End: 2021-06-28

## 2021-06-25 RX ADMIN — HYDROCODONE BITARTRATE AND ACETAMINOPHEN 1 TABLET: 5; 325 TABLET ORAL at 14:34

## 2021-06-25 RX ADMIN — FUROSEMIDE 40 MG: 10 INJECTION, SOLUTION INTRAMUSCULAR; INTRAVENOUS at 16:21

## 2021-06-25 ASSESSMENT — ENCOUNTER SYMPTOMS
COUGH: 0
EYE REDNESS: 0
BACK PAIN: 0
SINUS PRESSURE: 0
SORE THROAT: 0
WHEEZING: 0
EYE DISCHARGE: 0
VOMITING: 0
NAUSEA: 0
ABDOMINAL DISTENTION: 0
DIARRHEA: 0
SHORTNESS OF BREATH: 0
EYE PAIN: 0

## 2021-06-25 ASSESSMENT — PAIN DESCRIPTION - PAIN TYPE: TYPE: CHRONIC PAIN

## 2021-06-25 ASSESSMENT — PAIN DESCRIPTION - ORIENTATION: ORIENTATION: LEFT;RIGHT

## 2021-06-25 ASSESSMENT — PAIN SCALES - GENERAL
PAINLEVEL_OUTOF10: 5
PAINLEVEL_OUTOF10: 5

## 2021-06-25 ASSESSMENT — PAIN DESCRIPTION - LOCATION: LOCATION: LEG

## 2021-06-25 NOTE — ED PROVIDER NOTES
Department of Emergency Medicine   ED  Provider Note  Admit Date/RoomTime: 6/25/2021  1:23 PM  ED Room: 10/10          6/25/21  3:29 PM EDT    History of Present Illness:  Chief Complaint   Patient presents with    Leg Swelling     bilateral leg pain and swelling, ongoing for a month. States also has left rib pain after stumbling into her walker.  Leg Pain        Luis Alfredo Paige is a 80 y.o. female presenting to the ED for bilateral leg swelling for the past 3 to 4 days. The complaint has been persistent, severe in severity, and worsened by nothing. He reports the past several days she is having progressively worsening severe bilateral lower extremity swelling with pain is now becoming painful to walk. Patient denies known injuries to her legs she denies any recent falls. She reports she does take a water pill Lasix 20 mg daily and has been taking this. She has had intermittent leg swelling in the past but never to this extent. In addition she complains of pain to left rib cage after bumping her chest against her walker. She denies any other chest pain. She is taking Ultram and Tylenol without relief. She denies any shortness of breath but her daughter reports she has been visibly dyspneic with exertion. Review of Systems:   Pertinent positives and negatives are stated within HPI, all other systems reviewed and are negative. Review of Systems   Constitutional: Negative for chills and fever. HENT: Negative for ear pain, sinus pressure and sore throat. Eyes: Negative for pain, discharge and redness. Respiratory: Negative for cough, shortness of breath and wheezing. Cardiovascular: Negative for chest pain. Gastrointestinal: Negative for abdominal distention, diarrhea, nausea and vomiting. Genitourinary: Negative for dysuria and frequency. Musculoskeletal: Negative for arthralgias and back pain. Skin: Negative for rash and wound.    Neurological: Negative for weakness and headaches. Hematological: Negative for adenopathy. All other systems reviewed and are negative.           --------------------------------------------- PAST HISTORY ---------------------------------------------  Past Medical History:  has a past medical history of Arthritis, Hypertension, Myocardial infarct ([facility] [address] and UTI (urinary tract infection). Past Surgical History:  has a past surgical history that includes Hysterectomy; Appendectomy; Kyphosis surgery (N/A, 10/10/2020); and back surgery. Social History:  reports that she has never smoked. She has never used smokeless tobacco. She reports that she does not drink alcohol and does not use drugs. Family History: family history is not on file. Unless otherwise noted, family history is non contributory    The patients home medications have been reviewed. Allergies: Chlorzoxazone and Sulfa antibiotics        ------------------------- NURSING NOTES AND VITALS REVIEWED ---------------------------   The nursing notes within the ED encounter and vital signs as below have been reviewed. /68   Pulse 79   Temp 97.3 °F (36.3 °C) (Infrared)   Resp 20   Ht 5' 5\" (1.651 m)   Wt 145 lb (65.8 kg)   LMP 05/16/2018   SpO2 96%   BMI 24.13 kg/m²   Oxygen Saturation Interpretation: Normal    The patients available past medical records and past encounters were reviewed. ---------------------------------------------------PHYSICAL EXAM--------------------------------------    Physical Exam  Vitals and nursing note reviewed. Constitutional:       Appearance: She is well-developed. HENT:      Head: Normocephalic and atraumatic. Eyes:      Conjunctiva/sclera: Conjunctivae normal.   Cardiovascular:      Rate and Rhythm: Normal rate and regular rhythm. Pulses: Normal pulses. Heart sounds: Normal heart sounds. No murmur heard. Pulmonary:      Effort: Pulmonary effort is normal. No respiratory distress.       Breath sounds: Rales present. No wheezing. Abdominal:      General: Bowel sounds are normal.      Palpations: Abdomen is soft. Tenderness: There is no abdominal tenderness. There is no guarding or rebound. Musculoskeletal:         General: Tenderness present. Cervical back: Normal range of motion and neck supple. Right lower leg: Edema (2+ from foot to knee, taught and tenderness) present. Left lower leg: Edema (2+ from foot to knee, taught and tender) present. Skin:     General: Skin is warm and dry. Neurological:      Mental Status: She is alert and oriented to person, place, and time. Cranial Nerves: No cranial nerve deficit.       Coordination: Coordination normal.            -------------------------------------------------- RESULTS -------------------------------------------------    LABS: (Lab results interpreted by me)  Results for orders placed or performed during the hospital encounter of 06/25/21   CBC Auto Differential   Result Value Ref Range    WBC 4.7 4.5 - 11.5 E9/L    RBC 4.53 3.50 - 5.50 E12/L    Hemoglobin 13.6 11.5 - 15.5 g/dL    Hematocrit 39.9 34.0 - 48.0 %    MCV 88.1 80.0 - 99.9 fL    MCH 30.0 26.0 - 35.0 pg    MCHC 34.1 32.0 - 34.5 %    RDW 13.4 11.5 - 15.0 fL    Platelets 301 612 - 290 E9/L    MPV 10.1 7.0 - 12.0 fL    Neutrophils % 74.8 43.0 - 80.0 %    Immature Granulocytes % 0.2 0.0 - 5.0 %    Lymphocytes % 16.6 (L) 20.0 - 42.0 %    Monocytes % 7.2 2.0 - 12.0 %    Eosinophils % 0.6 0.0 - 6.0 %    Basophils % 0.6 0.0 - 2.0 %    Neutrophils Absolute 3.52 1.80 - 7.30 E9/L    Immature Granulocytes # 0.01 E9/L    Lymphocytes Absolute 0.78 (L) 1.50 - 4.00 E9/L    Monocytes Absolute 0.34 0.10 - 0.95 E9/L    Eosinophils Absolute 0.03 (L) 0.05 - 0.50 E9/L    Basophils Absolute 0.03 0.00 - 0.20 M4/S   Basic Metabolic Panel   Result Value Ref Range    Sodium 137 132 - 146 mmol/L    Potassium 4.0 3.5 - 5.0 mmol/L    Chloride 97 (L) 98 - 107 mmol/L    CO2 29 22 - 29 mmol/L    Anion Gap 11 7 - 16 mmol/L    Glucose 178 (H) 74 - 99 mg/dL    BUN 24 (H) 6 - 23 mg/dL    CREATININE 1.2 (H) 0.5 - 1.0 mg/dL    GFR Non-African American 42 >=60 mL/min/1.73    GFR African American 51     Calcium 9.5 8.6 - 10.2 mg/dL   Brain Natriuretic Peptide   Result Value Ref Range    Pro-BNP 1,122 (H) 0 - 450 pg/mL   Troponin   Result Value Ref Range    Troponin, High Sensitivity 44 (H) 0 - 9 ng/L   EKG 12 Lead   Result Value Ref Range    Ventricular Rate 73 BPM    Atrial Rate 73 BPM    P-R Interval 208 ms    QRS Duration 160 ms    Q-T Interval 474 ms    QTc Calculation (Bazett) 522 ms    R Axis -36 degrees    T Axis 112 degrees       All radiology results have been personally reviewed by myself   RADIOLOGY:  Interpreted by Radiologist.  US DUP LOWER EXTREMITIES BILATERAL VENOUS   Final Result   Limited evaluation of the calf veins due swelling in both lower extremities. No evidence of DVT in visualized veins of the lower extremities from the   distal external iliac veins to the posterior tibial trunk bilaterally. .         XR FOOT LEFT (MIN 3 VIEWS)   Final Result   1. No fracture or joint dislocation is seen in right or left ankle or foot. 2. Diffuse soft tissue swelling along the ankle and feet bilaterally. 3. Calcaneal spurs are seen bilaterally. XR FOOT RIGHT (MIN 3 VIEWS)   Final Result   1. No fracture or joint dislocation is seen in right or left ankle or foot. 2. Diffuse soft tissue swelling along the ankle and feet bilaterally. 3. Calcaneal spurs are seen bilaterally. XR ANKLE LEFT (2 VIEWS)   Final Result   1. No fracture or joint dislocation is seen in right or left ankle or foot. 2. Diffuse soft tissue swelling along the ankle and feet bilaterally. 3. Calcaneal spurs are seen bilaterally. XR ANKLE RIGHT (2 VIEWS)   Final Result   1. No fracture or joint dislocation is seen in right or left ankle or foot.    2. Diffuse soft tissue swelling along the ankle and feet bilaterally. 3. Calcaneal spurs are seen bilaterally. XR CHEST PORTABLE   Final Result   1. Presence of large size hiatal hernia. 2.  There is mild cardiomegaly. 3.  Discrete bilateral pleural effusions appears to be present. EKG: Interpreted by me, emergency department physician, Dr. Joyce Huggins  This EKG is signed by emergency department physician. Rate: 73  Rhythm: sinus  Interpretation: Left axis deviation left bundle branch block, 1st degree av block  Comparison: stable as compared to patient's most recent EKG 10/9/20      ------------------------------ ED COURSE/MEDICAL DECISION MAKING----------------------  Medications   HYDROcodone-acetaminophen (NORCO) 5-325 MG per tablet 1 tablet (1 tablet Oral Given 6/25/21 1434)   furosemide (LASIX) injection 40 mg (40 mg Intravenous Given 6/25/21 1621)       Re-evaluations & Consults:  ED Course as of Jun 25 2111 Fri Jun 25, 2021   1617 Compared to 0.8 - 4 months ago   Creatinine(!): 1.2 [MF]   1648 Discussed results and plan with patient and daughter including concern for CHF and EMILY and recommended admission. Patient states \"I am 80years old and do not want to spend my days in the hospital \"understand the seriousness and that she could get worse she already has a follow-up appointment with her family doctor on Tuesday. Patient we discharged home with increased Lasix and strict return precautions if she changes her mind and is willing to be admitted    [MF]      ED Course User Index  [MF] Chris Renee DO        The cardiac monitor revealed sinus with a heart rate in the 70s as interpreted by me. The cardiac monitor was ordered secondary to the patient's chf/emily and to monitor the patient for dysrhythmia. CPT [id number]    Medical Decision Making:    Patient for significant acute tight bilateral lower extremity edema with pain likely secondary to acute CHF. Ultrasound negative for DVT.   no source found for patient's rib/chest pain patient also found to have EMILY compared to lab work 4 months ago. Patient recommended admission for CHF and EMILY treatment however she refuses states she has a follow-up appoint with her family doctor on Tuesday. Patient and family given strict return precautions instructions to maintain that Tuesday appointment. This patient's ED course included: a personal history and physicial examination, re-evaluation prior to disposition, IV medications, cardiac monitoring, continuous pulse oximetry and complex medical decision making and emergency management    This patient has remained unchanged during their ED course. Critical Care Time:       Counseling: The emergency provider has spoken with the patient and discussed todays results, in addition to providing specific details for the plan of care and counseling regarding the diagnosis and prognosis. Questions are answered at this time and they are agreeable with the plan. Discharge Medication List as of 6/25/2021  5:18 PM      START taking these medications    Details   furosemide (LASIX) 20 MG tablet Take 1 tablet by mouth 2 times daily for 7 days, Disp-14 tablet, R-0Normal      HYDROcodone-acetaminophen (NORCO) 5-325 MG per tablet Take 1 tablet by mouth every 6 hours as needed for Pain for up to 3 days. , Disp-12 tablet, R-0Normal              --------------------------------- IMPRESSION AND DISPOSITION ---------------------------------    IMPRESSION  1. Acute on chronic congestive heart failure, unspecified heart failure type ([facility] [address]    2. EMILY (acute kidney injury) ([facility] [address]    3. Peripheral edema    4. Leg pain, bilateral        DISPOSITION  Disposition: Discharge to home  Patient condition is stable      Dr. Zulay LUDWIG, am the primary doctor of record. NOTE: This report was transcribed using voice recognition software.  Effort was made to ensure accuracy; however, inadvertent computerized transcription errors may be present         [facility], [place] Raj Luevano, DO  06/25/21 2024

## 2021-06-26 LAB
EKG ATRIAL RATE: 73 BPM
EKG P-R INTERVAL: 208 MS
EKG Q-T INTERVAL: 474 MS
EKG QRS DURATION: 160 MS
EKG QTC CALCULATION (BAZETT): 522 MS
EKG R AXIS: -36 DEGREES
EKG T AXIS: 112 DEGREES
EKG VENTRICULAR RATE: 73 BPM

## 2021-06-26 PROCEDURE — 93010 ELECTROCARDIOGRAM REPORT: CPT | Performed by: INTERNAL MEDICINE

## 2021-07-28 ENCOUNTER — HOSPITAL ENCOUNTER (OUTPATIENT)
Dept: INTERVENTIONAL RADIOLOGY/VASCULAR | Age: 86
Discharge: HOME | End: 2021-07-30
Payer: MEDICARE

## 2021-07-28 DIAGNOSIS — I73.9 PERIPHERAL VASCULAR DISEASE, UNSPECIFIED (HCC): ICD-10-CM

## 2021-07-28 PROCEDURE — 93922 UPR/L XTREMITY ART 2 LEVELS: CPT

## 2021-10-28 ENCOUNTER — HOSPITAL ENCOUNTER (OUTPATIENT)
Dept: GENERAL RADIOLOGY | Age: 86
Discharge: HOME | End: 2021-10-30
Payer: MEDICARE

## 2021-10-28 ENCOUNTER — HOSPITAL ENCOUNTER (OUTPATIENT)
Age: 86
Discharge: HOME | End: 2021-10-30
Payer: MEDICARE

## 2021-10-28 DIAGNOSIS — M54.9 DORSALGIA: ICD-10-CM

## 2021-10-28 PROCEDURE — 72070 X-RAY EXAM THORAC SPINE 2VWS: CPT

## 2021-10-28 PROCEDURE — 72100 X-RAY EXAM L-S SPINE 2/3 VWS: CPT

## 2021-12-23 PROBLEM — M54.16 LUMBAR RADICULOPATHY: Status: ACTIVE | Noted: 2021-12-23

## 2021-12-26 ENCOUNTER — APPOINTMENT (OUTPATIENT)
Dept: GENERAL RADIOLOGY | Age: 86
DRG: 690 | End: 2021-12-26
Payer: MEDICARE

## 2021-12-26 ENCOUNTER — HOSPITAL ENCOUNTER (INPATIENT)
Age: 86
LOS: 5 days | Discharge: HOME | DRG: 690 | End: 2021-12-31
Attending: STUDENT IN AN ORGANIZED HEALTH CARE EDUCATION/TRAINING PROGRAM | Admitting: FAMILY MEDICINE
Payer: MEDICARE

## 2021-12-26 ENCOUNTER — APPOINTMENT (OUTPATIENT)
Dept: CT IMAGING | Age: 86
DRG: 690 | End: 2021-12-26
Payer: MEDICARE

## 2021-12-26 DIAGNOSIS — N30.01 ACUTE CYSTITIS WITH HEMATURIA: ICD-10-CM

## 2021-12-26 DIAGNOSIS — R53.1 GENERALIZED WEAKNESS: Primary | ICD-10-CM

## 2021-12-26 DIAGNOSIS — N30.00 ACUTE CYSTITIS WITHOUT HEMATURIA: ICD-10-CM

## 2021-12-26 PROBLEM — N39.0 UTI (URINARY TRACT INFECTION): Status: ACTIVE | Noted: 2021-12-26

## 2021-12-26 LAB
ALBUMIN SERPL-MCNC: 3.2 G/DL (ref 3.5–5.2)
ALP BLD-CCNC: 223 U/L (ref 35–104)
ALT SERPL-CCNC: 14 U/L (ref 0–32)
ANION GAP SERPL CALCULATED.3IONS-SCNC: 14 MMOL/L (ref 7–16)
AST SERPL-CCNC: 21 U/L (ref 0–31)
BACTERIA: ABNORMAL /HPF
BASOPHILS ABSOLUTE: 0 E9/L (ref 0–0.2)
BASOPHILS RELATIVE PERCENT: 0 % (ref 0–2)
BILIRUB SERPL-MCNC: 0.5 MG/DL (ref 0–1.2)
BILIRUBIN URINE: NEGATIVE
BLOOD, URINE: ABNORMAL
BUN BLDV-MCNC: 34 MG/DL (ref 6–23)
CALCIUM SERPL-MCNC: 9 MG/DL (ref 8.6–10.2)
CHLORIDE BLD-SCNC: 94 MMOL/L (ref 98–107)
CLARITY: CLEAR
CO2: 24 MMOL/L (ref 22–29)
COLOR: YELLOW
CREAT SERPL-MCNC: 2.2 MG/DL (ref 0.5–1)
EOSINOPHILS ABSOLUTE: 0.1 E9/L (ref 0.05–0.5)
EOSINOPHILS RELATIVE PERCENT: 1 % (ref 0–6)
GFR AFRICAN AMERICAN: 25
GFR NON-AFRICAN AMERICAN: 21 ML/MIN/1.73
GLUCOSE BLD-MCNC: 138 MG/DL (ref 74–99)
GLUCOSE URINE: NEGATIVE MG/DL
HCT VFR BLD CALC: 40.6 % (ref 34–48)
HEMOGLOBIN: 14.2 G/DL (ref 11.5–15.5)
INFLUENZA A BY PCR: NOT DETECTED
INFLUENZA A BY PCR: NOT DETECTED
INFLUENZA B BY PCR: NOT DETECTED
INFLUENZA B BY PCR: NOT DETECTED
KETONES, URINE: NEGATIVE MG/DL
LACTIC ACID, SEPSIS: 1.6 MMOL/L (ref 0.5–1.9)
LEUKOCYTE ESTERASE, URINE: ABNORMAL
LIPASE: 16 U/L (ref 13–60)
LYMPHOCYTES ABSOLUTE: 1.62 E9/L (ref 1.5–4)
LYMPHOCYTES RELATIVE PERCENT: 16 % (ref 20–42)
MAGNESIUM: 2.2 MG/DL (ref 1.6–2.6)
MCH RBC QN AUTO: 30 PG (ref 26–35)
MCHC RBC AUTO-ENTMCNC: 35 % (ref 32–34.5)
MCV RBC AUTO: 85.7 FL (ref 80–99.9)
MONOCYTES ABSOLUTE: 0.81 E9/L (ref 0.1–0.95)
MONOCYTES RELATIVE PERCENT: 8 % (ref 2–12)
NEUTROPHILS ABSOLUTE: 7.58 E9/L (ref 1.8–7.3)
NEUTROPHILS RELATIVE PERCENT: 75 % (ref 43–80)
NITRITE, URINE: NEGATIVE
PDW BLD-RTO: 13.8 FL (ref 11.5–15)
PH UA: 6 (ref 5–9)
PLATELET # BLD: 279 E9/L (ref 130–450)
PMV BLD AUTO: 10.1 FL (ref 7–12)
POTASSIUM SERPL-SCNC: 4.8 MMOL/L (ref 3.5–5)
PROTEIN UA: 30 MG/DL
RBC # BLD: 4.74 E12/L (ref 3.5–5.5)
RBC UA: ABNORMAL /HPF (ref 0–2)
SARS-COV-2, NAAT: NOT DETECTED
SODIUM BLD-SCNC: 132 MMOL/L (ref 132–146)
SPECIFIC GRAVITY UA: 1.01 (ref 1–1.03)
TOTAL PROTEIN: 6.7 G/DL (ref 6.4–8.3)
TROPONIN, HIGH SENSITIVITY: 55 NG/L (ref 0–9)
UROBILINOGEN, URINE: 0.2 E.U./DL
WBC # BLD: 10.1 E9/L (ref 4.5–11.5)
WBC UA: ABNORMAL /HPF (ref 0–5)

## 2021-12-26 PROCEDURE — 87635 SARS-COV-2 COVID-19 AMP PRB: CPT

## 2021-12-26 PROCEDURE — XW033N5 INTRODUCTION OF MEROPENEM-VABORBACTAM ANTI-INFECTIVE INTO PERIPHERAL VEIN, PERCUTANEOUS APPROACH, NEW TECHNOLOGY GROUP 5: ICD-10-PCS | Performed by: FAMILY MEDICINE

## 2021-12-26 PROCEDURE — 2580000003 HC RX 258: Performed by: STUDENT IN AN ORGANIZED HEALTH CARE EDUCATION/TRAINING PROGRAM

## 2021-12-26 PROCEDURE — 70450 CT HEAD/BRAIN W/O DYE: CPT

## 2021-12-26 PROCEDURE — 96361 HYDRATE IV INFUSION ADD-ON: CPT

## 2021-12-26 PROCEDURE — 85025 COMPLETE CBC W/AUTO DIFF WBC: CPT

## 2021-12-26 PROCEDURE — 84484 ASSAY OF TROPONIN QUANT: CPT

## 2021-12-26 PROCEDURE — 99285 EMERGENCY DEPT VISIT HI MDM: CPT

## 2021-12-26 PROCEDURE — 87088 URINE BACTERIA CULTURE: CPT

## 2021-12-26 PROCEDURE — 83605 ASSAY OF LACTIC ACID: CPT

## 2021-12-26 PROCEDURE — 81001 URINALYSIS AUTO W/SCOPE: CPT

## 2021-12-26 PROCEDURE — 80053 COMPREHEN METABOLIC PANEL: CPT

## 2021-12-26 PROCEDURE — 96365 THER/PROPH/DIAG IV INF INIT: CPT

## 2021-12-26 PROCEDURE — 36415 COLL VENOUS BLD VENIPUNCTURE: CPT

## 2021-12-26 PROCEDURE — 83735 ASSAY OF MAGNESIUM: CPT

## 2021-12-26 PROCEDURE — 1200000000 HC SEMI PRIVATE

## 2021-12-26 PROCEDURE — 96375 TX/PRO/DX INJ NEW DRUG ADDON: CPT

## 2021-12-26 PROCEDURE — 83690 ASSAY OF LIPASE: CPT

## 2021-12-26 PROCEDURE — 87186 SC STD MICRODIL/AGAR DIL: CPT

## 2021-12-26 PROCEDURE — 87502 INFLUENZA DNA AMP PROBE: CPT

## 2021-12-26 PROCEDURE — 6360000002 HC RX W HCPCS: Performed by: STUDENT IN AN ORGANIZED HEALTH CARE EDUCATION/TRAINING PROGRAM

## 2021-12-26 PROCEDURE — 93005 ELECTROCARDIOGRAM TRACING: CPT | Performed by: STUDENT IN AN ORGANIZED HEALTH CARE EDUCATION/TRAINING PROGRAM

## 2021-12-26 PROCEDURE — 87040 BLOOD CULTURE FOR BACTERIA: CPT

## 2021-12-26 PROCEDURE — 87077 CULTURE AEROBIC IDENTIFY: CPT

## 2021-12-26 PROCEDURE — 71045 X-RAY EXAM CHEST 1 VIEW: CPT

## 2021-12-26 RX ORDER — MEROPENEM 1 G/1
INJECTION, POWDER, FOR SOLUTION INTRAVENOUS
Status: DISPENSED
Start: 2021-12-26 | End: 2021-12-27

## 2021-12-26 RX ORDER — 0.9 % SODIUM CHLORIDE 0.9 %
1000 INTRAVENOUS SOLUTION INTRAVENOUS ONCE
Status: COMPLETED | OUTPATIENT
Start: 2021-12-26 | End: 2021-12-26

## 2021-12-26 RX ADMIN — SODIUM CHLORIDE 1000 ML: 9 INJECTION, SOLUTION INTRAVENOUS at 16:48

## 2021-12-26 RX ADMIN — WATER 2000 MG: 1 INJECTION INTRAMUSCULAR; INTRAVENOUS; SUBCUTANEOUS at 16:57

## 2021-12-26 RX ADMIN — MEROPENEM 1000 MG: 1 INJECTION, POWDER, FOR SOLUTION INTRAVENOUS at 17:54

## 2021-12-26 NOTE — ED PROVIDER NOTES
Department of Emergency Medicine   ED  Provider Note  Admit Date/RoomTime: 12/26/2021  2:00 PM  ED Room: 10/10          History of Present Illness:  12/26/21, Time: 2:40 PM EST  Chief Complaint   Patient presents with    Fatigue     weakness/tremors upon standing          Rima Srinivasan is a 80 y.o. female presenting to the ED for Fatigue. The patient has been severely fatigued the past week. The patient's daughter is at bedside and states that she is having difficulty arousing her and waking her up although when she does wake up she is not altered or confused. She has been having frequent urination that she describes. But denies dysuria hematuria back pain or abdominal pain. At present, the patient denies any complaints. She denies any fevers chills or myalgias. Patient is fully vaccinated for Covid and has had a Covid booster shot as well. Denies any head trauma. She denies any localizing or focal weakness. Denies any nausea or emesis. This is been gradually worsening over the past week until the patient is unable to stay awake at home. Denies any bleeding from any source as well. Review of Systems:  Review of systems obtained and negative unless stated otherwise above in the HPI.    --------------------------------------------- PAST HISTORY ---------------------------------------------  Past Medical History:  has a past medical history of Arthritis, Hypertension, Myocardial infarct ([facility] [address] and UTI (urinary tract infection). Past Surgical History:  has a past surgical history that includes Hysterectomy; Appendectomy; Kyphosis surgery (N/A, 10/10/2020); and back surgery. Social History:  reports that she has never smoked. She has never used smokeless tobacco. She reports that she does not drink alcohol and does not use drugs. Family History: family history is not on file. . Unless otherwise noted, family history is non contributory    The patients home medications have been reviewed. Allergies: Chlorzoxazone and Sulfa antibiotics    I have reviewed the past medical history, past surgical history, social history, and family history    ---------------------------------------------------PHYSICAL EXAM--------------------------------------    Constitutional: Appears in no distress  Head: Normocephalic, atraumatic  Eyes: Non-icteric slcera, no conjunctival injection  ENT: Moist mucous membranes,  Neck: Trachea midline, no JVD  Respiratory: Nonlabored respirations, focal left lower lobe crackle is appreciated on exam, no wheezing or rhonchi is appreciated  Cardiovascular: Regular rate. Regular rhythm. No murmurs, no gallops, no rubs. Gastrointestinal: Abdomen Soft, Non tender, Non distended. No rebound tenderness, guarding, or rigidity. Extremities: No lower extremity edema  Genitourinary: No CVA tenderness, no suprapubic tenderness  Musculoskeletal: Moves all extremities, no deformity  Skin: Pink, warm, dry without rash. Neurologic: Alert, symmetric facies, no aphasia, moves all 4 extremities, 5 out of 5 bilateral upper extremity muscle strength, 4- out of 5 bilateral lower extremity muscle strength    -------------------------------------------------- RESULTS -------------------------------------------------  I have personally reviewed all laboratory and imaging results for this patient. Results are listed below.      LABS: (Lab results interpreted by me)  Results for orders placed or performed during the hospital encounter of 12/26/21   COVID-19, Rapid    Specimen: Nasopharyngeal Swab   Result Value Ref Range    SARS-CoV-2, NAAT Not Detected Not Detected   Rapid influenza A/B antigens    Specimen: Nasopharyngeal   Result Value Ref Range    Influenza A by PCR Not Detected Not Detected    Influenza B by PCR Not Detected Not Detected   Rapid influenza A/B antigens    Specimen: Nasopharyngeal   Result Value Ref Range    Influenza A by PCR Not Detected Not Detected    Influenza B by PCR Not Detected Not Detected   CBC Auto Differential   Result Value Ref Range    WBC 10.1 4.5 - 11.5 E9/L    RBC 4.74 3.50 - 5.50 E12/L    Hemoglobin 14.2 11.5 - 15.5 g/dL    Hematocrit 40.6 34.0 - 48.0 %    MCV 85.7 80.0 - 99.9 fL    MCH 30.0 26.0 - 35.0 pg    MCHC 35.0 (H) 32.0 - 34.5 %    RDW 13.8 11.5 - 15.0 fL    Platelets 663 336 - 284 E9/L    MPV 10.1 7.0 - 12.0 fL    Neutrophils % 75.0 43.0 - 80.0 %    Lymphocytes % 16.0 (L) 20.0 - 42.0 %    Monocytes % 8.0 2.0 - 12.0 %    Eosinophils % 1.0 0.0 - 6.0 %    Basophils % 0.0 0.0 - 2.0 %    Neutrophils Absolute 7.58 (H) 1.80 - 7.30 E9/L    Lymphocytes Absolute 1.62 1.50 - 4.00 E9/L    Monocytes Absolute 0.81 0.10 - 0.95 E9/L    Eosinophils Absolute 0.10 0.05 - 0.50 E9/L    Basophils Absolute 0.00 0.00 - 0.20 E9/L   Comprehensive Metabolic Panel   Result Value Ref Range    Sodium 132 132 - 146 mmol/L    Potassium 4.8 3.5 - 5.0 mmol/L    Chloride 94 (L) 98 - 107 mmol/L    CO2 24 22 - 29 mmol/L    Anion Gap 14 7 - 16 mmol/L    Glucose 138 (H) 74 - 99 mg/dL    BUN 34 (H) 6 - 23 mg/dL    CREATININE 2.2 (H) 0.5 - 1.0 mg/dL    GFR Non-African American 21 >=60 mL/min/1.73    GFR African American 25     Calcium 9.0 8.6 - 10.2 mg/dL    Total Protein 6.7 6.4 - 8.3 g/dL    Albumin 3.2 (L) 3.5 - 5.2 g/dL    Total Bilirubin 0.5 0.0 - 1.2 mg/dL    Alkaline Phosphatase 223 (H) 35 - 104 U/L    ALT 14 0 - 32 U/L    AST 21 0 - 31 U/L   Lipase   Result Value Ref Range    Lipase 16 13 - 60 U/L   Urinalysis with Microscopic   Result Value Ref Range    Color, UA Yellow Straw/Yellow    Clarity, UA Clear Clear    Glucose, Ur Negative Negative mg/dL    Bilirubin Urine Negative Negative    Ketones, Urine Negative Negative mg/dL    Specific Gravity, UA 1.015 1.005 - 1.030    Blood, Urine LARGE (A) Negative    pH, UA 6.0 5.0 - 9.0    Protein, UA 30 (A) Negative mg/dL    Urobilinogen, Urine 0.2 <2.0 E.U./dL    Nitrite, Urine Negative Negative    Leukocyte Esterase, Urine LARGE (A) Negative WBC, UA 10-20 (A) 0 - 5 /HPF    RBC, UA 10-20 (A) 0 - 2 /HPF    Bacteria, UA FEW (A) None Seen /HPF   Magnesium   Result Value Ref Range    Magnesium 2.2 1.6 - 2.6 mg/dL   Troponin   Result Value Ref Range    Troponin, High Sensitivity 55 (H) 0 - 9 ng/L   Lactate, Sepsis   Result Value Ref Range    Lactic Acid, Sepsis 1.6 0.5 - 1.9 mmol/L   EKG 12 Lead   Result Value Ref Range    Ventricular Rate 70 BPM    Atrial Rate 70 BPM    P-R Interval 206 ms    QRS Duration 154 ms    Q-T Interval 448 ms    QTc Calculation (Bazett) 483 ms    P Axis 34 degrees    R Axis -43 degrees    T Axis 116 degrees   ,       RADIOLOGY:  Interpreted by Radiologist unless otherwise specified  CT HEAD WO CONTRAST   Final Result   Chronic small vessel ischemic change with age related volume loss      No acute changes identified      RECOMMENDATIONS:   Unavailable         XR CHEST PORTABLE   Final Result   Hiatal hernia      No acute findings               ------------------------- NURSING NOTES AND VITALS REVIEWED ---------------------------   The nursing notes within the ED encounter and vital signs as below have been reviewed by myself  /78   Pulse 80   Temp 98.2 °F (36.8 °C)   Resp 16   Ht 5' 4\" (1.626 m)   Wt 145 lb (65.8 kg)   LMP 05/16/2018   SpO2 96%   BMI 24.89 kg/m²     Oxygen Saturation Interpretation: Normal    The patients available past medical records and past encounters were reviewed.         ------------------------------ ED COURSE/MEDICAL DECISION MAKING----------------------  Medications   meropenem (MERREM) 1 g injection (  Not Given 12/26/21 1802)   meropenem (MERREM) 1,000 mg in sodium chloride 0.9 % 100 mL IVPB (mini-bag) (0 mg IntraVENous Stopped 12/26/21 1835)     Followed by   meropenem (MERREM) 1,000 mg in sodium chloride 0.9 % 100 mL IVPB (mini-bag) (has no administration in time range)   meropenem (MERREM) 1 g injection (  Not Given 12/26/21 1835)   cefTRIAXone (ROCEPHIN) 2,000 mg in sterile water 20 mL IV syringe (2,000 mg IntraVENous Given 12/26/21 1917)   0.9 % sodium chloride bolus (0 mLs IntraVENous Stopped 12/26/21 6402)        Re-Evaluations: This patient's ED course included:a personal history and physicial examination, re-evaluation prior to disposition, multiple bedside re-evaluations and IV medications    This patient has remained hemodynamically stable during their ED course. Consultations:  Internal Medicine    Medical Decision Making:   Patient presents emerge department with fatigue. Vital signs interpreted by myself as normal.    History and physical examination findings consistent with a broad differential diagnosis including electrolyte disturbances, infectious etiologies, pneumonia, patient does have a left lower lobe crackle so will evaluate for focal pneumonia as well as Covid or flu. She has a nonfocal neurologic exam at this time    Lab/imaging: No leukocytosis is noted. The patient is acute kidney injury with elevated BUN and creatinine at 34 and 2.2 respectively. Patient is given IV fluids after this. She is hypoalbuminemic. Elevated troponin at 55. This is pending repeat at this time. CT head is unremarkable for any acute intracranial process. Flu and Covid washes are negative. No lactic acidosis. EKG:  ED is interpreted myself as a ventricular rate of 70 bpm there is a P wave before every QRS complex. QRS duration is prolonged. There is a left bundle branch block pattern. There is left axis deviation. No ST segment elevation or depression. No T wave inversions. This EKG is interpreted by myself as a sinus rhythm left bundle branch block pattern. No evidence of ischemia or infarct. As compared to a prior tracing and unchanged from previous. Patient started on IV antibiotics for acute urinary tract infection causing generalized weakness. Had a long discussion with the patient as well as family at the bedside.   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 She is known to the ID service as well. Initially wanted to take her home on oral antibiotics and I advised him this was not advisable given the type of bacteria that she typically grows in the past.  Will initiate Merrem here and transferred us [address] [address] for further evaluation management. Counseling: The emergency provider has spoken with the patient and discussed todays results, in addition to providing specific details for the plan of care and counseling regarding the diagnosis and prognosis. Questions are answered at this time and they are agreeable with the plan.       --------------------------------- IMPRESSION AND DISPOSITION ---------------------------------    IMPRESSION  1. Generalized weakness    2. Acute cystitis with hematuria        DISPOSITION  Disposition: Transfer to [facility]  Patient condition is serious    IDr. Marian, am the primary provider of record    Marian Boyer DO  Emergency Medicine    NOTE: This report was transcribed using voice recognition software.  Every effort was made to ensure accuracy; however, inadvertent computerized transcription errors may be present         Stephanie Neri DO  12/26/21 3066

## 2021-12-27 ENCOUNTER — APPOINTMENT (OUTPATIENT)
Dept: ULTRASOUND IMAGING | Age: 86
DRG: 690 | End: 2021-12-27
Payer: MEDICARE

## 2021-12-27 LAB
ANION GAP SERPL CALCULATED.3IONS-SCNC: 11 MMOL/L (ref 7–16)
BUN BLDV-MCNC: 27 MG/DL (ref 6–23)
CALCIUM SERPL-MCNC: 8.1 MG/DL (ref 8.6–10.2)
CHLORIDE BLD-SCNC: 101 MMOL/L (ref 98–107)
CO2: 20 MMOL/L (ref 22–29)
CREAT SERPL-MCNC: 1.8 MG/DL (ref 0.5–1)
GFR AFRICAN AMERICAN: 32
GFR NON-AFRICAN AMERICAN: 26 ML/MIN/1.73
GLUCOSE BLD-MCNC: 151 MG/DL (ref 74–99)
HCT VFR BLD CALC: 39.3 % (ref 34–48)
HEMOGLOBIN: 13.1 G/DL (ref 11.5–15.5)
MCH RBC QN AUTO: 29.6 PG (ref 26–35)
MCHC RBC AUTO-ENTMCNC: 33.3 % (ref 32–34.5)
MCV RBC AUTO: 88.7 FL (ref 80–99.9)
PDW BLD-RTO: 14 FL (ref 11.5–15)
PLATELET # BLD: 274 E9/L (ref 130–450)
PMV BLD AUTO: 10 FL (ref 7–12)
POTASSIUM SERPL-SCNC: 4.7 MMOL/L (ref 3.5–5)
RBC # BLD: 4.43 E12/L (ref 3.5–5.5)
SODIUM BLD-SCNC: 132 MMOL/L (ref 132–146)
WBC # BLD: 9.7 E9/L (ref 4.5–11.5)

## 2021-12-27 PROCEDURE — 36415 COLL VENOUS BLD VENIPUNCTURE: CPT

## 2021-12-27 PROCEDURE — 1200000000 HC SEMI PRIVATE

## 2021-12-27 PROCEDURE — 85027 COMPLETE CBC AUTOMATED: CPT

## 2021-12-27 PROCEDURE — 80048 BASIC METABOLIC PNL TOTAL CA: CPT

## 2021-12-27 PROCEDURE — 6360000002 HC RX W HCPCS: Performed by: FAMILY MEDICINE

## 2021-12-27 PROCEDURE — 6370000000 HC RX 637 (ALT 250 FOR IP): Performed by: FAMILY MEDICINE

## 2021-12-27 PROCEDURE — 76770 US EXAM ABDO BACK WALL COMP: CPT

## 2021-12-27 PROCEDURE — 2580000003 HC RX 258: Performed by: FAMILY MEDICINE

## 2021-12-27 RX ORDER — PANTOPRAZOLE SODIUM 40 MG/1
40 TABLET, DELAYED RELEASE ORAL
Status: DISCONTINUED | OUTPATIENT
Start: 2021-12-27 | End: 2021-12-31 | Stop reason: HOSPADM

## 2021-12-27 RX ORDER — AMLODIPINE BESYLATE 10 MG/1
5 TABLET ORAL DAILY
Status: DISCONTINUED | OUTPATIENT
Start: 2021-12-27 | End: 2021-12-31 | Stop reason: HOSPADM

## 2021-12-27 RX ORDER — ATENOLOL 50 MG/1
50 TABLET ORAL DAILY
Status: DISCONTINUED | OUTPATIENT
Start: 2021-12-27 | End: 2021-12-31 | Stop reason: HOSPADM

## 2021-12-27 RX ORDER — ISOSORBIDE MONONITRATE 30 MG/1
30 TABLET, EXTENDED RELEASE ORAL DAILY
Status: DISCONTINUED | OUTPATIENT
Start: 2021-12-27 | End: 2021-12-31 | Stop reason: HOSPADM

## 2021-12-27 RX ORDER — DOCUSATE SODIUM 100 MG/1
100 CAPSULE, LIQUID FILLED ORAL DAILY
Status: DISCONTINUED | OUTPATIENT
Start: 2021-12-27 | End: 2021-12-31 | Stop reason: HOSPADM

## 2021-12-27 RX ORDER — ATORVASTATIN CALCIUM 10 MG/1
10 TABLET, FILM COATED ORAL DAILY
Status: DISCONTINUED | OUTPATIENT
Start: 2021-12-27 | End: 2021-12-31 | Stop reason: HOSPADM

## 2021-12-27 RX ORDER — ESCITALOPRAM OXALATE 10 MG/1
5 TABLET ORAL DAILY
Status: DISCONTINUED | OUTPATIENT
Start: 2021-12-27 | End: 2021-12-31 | Stop reason: HOSPADM

## 2021-12-27 RX ORDER — SODIUM CHLORIDE 9 MG/ML
INJECTION, SOLUTION INTRAVENOUS CONTINUOUS
Status: DISCONTINUED | OUTPATIENT
Start: 2021-12-27 | End: 2021-12-31 | Stop reason: HOSPADM

## 2021-12-27 RX ORDER — LIDOCAINE 4 G/G
1 PATCH TOPICAL DAILY
Status: DISCONTINUED | OUTPATIENT
Start: 2021-12-27 | End: 2021-12-31 | Stop reason: HOSPADM

## 2021-12-27 RX ORDER — LORAZEPAM 1 MG/1
1 TABLET ORAL 2 TIMES DAILY PRN
Status: DISCONTINUED | OUTPATIENT
Start: 2021-12-27 | End: 2021-12-31 | Stop reason: HOSPADM

## 2021-12-27 RX ORDER — ASPIRIN 81 MG/1
81 TABLET ORAL DAILY
Status: DISCONTINUED | OUTPATIENT
Start: 2021-12-27 | End: 2021-12-31 | Stop reason: HOSPADM

## 2021-12-27 RX ADMIN — BENZOCAINE AND MENTHOL 1 LOZENGE: 15; 3.6 LOZENGE ORAL at 22:07

## 2021-12-27 RX ADMIN — MEROPENEM 1000 MG: 1 INJECTION, POWDER, FOR SOLUTION INTRAVENOUS at 10:40

## 2021-12-27 RX ADMIN — ESCITALOPRAM 5 MG: 10 TABLET, FILM COATED ORAL at 10:35

## 2021-12-27 RX ADMIN — ATENOLOL 50 MG: 50 TABLET ORAL at 10:37

## 2021-12-27 RX ADMIN — ISOSORBIDE MONONITRATE 30 MG: 30 TABLET ORAL at 10:34

## 2021-12-27 RX ADMIN — MEROPENEM 1000 MG: 1 INJECTION, POWDER, FOR SOLUTION INTRAVENOUS at 03:41

## 2021-12-27 RX ADMIN — MEROPENEM 1000 MG: 1 INJECTION, POWDER, FOR SOLUTION INTRAVENOUS at 18:49

## 2021-12-27 RX ADMIN — PANTOPRAZOLE SODIUM 40 MG: 40 TABLET, DELAYED RELEASE ORAL at 05:26

## 2021-12-27 RX ADMIN — LORAZEPAM 1 MG: 1 TABLET ORAL at 22:07

## 2021-12-27 RX ADMIN — DOCUSATE SODIUM 100 MG: 100 CAPSULE ORAL at 10:35

## 2021-12-27 RX ADMIN — SODIUM CHLORIDE: 9 INJECTION, SOLUTION INTRAVENOUS at 10:30

## 2021-12-27 RX ADMIN — SODIUM CHLORIDE: 9 INJECTION, SOLUTION INTRAVENOUS at 02:41

## 2021-12-27 RX ADMIN — ASPIRIN 81 MG: 81 TABLET, COATED ORAL at 10:35

## 2021-12-27 ASSESSMENT — ENCOUNTER SYMPTOMS
SHORTNESS OF BREATH: 0
ABDOMINAL PAIN: 0

## 2021-12-27 ASSESSMENT — PAIN SCALES - GENERAL
PAINLEVEL_OUTOF10: 0
PAINLEVEL_OUTOF10: 0

## 2021-12-27 NOTE — CARE COORDINATION
Presents for fatique, weakness/tremors upon standing. Id consult. -seen her in the past for ESBL UTI. On merem IV Q8. Met with patient and daughter Lisa Rollins. She lives alone in 1 story home has been independent with a walker. Her PCP is Dr Sven Beth and uses [address] [facility] in [facility]. Has a history of snf [facility] and [facility] [place]  Then [facility] after each- unsure of whom. Plan is home family will transport. Await pt/ot radha. Daren/sw to follow.  Electronically signed by Francoise Ramey RN on 12/27/2021 at 3:39 PM

## 2021-12-27 NOTE — H&P
HISTORY AND PHYSICAL             Date: 12/27/2021        Patient Name: Katelyn Mace     YOB: 1926      Age:  80 y.o. Chief Complaint     Chief Complaint   Patient presents with    Fatigue     weakness/tremors upon standing           History Obtained From   patient, electronic medical record    History of Present Illness   Patient brought to ER for weakness and tremors. Past Medical History     Past Medical History:   Diagnosis Date    Arthritis     Hypertension     Myocardial infarct ([address]     UTI (urinary tract infection)         Past Surgical History     Past Surgical History:   Procedure Laterality Date    APPENDECTOMY      BACK SURGERY      cement t12, l1, l2-oct 2020    HYSTERECTOMY      KYPHOSIS SURGERY N/A 10/10/2020    T12, L1 and L2  KYPHOPLASTY performed by Qi Campuzano MD at [address]        Medications Prior to Admission     Prior to Admission medications    Medication Sig Start Date End Date Taking? Authorizing Provider   isosorbide mononitrate (IMDUR) 30 MG extended release tablet Take 30 mg by mouth daily   Yes Historical Provider, MD   escitalopram (LEXAPRO) 5 MG tablet Take 5 mg by mouth daily   Yes Historical Provider, MD   Probiotic Product (PROBIOTIC DAILY) CAPS Take by mouth    Yes Historical Provider, MD   docusate sodium (COLACE, DULCOLAX) 100 MG CAPS Take 100 mg by mouth daily 7/22/20  Yes Evelyn Quiles MD   atenolol (TENORMIN) 50 MG tablet Take 50 mg by mouth daily    Yes Historical Provider, MD   aspirin 81 MG tablet Take 81 mg by mouth daily   Yes Historical Provider, MD   LORazepam (ATIVAN) 1 MG tablet Take 1 mg by mouth 2 times daily as needed.     Yes Historical Provider, MD        Allergies   Chlorzoxazone and Sulfa antibiotics    Social History     Social History     Tobacco History     Smoking Status  Never Smoker    Smokeless Tobacco Use  Never Used          Alcohol History     Alcohol Use Status  No          Drug Use     Drug Use Status  Never          Sexual Activity     Sexually Active  Not Currently                Family History   No family history on file. Review of Systems   Review of Systems   Constitutional: Positive for activity change. Respiratory: Negative for shortness of breath. Cardiovascular: Negative for chest pain. Gastrointestinal: Negative for abdominal pain. Neurological: Positive for weakness. Physical Exam   /77   Pulse 72   Temp 98 °F (36.7 °C) (Oral)   Resp 18   Ht 5' 4\" (1.626 m)   Wt 145 lb (65.8 kg)   LMP 05/16/2018   SpO2 96%   BMI 24.89 kg/m²     Physical Exam  Vitals reviewed. Eyes:      Pupils: Pupils are equal, round, and reactive to light. Cardiovascular:      Rate and Rhythm: Normal rate and regular rhythm. Pulses: Normal pulses. Heart sounds: Normal heart sounds. Pulmonary:      Effort: Pulmonary effort is normal. No respiratory distress. Breath sounds: Normal breath sounds. No wheezing. Abdominal:      General: Abdomen is flat. Bowel sounds are normal. There is no distension. Tenderness: There is no abdominal tenderness. Skin:     General: Skin is warm and dry. Neurological:      General: No focal deficit present.          Labs      Recent Results (from the past 24 hour(s))   EKG 12 Lead    Collection Time: 12/26/21  3:25 PM   Result Value Ref Range    Ventricular Rate 70 BPM    Atrial Rate 70 BPM    P-R Interval 206 ms    QRS Duration 154 ms    Q-T Interval 448 ms    QTc Calculation (Bazett) 483 ms    P Axis 34 degrees    R Axis -43 degrees    T Axis 116 degrees   CBC Auto Differential    Collection Time: 12/26/21  3:29 PM   Result Value Ref Range    WBC 10.1 4.5 - 11.5 E9/L    RBC 4.74 3.50 - 5.50 E12/L    Hemoglobin 14.2 11.5 - 15.5 g/dL    Hematocrit 40.6 34.0 - 48.0 %    MCV 85.7 80.0 - 99.9 fL    MCH 30.0 26.0 - 35.0 pg    MCHC 35.0 (H) 32.0 - 34.5 %    RDW 13.8 11.5 - 15.0 fL    Platelets 428 888 - 030 E9/L    MPV 10.1 7.0 - 12.0 fL Neutrophils % 75.0 43.0 - 80.0 %    Lymphocytes % 16.0 (L) 20.0 - 42.0 %    Monocytes % 8.0 2.0 - 12.0 %    Eosinophils % 1.0 0.0 - 6.0 %    Basophils % 0.0 0.0 - 2.0 %    Neutrophils Absolute 7.58 (H) 1.80 - 7.30 E9/L    Lymphocytes Absolute 1.62 1.50 - 4.00 E9/L    Monocytes Absolute 0.81 0.10 - 0.95 E9/L    Eosinophils Absolute 0.10 0.05 - 0.50 E9/L    Basophils Absolute 0.00 0.00 - 0.20 E9/L   Comprehensive Metabolic Panel    Collection Time: 12/26/21  3:29 PM   Result Value Ref Range    Sodium 132 132 - 146 mmol/L    Potassium 4.8 3.5 - 5.0 mmol/L    Chloride 94 (L) 98 - 107 mmol/L    CO2 24 22 - 29 mmol/L    Anion Gap 14 7 - 16 mmol/L    Glucose 138 (H) 74 - 99 mg/dL    BUN 34 (H) 6 - 23 mg/dL    CREATININE 2.2 (H) 0.5 - 1.0 mg/dL    GFR Non-African American 21 >=60 mL/min/1.73    GFR African American 25     Calcium 9.0 8.6 - 10.2 mg/dL    Total Protein 6.7 6.4 - 8.3 g/dL    Albumin 3.2 (L) 3.5 - 5.2 g/dL    Total Bilirubin 0.5 0.0 - 1.2 mg/dL    Alkaline Phosphatase 223 (H) 35 - 104 U/L    ALT 14 0 - 32 U/L    AST 21 0 - 31 U/L   Lipase    Collection Time: 12/26/21  3:29 PM   Result Value Ref Range    Lipase 16 13 - 60 U/L   COVID-19, Rapid    Collection Time: 12/26/21  3:29 PM    Specimen: Nasopharyngeal Swab   Result Value Ref Range    SARS-CoV-2, NAAT Not Detected Not Detected   Rapid influenza A/B antigens    Collection Time: 12/26/21  3:29 PM    Specimen: Nasopharyngeal   Result Value Ref Range    Influenza A by PCR Not Detected Not Detected    Influenza B by PCR Not Detected Not Detected   Magnesium    Collection Time: 12/26/21  3:29 PM   Result Value Ref Range    Magnesium 2.2 1.6 - 2.6 mg/dL   Troponin    Collection Time: 12/26/21  3:29 PM   Result Value Ref Range    Troponin, High Sensitivity 55 (H) 0 - 9 ng/L   Urinalysis with Microscopic    Collection Time: 12/26/21  3:42 PM   Result Value Ref Range    Color, UA Yellow Straw/Yellow    Clarity, UA Clear Clear    Glucose, Ur Negative Negative mg/dL Bilirubin Urine Negative Negative    Ketones, Urine Negative Negative mg/dL    Specific Gravity, UA 1.015 1.005 - 1.030    Blood, Urine LARGE (A) Negative    pH, UA 6.0 5.0 - 9.0    Protein, UA 30 (A) Negative mg/dL    Urobilinogen, Urine 0.2 <2.0 E.U./dL    Nitrite, Urine Negative Negative    Leukocyte Esterase, Urine LARGE (A) Negative    WBC, UA 10-20 (A) 0 - 5 /HPF    RBC, UA 10-20 (A) 0 - 2 /HPF    Bacteria, UA FEW (A) None Seen /HPF   Rapid influenza A/B antigens    Collection Time: 12/26/21  4:07 PM    Specimen: Nasopharyngeal   Result Value Ref Range    Influenza A by PCR Not Detected Not Detected    Influenza B by PCR Not Detected Not Detected   Lactate, Sepsis    Collection Time: 12/26/21  4:16 PM   Result Value Ref Range    Lactic Acid, Sepsis 1.6 0.5 - 1.9 mmol/L   CBC    Collection Time: 12/27/21  5:03 AM   Result Value Ref Range    WBC 9.7 4.5 - 11.5 E9/L    RBC 4.43 3.50 - 5.50 E12/L    Hemoglobin 13.1 11.5 - 15.5 g/dL    Hematocrit 39.3 34.0 - 48.0 %    MCV 88.7 80.0 - 99.9 fL    MCH 29.6 26.0 - 35.0 pg    MCHC 33.3 32.0 - 34.5 %    RDW 14.0 11.5 - 15.0 fL    Platelets 552 830 - 814 E9/L    MPV 10.0 7.0 - 12.0 fL   Basic metabolic panel    Collection Time: 12/27/21  5:03 AM   Result Value Ref Range    Sodium 132 132 - 146 mmol/L    Potassium 4.7 3.5 - 5.0 mmol/L    Chloride 101 98 - 107 mmol/L    CO2 20 (L) 22 - 29 mmol/L    Anion Gap 11 7 - 16 mmol/L    Glucose 151 (H) 74 - 99 mg/dL    BUN 27 (H) 6 - 23 mg/dL    CREATININE 1.8 (H) 0.5 - 1.0 mg/dL    GFR Non-African American 26 >=60 mL/min/1.73    GFR African American 32     Calcium 8.1 (L) 8.6 - 10.2 mg/dL        Imaging/Diagnostics Last 24 Hours   CT HEAD WO CONTRAST    Result Date: 12/26/2021  EXAMINATION: CT OF THE HEAD WITHOUT CONTRAST  12/26/2021 2:43 pm TECHNIQUE: CT of the head was performed without the administration of intravenous contrast. Dose modulation, iterative reconstruction, and/or weight based adjustment of the mA/kV was utilized to reduce the radiation dose to as low as reasonably achievable. COMPARISON: None. HISTORY: ORDERING SYSTEM PROVIDED HISTORY: altered mentation per patient TECHNOLOGIST PROVIDED HISTORY: Reason for exam:->altered mentation per patient Has a \"code stroke\" or \"stroke alert\" been called? ->No Decision Support Exception - unselect if not a suspected or confirmed emergency medical condition->Emergency Medical Condition (MA) FINDINGS: BRAIN/VENTRICLES: There is no acute intracranial hemorrhage, mass effect or midline shift. No abnormal extra-axial fluid collection. The gray-white differentiation is maintained without evidence of an acute infarct. There is no evidence of hydrocephalus patchy hypodensity in the supratentorial white matter. There is generalized volume loss with prominence of ventricles and sulci ORBITS: The visualized portion of the orbits demonstrate no acute abnormality. SINUSES: The visualized paranasal sinuses and mastoid air cells demonstrate no acute abnormality. SOFT TISSUES/SKULL:  No acute abnormality of the visualized skull or soft tissues. Chronic small vessel ischemic change with age related volume loss No acute changes identified RECOMMENDATIONS: Unavailable     XR CHEST PORTABLE    Result Date: 12/26/2021  EXAMINATION: ONE XRAY VIEW OF THE CHEST 12/26/2021 2:44 pm COMPARISON: Comparison is dated June 25, 2021 HISTORY: ORDERING SYSTEM PROVIDED HISTORY: LLL pneumonia TECHNOLOGIST PROVIDED HISTORY: Reason for exam:->LLL pneumonia FINDINGS: Large hiatal hernia again noted. Cardiac silhouette does not appear enlarged. No confluent pulmonary infiltrate identified. Blunting of the left costophrenic angle appears chronic. Pulmonary vasculature is unremarkable. No acute interval change.      Hiatal hernia No acute findings       Assessment      Hospital Problems           Last Modified POA    UTI (urinary tract infection) 12/26/2021 Yes      CAD  Weakness  HTN    Plan   History of ESBL.  Meropenem for now. ID consult. Monitor labs and cultures. Continue rest of meds.     Consultations Ordered:  IP CONSULT TO INTERNAL MEDICINE  IP CONSULT TO INFECTIOUS DISEASES  IP CONSULT TO CASE MANAGEMENT    Electronically signed by Claudia Powell MD on 12/27/21 at 6:36 AM EST

## 2021-12-27 NOTE — CONSULTS
[address] [place] [address] [facility]    Consultation Note     Admit Date: 12/26/2021  2:00 PM    Reason for Consult:   History of ESBL uti    Attending Physician:  Julio César Enamorado MD     Chief Complaint: weakness, AMS    HISTORY OF PRESENT ILLNESS:   The patient is a 80 y.o.  female known to the Infectious Diseases service. The patient has the below past med hx   I have seen her in the past for ESBL UTI   Her daughter states she was brought to [facility] ER with weakness and AMS. She was transferred to [facility]  Her u/a is abnormal  And urine culture is pending  She was started on merrem . Her daughter states her mental status is much much better     Past Medical History:        Diagnosis Date    Arthritis     Hypertension     [address] [address]     UTI (urinary tract infection)      Past Surgical History:        Procedure Laterality Date    APPENDECTOMY      BACK SURGERY      cement t12, l1, l2-oct 2020    HYSTERECTOMY      KYPHOSIS SURGERY N/A 10/10/2020    T12, L1 and L2  KYPHOPLASTY performed by Alex Thomas MD at [address] [place] [address]     Current Medications:   Scheduled Meds:   amLODIPine  5 mg Oral Daily    aspirin  81 mg Oral Daily    atenolol  50 mg Oral Daily    atorvastatin  10 mg Oral Daily    docusate sodium  100 mg Oral Daily    escitalopram  5 mg Oral Daily    isosorbide mononitrate  30 mg Oral Daily    lidocaine  1 patch TransDERmal Daily    pantoprazole  40 mg Oral QAM AC    meropenem  1,000 mg IntraVENous Q8H     Continuous Infusions:   sodium chloride 100 mL/hr at 12/27/21 1030     PRN Meds:LORazepam    Allergies:  Chlorzoxazone and Sulfa antibiotics    Social History:   Social History     Socioeconomic History    Marital status:       Spouse name: Not on file    Number of children: Not on file    Years of education: Not on file    Highest education level: Not on file   Occupational History    Not on file   Tobacco Use    Smoking status: Never cough, productive sputum or hemoptysis. CARDIOVASCULAR:  No chest pain, palpitations, orthopnea or dyspnea on exertion. GASTROINTESTINAL:  No nausea, vomiting, diarrhea or constipation or hematochezia   GENITOURINARY:  No frequency burning dysuria or hematuria. INTEGUMENT/BREAST:  No rash or breast masses. HEMATOLOGIC/LYMPHATIC:  No lymphadenopathy or blood dyscrasics. ALLERGIC/IMMUNOLOGIC:  No anaphylaxis. ENDOCRINE:  No polyuria or polydipsia or temperature intolerance. MUSCULOSKELETAL:  No myalgia or arthralgia. Full ROM. NEUROLOGICAL:  No focal motor sensory deficit. BEHAVIOR/PSYCH:  No psychosis. PHYSICAL EXAM:    Vitals:    BP (!) 141/75   Pulse 74   Temp 99 °F (37.2 °C) (Temporal)   Resp 18   Ht 5' 4\" (1.626 m)   Wt 145 lb (65.8 kg)   LMP 05/16/2018   SpO2 94%   BMI 24.89 kg/m²   Constitutional: The patient is awake, alert, and oriented. Skin: Warm and dry. No rashes were noted. HEENT: Eyes show round, and reactive pupils. No jaundice. Moist mucous membranes, no ulcerations, no thrush. Neck: Supple to movements. No lymphadenopathy. Chest: No use of accessory muscles to breathe. Symmetrical expansion. Auscultation reveals no wheezing, crackles, or rhonchi. Cardiovascular: S1 and S2 are rhythmic and regular. No murmurs appreciated. Abdomen: Positive bowel sounds to auscultation. Benign to palpation. No masses felt. No hepatosplenomegaly. Extremities: No clubbing, no cyanosis, no edema. Lines: peripheral      CBC+dif:  Recent Labs     12/26/21  1529 12/26/21  1529 12/27/21  0503   WBC 10.1  --  9.7   HGB 14.2   < > 13.1   HCT 40.6   < > 39.3   MCV 85.7   < > 88.7      < > 274   NEUTROABS 7.58*  --   --     < > = values in this interval not displayed.      No results found for: CRP  No results found for: CRPHS  No results found for: SEDRATE  Lab Results   Component Value Date    ALT 14 12/26/2021    AST 21 12/26/2021    ALKPHOS 223 (H) 12/26/2021    BILITOT 0.5 12/26/2021     Lab Results   Component Value Date     12/27/2021    K 4.7 12/27/2021    K 3.6 10/14/2020     12/27/2021    CO2 20 12/27/2021    BUN 27 12/27/2021    CREATININE 1.8 12/27/2021    GFRAA 32 12/27/2021    LABGLOM 26 12/27/2021    GLUCOSE 151 12/27/2021    PROT 6.7 12/26/2021    LABALBU 3.2 12/26/2021    CALCIUM 8.1 12/27/2021    BILITOT 0.5 12/26/2021    ALKPHOS 223 12/26/2021    AST 21 12/26/2021    ALT 14 12/26/2021       No results found for: PROTIME, INR    No results found for: TSH    Lab Results   Component Value Date    COLORU Yellow 12/26/2021    PHUR 6.0 12/26/2021    WBCUA 10-20 12/26/2021    RBCUA 10-20 12/26/2021    BACTERIA FEW 12/26/2021    CLARITYU Clear 12/26/2021    SPECGRAV 1.015 12/26/2021    LEUKOCYTESUR LARGE 12/26/2021    UROBILINOGEN 0.2 12/26/2021    BILIRUBINUR Negative 12/26/2021    BLOODU LARGE 12/26/2021    GLUCOSEU Negative 12/26/2021       No results found for: NNZ5YYK, BEART, V5RYVMYZ, PHART, THGBART, HET1BTK, PO2ART, VNB7BMR  Radiology:  CT HEAD WO CONTRAST   Final Result   Chronic small vessel ischemic change with age related volume loss      No acute changes identified      RECOMMENDATIONS:   Unavailable         XR CHEST PORTABLE   Final Result   Hiatal hernia      No acute findings             Microbiology:  Pending  No results for input(s): BC in the last 72 hours. No results for input(s): ORG in the last 72 hours. No results for input(s): Robbie Yeni in the last 72 hours. No results for input(s): STREPNEUMAGU in the last 72 hours. No results for input(s): LP1UAG in the last 72 hours. No results for input(s): ASO in the last 72 hours. No results for input(s): CULTRESP in the last 72 hours.     Assessment:  · Altered mental status  Improved  · No dysuria  · Abnormal urinalysis'  · WBC 9700  · tmax 99  · cxr  Hiatal hernia  · Will check us of kidneys    Plan:    · Cont  merrem  · Await cultures   · Check cultures  · Baseline ESR, CRP  · Monitor labs  · Will follow with you    Thank you for having us see this patient in consultation. I will be discussing this case with the treating physicians.       Electronically signed by Jenna Meng MD on 12/27/2021 at 12:08 PM

## 2021-12-28 LAB
ANION GAP SERPL CALCULATED.3IONS-SCNC: 12 MMOL/L (ref 7–16)
BUN BLDV-MCNC: 20 MG/DL (ref 6–23)
CALCIUM SERPL-MCNC: 8.1 MG/DL (ref 8.6–10.2)
CHLORIDE BLD-SCNC: 101 MMOL/L (ref 98–107)
CO2: 20 MMOL/L (ref 22–29)
CREAT SERPL-MCNC: 1.5 MG/DL (ref 0.5–1)
EKG ATRIAL RATE: 70 BPM
EKG P AXIS: 34 DEGREES
EKG P-R INTERVAL: 206 MS
EKG Q-T INTERVAL: 448 MS
EKG QRS DURATION: 154 MS
EKG QTC CALCULATION (BAZETT): 483 MS
EKG R AXIS: -43 DEGREES
EKG T AXIS: 116 DEGREES
EKG VENTRICULAR RATE: 70 BPM
GFR AFRICAN AMERICAN: 39
GFR NON-AFRICAN AMERICAN: 32 ML/MIN/1.73
GLUCOSE BLD-MCNC: 100 MG/DL (ref 74–99)
HCT VFR BLD CALC: 38.7 % (ref 34–48)
HEMOGLOBIN: 12.9 G/DL (ref 11.5–15.5)
MCH RBC QN AUTO: 29.4 PG (ref 26–35)
MCHC RBC AUTO-ENTMCNC: 33.3 % (ref 32–34.5)
MCV RBC AUTO: 88.2 FL (ref 80–99.9)
PDW BLD-RTO: 14.2 FL (ref 11.5–15)
PLATELET # BLD: 285 E9/L (ref 130–450)
PMV BLD AUTO: 9.3 FL (ref 7–12)
POTASSIUM SERPL-SCNC: 4.3 MMOL/L (ref 3.5–5)
RBC # BLD: 4.39 E12/L (ref 3.5–5.5)
SODIUM BLD-SCNC: 133 MMOL/L (ref 132–146)
WBC # BLD: 8.1 E9/L (ref 4.5–11.5)

## 2021-12-28 PROCEDURE — 97161 PT EVAL LOW COMPLEX 20 MIN: CPT

## 2021-12-28 PROCEDURE — 97530 THERAPEUTIC ACTIVITIES: CPT

## 2021-12-28 PROCEDURE — 36415 COLL VENOUS BLD VENIPUNCTURE: CPT

## 2021-12-28 PROCEDURE — 6360000002 HC RX W HCPCS: Performed by: FAMILY MEDICINE

## 2021-12-28 PROCEDURE — 2580000003 HC RX 258: Performed by: FAMILY MEDICINE

## 2021-12-28 PROCEDURE — 85027 COMPLETE CBC AUTOMATED: CPT

## 2021-12-28 PROCEDURE — 80048 BASIC METABOLIC PNL TOTAL CA: CPT

## 2021-12-28 PROCEDURE — 1200000000 HC SEMI PRIVATE

## 2021-12-28 PROCEDURE — 6370000000 HC RX 637 (ALT 250 FOR IP): Performed by: FAMILY MEDICINE

## 2021-12-28 RX ADMIN — ATENOLOL 50 MG: 50 TABLET ORAL at 09:37

## 2021-12-28 RX ADMIN — ISOSORBIDE MONONITRATE 30 MG: 30 TABLET ORAL at 09:37

## 2021-12-28 RX ADMIN — SODIUM CHLORIDE: 9 INJECTION, SOLUTION INTRAVENOUS at 18:53

## 2021-12-28 RX ADMIN — MEROPENEM 1000 MG: 1 INJECTION, POWDER, FOR SOLUTION INTRAVENOUS at 02:16

## 2021-12-28 RX ADMIN — MEROPENEM 1000 MG: 1 INJECTION, POWDER, FOR SOLUTION INTRAVENOUS at 10:30

## 2021-12-28 RX ADMIN — LORAZEPAM 1 MG: 1 TABLET ORAL at 20:59

## 2021-12-28 RX ADMIN — PANTOPRAZOLE SODIUM 40 MG: 40 TABLET, DELAYED RELEASE ORAL at 05:19

## 2021-12-28 RX ADMIN — DOCUSATE SODIUM 100 MG: 100 CAPSULE ORAL at 09:37

## 2021-12-28 RX ADMIN — ESCITALOPRAM 5 MG: 10 TABLET, FILM COATED ORAL at 09:37

## 2021-12-28 RX ADMIN — MEROPENEM 1000 MG: 1 INJECTION, POWDER, FOR SOLUTION INTRAVENOUS at 18:53

## 2021-12-28 ASSESSMENT — PAIN SCALES - GENERAL
PAINLEVEL_OUTOF10: 0
PAINLEVEL_OUTOF10: 0

## 2021-12-28 ASSESSMENT — ENCOUNTER SYMPTOMS: SHORTNESS OF BREATH: 0

## 2021-12-28 NOTE — PROGRESS NOTES
Progress Note  Date:2021       Room:[id number]  Patient Ernie Cardoso     YOB: 1926     Age:89 y.o. Patient resting comfortably ,no issues overnight. Subjective    Subjective:  Symptoms:  Stable. No shortness of breath or chest pain. Diet:  Adequate intake. Activity level: Impaired due to weakness. Pain:  She reports no pain. Review of Systems   Constitutional: Negative for activity change and fever. Respiratory: Negative for shortness of breath. Cardiovascular: Negative for chest pain. Objective         Vitals Last 24 Hours:  TEMPERATURE:  Temp  Av.5 °F (36.9 °C)  Min: 98 °F (36.7 °C)  Max: 99 °F (37.2 °C)  RESPIRATIONS RANGE: Resp  Av  Min: 16  Max: 18  PULSE OXIMETRY RANGE: SpO2  Av.5 %  Min: 94 %  Max: 97 %  PULSE RANGE: Pulse  Av.5  Min: 74  Max: 81  BLOOD PRESSURE RANGE: Systolic (62PGV), AFS:641 , Min:117 , FJI:449   ; Diastolic (33HFQ), UGC:88, Min:67, Max:75    I/O (24Hr): Intake/Output Summary (Last 24 hours) at 2021 0658  Last data filed at 2021 0440  Gross per 24 hour   Intake 2057 ml   Output 2450 ml   Net -393 ml     Objective:  General Appearance:  Comfortable. Vital signs: (most recent): Blood pressure 117/67, pulse 81, temperature 98 °F (36.7 °C), temperature source Oral, resp. rate 16, height 5' 4\" (1.626 m), weight 145 lb (65.8 kg), last menstrual period 2018, SpO2 97 %. No fever. Lungs:  Normal effort and normal respiratory rate. Breath sounds clear to auscultation. Heart: Normal rate. Regular rhythm.   S1 normal and S2 normal.      Labs/Imaging/Diagnostics    Labs:  CBC:  Recent Labs     21  1529 21  0503 21  0443   WBC 10.1 9.7 8.1   RBC 4.74 4.43 4.39   HGB 14.2 13.1 12.9   HCT 40.6 39.3 38.7   MCV 85.7 88.7 88.2   RDW 13.8 14.0 14.2    274 285     CHEMISTRIES:  Recent Labs     21  1529 21  0503 21  0443    132 133   K 4.8 4.7 4.3   CL 94* 101 101   CO2 24 20* 20*   BUN 34* 27* 20   CREATININE 2.2* 1.8* 1.5*   GLUCOSE 138* 151* 100*   MG 2.2  --   --      PT/INR:No results for input(s): PROTIME, INR in the last 72 hours. APTT:No results for input(s): APTT in the last 72 hours. LIVER PROFILE:  Recent Labs     12/26/21  1529   AST 21   ALT 14   BILITOT 0.5   ALKPHOS 223*       Imaging Last 24 Hours:  CT HEAD WO CONTRAST    Result Date: 12/26/2021  EXAMINATION: CT OF THE HEAD WITHOUT CONTRAST  12/26/2021 2:43 pm TECHNIQUE: CT of the head was performed without the administration of intravenous contrast. Dose modulation, iterative reconstruction, and/or weight based adjustment of the mA/kV was utilized to reduce the radiation dose to as low as reasonably achievable. COMPARISON: None. HISTORY: ORDERING SYSTEM PROVIDED HISTORY: altered mentation per patient TECHNOLOGIST PROVIDED HISTORY: Reason for exam:->altered mentation per patient Has a \"code stroke\" or \"stroke alert\" been called? ->No Decision Support Exception - unselect if not a suspected or confirmed emergency medical condition->Emergency Medical Condition (MA) FINDINGS: BRAIN/VENTRICLES: There is no acute intracranial hemorrhage, mass effect or midline shift. No abnormal extra-axial fluid collection. The gray-white differentiation is maintained without evidence of an acute infarct. There is no evidence of hydrocephalus patchy hypodensity in the supratentorial white matter. There is generalized volume loss with prominence of ventricles and sulci ORBITS: The visualized portion of the orbits demonstrate no acute abnormality. SINUSES: The visualized paranasal sinuses and mastoid air cells demonstrate no acute abnormality. SOFT TISSUES/SKULL:  No acute abnormality of the visualized skull or soft tissues.      Chronic small vessel ischemic change with age related volume loss No acute changes identified RECOMMENDATIONS: Unavailable     XR CHEST PORTABLE    Result Date: 12/26/2021  EXAMINATION: ONE XRAY VIEW OF THE CHEST 12/26/2021 2:44 pm COMPARISON: Comparison is dated June 25, 2021 HISTORY: ORDERING SYSTEM PROVIDED HISTORY: LLL pneumonia TECHNOLOGIST PROVIDED HISTORY: Reason for exam:->LLL pneumonia FINDINGS: Large hiatal hernia again noted. Cardiac silhouette does not appear enlarged. No confluent pulmonary infiltrate identified. Blunting of the left costophrenic angle appears chronic. Pulmonary vasculature is unremarkable. No acute interval change. Hiatal hernia No acute findings     US RETROPERITONEAL COMPLETE    Result Date: 12/27/2021  EXAMINATION: RETROPERITONEAL ULTRASOUND OF THE KIDNEYS AND URINARY BLADDER 12/27/2021 COMPARISON: None HISTORY: ORDERING SYSTEM PROVIDED HISTORY: rule out hydronephrosis TECHNOLOGIST PROVIDED HISTORY: Reason for exam:->rule out hydronephrosis What reading provider will be dictating this exam?->CRC FINDINGS: Kidneys: The right kidney measures 10.4 x 4.4 x 5.2 cm in length and the left kidney measures 10.9 x 4.3 x 4.2 cm in length. Kidneys demonstrate normal cortical echogenicity. No evidence of hydronephrosis or intrarenal stones. Evaluation limited by technically difficult exam. Bladder: Unremarkable appearance of the bladder. Travis catheter present. Unremarkable ultrasound of the kidneys and urinary bladder. RECOMMENDATIONS: Unavailable     Assessment//Plan           Hospital Problems           Last Modified POA    UTI (urinary tract infection) 12/26/2021 Yes        Assessment:  (UTI (urinary tract infection) 12/26/2021 Yes     CAD  Weakness  HTN     Plan  History of ESBL. Meropenem for now. ID following. Monitor labs and cultures. Continue rest of meds. ).        Electronically signed by Ania Combs MD on 12/28/21 at 6:58 AM EST

## 2021-12-28 NOTE — PROGRESS NOTES
Infectious Disease  Progress Note  [facility]    Chief Complaint: no complaint    Subjective: UTI    Scheduled Meds:   amLODIPine  5 mg Oral Daily    aspirin  81 mg Oral Daily    atenolol  50 mg Oral Daily    atorvastatin  10 mg Oral Daily    docusate sodium  100 mg Oral Daily    escitalopram  5 mg Oral Daily    isosorbide mononitrate  30 mg Oral Daily    lidocaine  1 patch TransDERmal Daily    pantoprazole  40 mg Oral QAM AC    meropenem  1,000 mg IntraVENous Q8H     Continuous Infusions:   sodium chloride 100 mL/hr at 12/27/21 1030     PRN Meds:LORazepam, benzocaine-menthol    Patient Vitals for the past 24 hrs:   BP Temp Temp src Pulse Resp SpO2   12/28/21 0803 (!) 151/78 98.2 °F (36.8 °C) Oral 85 16 94 %   12/28/21 0015 117/67 98 °F (36.7 °C) Oral 81 16 97 %       CBC with Differential:    Lab Results   Component Value Date    WBC 8.1 12/28/2021    RBC 4.39 12/28/2021    HGB 12.9 12/28/2021    HCT 38.7 12/28/2021     12/28/2021    MCV 88.2 12/28/2021    MCH 29.4 12/28/2021    MCHC 33.3 12/28/2021    RDW 14.2 12/28/2021    LYMPHOPCT 16.0 12/26/2021    MONOPCT 8.0 12/26/2021    BASOPCT 0.0 12/26/2021    MONOSABS 0.81 12/26/2021    LYMPHSABS 1.62 12/26/2021    EOSABS 0.10 12/26/2021    BASOSABS 0.00 12/26/2021     CMP:    Lab Results   Component Value Date     12/28/2021    K 4.3 12/28/2021    K 3.6 10/14/2020     12/28/2021    CO2 20 12/28/2021    BUN 20 12/28/2021    CREATININE 1.5 12/28/2021    GFRAA 39 12/28/2021    LABGLOM 32 12/28/2021    GLUCOSE 100 12/28/2021    PROT 6.7 12/26/2021    LABALBU 3.2 12/26/2021    CALCIUM 8.1 12/28/2021    BILITOT 0.5 12/26/2021    ALKPHOS 223 12/26/2021    AST 21 12/26/2021    ALT 14 12/26/2021       BP (!) 151/78   Pulse 85   Temp 98.2 °F (36.8 °C) (Oral)   Resp 16   Ht 5' 4\" (1.626 m)   Wt 145 lb (65.8 kg)   LMP 05/16/2018   SpO2 94%   BMI 24.89 kg/m²     Physical Exam  Const/Neuro- unchanged, no signs of acute distress, Alert  ENMT- Within Normal Limits, Normocephalic, mucous membranes pink/moist, No thrush  Neck: Neck supple  Heart- Regular, Rate, Rhythm- no murmur appreciated. Lungs- clear to ascultation. Respirations even and nonlabored. Abdomen- Soft, bowel sounds positive, non tender  Musculo/Extremities-  Equal and symmetrical, no edema. No tenderness. Skin:  Warm and dry, free from rashes. Cultures reviewed    Radiology reviewed  [facility] 157   Final Result   Unremarkable ultrasound of the kidneys and urinary bladder.       RECOMMENDATIONS:   Unavailable         CT HEAD WO CONTRAST   Final Result   Chronic small vessel ischemic change with age related volume loss      No acute changes identified      RECOMMENDATIONS:   Unavailable         XR CHEST PORTABLE   Final Result   Hiatal hernia      No acute findings             Assessment  Altered mental status Improved   No dysuria   Abnormal urinalysis'   WBC 9700   Afebrile   cxr Hiatal hernia   Will check us of kidneys    No hydronephrosis  History of ESBL UTI       Plan  Cont merrem   Await cultures   Check cultures   Baseline ESR, CRP   Monitor labs   Will follow with you        Electronically signed by Caroline Espinosa MD on 12/28/2021 at 11:17 AM

## 2021-12-28 NOTE — PROGRESS NOTES
Physical Therapy  Physical Therapy Initial Assessment     Name: Sasha Ortiz  : 1926  MRN: [id number]      Date of Service: 2021    Evaluating PT:  Annabel Boyd PT, DPT [id number]     Room #:  7507/2103-S  Diagnosis:  UTI (urinary tract infection) [N39.0]  Generalized weakness [R53.1]  Acute cystitis with hematuria [N30.01]  Reason for admission: fatigue, weakness   Precautions:  Falls, contact (ESBL), [place]  Procedure/Surgery:  None   Equipment Recommendations:  FWW    SUBJECTIVE:  Pt lives alone in a 1 story home with 4 RADHA 2 rails. Pt ambulated with FWW PTA. OBJECTIVE:   Initial Evaluation  Date:  Treatment   Short Term/ Long Term   Goals   AM-PAC 6 Clicks      Was pt agreeable to Eval/treatment? Yes      Does pt have pain?  Denies pain     Bed Mobility  Rolling: NT  Supine to sit: Champ  Sit to supine: Champ  Scooting: Champ  Independent    Transfers Sit to stand: Champ  Stand to sit: Champ  Stand pivot: NT  Independent    Ambulation    35 feet with [employer] CGA    >150 feet with [employer] Mod I   Stair negotiation: ascended and descended  NT  4 steps with 1 rail Mod I      LE ROM: WFL    LE Strength:   knee ext: 5/5  ankle DF: 5/5    Balance:   Sitting static: Supervision  Sitting dynamic: SBA  Standing static: CGA  Standing dynamic: CGA      -Pt is A & O x 3  -Sensation:  Pt denies numbness and tingling to extremities  -Edema:  unremarkable     Therapeutic Exercises:  Functional activity     Patient education  Pt educated on safety, sequencing of transfers, and role of PT    Patient response to education:   Pt verbalized understanding Pt demonstrated skill Pt requires further education in this area   Yes  Yes  Yes      ASSESSMENT:  Conditions Requiring Skilled Therapeutic Intervention:  [x]Decreased strength     []Decreased ROM  [x]Decreased functional mobility  [x]Decreased balance   [x]Decreased endurance   []Decreased posture  []Decreased sensation  []Decreased coordination   []Decreased vision  []Decreased safety awareness   []Increased pain       Comments:  Pt received pt supine in bed and agreeable to PT session   Pt presents with generalized weakness requiring light assist and guidance for completion of mobility. Extended time is needed d/t slow pace of movement. Pt is able to stand with light lift assist but shows mild posterior leaning. Ambulation completed with use of walker with slow speed but fair balance. Most difficulty coming with 180 degree turns where she requires cueing to turn walker. Returned to bed and repositioned. Pt with all needs met and call light in reach. Pt would benefit from continued PT POC to address functional deficits described above. Treatment:  Patient practiced and was instructed in the following treatment:     Patient education provided continuously throughout session for sequencing, safety maintenance, and improving any deficits found during the evaluation.  Bed mobility training - pt given verbal and tactile cues to facilitate proper sequencing and safety during rolling and supine>sit as well as provided with physical assistance to complete task     Sitting EOB x5 minutes. Stand from bedside with assist. Cues for hand position and posture.  Gait training- pt was given verbal and tactile cues to facilitate improved balance and turning during ambulation as well as provided with physical assistance. Pt's/ family goals   1. Return home     Patient and or family understand(s) diagnosis, prognosis, and plan of care. Yes     Prognosis is good for reaching above PT goals.     PHYSICAL THERAPY PLAN OF CARE:    PT POC is established based on physician order and patient diagnosis     Referring provider/PT Order:  *12/27/21 1545   PT eval and treat Start: 12/27/21 1545, End: 12/27/21 1545, ONE TIME, Standing Count: 1 Occurrences, Kylah Hurley MD    Diagnosis:  UTI (urinary tract infection) [N39.0]  Generalized weakness [R53.1]  Acute cystitis with hematuria [N30.01]  Specific instructions for next treatment:  Progress ambulation distance. Sitting in chair    Current Treatment Recommendations:   [] Strengthening to improve independence with functional mobility   [] ROM to improve independence with functional mobility   [x] Balance Training to improve static/dynamic balance and to reduce fall risk  [x] Endurance Training to improve activity tolerance during functional mobility   [x] Transfer Training to improve safety and independence with all functional transfers   [x] Gait Training to improve gait mechanics, endurance and asses need for appropriate assistive device  [x] Stair Training in preparation for safe discharge home and/or into the community   [x] Positioning to prevent skin breakdown and contractures  [x] Safety and Education Training   [] Patient/Caregiver Education   [] HEP  [] Other     PT long term treatment goals are located in above grid    Frequency of treatments: 2-5x/week x 1-2 weeks. Time in  0720  Time out  0740    Total Treatment Time  10 minutes     Evaluation Time includes thorough review of current medical information, gathering information on past medical history/social history and prior level of function, completion of standardized testing/informal observation of tasks, assessment of data and education on plan of care and goals.     CPT codes:  [x] Low Complexity PT evaluation 14368  [] Moderate Complexity PT evaluation 84058  [] High Complexity PT evaluation 52009  [] PT Re-evaluation 84328  [] Gait training 95341 - minutes  [] Manual therapy 34571 - minutes  [x] Therapeutic activities 02848 10 minutes  [] Therapeutic exercises 58260 - minutes  [] Neuromuscular reeducation 67362 - minutes     Meena Waterman, PT, DPT  [id number]

## 2021-12-29 LAB
ANION GAP SERPL CALCULATED.3IONS-SCNC: 13 MMOL/L (ref 7–16)
BUN BLDV-MCNC: 15 MG/DL (ref 6–23)
CALCIUM SERPL-MCNC: 8.4 MG/DL (ref 8.6–10.2)
CHLORIDE BLD-SCNC: 101 MMOL/L (ref 98–107)
CO2: 21 MMOL/L (ref 22–29)
CREAT SERPL-MCNC: 1.4 MG/DL (ref 0.5–1)
GFR AFRICAN AMERICAN: 42
GFR NON-AFRICAN AMERICAN: 35 ML/MIN/1.73
GLUCOSE BLD-MCNC: 97 MG/DL (ref 74–99)
HCT VFR BLD CALC: 39.3 % (ref 34–48)
HEMOGLOBIN: 12.7 G/DL (ref 11.5–15.5)
MCH RBC QN AUTO: 29 PG (ref 26–35)
MCHC RBC AUTO-ENTMCNC: 32.3 % (ref 32–34.5)
MCV RBC AUTO: 89.7 FL (ref 80–99.9)
PDW BLD-RTO: 14.1 FL (ref 11.5–15)
PLATELET # BLD: 307 E9/L (ref 130–450)
PMV BLD AUTO: 9.7 FL (ref 7–12)
POTASSIUM SERPL-SCNC: 3.9 MMOL/L (ref 3.5–5)
RBC # BLD: 4.38 E12/L (ref 3.5–5.5)
SODIUM BLD-SCNC: 135 MMOL/L (ref 132–146)
WBC # BLD: 7.3 E9/L (ref 4.5–11.5)

## 2021-12-29 PROCEDURE — 80048 BASIC METABOLIC PNL TOTAL CA: CPT

## 2021-12-29 PROCEDURE — 85027 COMPLETE CBC AUTOMATED: CPT

## 2021-12-29 PROCEDURE — 36415 COLL VENOUS BLD VENIPUNCTURE: CPT

## 2021-12-29 PROCEDURE — 6370000000 HC RX 637 (ALT 250 FOR IP): Performed by: FAMILY MEDICINE

## 2021-12-29 PROCEDURE — 2580000003 HC RX 258: Performed by: FAMILY MEDICINE

## 2021-12-29 PROCEDURE — 1200000000 HC SEMI PRIVATE

## 2021-12-29 PROCEDURE — 6360000002 HC RX W HCPCS: Performed by: FAMILY MEDICINE

## 2021-12-29 RX ADMIN — PANTOPRAZOLE SODIUM 40 MG: 40 TABLET, DELAYED RELEASE ORAL at 05:02

## 2021-12-29 RX ADMIN — MEROPENEM 1000 MG: 1 INJECTION, POWDER, FOR SOLUTION INTRAVENOUS at 02:26

## 2021-12-29 RX ADMIN — AMLODIPINE BESYLATE 5 MG: 10 TABLET ORAL at 09:31

## 2021-12-29 RX ADMIN — MEROPENEM 1000 MG: 1 INJECTION, POWDER, FOR SOLUTION INTRAVENOUS at 18:57

## 2021-12-29 RX ADMIN — ISOSORBIDE MONONITRATE 30 MG: 30 TABLET ORAL at 09:31

## 2021-12-29 RX ADMIN — MEROPENEM 1000 MG: 1 INJECTION, POWDER, FOR SOLUTION INTRAVENOUS at 12:21

## 2021-12-29 RX ADMIN — ATORVASTATIN CALCIUM 10 MG: 10 TABLET, FILM COATED ORAL at 09:31

## 2021-12-29 RX ADMIN — ATENOLOL 50 MG: 50 TABLET ORAL at 09:30

## 2021-12-29 RX ADMIN — LORAZEPAM 1 MG: 1 TABLET ORAL at 22:08

## 2021-12-29 RX ADMIN — ASPIRIN 81 MG: 81 TABLET, COATED ORAL at 09:30

## 2021-12-29 RX ADMIN — ESCITALOPRAM 5 MG: 10 TABLET, FILM COATED ORAL at 09:30

## 2021-12-29 RX ADMIN — DOCUSATE SODIUM 100 MG: 100 CAPSULE ORAL at 09:31

## 2021-12-29 ASSESSMENT — PAIN SCALES - GENERAL
PAINLEVEL_OUTOF10: 0
PAINLEVEL_OUTOF10: 0

## 2021-12-29 ASSESSMENT — ENCOUNTER SYMPTOMS: SHORTNESS OF BREATH: 0

## 2021-12-29 NOTE — PROGRESS NOTES
Progress Note  Date:2021       Room:[facility]  Rufus Choi     YOB: 1926     Age:89 y.o. Patient resting comfortably ,no issues overnight. Subjective    Subjective:  Symptoms:  Stable. No shortness of breath or chest pain. Diet:  Adequate intake. Activity level: Impaired due to weakness. Pain:  She reports no pain. Review of Systems   Constitutional: Negative for activity change and fever. Respiratory: Negative for shortness of breath. Cardiovascular: Negative for chest pain. Objective         Vitals Last 24 Hours:  TEMPERATURE:  Temp  Av.1 °F (36.7 °C)  Min: 97.9 °F (36.6 °C)  Max: 98.2 °F (36.8 °C)  RESPIRATIONS RANGE: Resp  Av  Min: 16  Max: 16  PULSE OXIMETRY RANGE: SpO2  Av.5 %  Min: 94 %  Max: 95 %  PULSE RANGE: Pulse  Av.5  Min: 85  Max: 90  BLOOD PRESSURE RANGE: Systolic (61ZTD), KZQ:853 , Min:145 , HFJ:309   ; Diastolic (43EAN), AWK:96, Min:76, Max:78    I/O (24Hr): Intake/Output Summary (Last 24 hours) at 2021 0705  Last data filed at 2021 2231  Gross per 24 hour   Intake --   Output 2200 ml   Net -2200 ml     Objective:  General Appearance:  Comfortable. Vital signs: (most recent): Blood pressure (!) 145/76, pulse 90, temperature 97.9 °F (36.6 °C), temperature source Temporal, resp. rate 16, height 5' 4\" (1.626 m), weight 145 lb (65.8 kg), last menstrual period 2018, SpO2 95 %. No fever. Lungs:  Normal effort and normal respiratory rate. Breath sounds clear to auscultation. Heart: Normal rate. Regular rhythm.   S1 normal and S2 normal.      Labs/Imaging/Diagnostics    Labs:  CBC:  Recent Labs     21  0503 21  0443 21  0436   WBC 9.7 8.1 7.3   RBC 4.43 4.39 4.38   HGB 13.1 12.9 12.7   HCT 39.3 38.7 39.3   MCV 88.7 88.2 89.7   RDW 14.0 14.2 14.1    285 307     CHEMISTRIES:  Recent Labs     21  1529 21  0503 21  0443    132 133   K 4.8 4.7 4.3   CL 94* 101 101   CO2 24 20* 20*   BUN 34* 27* 20   CREATININE 2.2* 1.8* 1.5*   GLUCOSE 138* 151* 100*   MG 2.2  --   --      PT/INR:No results for input(s): PROTIME, INR in the last 72 hours. APTT:No results for input(s): APTT in the last 72 hours. LIVER PROFILE:  Recent Labs     12/26/21  1529   AST 21   ALT 14   BILITOT 0.5   ALKPHOS 223*       Imaging Last 24 Hours:  CT HEAD WO CONTRAST    Result Date: 12/26/2021  EXAMINATION: CT OF THE HEAD WITHOUT CONTRAST  12/26/2021 2:43 pm TECHNIQUE: CT of the head was performed without the administration of intravenous contrast. Dose modulation, iterative reconstruction, and/or weight based adjustment of the mA/kV was utilized to reduce the radiation dose to as low as reasonably achievable. COMPARISON: None. HISTORY: ORDERING SYSTEM PROVIDED HISTORY: altered mentation per patient TECHNOLOGIST PROVIDED HISTORY: Reason for exam:->altered mentation per patient Has a \"code stroke\" or \"stroke alert\" been called? ->No Decision Support Exception - unselect if not a suspected or confirmed emergency medical condition->Emergency Medical Condition (MA) FINDINGS: BRAIN/VENTRICLES: There is no acute intracranial hemorrhage, mass effect or midline shift. No abnormal extra-axial fluid collection. The gray-white differentiation is maintained without evidence of an acute infarct. There is no evidence of hydrocephalus patchy hypodensity in the supratentorial white matter. There is generalized volume loss with prominence of ventricles and sulci ORBITS: The visualized portion of the orbits demonstrate no acute abnormality. SINUSES: The visualized paranasal sinuses and mastoid air cells demonstrate no acute abnormality. SOFT TISSUES/SKULL:  No acute abnormality of the visualized skull or soft tissues.      Chronic small vessel ischemic change with age related volume loss No acute changes identified RECOMMENDATIONS: Unavailable     XR CHEST PORTABLE    Result Date: 12/26/2021  EXAMINATION: ONE XRAY VIEW OF THE CHEST 12/26/2021 2:44 pm COMPARISON: Comparison is dated June 25, 2021 HISTORY: ORDERING SYSTEM PROVIDED HISTORY: LLL pneumonia TECHNOLOGIST PROVIDED HISTORY: Reason for exam:->LLL pneumonia FINDINGS: Large hiatal hernia again noted. Cardiac silhouette does not appear enlarged. No confluent pulmonary infiltrate identified. Blunting of the left costophrenic angle appears chronic. Pulmonary vasculature is unremarkable. No acute interval change. Hiatal hernia No acute findings     US RETROPERITONEAL COMPLETE    Result Date: 12/27/2021  EXAMINATION: RETROPERITONEAL ULTRASOUND OF THE KIDNEYS AND URINARY BLADDER 12/27/2021 COMPARISON: None HISTORY: ORDERING SYSTEM PROVIDED HISTORY: rule out hydronephrosis TECHNOLOGIST PROVIDED HISTORY: Reason for exam:->rule out hydronephrosis What reading provider will be dictating this exam?->CRC FINDINGS: Kidneys: The right kidney measures 10.4 x 4.4 x 5.2 cm in length and the left kidney measures 10.9 x 4.3 x 4.2 cm in length. Kidneys demonstrate normal cortical echogenicity. No evidence of hydronephrosis or intrarenal stones. Evaluation limited by technically difficult exam. Bladder: Unremarkable appearance of the bladder. Travis catheter present. Unremarkable ultrasound of the kidneys and urinary bladder. RECOMMENDATIONS: Unavailable     Assessment//Plan           Hospital Problems           Last Modified POA    UTI (urinary tract infection) 12/26/2021 Yes        Assessment:  (UTI (urinary tract infection) 12/26/2021 Yes     CAD  Weakness  HTN     Plan  History of ESBL. Meropenem for now. ID following. Monitor labs and cultures. Continue rest of meds. ).

## 2021-12-29 NOTE — PROGRESS NOTES
Patient's daughter wants soft bite size items ordered for her mother to eat for her food trays. Payam Cobb from the kitchen and I spent time making sure tonight and tomorrows choices are what they want. She is also going to [place] items on the menu to help us choose if she is not here to help order for her mom.

## 2021-12-29 NOTE — PROGRESS NOTES
Infectious Disease  Progress Note  [facility]    Chief Complaint: vomiting    Subjective: she had cough while eating and resulted in vomiting. She always had cough but today was the first time had vomiting. No fever/no urinary symptoms or abdominal pain. Scheduled Meds:   amLODIPine  5 mg Oral Daily    aspirin  81 mg Oral Daily    atenolol  50 mg Oral Daily    atorvastatin  10 mg Oral Daily    docusate sodium  100 mg Oral Daily    escitalopram  5 mg Oral Daily    isosorbide mononitrate  30 mg Oral Daily    lidocaine  1 patch TransDERmal Daily    pantoprazole  40 mg Oral QAM AC    meropenem  1,000 mg IntraVENous Q8H     Continuous Infusions:   sodium chloride 100 mL/hr at 12/28/21 1853     PRN Meds:LORazepam, benzocaine-menthol    ROS:  As mentioned in HPI, all other systems negative. /88   Pulse 105   Temp 97.8 °F (36.6 °C) (Temporal)   Resp 16   Ht 5' 4\" (1.626 m)   Wt 145 lb (65.8 kg)   LMP 05/16/2018   SpO2 96%   BMI 24.89 kg/m²     Physical Exam  Const/Neuro- unchanged, no signs of acute distress, Alert  ENMT- Within Normal Limits, Normocephalic, mucous membranes pink/moist, No thrush  Neck: Neck supple  Heart- Regular, Rate, Rhythm- no murmur appreciated. Lungs-  Respirations even and nonlabored. decreased breath sounds bilateral bases. Abdomen- Soft, bowel sounds positive, non tender  Musculo/Extremities-  Equal and symmetrical, no edema. No tenderness. Neurological- No focal motor or sensory loss. No confusion  Skin:  Warm and dry, free from rashes. Labs reviewed. Cultures reviewed  Urine cx showing 2 different gram negative rods: pending ID and sensitivities. Radiology reviewed    Assessment  UTI : presented with AMS which is resolved. Has hx of ESBL e.coli. so on meropenem day 4. No hydronephrosis on US kidneys.     Plan:  Continue meropenem  Awaiting finalization of cultures  Will follow      Electronically signed by Cal Hollis MD on 12/29/2021 at 10:47 AM

## 2021-12-29 NOTE — PROGRESS NOTES
OT SESSION ATTEMPT     Date:2021  Patient Name: Claudene Kehr  MRN: [id number]  : 1926  [facility]     Attempted OT session this date:    [] unavailable due to other medical staff currently with pt   [] on hold, await MRI/ neurosurgical recommendations. [] on hold per nursing staff secondary to lab / radiology results    [x] declined Occupational Therapy  this date due to c/o nausea and inability to participate in any therapy today. .  Benefits of participation in therapy reviewed with pt.    [] off unit   [] Other:     Will reattempt OT eval at a later time.     [place], [place] [address]

## 2021-12-29 NOTE — CARE COORDINATION
Pt refused OT eval d/t nausea. PT 16/24. Waiting final cultures. Continues on Meropenum. Waiting for OT eval to determine if home with family verses [facility]. SW/CM to follow for discharge needs.    Linwood Avalos, L.S.W.  [phone]

## 2021-12-30 LAB
ANION GAP SERPL CALCULATED.3IONS-SCNC: 12 MMOL/L (ref 7–16)
BUN BLDV-MCNC: 10 MG/DL (ref 6–23)
CALCIUM SERPL-MCNC: 8 MG/DL (ref 8.6–10.2)
CHLORIDE BLD-SCNC: 105 MMOL/L (ref 98–107)
CO2: 16 MMOL/L (ref 22–29)
CREAT SERPL-MCNC: 1.1 MG/DL (ref 0.5–1)
GFR AFRICAN AMERICAN: 56
GFR NON-AFRICAN AMERICAN: 46 ML/MIN/1.73
GLUCOSE BLD-MCNC: 96 MG/DL (ref 74–99)
HCT VFR BLD CALC: 40.9 % (ref 34–48)
HEMOGLOBIN: 13.2 G/DL (ref 11.5–15.5)
MCH RBC QN AUTO: 29.1 PG (ref 26–35)
MCHC RBC AUTO-ENTMCNC: 32.3 % (ref 32–34.5)
MCV RBC AUTO: 90.3 FL (ref 80–99.9)
ORGANISM: ABNORMAL
ORGANISM: ABNORMAL
PDW BLD-RTO: 14.1 FL (ref 11.5–15)
PLATELET # BLD: 305 E9/L (ref 130–450)
PMV BLD AUTO: 9.5 FL (ref 7–12)
POTASSIUM SERPL-SCNC: 4.2 MMOL/L (ref 3.5–5)
RBC # BLD: 4.53 E12/L (ref 3.5–5.5)
SODIUM BLD-SCNC: 133 MMOL/L (ref 132–146)
URINE CULTURE, ROUTINE: ABNORMAL
URINE CULTURE, ROUTINE: ABNORMAL
WBC # BLD: 7.7 E9/L (ref 4.5–11.5)

## 2021-12-30 PROCEDURE — 80048 BASIC METABOLIC PNL TOTAL CA: CPT

## 2021-12-30 PROCEDURE — 6370000000 HC RX 637 (ALT 250 FOR IP): Performed by: FAMILY MEDICINE

## 2021-12-30 PROCEDURE — 97535 SELF CARE MNGMENT TRAINING: CPT

## 2021-12-30 PROCEDURE — 1200000000 HC SEMI PRIVATE

## 2021-12-30 PROCEDURE — 85027 COMPLETE CBC AUTOMATED: CPT

## 2021-12-30 PROCEDURE — 36415 COLL VENOUS BLD VENIPUNCTURE: CPT

## 2021-12-30 PROCEDURE — 97165 OT EVAL LOW COMPLEX 30 MIN: CPT

## 2021-12-30 PROCEDURE — 6360000002 HC RX W HCPCS: Performed by: FAMILY MEDICINE

## 2021-12-30 PROCEDURE — 6370000000 HC RX 637 (ALT 250 FOR IP): Performed by: STUDENT IN AN ORGANIZED HEALTH CARE EDUCATION/TRAINING PROGRAM

## 2021-12-30 PROCEDURE — 2580000003 HC RX 258: Performed by: FAMILY MEDICINE

## 2021-12-30 RX ORDER — CEFDINIR 300 MG/1
300 CAPSULE ORAL DAILY
Status: DISCONTINUED | OUTPATIENT
Start: 2021-12-30 | End: 2021-12-30

## 2021-12-30 RX ORDER — CEFDINIR 300 MG/1
300 CAPSULE ORAL DAILY
Status: DISCONTINUED | OUTPATIENT
Start: 2021-12-30 | End: 2021-12-31 | Stop reason: HOSPADM

## 2021-12-30 RX ADMIN — PANTOPRAZOLE SODIUM 40 MG: 40 TABLET, DELAYED RELEASE ORAL at 05:16

## 2021-12-30 RX ADMIN — ATENOLOL 50 MG: 50 TABLET ORAL at 09:38

## 2021-12-30 RX ADMIN — ISOSORBIDE MONONITRATE 30 MG: 30 TABLET ORAL at 09:39

## 2021-12-30 RX ADMIN — ASPIRIN 81 MG: 81 TABLET, COATED ORAL at 09:39

## 2021-12-30 RX ADMIN — BENZOCAINE AND MENTHOL 1 LOZENGE: 15; 3.6 LOZENGE ORAL at 09:38

## 2021-12-30 RX ADMIN — AMLODIPINE BESYLATE 5 MG: 10 TABLET ORAL at 09:39

## 2021-12-30 RX ADMIN — ATORVASTATIN CALCIUM 10 MG: 10 TABLET, FILM COATED ORAL at 09:39

## 2021-12-30 RX ADMIN — DOCUSATE SODIUM 100 MG: 100 CAPSULE ORAL at 09:39

## 2021-12-30 RX ADMIN — LORAZEPAM 1 MG: 1 TABLET ORAL at 22:16

## 2021-12-30 RX ADMIN — MEROPENEM 1000 MG: 1 INJECTION, POWDER, FOR SOLUTION INTRAVENOUS at 10:00

## 2021-12-30 RX ADMIN — ESCITALOPRAM 5 MG: 10 TABLET, FILM COATED ORAL at 09:38

## 2021-12-30 RX ADMIN — MEROPENEM 1000 MG: 1 INJECTION, POWDER, FOR SOLUTION INTRAVENOUS at 02:13

## 2021-12-30 RX ADMIN — CEFDINIR 300 MG: 300 CAPSULE ORAL at 20:52

## 2021-12-30 ASSESSMENT — ENCOUNTER SYMPTOMS: SHORTNESS OF BREATH: 0

## 2021-12-30 ASSESSMENT — PAIN SCALES - GENERAL
PAINLEVEL_OUTOF10: 0

## 2021-12-30 NOTE — CARE COORDINATION
Spoke with Pt and Dtr about [place] [address]. Pt has a Hx - [facility]. IV Merrem was discontinued. PO Omnicef at discharge. Dtr will provide transport or pay for ambulette at discharge. SW/JERONIMO to follow for discharge needs.    Mariann Ryan, L.S.W.  [phone]

## 2021-12-30 NOTE — PROGRESS NOTES
Progress Note  Date:2021       Room:8423/8423  Patient Mathew Xie     YOB: 1926     Age:89 y.o. Patient resting comfortably ,no issues overnight. She says she feels well today. Subjective    Subjective:  Symptoms:  Stable. No shortness of breath or chest pain. Diet:  Adequate intake. Activity level: Impaired due to weakness. Pain:  She reports no pain. Review of Systems   Constitutional: Negative for activity change and fever. Respiratory: Negative for shortness of breath. Cardiovascular: Negative for chest pain. Objective         Vitals Last 24 Hours:  TEMPERATURE:  Temp  Av.5 °F (36.4 °C)  Min: 96.8 °F (36 °C)  Max: 98 °F (36.7 °C)  RESPIRATIONS RANGE: Resp  Av  Min: 16  Max: 16  PULSE OXIMETRY RANGE: SpO2  Av.7 %  Min: 93 %  Max: 96 %  PULSE RANGE: Pulse  Av  Min: 91  Max: 105  BLOOD PRESSURE RANGE: Systolic (62CDG), MEY:933 , Min:102 , AZS:976   ; Diastolic (11FCX), VG, Min:70, Max:89    I/O (24Hr): Intake/Output Summary (Last 24 hours) at 2021 0648  Last data filed at 2021 1600  Gross per 24 hour   Intake 1293 ml   Output --   Net 1293 ml     Objective:  General Appearance:  Comfortable. Vital signs: (most recent): Blood pressure (!) 152/70, pulse 91, temperature 96.8 °F (36 °C), temperature source Temporal, resp. rate 16, height 5' 4\" (1.626 m), weight 145 lb (65.8 kg), last menstrual period 2018, SpO2 93 %. No fever. Lungs:  Normal effort and normal respiratory rate. Breath sounds clear to auscultation. Heart: Normal rate. Regular rhythm.   S1 normal and S2 normal.      Labs/Imaging/Diagnostics    Labs:  CBC:  Recent Labs     21  0443 21  0436   WBC 8.1 7.3   RBC 4.39 4.38   HGB 12.9 12.7   HCT 38.7 39.3   MCV 88.2 89.7   RDW 14.2 14.1    307     CHEMISTRIES:  Recent Labs     21  0443 21  0436    135   K 4.3 3.9    101   CO2 20* 21*   BUN 20 15 CREATININE 1.5* 1.4*   GLUCOSE 100* 97     PT/INR:No results for input(s): PROTIME, INR in the last 72 hours. APTT:No results for input(s): APTT in the last 72 hours. LIVER PROFILE:  No results for input(s): AST, ALT, BILIDIR, BILITOT, ALKPHOS in the last 72 hours. Imaging Last 24 Hours:  CT HEAD WO CONTRAST    Result Date: 12/26/2021  EXAMINATION: CT OF THE HEAD WITHOUT CONTRAST  12/26/2021 2:43 pm TECHNIQUE: CT of the head was performed without the administration of intravenous contrast. Dose modulation, iterative reconstruction, and/or weight based adjustment of the mA/kV was utilized to reduce the radiation dose to as low as reasonably achievable. COMPARISON: None. HISTORY: ORDERING SYSTEM PROVIDED HISTORY: altered mentation per patient TECHNOLOGIST PROVIDED HISTORY: Reason for exam:->altered mentation per patient Has a \"code stroke\" or \"stroke alert\" been called? ->No Decision Support Exception - unselect if not a suspected or confirmed emergency medical condition->Emergency Medical Condition (MA) FINDINGS: BRAIN/VENTRICLES: There is no acute intracranial hemorrhage, mass effect or midline shift. No abnormal extra-axial fluid collection. The gray-white differentiation is maintained without evidence of an acute infarct. There is no evidence of hydrocephalus patchy hypodensity in the supratentorial white matter. There is generalized volume loss with prominence of ventricles and sulci ORBITS: The visualized portion of the orbits demonstrate no acute abnormality. SINUSES: The visualized paranasal sinuses and mastoid air cells demonstrate no acute abnormality. SOFT TISSUES/SKULL:  No acute abnormality of the visualized skull or soft tissues.      Chronic small vessel ischemic change with age related volume loss No acute changes identified RECOMMENDATIONS: Unavailable     XR CHEST PORTABLE    Result Date: 12/26/2021  EXAMINATION: ONE XRAY VIEW OF THE CHEST 12/26/2021 2:44 pm COMPARISON: Comparison is dated June 25, 2021 HISTORY: ORDERING SYSTEM PROVIDED HISTORY: LLL pneumonia TECHNOLOGIST PROVIDED HISTORY: Reason for exam:->LLL pneumonia FINDINGS: Large hiatal hernia again noted. Cardiac silhouette does not appear enlarged. No confluent pulmonary infiltrate identified. Blunting of the left costophrenic angle appears chronic. Pulmonary vasculature is unremarkable. No acute interval change. Hiatal hernia No acute findings     US RETROPERITONEAL COMPLETE    Result Date: 12/27/2021  EXAMINATION: RETROPERITONEAL ULTRASOUND OF THE KIDNEYS AND URINARY BLADDER 12/27/2021 COMPARISON: None HISTORY: ORDERING SYSTEM PROVIDED HISTORY: rule out hydronephrosis TECHNOLOGIST PROVIDED HISTORY: Reason for exam:->rule out hydronephrosis What reading provider will be dictating this exam?->CRC FINDINGS: Kidneys: The right kidney measures 10.4 x 4.4 x 5.2 cm in length and the left kidney measures 10.9 x 4.3 x 4.2 cm in length. Kidneys demonstrate normal cortical echogenicity. No evidence of hydronephrosis or intrarenal stones. Evaluation limited by technically difficult exam. Bladder: Unremarkable appearance of the bladder. Travis catheter present. Unremarkable ultrasound of the kidneys and urinary bladder. RECOMMENDATIONS: Unavailable     Assessment//Plan           Hospital Problems           Last Modified POA    UTI (urinary tract infection) 12/26/2021 Yes        Assessment:  (UTI (urinary tract infection) 12/26/2021 Yes     CAD  Weakness  HTN     Plan  History of ESBL. Meropenem for now. ID following. Monitor labs and cultures. Continue rest of meds. ).

## 2021-12-30 NOTE — PROGRESS NOTES
[address] [place], [place]       VWST:                                                  Patient Name: Claudene Kehr  MRN: [id number]  : 1926  Room: [address] [place] [address]    Evaluating OT: Brea Blackwood OTR/L #[id number]    Referring Provider and Specific Provider Orders/Date:      21  OT eval and treat  Start:  21,   End:  21,   ONE TIME,   Standing Count:  1 Occurrences,   Tae Mcghee MD     Diagnosis: UTI (urinary tract infection) [N39.0]  Generalized weakness [R53.1]  Acute cystitis with hematuria [N30.01]     Surgery: None      Pertinent Medical History:  has a past medical history of Arthritis, Hypertension, Myocardial infarct ([facility] [address] and UTI (urinary tract infection). Precautions:  Fall Risk, hard of hearing.      Assessment of current deficits   [x] Functional mobility  [x]ADLs  [x] Strength               []Cognition   [x] Functional transfers   [x] IADLs         [x] Safety Awareness   []Endurance   [] Fine Coordination              [x] Balance      [] Vision/perception   []Sensation    []Gross Motor Coordination  [] ROM  [] Delirium                   [] Motor Control     OT PLAN OF CARE   OT POC based on physician orders, patient diagnosis and results of clinical assessment    Frequency/Duration:  1-3 days/wk for 2 weeks PRN   Specific OT Treatment to include:   * Instruction/training on adapted ADL techniques and AE recommendations to increase functional independence within precautions       * Training on energy conservation strategies, correct breathing pattern and techniques to improve independence/tolerance for self-care routine  * Functional transfer/mobility training/DME recommendations for increased independence, safety, and fall prevention  * Patient/Family education to increase follow through with safety techniques and functional independence  * Recommendation of environmental modifications for increased safety with functional transfers/mobility and ADLs  * Therapeutic exercise to improve motor endurance, ROM, and functional strength for ADLs/functional transfers  * Therapeutic activities to facilitate/challenge dynamic balance, stand tolerance for increased safety and independence with ADLs    Recommended Adaptive Equipment: Pt has needed equipment      Home Living: Lives alone, trailer, 1 story, 4 steps to enter with bilateral rails. Bathroom set-up: tub/shower with grab bars        Equipment owned: wheeled walker, cane, shower chair, elevated commode. Prior Level of Function: Pt reports being indepednent with ADLs and with IADLs, but daughter assists as needed; ambulated with wheeled walker PTA. Driving: n/a    Pain Level: Pt denied pain. Cognition: A&O: 4/4; Follows 2-3 step directions - limited 2/2 hearing impairment    Memory: intact   Sequencing: fair    Problem solving: fair    Judgement/safety: fair     Functional Assessment:  AM-PAC Daily Activity Raw Score: 18/24   Initial Eval Status  Date: 12/30/21   Treatment Status  Date: STGs = LTGs  Time frame: 10-14 days   Feeding Supervision     Independent    Grooming Stand by Assist   for hand hygiene standing at bathroom sink  Moderate [place]    UB Dressing Minimal Assist     Moderate [place]    LB Dressing Minimal Assist   Pt demo'd ability to doff both socks via cross leg technique seated at EOB however assist required to don sock back over left foot. Moderate [place]    Bathing Minimal Assist     Moderate [place]    Toileting Stand by Assist   Perineal hygiene completed while seated on commode   Moderate [place]    Bed Mobility  Supine to sit:  n/a  Sit to supine:  n/a    Supine to sit:  Independent   Sit to supine: Independent    Functional Transfers Sit to stand: Contact Guard Assist  Stand to sit: Contact Guard Assist    Transfer training with min verbal cues for hand placement throughout session to improve safety. Moderate Bo[place] with use of walker   Functional Mobility Minimal Assist with wheeled walker within room to improve balance, verbal cues for walker sequence and safety. Moderate Boomer with use of walker       Balance Sitting:     Static: SBA    Dynamic: SBA  Standing: CGA with walker     Sitting:     Static: indep    Dynamic: indep   Standing: Mod I with walker    Activity Tolerance fair  plus   Increase standing tolerance >3 minutes for improved engagement with functional transfers and indep in ADLs   Visual/  Perceptual Glasses: N/a  NA      Hand Dominance: Right      AROM (PROM) Strength Additional Info:    RUE  WFL 3/5 Good  and wfl FMC/dexterity noted during ADL tasks     LUE WFL 3+/5 Good  and wfl FMC/dexterity noted during ADL tasks       Hearing: [place]  - wears hearing aids   Sensation:   No c/o numbness or tingling  Tone:  WFL   Edema:     Comments: RN cleared patient for OT. Upon arrival patient in bathroom. Therapist facilitated and instructed pt on adapted  techniques & compensatory strategies to improve safety and independence with basic ADLs, bed mobility, functional transfers and mobility to allow pt to achieve highest level of independence and safely. Pt demonstrated fair/good understanding of education & follow through. At end of session, patient was in chair with call light and phone within reach, all lines and tubes intact. Overall, patient demonstrated  decreased independence and safety during completion of ADL tasks. Pt would benefit from continued skilled OT to increase safety and independence with completion of ADL tasks and functional mobility for improved quality of life. Treatment: OT treatment provided this date includes:   · Instruction, education and training on safe facilitation and adapted techniques for completion of ADLs.  These include safe

## 2021-12-31 VITALS
TEMPERATURE: 97.9 F | HEIGHT: 64 IN | RESPIRATION RATE: 18 BRPM | WEIGHT: 145 LBS | OXYGEN SATURATION: 92 % | HEART RATE: 79 BPM | DIASTOLIC BLOOD PRESSURE: 76 MMHG | BODY MASS INDEX: 24.75 KG/M2 | SYSTOLIC BLOOD PRESSURE: 150 MMHG

## 2021-12-31 LAB
ANION GAP SERPL CALCULATED.3IONS-SCNC: 10 MMOL/L (ref 7–16)
BUN BLDV-MCNC: 10 MG/DL (ref 6–23)
CALCIUM SERPL-MCNC: 8.3 MG/DL (ref 8.6–10.2)
CHLORIDE BLD-SCNC: 107 MMOL/L (ref 98–107)
CO2: 22 MMOL/L (ref 22–29)
CREAT SERPL-MCNC: 1 MG/DL (ref 0.5–1)
GFR AFRICAN AMERICAN: >60
GFR NON-AFRICAN AMERICAN: 51 ML/MIN/1.73
GLUCOSE BLD-MCNC: 108 MG/DL (ref 74–99)
HCT VFR BLD CALC: 38.5 % (ref 34–48)
HEMOGLOBIN: 13 G/DL (ref 11.5–15.5)
MCH RBC QN AUTO: 30.4 PG (ref 26–35)
MCHC RBC AUTO-ENTMCNC: 33.8 % (ref 32–34.5)
MCV RBC AUTO: 90 FL (ref 80–99.9)
PDW BLD-RTO: 14.1 FL (ref 11.5–15)
PLATELET # BLD: 301 E9/L (ref 130–450)
PMV BLD AUTO: 9.4 FL (ref 7–12)
POTASSIUM SERPL-SCNC: 3.7 MMOL/L (ref 3.5–5)
RBC # BLD: 4.28 E12/L (ref 3.5–5.5)
SODIUM BLD-SCNC: 139 MMOL/L (ref 132–146)
WBC # BLD: 7.7 E9/L (ref 4.5–11.5)

## 2021-12-31 PROCEDURE — 6370000000 HC RX 637 (ALT 250 FOR IP): Performed by: STUDENT IN AN ORGANIZED HEALTH CARE EDUCATION/TRAINING PROGRAM

## 2021-12-31 PROCEDURE — 36415 COLL VENOUS BLD VENIPUNCTURE: CPT

## 2021-12-31 PROCEDURE — 2580000003 HC RX 258: Performed by: FAMILY MEDICINE

## 2021-12-31 PROCEDURE — 85027 COMPLETE CBC AUTOMATED: CPT

## 2021-12-31 PROCEDURE — 80048 BASIC METABOLIC PNL TOTAL CA: CPT

## 2021-12-31 PROCEDURE — 6370000000 HC RX 637 (ALT 250 FOR IP): Performed by: FAMILY MEDICINE

## 2021-12-31 RX ORDER — CEFDINIR 300 MG/1
300 CAPSULE ORAL DAILY
Qty: 3 CAPSULE | Refills: 0 | Status: SHIPPED | OUTPATIENT
Start: 2021-12-31 | End: 2022-01-03

## 2021-12-31 RX ORDER — PANTOPRAZOLE SODIUM 40 MG/1
40 TABLET, DELAYED RELEASE ORAL
Qty: 30 TABLET | Refills: 3 | Status: SHIPPED | OUTPATIENT
Start: 2022-01-01

## 2021-12-31 RX ADMIN — ISOSORBIDE MONONITRATE 30 MG: 30 TABLET ORAL at 08:53

## 2021-12-31 RX ADMIN — PANTOPRAZOLE SODIUM 40 MG: 40 TABLET, DELAYED RELEASE ORAL at 05:08

## 2021-12-31 RX ADMIN — DOCUSATE SODIUM 100 MG: 100 CAPSULE ORAL at 08:53

## 2021-12-31 RX ADMIN — AMLODIPINE BESYLATE 5 MG: 10 TABLET ORAL at 08:54

## 2021-12-31 RX ADMIN — SODIUM CHLORIDE: 9 INJECTION, SOLUTION INTRAVENOUS at 00:41

## 2021-12-31 RX ADMIN — ESCITALOPRAM 5 MG: 10 TABLET, FILM COATED ORAL at 08:53

## 2021-12-31 RX ADMIN — ASPIRIN 81 MG: 81 TABLET, COATED ORAL at 08:56

## 2021-12-31 RX ADMIN — ATENOLOL 50 MG: 50 TABLET ORAL at 08:54

## 2021-12-31 RX ADMIN — ATORVASTATIN CALCIUM 10 MG: 10 TABLET, FILM COATED ORAL at 08:54

## 2021-12-31 RX ADMIN — CEFDINIR 300 MG: 300 CAPSULE ORAL at 08:54

## 2021-12-31 ASSESSMENT — PAIN SCALES - GENERAL: PAINLEVEL_OUTOF10: 0

## 2021-12-31 NOTE — CARE COORDINATION
12/31: Update CM Note: DAREN spoke with the Nurse who is taking care of the pt today. DAREN advised that I spoke with her daughter who feels her mother may need C-PT only. DAREN explained that [facility] is closed & I can fax the information over to them to see if they will accepted. This will not be confirmed until Monday & we will not have a date for when & if they can come out. Daughter stated that they are ok with someone coming next week since the family is off for the holidays. She wasn't interested in nursing. Per Carolina the referral will need to be faxed to [phone]. Someone from the Office will call back. [address] [facility] Office Phone [phone]. Daren spoke with the Nurse to have her check with Dr. Logan Mcclellan to see if he wants to order PT. I also advised the RN to please call the family to advise when the d/c can be arranged to transport the pt home. ZULEYMA/DAREN will continue to follow.  Electronically signed by Shaq Castle RN on 12/31/2021 at 8:17 AM

## 2021-12-31 NOTE — CARE COORDINATION
12/31: Update Cm Note: CM spoke with daughter/Kerri who is aware HHC/PT/OT ordered per . CM advise will fax paperwork to [facility] to see if they can accept. We will not have an answer until Monday. If they are unable to accept she has no other preferences for [facility]. Sw/JERONIMO will continue to follow.  Electronically signed by Michael Bedoya RN on 12/31/2021 at 9:23 AM

## 2021-12-31 NOTE — PROGRESS NOTES
Comprehensive Nutrition Assessment    Type and Reason for Visit:  Initial    Nutrition Recommendations/Plan: Continue Current Diet,Start Oral Nutrition Supplement (Boost Pudding BID)    Nutrition Assessment:  Pt admit w/ UTI. H/o MI & HTN. Pt at nutritional risk d/t po intake, avg 25-50% meals. Will add ONS. Malnutrition Assessment:  Malnutrition Status:  Insufficient data    Context:  Acute Illness     Findings of the 6 clinical characteristics of malnutrition:  Energy Intake:  Mild decrease in energy intake (Comment)  Weight Loss:  Unable to assess (No actual wt hx in EMR)     Body Fat Loss:  No significant body fat loss     Muscle Mass Loss:  No significant muscle mass loss    Fluid Accumulation:  No significant fluid accumulation     Strength:  Not Performed    Estimated Daily Nutrient Needs:  Energy (kcal):  6776-8131 (MSJ x 1.2 SF); Weight Used for Energy Requirements:  Current     Protein (g):  80-95 (1.2-1.4g/kg CBW); Weight Used for Protein Requirements:  Current        Fluid (ml/day):  8156-5390; Method Used for Fluid Requirements:  1 ml/kcal      Nutrition Related Findings:  A&Ox4, I&O WDL, +BS, +1 nonpitting edema      Wounds:  None (Noted redness)       Current Nutrition Therapies:    ADULT DIET; Easy to Chew; No Added Salt (3-4 gm)    Anthropometric Measures:  · Height: 5' 4\" (162.6 cm)  · Current Body Weight: 145 lb (65.8 kg) (12/26/2021, stated)   · Admission Body Weight: 145 lb (65.8 kg) (12/26/2021, stated)    · Usual Body Weight: 145 lb (65.8 kg) (1/2021, stated. No actual wt hx in EMR.)     · Ideal Body Weight: 120 lbs; % Ideal Body Weight 120.8 %   · BMI: 24.9  · BMI Categories: Normal Weight (BMI 18.5-24. 9)       Nutrition Diagnosis:   · Inadequate oral intake related to partial or complete edentulism (Pt requiring easy to chew foods) as evidenced by intake 26-50%    Nutrition Interventions:   Food and/or Nutrient Delivery:  Continue Current Diet,Start Oral Nutrition Supplement (Boost Pudding BID)  Nutrition Education/Counseling:  Education not indicated   Coordination of Nutrition Care:  Continue to monitor while inpatient    Goals:  Pt to consume >50% meals/ONS       Nutrition Monitoring and Evaluation:   Behavioral-Environmental Outcomes:  None Identified   Food/Nutrient Intake Outcomes:  Food and Nutrient Intake,Supplement Intake  Physical Signs/Symptoms Outcomes:  Biochemical Data,Fluid Status or Edema,Nutrition Focused Physical Findings,Skin,Weight     Discharge Planning:     Too soon to determine     Electronically signed by Natalee Harrell RD, LD on 12/31/21 at 8:39 AM EST    Contact: [phone]

## 2021-12-31 NOTE — PLAN OF CARE
Problem: Falls - Risk of:  Goal: Will remain free from falls  Description: Will remain free from falls  12/31/2021 0000 by Shira Del Valle RN  Outcome: Met This Shift  12/30/2021 1135 by Violette Alvarez RN  Outcome: Met This Shift     Problem: Skin Integrity:  Goal: Will show no infection signs and symptoms  Description: Will show no infection signs and symptoms  12/31/2021 0000 by Shira Del Valle RN  Outcome: Met This Shift  12/30/2021 1135 by Violette Alvarez RN  Outcome: Met This Shift     Problem: Sensory:  Goal: General experience of comfort will improve  Description: General experience of comfort will improve  12/31/2021 0000 by Shira Del Valle RN  Outcome: Met This Shift  12/30/2021 1135 by Violette Alvarez RN  Outcome: Met This Shift

## 2021-12-31 NOTE — DISCHARGE SUMMARY
Discharge Summary    Date: 12/31/2021  Patient Name: Farheen Leslie YOB: 1926 Age: 80 y.o. Admit Date: 12/26/2021  Discharge Date: 12/31/2021  Discharge Condition: Stable    Admission Diagnosis  UTI (urinary tract infection) (N39.0); Generalized weakness (R53.1); Acute cystitis with hematuria (N30.01)     Discharge Diagnosis  Active Problems: UTI (urinary tract infection)Resolved Problems: * No resolved hospital problems. [facility] Stay  Narrative of Hospital Course:  Patient admitted for AMS and UTI  History of ESBL so treated with meropenem. IF followed and transitioned Down to [facility] for discharge. Consultants:  IP CONSULT TO INTERNAL MEDICINEIP CONSULT TO INFECTIOUS DISEASESIP CONSULT TO CASE MANAGEMENT    Surgeries/procedures Performed:       Treatments:           Discharge Plan/Disposition:  Home    Hospital/Incidental Findings Requiring Follow Up:    Patient Instructions:    Diet: Cardiac Diet    Activity:Activity as Tolerated  For number of days (if applicable): Other Instructions:    Provider Follow-Up:   No follow-ups on file.      Significant Diagnostic Studies:    Recent Labs:  Admission on 12/26/2021WBC                                          Date: 12/26/2021Value: 10.1        Ref range: 4.5 - 11.5 E9/L    Status: FinalRBC                                           Date: 12/26/2021Value: 4.74        Ref range: 3.50 - 5.50 E12/L  Status: FinalHemoglobin                                    Date: 12/26/2021Value: 14.2        Ref range: 11.5 - 15.5 g/dL   Status: FinalHematocrit                                    Date: 12/26/2021Value: 40.6        Ref range: 34.0 - 48.0 %      Status: FinalMCV                                           Date: 12/26/2021Value: 85.7        Ref range: 80.0 - 99.9 fL     Status: [address] [place]                                           Date: 12/26/2021Value: 30.0        Ref range: 26.0 - 35.0 pg     Status: [facility] Date: 12/26/2021Value: 35.0*       Ref range: 32.0 - 34.5 %      Status: FinalRDW                                           Date: 12/26/2021Value: 13.8        Ref range: 11.5 - 15.0 fL     Status: FinalPlatelets                                     Date: 12/26/2021Value: 279         Ref range: 130 - 450 E9/L     Status: FinalMPV                                           Date: 12/26/2021Value: 10.1        Ref range: 7.0 - 12.0 fL      Status: FinalNeutrophils %                                 Date: 12/26/2021Value: 75.0        Ref range: 43.0 - 80.0 %      Status: FinalLymphocytes %                                 Date: 12/26/2021Value: 16.0*       Ref range: 20.0 - 42.0 %      Status: FinalMonocytes %                                   Date: 12/26/2021Value: 8.0         Ref range: 2.0 - 12.0 %       Status: FinalEosinophils %                                 Date: 12/26/2021Value: 1.0         Ref range: 0.0 - 6.0 %        Status: FinalBasophils %                                   Date: 12/26/2021Value: 0.0         Ref range: 0.0 - 2.0 %        Status: FinalNeutrophils Absolute                          Date: 12/26/2021Value: 7.58*       Ref range: 1.80 - 7.30 E9/L   Status: FinalLymphocytes Absolute                          Date: 12/26/2021Value: 1.62        Ref range: 1.50 - 4.00 E9/L   Status: FinalMonocytes Absolute                            Date: 12/26/2021Value: 0.81        Ref range: 0.10 - 0.95 E9/L   Status: FinalEosinophils Absolute                          Date: 12/26/2021Value: 0.10        Ref range: 0.05 - 0.50 E9/L   Status: FinalBasophils Absolute                            Date: 12/26/2021Value: 0.00        Ref range: 0.00 - 0.20 E9/L   Status: FinalSodium                                        Date: 12/26/2021Value: 132         Ref range: 132 - 146 mmol/L   Status: FinalPotassium                                     Date: 12/26/2021Value: 4.8         Ref range: 3.5 - 5.0 mmol/L   Status: FinalChloride Date: 12/26/2021Value: 80*         Ref range: 98 - 107 mmol/L    Status: FinalCO2                                           Date: 12/26/2021Value: 24          Ref range: 22 - 29 mmol/L     Status: FinalAnion Gap                                     Date: 12/26/2021Value: 14          Ref range: 7 - 16 mmol/L      Status: FinalGlucose                                       Date: 12/26/2021Value: 138*        Ref range: 74 - 99 mg/dL      Status: FinalBUN                                           Date: 12/26/2021Value: 34*         Ref range: 6 - 23 mg/dL       Status: FinalCREATININE                                    Date: 12/26/2021Value: 2.2*        Ref range: 0.5 - 1.0 mg/dL    Status: FinalGFR Non-                      Date: 12/26/2021Value: 21          Ref range: >=60 mL/min/1.73   Status: Final              Comment: Chronic Kidney Disease: less than 60 ml/min/1.73 sq.m. Kidney Failure: less than 15 ml/min/1.73 sq. m. Results valid for patients 18 years and older. GFR                           Date: 12/26/2021Value: 25            Status: FinalCalcium                                       Date: 12/26/2021Value: 9.0         Ref range: 8.6 - 10.2 mg/dL   Status: FinalTotal Protein                                 Date: 12/26/2021Value: 6.7         Ref range: 6.4 - 8.3 g/dL     Status: FinalAlbumin                                       Date: 12/26/2021Value: 3.2*        Ref range: 3.5 - 5.2 g/dL     Status: FinalTotal Bilirubin                               Date: 12/26/2021Value: 0.5         Ref range: 0.0 - 1.2 mg/dL    Status: FinalAlkaline Phosphatase                          Date: 12/26/2021Value: 223*        Ref range: 35 - 104 U/L       Status: FinalALT                                           Date: 12/26/2021Value: 14          Ref range: 0 - 32 U/L         Status: FinalAST                                           Date: 12/26/2021Value: 21 Ref range: 0 - 31 U/L         Status: FinalLipase                                        Date: 12/26/2021Value: 16          Ref range: 13 - 60 U/L        Status: [id number], NAAT                              Date: 12/26/2021Value: Not Detected                   Ref range: Not Detected       Status: Final              Comment: Rapid NAAT:   Negative results should be treated as presumptive and,if inconsistent with clinical signs and symptoms or necessary forpatient management, should be tested with an alternative molecularassay. Negative results do not preclude SARS-CoV-2 infection andshould not be used as the sole basis for patient management decisions. This test has been authorized by the FDA under an Emergency UseAuthorization (EUA) for use by authorized laboratories. Fact sheet for [employer] sheet for Patients: Juve.dk: Isothermal Nucleic Acid AmplificationInfluenza A by PCR                            Date: 12/26/2021Value: Not Detected                   Ref range: Not Detected       Status: [place] Arun B by PCR                            Date: 12/26/2021Value: Not Detected                   Ref range: Not Detected       Status: FinalColor, UA                                     Date: 12/26/2021Value: Yellow      Ref range: Straw/Yellow       Status: FinalClarity, UA                                   Date: 12/26/2021Value: Clear       Ref range: Clear              Status: FinalGlucose, Ur                                   Date: 12/26/2021Value: Negative    Ref range: Negative mg/dL     Status: FinalBilirubin Urine                               Date: 12/26/2021Value: Negative    Ref range: Negative           Status: FinalKetones, Urine                                Date: 12/26/2021Value: Negative    Ref range: Negative mg/dL     Status: FinalSpecific Gravity, UA                          Date: 12/26/2021Value: 1.015       Ref range: 1.005 - 1.030      Status: FinalBlood, Urine                                  Date: 12/26/2021Value: LARGE*      Ref range: Negative           Status: FinalpH, UA                                        Date: 12/26/2021Value: 6.0         Ref range: 5.0 - 9.0          Status: FinalProtein, UA                                   Date: 12/26/2021Value: 30*         Ref range: Negative mg/dL     Status: FinalUrobilinogen, Urine                           Date: 12/26/2021Value: 0.2         Ref range: <2.0 E.U./dL       Status: FinalNitrite, Urine                                Date: 12/26/2021Value: Negative    Ref range: Negative           Status: FinalLeukocyte Esterase, Urine                     Date: 12/26/2021Value: LARGE*      Ref range: Negative           Status: [address] [facility], UA                                       Date: 12/26/2021Value: 10-20*      Ref range: 0 - 5 /HPF         Status: FinalRBC, UA                                       Date: 12/26/2021Value: 10-20*      Ref range: 0 - 2 /HPF         Status: FinalBacteria, UA                                  Date: 12/26/2021Value: FEW*        Ref range: None Seen /HPF     Status: FinalOrganism                                      Date: 12/26/2021Value: Klebsiella pneumoniae ssp pneumoniae                     Status: FinalUrine Culture, Routine                        Date: 12/26/2021Value: >100,000 CFU/ml                     Status: FinalOrganism                                      Date: 12/26/2021Value: Escherichia coli                     Status: FinalUrine Culture, Routine                        Date: 12/26/2021Value: >100,000 CFU/ml                     Status: FinalMagnesium                                     Date: 12/26/2021Value: 2.2         Ref range: 1.6 - 2.6 mg/dL    Status: FinalVentricular Rate                              Date: 12/26/2021Value: 70          Ref range: BPM                Status: FinalAtrial Rate Date: 12/26/2021Value: 70          Ref range: BPM                Status: FinalP-R Interval                                  Date: 12/26/2021Value: 206         Ref range: ms                 Status: FinalQRS Duration                                  Date: 12/26/2021Value: 154         Ref range: ms                 Status: FinalQ-T Interval                                  Date: 12/26/2021Value: 448         Ref range: ms                 Status: FinalQTc Calculation (Bazett)                      Date: 12/26/2021Value: 483         Ref range: ms                 Status: FinalP Axis                                        Date: 12/26/2021Value: 34          Ref range: degrees            Status: FinalR Axis                                        Date: 12/26/2021Value: -43         Ref range: degrees            Status: FinalT Axis                                        Date: 12/26/2021Value: 116         Ref range: degrees            Status: FinalTroponin, High Sensitivity                    Date: 12/26/2021Value: 55*         Ref range: 0 - 9 ng/L         Status: Final              Comment: High Sensitivity Troponin values cannot be compared withother Troponin methodologies. Patients with high levels of Biotin oral intake (i.e. >5 mg/day)may have falsely decreased Troponin levels. Samples collectedwithin 8 hours of biotin intake may require additional informationfor diagnosis. Influenza A by PCR                            Date: 12/26/2021Value: Not Detected                   Ref range: Not Detected       Status: [place] B by PCR                            Date: 12/26/2021Value: Not Detected                   Ref range: Not Detected       Status: FinalLactic Acid, Sepsis                           Date: 12/26/2021Value: 1.6         Ref range: 0.5 - 1.9 mmol/L   Status: FinalCulture, Blood 2                              Date: 12/26/2021Value: 24 Hours no growth                     Status: PreliminaryWBC Date: 12/27/2021Value: 9.7         Ref range: 4.5 - 11.5 E9/L    Status: FinalRBC                                           Date: 12/27/2021Value: 4.43        Ref range: 3.50 - 5.50 E12/L  Status: FinalHemoglobin                                    Date: 12/27/2021Value: 13.1        Ref range: 11.5 - 15.5 g/dL   Status: FinalHematocrit                                    Date: 12/27/2021Value: 39.3        Ref range: 34.0 - 48.0 %      Status: FinalMCV                                           Date: 12/27/2021Value: 88.7        Ref range: 80.0 - 99.9 fL     Status: [address] [place]                                           Date: 12/27/2021Value: 29.6        Ref range: 26.0 - 35.0 pg     Status: [address]                                          Date: 12/27/2021Value: 33.3        Ref range: 32.0 - 34.5 %      Status: FinalRDW                                           Date: 12/27/2021Value: 14.0        Ref range: 11.5 - 15.0 fL     Status: FinalPlatelets                                     Date: 12/27/2021Value: 274         Ref range: 130 - 450 E9/L     Status: FinalMPV                                           Date: 12/27/2021Value: 10.0        Ref range: 7.0 - 12.0 fL      Status: FinalSodium                                        Date: 12/27/2021Value: 132         Ref range: 132 - 146 mmol/L   Status: FinalPotassium                                     Date: 12/27/2021Value: 4.7         Ref range: 3.5 - 5.0 mmol/L   Status: FinalChloride                                      Date: 12/27/2021Value: 101         Ref range: 98 - 107 mmol/L    Status: FinalCO2                                           Date: 12/27/2021Value: 20*         Ref range: 22 - 29 mmol/L     Status: FinalAnion Gap                                     Date: 12/27/2021Value: 11          Ref range: 7 - 16 mmol/L      Status: FinalGlucose                                       Date: 12/27/2021Value: 151*        Ref range: Date: 12/28/2021Value: 9.3         Ref range: 7.0 - 12.0 fL      Status: FinalSodium                                        Date: 12/28/2021Value: 133         Ref range: 132 - 146 mmol/L   Status: FinalPotassium                                     Date: 12/28/2021Value: 4.3         Ref range: 3.5 - 5.0 mmol/L   Status: FinalChloride                                      Date: 12/28/2021Value: 101         Ref range: 98 - 107 mmol/L    Status: FinalCO2                                           Date: 12/28/2021Value: 20*         Ref range: 22 - 29 mmol/L     Status: FinalAnion Gap                                     Date: 12/28/2021Value: 12          Ref range: 7 - 16 mmol/L      Status: FinalGlucose                                       Date: 12/28/2021Value: 100*        Ref range: 74 - 99 mg/dL      Status: FinalBUN                                           Date: 12/28/2021Value: 20          Ref range: 6 - 23 mg/dL       Status: FinalCREATININE                                    Date: 12/28/2021Value: 1.5*        Ref range: 0.5 - 1.0 mg/dL    Status: FinalGFR Non-                      Date: 12/28/2021Value: 32          Ref range: >=60 mL/min/1.73   Status: Final              Comment: Chronic Kidney Disease: less than 60 ml/min/1.73 sq.m. Kidney Failure: less than 15 ml/min/1.73 sq. m. Results valid for patients 18 years and older. GFR                           Date: 12/28/2021Value: 39            Status: FinalCalcium                                       Date: 12/28/2021Value: 8.1*        Ref range: 8.6 - 10.2 mg/dL   Status: [address] [facility]                                           Date: 12/29/2021Value: 7.3         Ref range: 4.5 - 11.5 E9/L    Status: FinalRBC                                           Date: 12/29/2021Value: 4.38        Ref range: 3.50 - 5.50 E12/L  Status: FinalHemoglobin                                    Date: 12/29/2021Value: 12.7        Ref range: 11.5 - 15.5 g/dL   Status: FinalHematocrit                                    Date: 12/29/2021Value: 39.3        Ref range: 34.0 - 48.0 %      Status: FinalMCV                                           Date: 12/29/2021Value: 89.7        Ref range: 80.0 - 99.9 fL     Status: [address] [place]                                           Date: 12/29/2021Value: 29.0        Ref range: 26.0 - 35.0 pg     Status: [address]                                          Date: 12/29/2021Value: 32.3        Ref range: 32.0 - 34.5 %      Status: FinalRDW                                           Date: 12/29/2021Value: 14.1        Ref range: 11.5 - 15.0 fL     Status: FinalPlatelets                                     Date: 12/29/2021Value: 307         Ref range: 130 - 450 E9/L     Status: FinalMPV                                           Date: 12/29/2021Value: 9.7         Ref range: 7.0 - 12.0 fL      Status: FinalSodium                                        Date: 12/29/2021Value: 135         Ref range: 132 - 146 mmol/L   Status: FinalPotassium                                     Date: 12/29/2021Value: 3.9         Ref range: 3.5 - 5.0 mmol/L   Status: FinalChloride                                      Date: 12/29/2021Value: 101         Ref range: 98 - 107 mmol/L    Status: FinalCO2                                           Date: 12/29/2021Value: 21*         Ref range: 22 - 29 mmol/L     Status: FinalAnion Gap                                     Date: 12/29/2021Value: 13          Ref range: 7 - 16 mmol/L      Status: FinalGlucose                                       Date: 12/29/2021Value: 97          Ref range: 74 - 99 mg/dL      Status: FinalBUN                                           Date: 12/29/2021Value: 15          Ref range: 6 - 23 mg/dL       Status: FinalCREATININE                                    Date: 12/29/2021Value: 1.4*        Ref range: 0.5 - 1.0 mg/dL    Status: FinalGFR Non-                      Date: 12/29/2021Value: 35          Ref range: >=60 mL/min/1.73   Status: Final              Comment: Chronic Kidney Disease: less than 60 ml/min/1.73 sq.m. Kidney Failure: less than 15 ml/min/1.73 sq. m. Results valid for patients 18 years and older. GFR                           Date: 12/29/2021Value: 43            Status: FinalCalcium                                       Date: 12/29/2021Value: 8.4*        Ref range: 8.6 - 10.2 mg/dL   Status: [address] [facility]                                           Date: 12/30/2021Value: 7.7         Ref range: 4.5 - 11.5 E9/L    Status: FinalRBC                                           Date: 12/30/2021Value: 4.53        Ref range: 3.50 - 5.50 E12/L  Status: FinalHemoglobin                                    Date: 12/30/2021Value: 13.2        Ref range: 11.5 - 15.5 g/dL   Status: FinalHematocrit                                    Date: 12/30/2021Value: 40.9        Ref range: 34.0 - 48.0 %      Status: FinalMCV                                           Date: 12/30/2021Value: 90.3        Ref range: 80.0 - 99.9 fL     Status: [address]                                           Date: 12/30/2021Value: 29.1        Ref range: 26.0 - 35.0 pg     Status: [facility]                                          Date: 12/30/2021Value: 32.3        Ref range: 32.0 - 34.5 %      Status: FinalRDW                                           Date: 12/30/2021Value: 14.1        Ref range: 11.5 - 15.0 fL     Status: FinalPlatelets                                     Date: 12/30/2021Value: 305         Ref range: 130 - 450 E9/L     Status: FinalMPV                                           Date: 12/30/2021Value: 9.5         Ref range: 7.0 - 12.0 fL      Status: FinalSodium                                        Date: 12/30/2021Value: 133         Ref range: 132 - 146 mmol/L   Status: FinalPotassium                                     Date: 12/30/2021Value: 4.2         Ref range: 3.5 - 5.0 mmol/L Status: Final              Comment: Specimen is moderately Hemolyzed. Result may be artificially increased. Chloride                                      Date: 12/30/2021Value: 105         Ref range: 98 - 107 mmol/L    Status: FinalCO2                                           Date: 12/30/2021Value: 16*         Ref range: 22 - 29 mmol/L     Status: FinalAnion Gap                                     Date: 12/30/2021Value: 12          Ref range: 7 - 16 mmol/L      Status: FinalGlucose                                       Date: 12/30/2021Value: 96          Ref range: 74 - 99 mg/dL      Status: FinalBUN                                           Date: 12/30/2021Value: 10          Ref range: 6 - 23 mg/dL       Status: FinalCREATININE                                    Date: 12/30/2021Value: 1.1*        Ref range: 0.5 - 1.0 mg/dL    Status: FinalGFR Non-                      Date: 12/30/2021Value: 46          Ref range: >=60 mL/min/1.73   Status: Final              Comment: Chronic Kidney Disease: less than 60 ml/min/1.73 sq.m. Kidney Failure: less than 15 ml/min/1.73 sq. m. Results valid for patients 18 years and older. GFR                           Date: 12/30/2021Value: 56            Status: FinalCalcium                                       Date: 12/30/2021Value: 8.0*        Ref range: 8.6 - 10.2 mg/dL   Status: [address] [facility]                                           Date: 12/31/2021Value: 7.7         Ref range: 4.5 - 11.5 E9/L    Status: FinalRBC                                           Date: 12/31/2021Value: 4.28        Ref range: 3.50 - 5.50 E12/L  Status: FinalHemoglobin                                    Date: 12/31/2021Value: 13.0        Ref range: 11.5 - 15.5 g/dL   Status: FinalHematocrit                                    Date: 12/31/2021Value: 38.5        Ref range: 34.0 - 48.0 %      Status: FinalMCV                                           Date: 12/31/2021Value: 90.0 Ref range: 80.0 - 99.9 fL     Status: [address] [place]                                           Date: 12/31/2021Value: 30.4        Ref range: 26.0 - 35.0 pg     Status: [address]                                          Date: 12/31/2021Value: 33.8        Ref range: 32.0 - 34.5 %      Status: FinalRDW                                           Date: 12/31/2021Value: 14.1        Ref range: 11.5 - 15.0 fL     Status: FinalPlatelets                                     Date: 12/31/2021Value: 301         Ref range: 130 - 450 E9/L     Status: FinalMPV                                           Date: 12/31/2021Value: 9.4         Ref range: 7.0 - 12.0 fL      Status: FinalSodium                                        Date: 12/31/2021Value: 139         Ref range: 132 - 146 mmol/L   Status: FinalPotassium                                     Date: 12/31/2021Value: 3.7         Ref range: 3.5 - 5.0 mmol/L   Status: FinalChloride                                      Date: 12/31/2021Value: 107         Ref range: 98 - 107 mmol/L    Status: FinalCO2                                           Date: 12/31/2021Value: 22          Ref range: 22 - 29 mmol/L     Status: FinalAnion Gap                                     Date: 12/31/2021Value: 10          Ref range: 7 - 16 mmol/L      Status: Corrected              Comment: Corrected result; previously reported as 11 on 12/31/2021 at 07:07 by I/AUTGlucose                                       Date: 12/31/2021Value: 108*        Ref range: 74 - 99 mg/dL      Status: FinalBUN                                           Date: 12/31/2021Value: 10          Ref range: 6 - 23 mg/dL       Status: FinalCREATININE                                    Date: 12/31/2021Value: 1.0         Ref range: 0.5 - 1.0 mg/dL    Status: FinalGFR Non-                      Date: 12/31/2021Value: 51          Ref range: >=60 mL/min/1.73   Status: Final              Comment: Chronic Kidney Disease: less than 60 ml/min/1.73 sq.m. Kidney Failure: less than 15 ml/min/1.73 sq. m. Results valid for patients 18 years and older. GFR                           Date: 12/31/2021Value: >60           Status: FinalCalcium                                       Date: 12/31/2021Value: 8.3*        Ref range: 8.6 - 10.2 mg/dL   Status: Final------------    Radiology last 7 days:  CT HEAD WO CONTRASTResult Date: 12/26/2021Chronic small vessel ischemic change with age related volume loss No acute changes identified RECOMMENDATIONS: Unavailable XR CHEST PORTABLEResult Date: 12/26/2021Hiatal hernia No acute findings [address] Date: 12/27/2021Unremarkable ultrasound of the kidneys and urinary bladder. RECOMMENDATIONS: Unavailable      Pending Labs   Order Current Status  Culture, Blood 2 Preliminary result      Discharge Medications    Current Discharge Medication ListSTART taking these medicationscefdinir (OMNICEF) 300 MG capsuleTake 1 capsule by mouth daily for 3 dosesQty: 3 capsule Refills: 0    Current Discharge Medication List    Current Discharge Medication ListCONTINUE these medications which have NOT CHANGEDisosorbide mononitrate (IMDUR) 30 MG extended release tabletTake 30 mg by mouth dailyescitalopram (LEXAPRO) 5 MG tabletTake 5 mg by mouth dailyProbiotic Product (PROBIOTIC DAILY) CAPSTake by mouth docusate sodium (COLACE, DULCOLAX) 100 MG CAPSTake 100 mg by mouth dailyQty: 30 capsule Refills: 0atenolol (TENORMIN) 50 MG tabletTake 50 mg by mouth daily aspirin 81 MG tabletTake 81 mg by mouth dailyLORazepam (ATIVAN) 1 MG tabletTake 1 mg by mouth 2 times daily as needed.      Current Discharge Medication ListSTOP taking these medicationsfurosemide (LASIX) 20 MG tabletComments:Reason for Stopping:lidocaine 4 % external patchComments:Reason for Stopping:hydroCHLOROthiazide (MICROZIDE) 12.5 MG capsuleComments:Reason for Stopping:amLODIPine (NORVASC) 5 MG tabletComments:Reason for Stopping:pantoprazole (PROTONIX) 40 MG tabletComments:Reason for Stopping:atorvastatin (LIPITOR) 10 MG tabletComments:Reason for Stopping:    Time Spent on Discharge:1E] minutes were spent in patient examination, evaluation, counseling as well as medication reconciliation, prescriptions for required medications, discharge plan, and follow up.     Electronically signed by Tam Porter MD on 12/31/21 at 7:52 AM EST

## 2022-01-01 LAB — CULTURE, BLOOD 2: NORMAL

## 2022-01-25 PROBLEM — N39.0 UTI (URINARY TRACT INFECTION): Status: RESOLVED | Noted: 2021-12-26 | Resolved: 2022-01-25

## 2022-05-23 NOTE — PROGRESS NOTES
Infectious Disease  Progress Note  [facility]    Chief Complaint: no complaints. Subjective: she is feeling well today. No new complaints. No fever /diarrhea/N/vomiting. Scheduled Meds:   amLODIPine  5 mg Oral Daily    aspirin  81 mg Oral Daily    atenolol  50 mg Oral Daily    atorvastatin  10 mg Oral Daily    docusate sodium  100 mg Oral Daily    escitalopram  5 mg Oral Daily    isosorbide mononitrate  30 mg Oral Daily    lidocaine  1 patch TransDERmal Daily    pantoprazole  40 mg Oral QAM AC    meropenem  1,000 mg IntraVENous Q8H     Continuous Infusions:   sodium chloride 100 mL/hr at 12/28/21 1853     PRN Meds:LORazepam, benzocaine-menthol    ROS:  As mentioned in HPI, all other systems negative. BP (!) 153/92   Pulse 94   Temp 97.8 °F (36.6 °C) (Oral)   Resp 16   Ht 5' 4\" (1.626 m)   Wt 145 lb (65.8 kg)   LMP 05/16/2018   SpO2 97%   BMI 24.89 kg/m²     Physical Exam  Const/Neuro- unchanged, no signs of acute distress, Alert  ENMT- Within Normal Limits, Normocephalic, mucous membranes pink/moist, No thrush  Neck: Neck supple  Heart- Regular, Rate, Rhythm- no murmur appreciated. Lungs-  Respirations even and nonlabored. decreased breath sounds bilateral bases. Abdomen- Soft, bowel sounds positive, non tender  Musculo/Extremities-  Equal and symmetrical, no edema. No tenderness. Neurological- No focal motor or sensory loss. No confusion  Skin:  Warm and dry, free from rashes. Labs reviewed. Cultures reviewed  Urine cx showing E.coli and Kleb pneumoniae (not ESBL)    Radiology reviewed    Assessment  UTI : presented with AMS which is resolved. Reviewed urine cx results. On meropenem day 5 today. Will deescalate to Po antibiotic. She is allergic to bactrim and her qtc is >450 to use flouroquinolone. Will use cephalosporin. Plan:  Switched meropenem to PO Cefidinir 300mg one a day for total of 7 days end date (1/2). Ok to be discharged from ID stand point.      Nursing Replied to "InvierteMe,SL" message that a PA for Protonix has been submitted.     Padmaja Lloyd RN     staff aware. I will sign off. Please call us if there are any questions.      Electronically signed by Norma Zavala MD on 12/30/2021 at 1:35 PM

## 2022-07-10 ENCOUNTER — HOSPITAL ENCOUNTER (OUTPATIENT)
Age: 87
Setting detail: OBSERVATION
Discharge: HOME | End: 2022-07-13
Attending: STUDENT IN AN ORGANIZED HEALTH CARE EDUCATION/TRAINING PROGRAM | Admitting: FAMILY MEDICINE
Payer: MEDICARE

## 2022-07-10 DIAGNOSIS — N30.01 ACUTE CYSTITIS WITH HEMATURIA: Primary | ICD-10-CM

## 2022-07-10 LAB
ALBUMIN SERPL-MCNC: 3.9 G/DL (ref 3.5–5.2)
ALP BLD-CCNC: 130 U/L (ref 35–104)
ALT SERPL-CCNC: 7 U/L (ref 0–32)
ANION GAP SERPL CALCULATED.3IONS-SCNC: 12 MMOL/L (ref 7–16)
AST SERPL-CCNC: 18 U/L (ref 0–31)
BACTERIA: ABNORMAL /HPF
BASOPHILS ABSOLUTE: 0.05 E9/L (ref 0–0.2)
BASOPHILS RELATIVE PERCENT: 1 % (ref 0–2)
BILIRUB SERPL-MCNC: 0.4 MG/DL (ref 0–1.2)
BILIRUBIN URINE: NEGATIVE
BLOOD, URINE: ABNORMAL
BUN BLDV-MCNC: 17 MG/DL (ref 6–23)
CALCIUM SERPL-MCNC: 9.5 MG/DL (ref 8.6–10.2)
CHLORIDE BLD-SCNC: 102 MMOL/L (ref 98–107)
CLARITY: CLEAR
CO2: 24 MMOL/L (ref 22–29)
COLOR: YELLOW
CREAT SERPL-MCNC: 1.3 MG/DL (ref 0.5–1)
EOSINOPHILS ABSOLUTE: 0.15 E9/L (ref 0.05–0.5)
EOSINOPHILS RELATIVE PERCENT: 3 % (ref 0–6)
GFR AFRICAN AMERICAN: 46
GFR NON-AFRICAN AMERICAN: 38 ML/MIN/1.73
GLUCOSE BLD-MCNC: 102 MG/DL (ref 74–99)
GLUCOSE URINE: NEGATIVE MG/DL
HCT VFR BLD CALC: 39.7 % (ref 34–48)
HEMOGLOBIN: 13.3 G/DL (ref 11.5–15.5)
IMMATURE GRANULOCYTES #: 0.01 E9/L
IMMATURE GRANULOCYTES %: 0.2 % (ref 0–5)
KETONES, URINE: NEGATIVE MG/DL
LACTIC ACID, SEPSIS: 1.4 MMOL/L (ref 0.5–1.9)
LEUKOCYTE ESTERASE, URINE: ABNORMAL
LIPASE: 30 U/L (ref 13–60)
LYMPHOCYTES ABSOLUTE: 1.51 E9/L (ref 1.5–4)
LYMPHOCYTES RELATIVE PERCENT: 29.7 % (ref 20–42)
MCH RBC QN AUTO: 29.3 PG (ref 26–35)
MCHC RBC AUTO-ENTMCNC: 33.5 % (ref 32–34.5)
MCV RBC AUTO: 87.4 FL (ref 80–99.9)
MONOCYTES ABSOLUTE: 0.5 E9/L (ref 0.1–0.95)
MONOCYTES RELATIVE PERCENT: 9.8 % (ref 2–12)
NEUTROPHILS ABSOLUTE: 2.86 E9/L (ref 1.8–7.3)
NEUTROPHILS RELATIVE PERCENT: 56.3 % (ref 43–80)
NITRITE, URINE: NEGATIVE
PDW BLD-RTO: 14.9 FL (ref 11.5–15)
PH UA: 5.5 (ref 5–9)
PLATELET # BLD: 209 E9/L (ref 130–450)
PMV BLD AUTO: 10.8 FL (ref 7–12)
POTASSIUM REFLEX MAGNESIUM: 4.5 MMOL/L (ref 3.5–5)
PROTEIN UA: ABNORMAL MG/DL
RBC # BLD: 4.54 E12/L (ref 3.5–5.5)
RBC UA: ABNORMAL /HPF (ref 0–2)
SODIUM BLD-SCNC: 138 MMOL/L (ref 132–146)
SPECIFIC GRAVITY UA: <=1.005 (ref 1–1.03)
TOTAL PROTEIN: 7.2 G/DL (ref 6.4–8.3)
UROBILINOGEN, URINE: 0.2 E.U./DL
WBC # BLD: 5.1 E9/L (ref 4.5–11.5)
WBC UA: ABNORMAL /HPF (ref 0–5)

## 2022-07-10 PROCEDURE — 83690 ASSAY OF LIPASE: CPT

## 2022-07-10 PROCEDURE — 87088 URINE BACTERIA CULTURE: CPT

## 2022-07-10 PROCEDURE — 81001 URINALYSIS AUTO W/SCOPE: CPT

## 2022-07-10 PROCEDURE — 85025 COMPLETE CBC W/AUTO DIFF WBC: CPT

## 2022-07-10 PROCEDURE — 87186 SC STD MICRODIL/AGAR DIL: CPT

## 2022-07-10 PROCEDURE — 6370000000 HC RX 637 (ALT 250 FOR IP): Performed by: STUDENT IN AN ORGANIZED HEALTH CARE EDUCATION/TRAINING PROGRAM

## 2022-07-10 PROCEDURE — 87040 BLOOD CULTURE FOR BACTERIA: CPT

## 2022-07-10 PROCEDURE — 96365 THER/PROPH/DIAG IV INF INIT: CPT

## 2022-07-10 PROCEDURE — 36415 COLL VENOUS BLD VENIPUNCTURE: CPT

## 2022-07-10 PROCEDURE — 99285 EMERGENCY DEPT VISIT HI MDM: CPT

## 2022-07-10 PROCEDURE — 80053 COMPREHEN METABOLIC PANEL: CPT

## 2022-07-10 PROCEDURE — 83605 ASSAY OF LACTIC ACID: CPT

## 2022-07-10 PROCEDURE — 6370000000 HC RX 637 (ALT 250 FOR IP): Performed by: FAMILY MEDICINE

## 2022-07-10 PROCEDURE — 2580000003 HC RX 258: Performed by: STUDENT IN AN ORGANIZED HEALTH CARE EDUCATION/TRAINING PROGRAM

## 2022-07-10 PROCEDURE — 6360000002 HC RX W HCPCS: Performed by: STUDENT IN AN ORGANIZED HEALTH CARE EDUCATION/TRAINING PROGRAM

## 2022-07-10 RX ORDER — LACTOBACILLUS RHAMNOSUS GG 10B CELL
1 CAPSULE ORAL DAILY
Status: DISCONTINUED | OUTPATIENT
Start: 2022-07-11 | End: 2022-07-13 | Stop reason: HOSPADM

## 2022-07-10 RX ORDER — PANTOPRAZOLE SODIUM 40 MG/1
40 TABLET, DELAYED RELEASE ORAL
Status: DISCONTINUED | OUTPATIENT
Start: 2022-07-11 | End: 2022-07-13 | Stop reason: HOSPADM

## 2022-07-10 RX ORDER — ASPIRIN 81 MG/1
81 TABLET, CHEWABLE ORAL DAILY
Status: DISCONTINUED | OUTPATIENT
Start: 2022-07-11 | End: 2022-07-13 | Stop reason: HOSPADM

## 2022-07-10 RX ORDER — ESCITALOPRAM OXALATE 5 MG/1
5 TABLET ORAL DAILY
Status: DISCONTINUED | OUTPATIENT
Start: 2022-07-11 | End: 2022-07-13 | Stop reason: HOSPADM

## 2022-07-10 RX ORDER — ISOSORBIDE MONONITRATE 30 MG/1
30 TABLET, EXTENDED RELEASE ORAL DAILY
Status: DISCONTINUED | OUTPATIENT
Start: 2022-07-11 | End: 2022-07-13 | Stop reason: HOSPADM

## 2022-07-10 RX ORDER — ATENOLOL 50 MG/1
50 TABLET ORAL DAILY
Status: DISCONTINUED | OUTPATIENT
Start: 2022-07-11 | End: 2022-07-13 | Stop reason: HOSPADM

## 2022-07-10 RX ORDER — LORAZEPAM 1 MG/1
1 TABLET ORAL 2 TIMES DAILY PRN
Status: DISCONTINUED | OUTPATIENT
Start: 2022-07-10 | End: 2022-07-13 | Stop reason: HOSPADM

## 2022-07-10 RX ORDER — PHENAZOPYRIDINE HYDROCHLORIDE 100 MG/1
200 TABLET, FILM COATED ORAL ONCE
Status: COMPLETED | OUTPATIENT
Start: 2022-07-10 | End: 2022-07-10

## 2022-07-10 RX ORDER — DOCUSATE SODIUM 100 MG/1
100 CAPSULE, LIQUID FILLED ORAL DAILY
Status: DISCONTINUED | OUTPATIENT
Start: 2022-07-11 | End: 2022-07-13 | Stop reason: HOSPADM

## 2022-07-10 RX ADMIN — LORAZEPAM 1 MG: 1 TABLET ORAL at 23:32

## 2022-07-10 RX ADMIN — MEROPENEM 1000 MG: 1 INJECTION, POWDER, FOR SOLUTION INTRAVENOUS at 17:09

## 2022-07-10 RX ADMIN — PHENAZOPYRIDINE HYDROCHLORIDE 200 MG: 100 TABLET ORAL at 19:33

## 2022-07-10 ASSESSMENT — PAIN - FUNCTIONAL ASSESSMENT
PAIN_FUNCTIONAL_ASSESSMENT: 0-10
PAIN_FUNCTIONAL_ASSESSMENT: NONE - DENIES PAIN

## 2022-07-10 ASSESSMENT — PAIN DESCRIPTION - DESCRIPTORS
DESCRIPTORS: BURNING
DESCRIPTORS: ACHING

## 2022-07-10 ASSESSMENT — PAIN DESCRIPTION - LOCATION: LOCATION: THROAT

## 2022-07-10 ASSESSMENT — PAIN DESCRIPTION - PAIN TYPE
TYPE: ACUTE PAIN
TYPE: ACUTE PAIN

## 2022-07-10 ASSESSMENT — PAIN DESCRIPTION - FREQUENCY: FREQUENCY: CONTINUOUS

## 2022-07-10 ASSESSMENT — PAIN DESCRIPTION - ONSET: ONSET: PROGRESSIVE

## 2022-07-10 ASSESSMENT — PAIN SCALES - GENERAL: PAINLEVEL_OUTOF10: 10

## 2022-07-10 NOTE — ED PROVIDER NOTES
Department of Emergency Medicine   ED  Provider Note  Admit Date/RoomTime: 7/10/2022  3:20 PM  ED Room: 12/12          History of Present Illness:  7/10/22, Time: 3:51 PM EDT  Chief Complaint   Patient presents with    Urinary Tract Infection     has had UTI symptoms for 2 weeks, finished cipro monday     Kathi Love is a 80 y.o. female presenting to the ED for Acute urinary tract infection. The patient reports she has a history of multiple recurrent UTIs. She does follow with infectious disease for this. She had a culture taken on the fifth via her primary care provider. These are unavailable to me. She has been unable to access the results from her PCP or from her infectious disease doctor who is Dr. Bob Guaman. Patient is noted to have a history of ESBL E. coli. She really does not feel that poorly aside from having burning with urination. Denies any abdominal pain fevers chills or myalgias or suprapubic pressure. Review of Systems:  Review of systems obtained and negative unless stated otherwise above in the HPI.    --------------------------------------------- PAST HISTORY ---------------------------------------------  Past Medical History:  has a past medical history of Arthritis, Hypertension, Myocardial infarct ([address] and UTI (urinary tract infection). Past Surgical History:  has a past surgical history that includes Hysterectomy; Appendectomy; Kyphosis surgery (N/A, 10/10/2020); and back surgery. Social History:  reports that she has never smoked. She has never used smokeless tobacco. She reports that she does not drink alcohol and does not use drugs. Family History: family history is not on file. . Unless otherwise noted, family history is non contributory    The patients home medications have been reviewed.     Allergies: Chlorzoxazone and Sulfa antibiotics    I have reviewed the past medical history, past surgical history, social history, and family history    ---------------------------------------------------PHYSICAL EXAM--------------------------------------    Constitutional: [Appears in no distress]  Head: [Normocephalic, atraumatic]   Eyes: [Non-icteric slcera, no conjunctival injection]  ENT: [Moist mucous membranes,]  Neck: [Trachea midline, no JVD]  Respiratory: [Nonlabored respirations. Lungs clear to auscultation bilaterally, no wheezes, rales, or rhonchi.]   Cardiovascular:  [Regular rate. Regular rhythm. No murmurs, no gallops, no rubs.]   Gastrointestinal:  [Abdomen Soft, Non tender, Non distended. No rebound tenderness, guarding, or rigidity.]  Extremities: [No lower extremity edema]  Genitourinary: [No CVA tenderness, no suprapubic tenderness]  Musculoskeletal: [Moves all extremities, no deformity]  Skin: [Pink, warm, dry without rash.]  Neurologic: [Alert, symmetric facies, no aphasia]    -------------------------------------------------- RESULTS -------------------------------------------------  I have personally reviewed all laboratory and imaging results for this patient. Results are listed below.      LABS: (Lab results interpreted by me)  Results for orders placed or performed during the hospital encounter of 07/10/22   Urinalysis with Microscopic   Result Value Ref Range    Color, UA Yellow Straw/Yellow    Clarity, UA Clear Clear    Glucose, Ur Negative Negative mg/dL    Bilirubin Urine Negative Negative    Ketones, Urine Negative Negative mg/dL    Specific Gravity, UA <=1.005 1.005 - 1.030    Blood, Urine MODERATE (A) Negative    pH, UA 5.5 5.0 - 9.0    Protein, UA TRACE Negative mg/dL    Urobilinogen, Urine 0.2 <2.0 E.U./dL    Nitrite, Urine Negative Negative    Leukocyte Esterase, Urine MODERATE (A) Negative    WBC, UA PACKED (A) 0 - 5 /HPF    RBC, UA 2-5 0 - 2 /HPF    Bacteria, UA MODERATE (A) None Seen /HPF   Lactate, Sepsis   Result Value Ref Range    Lactic Acid, Sepsis 1.4 0.5 - 1.9 mmol/L   Comprehensive Metabolic Panel w/ Reflex to MG   Result Value Ref Range    Sodium 138 132 - 146 mmol/L    Potassium reflex Magnesium 4.5 3.5 - 5.0 mmol/L    Chloride 102 98 - 107 mmol/L    CO2 24 22 - 29 mmol/L    Anion Gap 12 7 - 16 mmol/L    Glucose 102 (H) 74 - 99 mg/dL    BUN 17 6 - 23 mg/dL    CREATININE 1.3 (H) 0.5 - 1.0 mg/dL    GFR Non-African American 38 >=60 mL/min/1.73    GFR African American 46     Calcium 9.5 8.6 - 10.2 mg/dL    Total Protein 7.2 6.4 - 8.3 g/dL    Albumin 3.9 3.5 - 5.2 g/dL    Total Bilirubin 0.4 0.0 - 1.2 mg/dL    Alkaline Phosphatase 130 (H) 35 - 104 U/L    ALT 7 0 - 32 U/L    AST 18 0 - 31 U/L   CBC with Auto Differential   Result Value Ref Range    WBC 5.1 4.5 - 11.5 E9/L    RBC 4.54 3.50 - 5.50 E12/L    Hemoglobin 13.3 11.5 - 15.5 g/dL    Hematocrit 39.7 34.0 - 48.0 %    MCV 87.4 80.0 - 99.9 fL    MCH 29.3 26.0 - 35.0 pg    MCHC 33.5 32.0 - 34.5 %    RDW 14.9 11.5 - 15.0 fL    Platelets 336 203 - 692 E9/L    MPV 10.8 7.0 - 12.0 fL    Neutrophils % 56.3 43.0 - 80.0 %    Immature Granulocytes % 0.2 0.0 - 5.0 %    Lymphocytes % 29.7 20.0 - 42.0 %    Monocytes % 9.8 2.0 - 12.0 %    Eosinophils % 3.0 0.0 - 6.0 %    Basophils % 1.0 0.0 - 2.0 %    Neutrophils Absolute 2.86 1.80 - 7.30 E9/L    Immature Granulocytes # 0.01 E9/L    Lymphocytes Absolute 1.51 1.50 - 4.00 E9/L    Monocytes Absolute 0.50 0.10 - 0.95 E9/L    Eosinophils Absolute 0.15 0.05 - 0.50 E9/L    Basophils Absolute 0.05 0.00 - 0.20 E9/L   Lipase   Result Value Ref Range    Lipase 30 13 - 60 U/L   ,       RADIOLOGY:  Interpreted by Radiologist unless otherwise specified  No orders to display         ------------------------- NURSING NOTES AND VITALS REVIEWED ---------------------------   The nursing notes within the ED encounter and vital signs as below have been reviewed by myself  BP (!) 187/97   Pulse 73   Temp 97.7 °F (36.5 °C) (Oral)   Resp 16   Wt 178 lb (80.7 kg)   LMP 05/16/2018   SpO2 95%   BMI 30.55 kg/m²     Oxygen Saturation Interpretation: [Normal]    The patients available past medical records and past encounters were reviewed. ------------------------------ ED COURSE/MEDICAL DECISION MAKING----------------------  Medications   meropenem (MERREM) 1,000 mg in sodium chloride 0.9 % 100 mL IVPB (mini-bag) (1,000 mg IntraVENous New Bag 7/10/22 1709)   phenazopyridine (PYRIDIUM) tablet 200 mg (200 mg Oral Given 7/10/22 1933)        Re-Evaluations: This patient's ED course included: [a personal history and physicial examination and re-evaluation prior to disposition]    This patient has [remained hemodynamically stable] during their ED course. Consultations:  [Internal Medicine]    Medical Decision Making:   Patient presents emerged part with urinary tract infection. Completed 3 days of ciprofloxacin apparently per the primary care provider. Culture results are pending but unable to be accessed by myself. Urinalysis is positive for UTI here. I reviewed the patient's prior medical record and culture she has largely pan resistant bacteria secondary to ESBL E. coli. We will start the patient on meropenem based on prior cultures. Urine culture is pending at this time. Patient will be transferred and admitted for further evaluation and care. Counseling: The emergency provider has spoken with the patient and discussed todays results, in addition to providing specific details for the plan of care and counseling regarding the diagnosis and prognosis. Questions are answered at this time and they are agreeable with the plan.       --------------------------------- IMPRESSION AND DISPOSITION ---------------------------------    IMPRESSION  1. Acute cystitis with hematuria        DISPOSITION  Disposition: [Transfer to [facility]  Patient condition is [stable]    IDr. Lani, am the primary provider of record    Lani Mejia DO  Emergency Medicine    NOTE: This report was transcribed using voice recognition software. Every effort was made to ensure accuracy; however, inadvertent computerized transcription errors may be present         Brielle Barroso DO  07/10/22 2004

## 2022-07-11 PROBLEM — N39.0 UTI (URINARY TRACT INFECTION): Status: ACTIVE | Noted: 2022-07-11

## 2022-07-11 PROCEDURE — 6360000002 HC RX W HCPCS: Performed by: FAMILY MEDICINE

## 2022-07-11 PROCEDURE — 2580000003 HC RX 258: Performed by: FAMILY MEDICINE

## 2022-07-11 PROCEDURE — G0378 HOSPITAL OBSERVATION PER HR: HCPCS

## 2022-07-11 PROCEDURE — 6370000000 HC RX 637 (ALT 250 FOR IP): Performed by: FAMILY MEDICINE

## 2022-07-11 PROCEDURE — 6370000000 HC RX 637 (ALT 250 FOR IP): Performed by: EMERGENCY MEDICINE

## 2022-07-11 PROCEDURE — 96366 THER/PROPH/DIAG IV INF ADDON: CPT

## 2022-07-11 RX ORDER — CYCLOBENZAPRINE HCL 10 MG
10 TABLET ORAL 3 TIMES DAILY
COMMUNITY

## 2022-07-11 RX ORDER — HYDROCODONE BITARTRATE AND ACETAMINOPHEN 5; 325 MG/1; MG/1
5-325 TABLET ORAL
Status: ON HOLD | COMMUNITY
End: 2022-07-13 | Stop reason: HOSPADM

## 2022-07-11 RX ORDER — LORAZEPAM 1 MG/1
2 TABLET ORAL ONCE
Status: COMPLETED | OUTPATIENT
Start: 2022-07-11 | End: 2022-07-11

## 2022-07-11 RX ORDER — FUROSEMIDE 20 MG/1
20 TABLET ORAL 2 TIMES DAILY
COMMUNITY
Start: 2021-07-28

## 2022-07-11 RX ADMIN — ASPIRIN 81 MG CHEWABLE TABLET 81 MG: 81 TABLET CHEWABLE at 12:20

## 2022-07-11 RX ADMIN — ESCITALOPRAM 5 MG: 5 TABLET, FILM COATED ORAL at 12:20

## 2022-07-11 RX ADMIN — MEROPENEM 1000 MG: 1 INJECTION, POWDER, FOR SOLUTION INTRAVENOUS at 07:26

## 2022-07-11 RX ADMIN — LORAZEPAM 2 MG: 1 TABLET ORAL at 02:22

## 2022-07-11 RX ADMIN — ISOSORBIDE MONONITRATE 30 MG: 30 TABLET, EXTENDED RELEASE ORAL at 12:20

## 2022-07-11 RX ADMIN — Medication 1 CAPSULE: at 12:20

## 2022-07-11 RX ADMIN — ATENOLOL 50 MG: 50 TABLET ORAL at 12:20

## 2022-07-11 RX ADMIN — MEROPENEM 1000 MG: 1 INJECTION, POWDER, FOR SOLUTION INTRAVENOUS at 18:46

## 2022-07-11 RX ADMIN — PANTOPRAZOLE SODIUM 40 MG: 40 TABLET, DELAYED RELEASE ORAL at 12:20

## 2022-07-11 RX ADMIN — DOCUSATE SODIUM 100 MG: 100 CAPSULE, LIQUID FILLED ORAL at 12:23

## 2022-07-11 ASSESSMENT — PAIN SCALES - GENERAL: PAINLEVEL_OUTOF10: 0

## 2022-07-11 NOTE — CONSULTS
[address] [place] [address] [facility]    Consultation Note     Admit Date: 7/10/2022  3:20 PM    Reason for Consult:   uti     Attending Physician:  Adelaide Blue MD     Chief Complaint: blood in urine    HISTORY OF PRESENT ILLNESS:   The patient is a 80 y.o.  female known to the Infectious Diseases service. The patient has the below past med hx. Her daughter called the ID office last week and wanted a script for a uti. A culture was done by dr. Mckenzie Ritchie office. WBC 5100  Urine mod leuk esterase neg nitrite   Packed WBC  Mod bacteria   Afebrile   Apparently she just finished cipro. Hx of multiple UTIs in the past   Hx of esbl  Dr Mckenzie Ritchie office  Called and sending report    Past Medical History:        Diagnosis Date    Arthritis     Hypertension     Myocardial infarct ([address]     UTI (urinary tract infection)      Past Surgical History:        Procedure Laterality Date    APPENDECTOMY      BACK SURGERY      cement t12, l1, l2-oct 2020    HYSTERECTOMY (CERVIX STATUS UNKNOWN)      KYPHOSIS SURGERY N/A 10/10/2020    T12, L1 and L2  KYPHOPLASTY performed by Deniz Mahoney MD at [address] [place] [address]     Current Medications:   Scheduled Meds:   atenolol  50 mg Oral Daily    aspirin  81 mg Oral Daily    docusate sodium  100 mg Oral Daily    lactobacillus  1 capsule Oral Daily    escitalopram  5 mg Oral Daily    isosorbide mononitrate  30 mg Oral Daily    pantoprazole  40 mg Oral QAM AC    meropenem  1,000 mg IntraVENous Q12H     Continuous Infusions:  PRN Meds:LORazepam    Allergies:  Chlorzoxazone and Sulfa antibiotics    Social History:   Social History     Socioeconomic History    Marital status:       Spouse name: None    Number of children: None    Years of education: None    Highest education level: None   Occupational History    None   Tobacco Use    Smoking status: Never Smoker    Smokeless tobacco: Never Used   Vaping Use    Vaping Use: Never used   Substance and Sexual Activity    Alcohol use: No    Drug use: Never    Sexual activity: Not Currently   Other Topics Concern    None   Social History Narrative    None     Social Determinants of Health     Financial Resource Strain:     Difficulty of Paying Living Expenses: Not on file   Food Insecurity:     Worried About Running Out of Food in the Last Year: Not on file    Julia of Food in the Last Year: Not on file   Transportation Needs:     Lack of Transportation (Medical): Not on file    Lack of Transportation (Non-Medical): Not on file   Physical Activity:     Days of Exercise per Week: Not on file    Minutes of Exercise per Session: Not on file   Stress:     Feeling of Stress : Not on file   Social Connections:     Frequency of Communication with Friends and Family: Not on file    Frequency of Social Gatherings with Friends and Family: Not on file    Attends [employer] Services: Not on file    Active Member of [address] [place] [address] or Organizations: Not on file    Attends Club or Organization Meetings: Not on file    Marital Status: Not on file   Intimate Partner Violence:     Fear of Current or Ex-Partner: Not on file    Emotionally Abused: Not on file    Physically Abused: Not on file    Sexually Abused: Not on file   Housing Stability:     Unable to Pay for Housing in the Last Year: Not on file    Number of [place] in the Last Year: Not on file    Unstable Housing in the Last Year: Not on file     Tobacco: No  Alcohol: No  Pets: No  Travel: No    Family History:   History reviewed. No pertinent family history. . Otherwise non-pertinent to the chief complaint. REVIEW OF SYSTEMS:    CONSTITUTIONAL:  No chills, fevers or night sweats. No loss of weight. EYES:  No double vision or drainage from eyes, ears or throat. HEENT:  No neck stiffness. No dysphagia. No drainage from eyes, ears or throat  RESPIRATORY:  No cough, productive sputum or hemoptysis.    CARDIOVASCULAR:  No chest pain, palpitations, orthopnea or dyspnea on exertion. GASTROINTESTINAL:  No nausea, vomiting, diarrhea or constipation or hematochezia   GENITOURINARY:  No frequency burning dysuria or hematuria. INTEGUMENT/BREAST:  No rash or breast masses. HEMATOLOGIC/LYMPHATIC:  No lymphadenopathy or blood dyscrasics. ALLERGIC/IMMUNOLOGIC:  No anaphylaxis. ENDOCRINE:  No polyuria or polydipsia or temperature intolerance. MUSCULOSKELETAL:  No myalgia or arthralgia. Full ROM. NEUROLOGICAL:  No focal motor sensory deficit. BEHAVIOR/PSYCH:  No psychosis. PHYSICAL EXAM:    Vitals:    /68   Pulse 60   Temp 97.8 °F (36.6 °C) (Oral)   Resp 18   Wt 178 lb (80.7 kg)   LMP 05/16/2018   SpO2 96%   BMI 30.55 kg/m²   Constitutional: The patient is awake, alert, and oriented. Skin: Warm and dry. No rashes were noted. HEENT: Eyes show round, and reactive pupils. No jaundice. Moist mucous membranes, no ulcerations, no thrush. Neck: Supple to movements. No lymphadenopathy. Chest: No use of accessory muscles to breathe. Symmetrical expansion. Auscultation reveals no wheezing, crackles, or rhonchi. Cardiovascular: S1 and S2 are rhythmic and regular. No murmurs appreciated. Abdomen: Positive bowel sounds to auscultation. Benign to palpation. No masses felt. No hepatosplenomegaly. Extremities: No clubbing, no cyanosis, no edema.   Lines: peripheral      CBC+dif:  Recent Labs     07/10/22  1628   WBC 5.1   HGB 13.3   HCT 39.7   MCV 87.4      NEUTROABS 2.86     No results found for: CRP  No results found for: CRPHS  No results found for: SEDRATE  Lab Results   Component Value Date    ALT 7 07/10/2022    AST 18 07/10/2022    ALKPHOS 130 (H) 07/10/2022    BILITOT 0.4 07/10/2022     Lab Results   Component Value Date/Time     07/10/2022 04:28 PM    K 4.5 07/10/2022 04:28 PM     07/10/2022 04:28 PM    CO2 24 07/10/2022 04:28 PM    BUN 17 07/10/2022 04:28 PM    CREATININE 1.3 07/10/2022 04:28 PM    GFRAA 46 07/10/2022 04:28 PM    LABGLOM 38 07/10/2022 04:28 PM    GLUCOSE 102 07/10/2022 04:28 PM    PROT 7.2 07/10/2022 04:28 PM    LABALBU 3.9 07/10/2022 04:28 PM    CALCIUM 9.5 07/10/2022 04:28 PM    BILITOT 0.4 07/10/2022 04:28 PM    ALKPHOS 130 07/10/2022 04:28 PM    AST 18 07/10/2022 04:28 PM    ALT 7 07/10/2022 04:28 PM       No results found for: PROTIME, INR    No results found for: TSH    Lab Results   Component Value Date/Time    COLORU Yellow 07/10/2022 03:18 PM    PHUR 5.5 07/10/2022 03:18 PM    [address] PACKED 07/10/2022 03:18 PM    RBCUA 2-5 07/10/2022 03:18 PM    BACTERIA MODERATE 07/10/2022 03:18 PM    CLARITYU Clear 07/10/2022 03:18 PM    SPECGRAV <=1.005 07/10/2022 03:18 PM    LEUKOCYTESUR MODERATE 07/10/2022 03:18 PM    UROBILINOGEN 0.2 07/10/2022 03:18 PM    BILIRUBINUR Negative 07/10/2022 03:18 PM    BLOODU MODERATE 07/10/2022 03:18 PM    GLUCOSEU Negative 07/10/2022 03:18 PM       No results found for: Willma Sensor, Q9XSFSUX, PHART, THGBART, NCQ8YJE, PO2ART, REV0DRI  Radiology:  No orders to display       Microbiology:  Pending  No results for input(s): BC in the last 72 hours. No results for input(s): ORG in the last 72 hours. No results for input(s): Crissie Soulier in the last 72 hours. No results for input(s): STREPNEUMAGU in the last 72 hours. No results for input(s): LP1UAG in the last 72 hours. No results for input(s): ASO in the last 72 hours. No results for input(s): CULTRESP in the last 72 hours. Assessment:  · UTI  · Hematuria  · E.coli ESBL from [facility] on July 5    Plan:    · Start merrem for seven days   Already started   · Check cultures  · Baseline ESR, CRP  · Monitor labs  · Will follow with you    Thank you for having us see this patient in consultation. I will be discussing this case with the treating physicians.       Electronically signed by Steve Choi MD on 7/11/2022 at 1:45 PM

## 2022-07-11 NOTE — PROGRESS NOTES
This patient is on medication that requires renal, weight, and/or indication dose adjustment. Date Body Weight IBW  Adjusted BW SCr  CrCl Dialysis status   7/10/2022 178 lb (80.7 kg) Ideal body weight: 54.7 kg (120 lb 9.5 oz)  Adjusted ideal body weight: 65.1 kg (143 lb 8.9 oz) Serum creatinine: 1.3 mg/dL (H) 07/10/22 1628  Estimated creatinine clearance: 27 mL/min (A) N/a       Pharmacy has dose-adjusted the following medication(s):    Date Previous Order Adjusted Order   7/10/2022 Meropenem 1000 mg q8h Meropenem 1000 mg q12h       These changes were made per protocol according to the [address] for Pharmacists. *Please note this dose may need readjusted if patient's condition changes. Please contact pharmacy with any questions regarding these changes.     Pino Elise, [facility]  7/10/2022  11:16 PM

## 2022-07-11 NOTE — ED NOTES
Patient stated ativan was not helping her anxiety or helping her fall asleep. Dr. Tomas Moreira notified. Orders for po ativan ordered.       ROBE Taylor  07/11/22 9582

## 2022-07-12 ENCOUNTER — APPOINTMENT (OUTPATIENT)
Dept: ULTRASOUND IMAGING | Age: 87
End: 2022-07-12
Payer: MEDICARE

## 2022-07-12 PROCEDURE — 36410 VNPNXR 3YR/> PHY/QHP DX/THER: CPT

## 2022-07-12 PROCEDURE — 76937 US GUIDE VASCULAR ACCESS: CPT

## 2022-07-12 PROCEDURE — G0378 HOSPITAL OBSERVATION PER HR: HCPCS

## 2022-07-12 PROCEDURE — 6360000002 HC RX W HCPCS: Performed by: INTERNAL MEDICINE

## 2022-07-12 PROCEDURE — 97535 SELF CARE MNGMENT TRAINING: CPT

## 2022-07-12 PROCEDURE — 6370000000 HC RX 637 (ALT 250 FOR IP): Performed by: FAMILY MEDICINE

## 2022-07-12 PROCEDURE — 2580000003 HC RX 258: Performed by: INTERNAL MEDICINE

## 2022-07-12 PROCEDURE — 6360000002 HC RX W HCPCS: Performed by: FAMILY MEDICINE

## 2022-07-12 PROCEDURE — 76770 US EXAM ABDO BACK WALL COMP: CPT

## 2022-07-12 PROCEDURE — 2580000003 HC RX 258: Performed by: FAMILY MEDICINE

## 2022-07-12 PROCEDURE — 96366 THER/PROPH/DIAG IV INF ADDON: CPT

## 2022-07-12 PROCEDURE — C1751 CATH, INF, PER/CENT/MIDLINE: HCPCS

## 2022-07-12 PROCEDURE — 97165 OT EVAL LOW COMPLEX 30 MIN: CPT

## 2022-07-12 RX ORDER — GABAPENTIN 100 MG/1
100 CAPSULE ORAL DAILY
COMMUNITY
Start: 2022-05-31

## 2022-07-12 RX ORDER — HEPARIN SODIUM (PORCINE) LOCK FLUSH IV SOLN 100 UNIT/ML 100 UNIT/ML
3 SOLUTION INTRAVENOUS EVERY 12 HOURS SCHEDULED
Status: DISCONTINUED | OUTPATIENT
Start: 2022-07-12 | End: 2022-07-13 | Stop reason: HOSPADM

## 2022-07-12 RX ORDER — SODIUM CHLORIDE 0.9 % (FLUSH) 0.9 %
5-40 SYRINGE (ML) INJECTION PRN
Status: DISCONTINUED | OUTPATIENT
Start: 2022-07-12 | End: 2022-07-13 | Stop reason: HOSPADM

## 2022-07-12 RX ORDER — TRAMADOL HYDROCHLORIDE 50 MG/1
50 TABLET ORAL EVERY 6 HOURS PRN
Status: DISCONTINUED | OUTPATIENT
Start: 2022-07-12 | End: 2022-07-13 | Stop reason: HOSPADM

## 2022-07-12 RX ORDER — SODIUM CHLORIDE 9 MG/ML
INJECTION, SOLUTION INTRAVENOUS PRN
Status: DISCONTINUED | OUTPATIENT
Start: 2022-07-12 | End: 2022-07-13 | Stop reason: HOSPADM

## 2022-07-12 RX ORDER — HEPARIN SODIUM (PORCINE) LOCK FLUSH IV SOLN 100 UNIT/ML 100 UNIT/ML
3 SOLUTION INTRAVENOUS PRN
Status: DISCONTINUED | OUTPATIENT
Start: 2022-07-12 | End: 2022-07-13 | Stop reason: HOSPADM

## 2022-07-12 RX ORDER — MEROPENEM 500 MG/1
1000 INJECTION, POWDER, FOR SOLUTION INTRAVENOUS EVERY 12 HOURS
Qty: 28 EACH | Refills: 0 | Status: SHIPPED | OUTPATIENT
Start: 2022-07-12 | End: 2022-07-19

## 2022-07-12 RX ORDER — GABAPENTIN 100 MG/1
100 CAPSULE ORAL DAILY
Status: DISCONTINUED | OUTPATIENT
Start: 2022-07-12 | End: 2022-07-13 | Stop reason: HOSPADM

## 2022-07-12 RX ORDER — LIDOCAINE HYDROCHLORIDE 10 MG/ML
5 INJECTION, SOLUTION EPIDURAL; INFILTRATION; INTRACAUDAL; PERINEURAL ONCE
Status: DISCONTINUED | OUTPATIENT
Start: 2022-07-12 | End: 2022-07-13 | Stop reason: HOSPADM

## 2022-07-12 RX ORDER — SODIUM CHLORIDE 0.9 % (FLUSH) 0.9 %
5-40 SYRINGE (ML) INJECTION EVERY 12 HOURS SCHEDULED
Status: DISCONTINUED | OUTPATIENT
Start: 2022-07-12 | End: 2022-07-13 | Stop reason: HOSPADM

## 2022-07-12 RX ADMIN — DOCUSATE SODIUM 100 MG: 100 CAPSULE, LIQUID FILLED ORAL at 07:59

## 2022-07-12 RX ADMIN — Medication 10 ML: at 22:37

## 2022-07-12 RX ADMIN — ESCITALOPRAM 5 MG: 5 TABLET, FILM COATED ORAL at 07:59

## 2022-07-12 RX ADMIN — MEROPENEM 1000 MG: 1 INJECTION, POWDER, FOR SOLUTION INTRAVENOUS at 18:43

## 2022-07-12 RX ADMIN — GABAPENTIN 100 MG: 100 CAPSULE ORAL at 22:20

## 2022-07-12 RX ADMIN — PANTOPRAZOLE SODIUM 40 MG: 40 TABLET, DELAYED RELEASE ORAL at 05:57

## 2022-07-12 RX ADMIN — ASPIRIN 81 MG CHEWABLE TABLET 81 MG: 81 TABLET CHEWABLE at 07:59

## 2022-07-12 RX ADMIN — ISOSORBIDE MONONITRATE 30 MG: 30 TABLET, EXTENDED RELEASE ORAL at 07:59

## 2022-07-12 RX ADMIN — LORAZEPAM 1 MG: 1 TABLET ORAL at 22:20

## 2022-07-12 RX ADMIN — ATENOLOL 50 MG: 50 TABLET ORAL at 07:59

## 2022-07-12 RX ADMIN — LORAZEPAM 1 MG: 1 TABLET ORAL at 00:36

## 2022-07-12 RX ADMIN — Medication 1 CAPSULE: at 07:59

## 2022-07-12 RX ADMIN — TRAMADOL HYDROCHLORIDE 50 MG: 50 TABLET, COATED ORAL at 07:59

## 2022-07-12 RX ADMIN — SODIUM CHLORIDE, PRESERVATIVE FREE 300 UNITS: 5 INJECTION INTRAVENOUS at 22:36

## 2022-07-12 RX ADMIN — MEROPENEM 1000 MG: 1 INJECTION, POWDER, FOR SOLUTION INTRAVENOUS at 06:04

## 2022-07-12 ASSESSMENT — ENCOUNTER SYMPTOMS
ABDOMINAL PAIN: 0
SHORTNESS OF BREATH: 0

## 2022-07-12 NOTE — PROGRESS NOTES
MIDLINE Placement 7/12/2022    Product number: [id number]   Lot Number: [id number]      Ultrasound: yes   Right brachial vein                Upper Arm Circumference: 29c    Size: 4.5f    Exposed Length: 3cm    Internal Length: 12cm   Cut: 0   Vein Measurement: .55    Jack Zhou RN  7/12/2022  4:00 PM

## 2022-07-12 NOTE — CARE COORDINATION
7/12 Care Coordination:Plan for discharge home on IV ATB, Plan for PIC line, Pt wants [facility], Referral called to Aleksandra she will evaluate. CM/SW will continue to follow for discharge planning.    Mecca DENNYN,RN--BC  [phone]

## 2022-07-12 NOTE — CARE COORDINATION
7/12 Care Coordination: Pt presenting to the ED for Acute urinary tract infection. Has Hx of frequent UTI's. On IV ATB. Spoke with pt and her daughter Kira Ortiz. PTA Pt lives alone, was independent. still does her ADL's. her daughter lives 10 houses away from her. Helps her everyday. Plan is discharge to home. Refuses any [facility]. Is open to [place] [id number] if needed, [facility], had in the past. Daughter will transport home. Pt has a [employer] at home. CM/SW will continue to follow for discharge planning.    Serena DENNYN,RN-CV-BC  [phone]

## 2022-07-12 NOTE — H&P
HISTORY AND PHYSICAL             Date: 7/12/2022        Patient Name: Moses Barkley     YOB: 1926      Age:  80 y.o. Chief Complaint     Chief Complaint   Patient presents with    Urinary Tract Infection     has had UTI symptoms for 2 weeks, finished cipro monday          History Obtained From   patient, electronic medical record    History of Present Illness   Patient complains of some burning with urination, apparently was on cipro for a possible UTI. Past Medical History     Past Medical History:   Diagnosis Date    Arthritis     Hypertension     Myocardial infarct ([address]     UTI (urinary tract infection)         Past Surgical History     Past Surgical History:   Procedure Laterality Date    APPENDECTOMY      BACK SURGERY      cement t12, l1, l2-oct 2020    HYSTERECTOMY (CERVIX STATUS UNKNOWN)      KYPHOSIS SURGERY N/A 10/10/2020    T12, L1 and L2  KYPHOPLASTY performed by Ly Branes MD at [address] [place] [address]        Medications Prior to Admission     Prior to Admission medications    Medication Sig Start Date End Date Taking? Authorizing Provider   furosemide (LASIX) 20 MG tablet Take 20 mg by mouth 2 times daily 7/28/21  Yes Historical Provider, MD   cyclobenzaprine (FLEXERIL) 10 MG tablet Take 10 mg by mouth 3 times daily    Historical Provider, MD   HYDROcodone-acetaminophen (NORCO) 5-325 MG per tablet Take 5-325 tablets by mouth every 4-6 hours as needed.      Historical Provider, MD   pantoprazole (PROTONIX) 40 MG tablet Take 1 tablet by mouth every morning (before breakfast) 1/1/22   Alisa Leon MD   isosorbide mononitrate (IMDUR) 30 MG extended release tablet Take 30 mg by mouth daily    Historical Provider, MD   escitalopram (LEXAPRO) 5 MG tablet Take 5 mg by mouth daily    Historical Provider, MD   Probiotic Product (PROBIOTIC DAILY) CAPS Take by mouth     Historical Provider, MD   docusate sodium (COLACE, DULCOLAX) 100 MG CAPS Take 100 mg by mouth daily 7/22/20 Ana Laura Silva MD   atenolol (TENORMIN) 50 MG tablet Take 50 mg by mouth daily     Historical Provider, MD   aspirin 81 MG tablet Take 81 mg by mouth daily    Historical Provider, MD   LORazepam (ATIVAN) 1 MG tablet Take 1 mg by mouth 2 times daily as needed. Historical Provider, MD        Allergies   Chlorzoxazone and Sulfa antibiotics    Social History     Social History     Tobacco History     Smoking Status  Never Smoker    Smokeless Tobacco Use  Never Used          Alcohol History     Alcohol Use Status  No          Drug Use     Drug Use Status  Never          Sexual Activity     Sexually Active  Not Currently                Family History   History reviewed. No pertinent family history. Review of Systems   Review of Systems   Constitutional: Positive for activity change and fatigue. HENT: Negative for congestion. Respiratory: Negative for shortness of breath. Cardiovascular: Negative for chest pain. Gastrointestinal: Negative for abdominal pain. Genitourinary: Positive for dysuria. Neurological: Negative for dizziness. Physical Exam   BP (!) 143/64   Pulse 65   Temp 96.9 °F (36.1 °C) (Temporal)   Resp 17   Ht 5' 6\" (1.676 m)   Wt 149 lb 4 oz (67.7 kg)   LMP 05/16/2018   SpO2 94%   BMI 24.09 kg/m²     Physical Exam  HENT:      Head: Normocephalic. Mouth/Throat:      Mouth: Mucous membranes are moist.   Eyes:      Pupils: Pupils are equal, round, and reactive to light. Cardiovascular:      Rate and Rhythm: Normal rate and regular rhythm. Pulses: Normal pulses. Heart sounds: Normal heart sounds. Pulmonary:      Effort: Pulmonary effort is normal. No respiratory distress. Breath sounds: Normal breath sounds. No wheezing. Abdominal:      General: Abdomen is flat. Bowel sounds are normal. There is no distension. Tenderness: There is no abdominal tenderness. Skin:     General: Skin is warm and dry.       Capillary Refill: Capillary refill takes less than 2 seconds. Neurological:      Mental Status: She is alert. Mental status is at baseline. Psychiatric:         Mood and Affect: Mood normal.         Labs    No results found for this or any previous visit (from the past 24 hour(s)). Imaging/Diagnostics Last 24 Hours   No results found. Assessment      Hospital Problems           Last Modified POA    * (Principal) UTI (urinary tract infection) 7/11/2022 Yes      History of ESBL  CAD  HTN    Plan   IV meropenem  ID managing. Continue meds. Monitor labs and cultures.     Consultations Ordered:  IP CONSULT TO INTERNAL MEDICINE  IP CONSULT TO INFECTIOUS DISEASES  IP CONSULT TO CASE MANAGEMENT    Electronically signed by Rhea Trinidad MD on 7/12/22 at 6:42 AM EDT

## 2022-07-12 NOTE — PROGRESS NOTES
Infectious Disease  Progress Note  [facility]    Chief Complaint: legs hurt    Subjective: uti    Scheduled Meds:   lidocaine PF  5 mL IntraDERmal Once    sodium chloride flush  5-40 mL IntraVENous 2 times per day    heparin flush  3 mL IntraVENous 2 times per day    atenolol  50 mg Oral Daily    aspirin  81 mg Oral Daily    docusate sodium  100 mg Oral Daily    lactobacillus  1 capsule Oral Daily    escitalopram  5 mg Oral Daily    isosorbide mononitrate  30 mg Oral Daily    pantoprazole  40 mg Oral QAM AC    meropenem  1,000 mg IntraVENous Q12H     Continuous Infusions:   sodium chloride       PRN Meds:traMADol, sodium chloride flush, sodium chloride, heparin flush, LORazepam    Patient Vitals for the past 24 hrs:   BP Temp Temp src Pulse Resp SpO2 Height Weight   07/12/22 0829 -- -- -- -- 15 -- -- --   07/12/22 0726 (!) 164/74 97.5 °F (36.4 °C) Temporal 75 16 94 % -- --   07/11/22 2004 (!) 143/64 96.9 °F (36.1 °C) Temporal 65 17 94 % 5' 6\" (1.676 m) 149 lb 4 oz (67.7 kg)       CBC with Differential:    Lab Results   Component Value Date/Time    WBC 5.1 07/10/2022 04:28 PM    RBC 4.54 07/10/2022 04:28 PM    HGB 13.3 07/10/2022 04:28 PM    HCT 39.7 07/10/2022 04:28 PM     07/10/2022 04:28 PM    MCV 87.4 07/10/2022 04:28 PM    MCH 29.3 07/10/2022 04:28 PM    MCHC 33.5 07/10/2022 04:28 PM    RDW 14.9 07/10/2022 04:28 PM    LYMPHOPCT 29.7 07/10/2022 04:28 PM    MONOPCT 9.8 07/10/2022 04:28 PM    BASOPCT 1.0 07/10/2022 04:28 PM    MONOSABS 0.50 07/10/2022 04:28 PM    LYMPHSABS 1.51 07/10/2022 04:28 PM    EOSABS 0.15 07/10/2022 04:28 PM    BASOSABS 0.05 07/10/2022 04:28 PM     CMP:    Lab Results   Component Value Date/Time     07/10/2022 04:28 PM    K 4.5 07/10/2022 04:28 PM     07/10/2022 04:28 PM    CO2 24 07/10/2022 04:28 PM    BUN 17 07/10/2022 04:28 PM    CREATININE 1.3 07/10/2022 04:28 PM    GFRAA 46 07/10/2022 04:28 PM    LABGLOM 38 07/10/2022 04:28 PM    GLUCOSE 102 07/10/2022 04:28

## 2022-07-12 NOTE — PROGRESS NOTES
[address]        IETO:                                                  Patient Name: Kina Thorne    MRN: [id number]    : 1926    Room: [address] [place] [address]          Evaluating OT: Judith Bedolla OTR/L; [id number]       Referring Provider: Joe Castelan MD    Specific Provider Orders/Date: OT Eval and Treat 22      Diagnosis: UTI (urinary tract infection)    Surgery: None this admission     Pertinent Medical History:  has a past medical history of Arthritis, Hypertension, Myocardial infarct ([facility] [address] and UTI (urinary tract infection).      Recommended Adaptive Equipment: TBD     Precautions:  Fall Risk, [place]     Assessment of current deficits    [x] Functional mobility  [x]ADLs  [x] Strength               [x]Cognition    [x] Functional transfers   [x] IADLs         [x] Safety Awareness   [x]Endurance    [x] Fine Coordination              [x] Balance      [] Vision/perception   []Sensation     []Gross Motor Coordination  [] ROM  [] Delirium                   [] Motor Control     OT PLAN OF CARE   OT POC based on physician orders, patient diagnosis and results of clinical assessment    Frequency/Duration 1-3 days/wk for 2 weeks PRN   Specific OT Treatment Interventions to include:   * Instruction/training on adapted ADL techniques and AE recommendations to increase functional independence within precautions       * Training on energy conservation strategies, correct breathing pattern and techniques to improve independence/tolerance for self-care routine  * Functional transfer/mobility training/DME recommendations for increased independence, safety, and fall prevention  * Patient/Family education to increase follow through with safety techniques and functional independence  * Recommendation of environmental modifications for increased safety with functional transfers/mobility and ADLs  * Therapeutic exercise to improve motor endurance, ROM, and functional strength for ADLs/functional transfers  * Therapeutic activities to facilitate/challenge dynamic balance, stand tolerance for increased safety and independence with ADLs  * Positioning to improve skin integrity, interaction with environment and functional independence    Home Living: Pt lives alone in trailer with 3 steps & 2 handrails to enter. Bathroom setup: Tub/shower with shower chair & grab bars   Equipment owned: Shower chair, ww, cane, elevated commode, w/c    Prior Level of Function: IND with ADLs , IND with IADLs; engaged in functional mobility with use of  ww/cane  Occupation: None reported    Pain Level: Pt with no c/o pain throughout session  Cognition: A&O: 4/4; Follows 2 step directions   Memory:  Good   Sequencing:  Fair+   Problem solving:  Fair+   Judgement/safety:  Fair     Functional Assessment:  AM-PAC Daily Activity Raw Score: 17/24   Initial Eval Status  Date: 7/12/22 Treatment Status  Date: STGs = LTGs  Time frame: 10-14 days   Feeding Stand by Assist   Independent    Grooming Stand by Assist   Modified [place]     UB Dressing Stand by Assist   Modified [place]    LB Dressing Moderate Assist   Modified [place]    Bathing Minimal Assist  Modified [place]    Toileting Minimal Assist   Modified [place]    Bed Mobility  Supine to sit: Stand by Assist   Sit to supine: Stand by Assist   Supine to sit:  Independent   Sit to supine: Independent    Functional Transfers Sit to stand:SBA   Stand to sit:SBA  Stand pivot: SBA  Commode: SBA  Sit to stand:Modified [place]    Stand to sit:Modified [place]   Stand pivot: Modified [place]   Commode: Modified [place]    Functional Mobility Minimal Assist  Use of ww within household distance  Modified [place] with use of Appropriate AD   Balance Sitting:     Static - Supervision     Dynamic - SBA  Standing: SBA independence with bed mobility/functional transfers and functional mobility. Pt completed functional mobility to<>from bathroom with use of ww to maintain dynamic standing balance. Pt demo'd narrow DECLAN throughout functional transfers/mobility.   Sitting EOB ~5 minutes to improve dynamic sitting balance and activity tolerance during ADLs.   Skilled positioning/alignment-  Proper Positioning/Alignment. Pt required assist/cuing to maintain proper body mechanics throughout session to promote implementation of fall prevention strategies, safety awareness, & independence throughout functional tasks. Rehab Potential: Good for established goals     LTG: maximize independence with ADLs to return to PLOF    Patient and/or family were instructed on functional diagnosis, prognosis/goals and OT plan of care. Demonstrated fair understanding. Eval Complexity: Low  · History: Expanded chart review of medical records and additional review of physical, cognitive, or psychosocial history related to current functional performance  · Exam: 3+ performance deficits  · Assistance/Modification: Min/mod assistance or modifications required to perform tasks. May have comorbidities that affect occupational performance. Evaluation time includes thorough review of current medical information, gathering information on past medical & social history & PLOF, completion of standardized testing, informal observation of tasks, consultation with other medical professions/disciplines, assessment of data & development of POC/goals.      Time In: 10:35a  Time Out: 11:00a  Total Treatment Time: 10 minutes    Min Units   OT Eval Low 03247  x     OT Eval Medium 29357      OT Eval High 34749      OT Re-Eval [id number]       Therapeutic Ex 57171       Therapeutic Activities 38426       ADL/Self Care 77838  10 1    Orthotic Management 07603       Manual 57922     Neuro Re-Ed 30029       Non-Billable Time          Evaluation Time additionally includes thorough review of current medical information, gathering information on past medical history/social history and prior level of function, interpretation of standardized testing/informal observation of tasks, assessment of data and development of plan of care and goals.               Judith Bedolla OTR/L; [id number]

## 2022-07-13 VITALS
BODY MASS INDEX: 23.99 KG/M2 | HEIGHT: 66 IN | HEART RATE: 55 BPM | SYSTOLIC BLOOD PRESSURE: 135 MMHG | TEMPERATURE: 97.7 F | DIASTOLIC BLOOD PRESSURE: 58 MMHG | OXYGEN SATURATION: 92 % | RESPIRATION RATE: 16 BRPM | WEIGHT: 149.25 LBS

## 2022-07-13 LAB
ANION GAP SERPL CALCULATED.3IONS-SCNC: 14 MMOL/L (ref 7–16)
BUN BLDV-MCNC: 17 MG/DL (ref 6–23)
CALCIUM SERPL-MCNC: 8.7 MG/DL (ref 8.6–10.2)
CHLORIDE BLD-SCNC: 102 MMOL/L (ref 98–107)
CO2: 23 MMOL/L (ref 22–29)
CREAT SERPL-MCNC: 1.3 MG/DL (ref 0.5–1)
GFR AFRICAN AMERICAN: 46
GFR NON-AFRICAN AMERICAN: 38 ML/MIN/1.73
GLUCOSE BLD-MCNC: 110 MG/DL (ref 74–99)
HCT VFR BLD CALC: 36.8 % (ref 34–48)
HEMOGLOBIN: 12 G/DL (ref 11.5–15.5)
MCH RBC QN AUTO: 28.8 PG (ref 26–35)
MCHC RBC AUTO-ENTMCNC: 32.6 % (ref 32–34.5)
MCV RBC AUTO: 88.2 FL (ref 80–99.9)
PDW BLD-RTO: 14.7 FL (ref 11.5–15)
PLATELET # BLD: 195 E9/L (ref 130–450)
PMV BLD AUTO: 10.9 FL (ref 7–12)
POTASSIUM SERPL-SCNC: 3.8 MMOL/L (ref 3.5–5)
RBC # BLD: 4.17 E12/L (ref 3.5–5.5)
SODIUM BLD-SCNC: 139 MMOL/L (ref 132–146)
WBC # BLD: 4.4 E9/L (ref 4.5–11.5)

## 2022-07-13 PROCEDURE — 96366 THER/PROPH/DIAG IV INF ADDON: CPT

## 2022-07-13 PROCEDURE — 2580000003 HC RX 258: Performed by: FAMILY MEDICINE

## 2022-07-13 PROCEDURE — 2580000003 HC RX 258: Performed by: INTERNAL MEDICINE

## 2022-07-13 PROCEDURE — G0378 HOSPITAL OBSERVATION PER HR: HCPCS

## 2022-07-13 PROCEDURE — 36415 COLL VENOUS BLD VENIPUNCTURE: CPT

## 2022-07-13 PROCEDURE — 85027 COMPLETE CBC AUTOMATED: CPT

## 2022-07-13 PROCEDURE — 6360000002 HC RX W HCPCS: Performed by: FAMILY MEDICINE

## 2022-07-13 PROCEDURE — 80048 BASIC METABOLIC PNL TOTAL CA: CPT

## 2022-07-13 PROCEDURE — 6370000000 HC RX 637 (ALT 250 FOR IP): Performed by: FAMILY MEDICINE

## 2022-07-13 RX ADMIN — Medication 1 CAPSULE: at 09:04

## 2022-07-13 RX ADMIN — ESCITALOPRAM 5 MG: 5 TABLET, FILM COATED ORAL at 09:05

## 2022-07-13 RX ADMIN — ISOSORBIDE MONONITRATE 30 MG: 30 TABLET, EXTENDED RELEASE ORAL at 09:04

## 2022-07-13 RX ADMIN — PANTOPRAZOLE SODIUM 40 MG: 40 TABLET, DELAYED RELEASE ORAL at 09:04

## 2022-07-13 RX ADMIN — DOCUSATE SODIUM 100 MG: 100 CAPSULE, LIQUID FILLED ORAL at 09:05

## 2022-07-13 RX ADMIN — MEROPENEM 1000 MG: 1 INJECTION, POWDER, FOR SOLUTION INTRAVENOUS at 05:15

## 2022-07-13 RX ADMIN — Medication 10 ML: at 09:05

## 2022-07-13 ASSESSMENT — ENCOUNTER SYMPTOMS: SHORTNESS OF BREATH: 0

## 2022-07-13 ASSESSMENT — PAIN SCALES - GENERAL: PAINLEVEL_OUTOF10: 0

## 2022-07-13 NOTE — PROGRESS NOTES
Progress Note  Date:2022       \[facility]\"":[id number]  Patient Ann Marie Flowers     YOB: 1926     Age:89 y.o. Patient sleeping. Subjective    Subjective:  Symptoms:  Stable. No shortness of breath or chest pain. Diet:  Adequate intake. Review of Systems   Constitutional: Negative for activity change and fever. Respiratory: Negative for shortness of breath. Cardiovascular: Negative for chest pain. Objective         Vitals Last 24 Hours:  TEMPERATURE:  Temp  Av.2 °F (36.2 °C)  Min: 96.8 °F (36 °C)  Max: 97.5 °F (36.4 °C)  RESPIRATIONS RANGE: Resp  Avg: 15.7  Min: 15  Max: 16  PULSE OXIMETRY RANGE: SpO2  Av %  Min: 94 %  Max: 98 %  PULSE RANGE: Pulse  Av.5  Min: 58  Max: 75  BLOOD PRESSURE RANGE: Systolic (86BWD), RPA:077 , Min:160 , GRD:218   ; Diastolic (09VSP), JIX:84, Min:67, Max:74    I/O (24Hr): No intake or output data in the 24 hours ending 22 0640  Objective:  General Appearance:  Comfortable. Vital signs: (most recent): Blood pressure (!) 160/67, pulse 62, temperature 96.8 °F (36 °C), temperature source Temporal, resp. rate 16, height 5' 6\" (1.676 m), weight 149 lb 4 oz (67.7 kg), last menstrual period 2018, SpO2 98 %. No fever. Lungs:  Normal effort and normal respiratory rate. Breath sounds clear to auscultation. Heart: Normal rate. Regular rhythm. S1 normal and S2 normal.      Labs/Imaging/Diagnostics    Labs:  CBC:Recent Labs     07/10/22  1628 22  0500   WBC 5.1 4.4*   RBC 4.54 4.17   HGB 13.3 12.0   HCT 39.7 36.8   MCV 87.4 88.2   RDW 14.9 14.7    195     CHEMISTRIES:  Recent Labs     07/10/22  1628 22  0500    139   K 4.5 3.8    102   CO2 24 23   BUN 17 17   CREATININE 1.3* 1.3*   GLUCOSE 102* 110*     PT/INR:No results for input(s): PROTIME, INR in the last 72 hours. APTT:No results for input(s): APTT in the last 72 hours.   LIVER PROFILE:  Recent Labs     07/10/22  1628   AST 18   ALT 7 BILITOT 0.4   ALKPHOS 130*       Imaging Last 24 Hours:  US RETROPERITONEAL COMPLETE    Result Date: 7/12/2022  EXAMINATION: RETROPERITONEAL ULTRASOUND OF THE KIDNEYS AND URINARY BLADDER 7/12/2022 COMPARISON: None HISTORY: ORDERING SYSTEM PROVIDED HISTORY: rule out hyrdronephrosis TECHNOLOGIST PROVIDED HISTORY: Reason for exam:->rule out hyrdronephrosis What reading provider will be dictating this exam?->CRC FINDINGS: Kidneys: The right kidney measures 8.0 cm in length and the left kidney measures 9.8 cm in length. Kidneys demonstrate normal cortical echogenicity. No evidence of hydronephrosis or intrarenal stones. Bladder: Unremarkable appearance of the bladder. No significant post void residual.     1. The right kidney is mildly atrophic. 2. No urolithiasis or hydronephrosis. RECOMMENDATIONS: Unavailable     Assessment//Plan           Hospital Problems           Last Modified POA    * (Principal) UTI (urinary tract infection) 7/11/2022 Yes        Assessment:  (Principal) UTI (urinary tract infection) 7/11/2022 Yes     History of ESBL  CAD  HTN  ). Plan:   (IV meropenem  Ultrasound bladder ok. Home when ok with ID  PICC).        Electronically signed by Rhea Trinidad MD on 7/13/22 at 6:40 AM EDT

## 2022-07-13 NOTE — PROGRESS NOTES
Infectious Disease  Progress Note  [facility]    Chief Complaint: legs hurt    Subjective: uti    Scheduled Meds:   lidocaine PF  5 mL IntraDERmal Once    sodium chloride flush  5-40 mL IntraVENous 2 times per day    heparin flush  3 mL IntraVENous 2 times per day    gabapentin  100 mg Oral Daily    atenolol  50 mg Oral Daily    aspirin  81 mg Oral Daily    docusate sodium  100 mg Oral Daily    lactobacillus  1 capsule Oral Daily    escitalopram  5 mg Oral Daily    isosorbide mononitrate  30 mg Oral Daily    pantoprazole  40 mg Oral QAM AC    meropenem  1,000 mg IntraVENous Q12H     Continuous Infusions:   sodium chloride       PRN Meds:traMADol, sodium chloride flush, sodium chloride, heparin flush, LORazepam    Patient Vitals for the past 24 hrs:   BP Temp Temp src Pulse Resp SpO2   07/13/22 0858 (!) 135/58 97.7 °F (36.5 °C) Oral 55 16 92 %   07/12/22 2130 (!) 160/67 96.8 °F (36 °C) Temporal 62 16 98 %       CBC with Differential:    Lab Results   Component Value Date/Time    WBC 4.4 07/13/2022 05:00 AM    RBC 4.17 07/13/2022 05:00 AM    HGB 12.0 07/13/2022 05:00 AM    HCT 36.8 07/13/2022 05:00 AM     07/13/2022 05:00 AM    MCV 88.2 07/13/2022 05:00 AM    MCH 28.8 07/13/2022 05:00 AM    MCHC 32.6 07/13/2022 05:00 AM    RDW 14.7 07/13/2022 05:00 AM    LYMPHOPCT 29.7 07/10/2022 04:28 PM    MONOPCT 9.8 07/10/2022 04:28 PM    BASOPCT 1.0 07/10/2022 04:28 PM    MONOSABS 0.50 07/10/2022 04:28 PM    LYMPHSABS 1.51 07/10/2022 04:28 PM    EOSABS 0.15 07/10/2022 04:28 PM    BASOSABS 0.05 07/10/2022 04:28 PM     CMP:    Lab Results   Component Value Date/Time     07/13/2022 05:00 AM    K 3.8 07/13/2022 05:00 AM    K 4.5 07/10/2022 04:28 PM     07/13/2022 05:00 AM    CO2 23 07/13/2022 05:00 AM    BUN 17 07/13/2022 05:00 AM    CREATININE 1.3 07/13/2022 05:00 AM    GFRAA 46 07/13/2022 05:00 AM    LABGLOM 38 07/13/2022 05:00 AM    GLUCOSE 110 07/13/2022 05:00 AM    PROT 7.2 07/10/2022 04:28 PM LABALBU 3.9 07/10/2022 04:28 PM    CALCIUM 8.7 07/13/2022 05:00 AM    BILITOT 0.4 07/10/2022 04:28 PM    ALKPHOS 130 07/10/2022 04:28 PM    AST 18 07/10/2022 04:28 PM    ALT 7 07/10/2022 04:28 PM       BP (!) 135/58   Pulse 55   Temp 97.7 °F (36.5 °C) (Oral)   Resp 16   Ht 5' 6\" (1.676 m)   Wt 149 lb 4 oz (67.7 kg)   LMP 05/16/2018   SpO2 92%   BMI 24.09 kg/m²     Physical Exam  Const/Neuro- unchanged, no signs of acute distress, Alert  ENMT- Within Normal Limits, Normocephalic, mucous membranes pink/moist, No thrush  Neck: Neck supple  Heart- Regular, Rate, Rhythm- no murmur appreciated. Lungs- clear to ascultation. Respirations even and nonlabored. Abdomen- Soft, bowel sounds positive, non tender  Musculo/Extremities-  Equal and symmetrical, no edema. No tenderness. Skin:  Warm and dry, free from rashes. Cultures reviewed    Radiology reviewed  [address]   Final Result   1. The right kidney is mildly atrophic. 2. No urolithiasis or hydronephrosis. RECOMMENDATIONS:   Unavailable             Assessment  UTI   Hematuria   E.coli ESBL from [facility] on July 5  Urine growing now e.  Coli   Most likely the same  US no hydronephrosis  Home today on merrem   followup with ID      Plan  Start merrem for seven days Already started   Check cultures   Baseline ESR, CRP   Monitor labs   Will follow with you  I checked the dose with pharmacy   One gram every 12h of merrem   Talked to  an son  Place picc line  Home when arranged         Electronically signed by Ephraim Yousif MD on 7/13/2022 at 11:22 AM

## 2022-07-13 NOTE — DISCHARGE SUMMARY
Discharge Summary    Date: 7/13/2022  Patient Name: Malou King YOB: 1926 Age: 80 y.o. Admit Date: 7/10/2022  Discharge Date: 7/13/2022  Discharge Condition: Stable    Admission Diagnosis  Acute cystitis with hematuria (N30.01);UTI (urinary tract infection) (N39.0)     Discharge Diagnosis  Principal Problem: UTI (urinary tract infection)Resolved Problems: * No resolved hospital problems. [facility] Stay  Narrative of Hospital Course:  Patient admitted for ESBL UTI. IV meropenem per ID. Home with PICC. Consultants:  IP CONSULT TO INTERNAL MEDICINEIP CONSULT TO INFECTIOUS DISEASESIP CONSULT TO CASE MANAGEMENT    Surgeries/procedures Performed:       Treatments:           Discharge Plan/Disposition:  Home    Hospital/Incidental Findings Requiring Follow Up:    Patient Instructions:    Diet: Cardiac Diet    Activity:Activity as Tolerated  For number of days (if applicable): Other Instructions:    Provider Follow-Up:   No follow-ups on file.      Significant Diagnostic Studies:    Recent Labs:  Admission on 07/10/2022Color, UA                                   Date: 07/10/2022Value: Yellow      Ref range: Straw/Yellow       Status: FinalClarity, UA                                   Date: 07/10/2022Value: Clear       Ref range: Clear              Status: FinalGlucose, Ur                                   Date: 07/10/2022Value: Negative    Ref range: Negative mg/dL     Status: FinalBilirubin Urine                               Date: 07/10/2022Value: Negative    Ref range: Negative           Status: FinalKetones, Urine                                Date: 07/10/2022Value: Negative    Ref range: Negative mg/dL     Status: FinalSpecific Gravity, UA                          Date: 07/10/2022Value: <=1.005     Ref range: 1.005 - 1.030      Status: FinalBlood, Urine                                  Date: 07/10/2022Value: MODERATE*   Ref range: Negative           Status: FinalpH, UA 102         Ref range: 98 - 107 mmol/L    Status: FinalCO2                                           Date: 07/10/2022Value: 24          Ref range: 22 - 29 mmol/L     Status: FinalAnion Gap                                     Date: 07/10/2022Value: 12          Ref range: 7 - 16 mmol/L      Status: FinalGlucose                                       Date: 07/10/2022Value: 102*        Ref range: 74 - 99 mg/dL      Status: FinalBUN                                           Date: 07/10/2022Value: 17          Ref range: 6 - 23 mg/dL       Status: FinalCREATININE                                    Date: 07/10/2022Value: 1.3*        Ref range: 0.5 - 1.0 mg/dL    Status: FinalGFR Non-                      Date: 07/10/2022Value: 38          Ref range: >=60 mL/min/1.73   Status: Final              Comment: Chronic Kidney Disease: less than 60 ml/min/1.73 sq.m. Kidney Failure: less than 15 ml/min/1.73 sq. m. Results valid for patients 18 years and older. GFR                           Date: 07/10/2022Value: 46            Status: FinalCalcium                                       Date: 07/10/2022Value: 9.5         Ref range: 8.6 - 10.2 mg/dL   Status: FinalTotal Protein                                 Date: 07/10/2022Value: 7.2         Ref range: 6.4 - 8.3 g/dL     Status: FinalAlbumin                                       Date: 07/10/2022Value: 3.9         Ref range: 3.5 - 5.2 g/dL     Status: FinalTotal Bilirubin                               Date: 07/10/2022Value: 0.4         Ref range: 0.0 - 1.2 mg/dL    Status: FinalAlkaline Phosphatase                          Date: 07/10/2022Value: 130*        Ref range: 35 - 104 U/L       Status: FinalALT                                           Date: 07/10/2022Value: 7           Ref range: 0 - 32 U/L         Status: FinalAST                                           Date: 07/10/2022Value: 18          Ref range: 0 - 31 U/L         Status: [address] [facility] Date: 07/10/2022Value: 5.1         Ref range: 4.5 - 11.5 E9/L    Status: FinalRBC                                           Date: 07/10/2022Value: 4.54        Ref range: 3.50 - 5.50 E12/L  Status: FinalHemoglobin                                    Date: 07/10/2022Value: 13.3        Ref range: 11.5 - 15.5 g/dL   Status: FinalHematocrit                                    Date: 07/10/2022Value: 39.7        Ref range: 34.0 - 48.0 %      Status: FinalMCV                                           Date: 07/10/2022Value: 87.4        Ref range: 80.0 - 99.9 fL     Status: [address]                                           Date: 07/10/2022Value: 29.3        Ref range: 26.0 - 35.0 pg     Status: [address]                                          Date: 07/10/2022Value: 33.5        Ref range: 32.0 - 34.5 %      Status: FinalRDW                                           Date: 07/10/2022Value: 14.9        Ref range: 11.5 - 15.0 fL     Status: FinalPlatelets                                     Date: 07/10/2022Value: 209         Ref range: 130 - 450 E9/L     Status: FinalMPV                                           Date: 07/10/2022Value: 10.8        Ref range: 7.0 - 12.0 fL      Status: FinalNeutrophils %                                 Date: 07/10/2022Value: 56.3        Ref range: 43.0 - 80.0 %      Status: FinalImmature Granulocytes %                       Date: 07/10/2022Value: 0.2         Ref range: 0.0 - 5.0 %        Status: FinalLymphocytes %                                 Date: 07/10/2022Value: 29.7        Ref range: 20.0 - 42.0 %      Status: FinalMonocytes %                                   Date: 07/10/2022Value: 9.8         Ref range: 2.0 - 12.0 %       Status: FinalEosinophils %                                 Date: 07/10/2022Value: 3.0         Ref range: 0.0 - 6.0 %        Status: FinalBasophils %                                   Date: 07/10/2022Value: 1.0         Ref range: 0.0 - Culture, Blood 1 Preliminary result  Culture, Blood 2 Preliminary result  Culture, Urine Preliminary result      Discharge Medications    Current Discharge Medication ListSTART taking these medicationsmeropenem (MERREM) 500 MG injectionInfuse 1,000 mg intravenously every 12 hours for 7 daysQty: 28 each Refills: 0    Current Discharge Medication List    Current Discharge Medication ListCONTINUE these medications which have NOT CHANGEDfurosemide (LASIX) 20 MG tabletTake 20 mg by mouth 2 times dailygabapentin (NEURONTIN) 100 MG capsuleTake 100 mg by mouth daily. cyclobenzaprine (FLEXERIL) 10 MG tabletTake 10 mg by mouth 3 times dailypantoprazole (PROTONIX) 40 MG tabletTake 1 tablet by mouth every morning (before breakfast)Qty: 30 tablet Refills: 3isosorbide mononitrate (IMDUR) 30 MG extended release tabletTake 30 mg by mouth dailyescitalopram (LEXAPRO) 5 MG tabletTake 5 mg by mouth dailyProbiotic Product (PROBIOTIC DAILY) CAPSTake by mouth docusate sodium (COLACE, DULCOLAX) 100 MG CAPSTake 100 mg by mouth dailyQty: 30 capsule Refills: 0atenolol (TENORMIN) 50 MG tabletTake 50 mg by mouth daily aspirin 81 MG tabletTake 81 mg by mouth dailyLORazepam (ATIVAN) 1 MG tabletTake 1 mg by mouth 2 times daily as needed. Current Discharge Medication ListSTOP taking these medicationsHYDROcodone-acetaminophen ([address]) 5-325 MG per tabletComments:Reason for Stopping:    Time Spent on Discharge:1E] minutes were spent in patient examination, evaluation, counseling as well as medication reconciliation, prescriptions for required medications, discharge plan, and follow up.     Electronically signed by Lamine Gonzales MD on 7/13/22 at 9:31 AM EDT

## 2022-07-13 NOTE — PROGRESS NOTES
Discharge paperwork reviewed at the bedside with patient's grandson.  Johnnie  is awaiting a callback from home health "[facility]" once callback is received patient will be discharged

## 2022-07-14 LAB
ORGANISM: ABNORMAL
URINE CULTURE, ROUTINE: ABNORMAL

## 2022-07-16 LAB
BLOOD CULTURE, ROUTINE: NORMAL
CULTURE, BLOOD 2: NORMAL

## 2022-08-01 ENCOUNTER — HOSPITAL ENCOUNTER (OUTPATIENT)
Age: 87
Discharge: HOME | End: 2022-08-01
Payer: MEDICARE

## 2022-08-01 DIAGNOSIS — N30.00 ACUTE CYSTITIS WITHOUT HEMATURIA: ICD-10-CM

## 2022-08-01 PROCEDURE — 87077 CULTURE AEROBIC IDENTIFY: CPT

## 2022-08-01 PROCEDURE — 87088 URINE BACTERIA CULTURE: CPT

## 2022-08-01 PROCEDURE — 87186 SC STD MICRODIL/AGAR DIL: CPT

## 2022-08-03 LAB
ORGANISM: ABNORMAL
URINE CULTURE, ROUTINE: ABNORMAL

## 2022-08-04 NOTE — PROGRESS NOTES
Progress Note  Date:2021       [id number]  Patient Miri Glass     YOB: 1926     Age:89 y.o. Patient resting comfortably. Subjective    Subjective:  Symptoms:  Stable. No shortness of breath or chest pain. Diet:  Adequate intake. Activity level: Impaired due to pain. Pain:  She complains of pain that is mild. Review of Systems   Constitutional: Positive for activity change. Respiratory: Negative for shortness of breath. Cardiovascular: Negative for chest pain. Gastrointestinal: Negative for abdominal pain. Musculoskeletal: Negative for back pain and gait problem. Neurological: Negative for dizziness. Hematological: Negative for adenopathy. Psychiatric/Behavioral: Negative for agitation. Objective         Vitals Last 24 Hours:  TEMPERATURE:  Temp  Av.5 °F (36.4 °C)  Min: 97 °F (36.1 °C)  Max: 97.9 °F (36.6 °C)  RESPIRATIONS RANGE: Resp  Av.8  Min: 16  Max: 18  PULSE OXIMETRY RANGE: SpO2  Av.8 %  Min: 95 %  Max: 97 %  PULSE RANGE: Pulse  Av.6  Min: 62  Max: 73  BLOOD PRESSURE RANGE: Systolic (41KJU), ULN:349 , Min:125 , WSH:311   ; Diastolic (27TRT), IGW:97, Min:77, Max:110    I/O (24Hr): Intake/Output Summary (Last 24 hours) at 2021 0704  Last data filed at 2021 0601  Gross per 24 hour   Intake 120 ml   Output 2100 ml   Net -1980 ml     Objective:  General Appearance:  Comfortable. Vital signs: (most recent): Blood pressure (!) 177/79, pulse 62, temperature 97 °F (36.1 °C), temperature source Temporal, resp. rate 16, height 5' 6\" (1.676 m), weight 145 lb (65.8 kg), last menstrual period 2018, SpO2 97 %. Lungs:  Normal effort and normal respiratory rate. Breath sounds clear to auscultation. Heart: Normal rate. Regular rhythm.   S1 normal and S2 normal.      Labs/Imaging/Diagnostics    Labs:  CBC:  Recent Labs     21  0501 21  0544 21  0447   WBC 5.3 5.1 4.8   RBC 4.28 4.53 regular regular regular regular regular

## 2022-08-10 PROBLEM — N39.0 UTI (URINARY TRACT INFECTION): Status: RESOLVED | Noted: 2022-07-11 | Resolved: 2022-08-10

## 2022-10-24 ENCOUNTER — HOSPITAL ENCOUNTER (OUTPATIENT)
Age: 87
Discharge: HOME | End: 2022-10-24
Payer: MEDICARE

## 2022-10-24 DIAGNOSIS — N30.00 ACUTE CYSTITIS WITHOUT HEMATURIA: ICD-10-CM

## 2022-10-24 LAB
BACTERIA: ABNORMAL /HPF
BILIRUBIN URINE: NEGATIVE
BLOOD, URINE: ABNORMAL
CLARITY: ABNORMAL
COLOR: YELLOW
EPITHELIAL CELLS, UA: ABNORMAL /HPF
GLUCOSE URINE: NEGATIVE MG/DL
KETONES, URINE: NEGATIVE MG/DL
LEUKOCYTE ESTERASE, URINE: ABNORMAL
NITRITE, URINE: NEGATIVE
PH UA: 6.5 (ref 5–9)
PROTEIN UA: NEGATIVE MG/DL
RBC UA: ABNORMAL /HPF (ref 0–2)
SPECIFIC GRAVITY UA: 1.01 (ref 1–1.03)
UROBILINOGEN, URINE: 0.2 E.U./DL
WBC UA: ABNORMAL /HPF (ref 0–5)

## 2022-10-24 PROCEDURE — 81001 URINALYSIS AUTO W/SCOPE: CPT

## 2022-10-24 PROCEDURE — 87088 URINE BACTERIA CULTURE: CPT

## 2022-10-25 LAB — URINE CULTURE, ROUTINE: NORMAL

## 2022-10-27 RX ORDER — GRANULES FOR ORAL 3 G/1
3 POWDER ORAL ONCE
Qty: 1 EACH | Refills: 0 | Status: SHIPPED | OUTPATIENT
Start: 2022-10-27 | End: 2022-10-27

## 2022-11-29 ENCOUNTER — HOSPITAL ENCOUNTER (OUTPATIENT)
Dept: GENERAL RADIOLOGY | Age: 87
Discharge: HOME | End: 2022-12-01
Payer: MEDICARE

## 2022-11-29 DIAGNOSIS — R13.11 DYSPHAGIA, ORAL PHASE: ICD-10-CM

## 2022-11-29 PROCEDURE — 74230 X-RAY XM SWLNG FUNCJ C+: CPT

## 2022-11-29 PROCEDURE — 92526 ORAL FUNCTION THERAPY: CPT

## 2022-11-29 PROCEDURE — 2500000003 HC RX 250 WO HCPCS: Performed by: RADIOLOGY

## 2022-11-29 PROCEDURE — 92611 MOTION FLUOROSCOPY/SWALLOW: CPT

## 2022-11-29 RX ADMIN — BARIUM SULFATE 15 ML: 0.81 POWDER, FOR SUSPENSION ORAL at 14:02

## 2022-11-29 RX ADMIN — BARIUM SULFATE 15 ML: 400 PASTE ORAL at 14:02

## 2022-11-29 RX ADMIN — BARIUM SULFATE 15 ML: 400 SUSPENSION ORAL at 14:02

## 2022-11-29 NOTE — PROGRESS NOTES
SPEECH/LANGUAGE PATHOLOGY  VIDEOFLUOROSCOPIC STUDY OF SWALLOWING (MBS)   and PLAN OF CARE    PATIENT NAME:  Abimbola Edwards  (female)     MRN:  [id number]    :  1926  (80 y.o.)  STATUS:  Outpatient    TODAY'S DATE:  2022  REFERRING PROVIDER:   Marcial Petersen   SPECIFIC PROVIDER ORDER: FL modified barium swallow with video  Date of order:  10/6/22   REASON FOR REFERRAL: to further assess oropharyngeal function   EVALUATING THERAPIST: Sarah Packer SLP      RESULTS:      DYSPHAGIA DIAGNOSIS:  mild  oropharyngeal phase dysphagia     DIET RECOMMENDATIONS:  Soft and bite size consistency solids (IDDSI level 6) with  thin liquids (IDDSI level 0)- NO STRAWS    FEEDING RECOMMENDATIONS:    Assistance level:  Encourage self-feeding     Compensatory strategies recommended: Double swallow, Small bites/sips, Alternate solids and liquids, Throat clear, and No straw     Discussed recommendations with nursing and/or faxed report to referring provider: Yes    Laryngeal Penetration and Aspiration:  Penetration WITHOUT aspiration was observed in today's study with  thin liquid, mildly thick liquid (nectar)    SPEECH THERAPY  PLAN OF CARE   The dysphagia POC is established based on physician order and dysphagia diagnosis    Skilled SLP intervention for dysphagia management up to 1x per week until goals met, pt plateaus in function and/or discharged from hospital      Conditions Requiring Skilled Therapeutic Intervention for dysphagia:    Reduced laryngeal closure resulting in penetration  Sensation of food sticking in throat     SPECIFIC DYSPHAGIA INTERVENTIONS TO INCLUDE:     Compensatory strategy training     Specific instructions for next treatment:  initiate instruction of compensatory strategies  Treatment Goals:    Short Term Goals:  Pt will implement identified compensatory swallowing strategies on 90% of opportunities or greater to improve airway protection and swallow function.     Long Term Goals:   Pt will maintain adequate nutrition/hydration via PO intake of the least restrictive oral diet with implementation of safe swallow/ compensatory strategies and decrease signs/symptoms of aspiration to less than 1 x/day. Patient/family Goal:    To be able to [facility] discussed with Patient and Family   The Patient and Family understand(s) the diagnosis, prognosis and plan of care     Rehabilitation Potential/Prognosis: good                      ADMITTING DIAGNOSIS: Dysphagia, oral phase [R13.11]     VISIT DIAGNOSIS:   Visit Diagnoses         Codes    Dysphagia, oral phase     R13.11                PATIENT REPORT/COMPLAINT: food sticking in the throat  occasional cough    PRIOR LEVEL OF SWALLOW FUNCTION:    Past History of Dysphagia?:  none reported    Home diet: Soft and bite size consistency solids (IDDSI level 6) with  thin liquids (IDDSI level 0)  Current Diet Order:  No diet orders on file    PROCEDURE:  Consistencies Administered During the Evaluation   Liquids: thin liquid and nectar thick liquid   Solids:  pureed foods, soft solid foods. Dry solid was attempted, but patient unable to masticate.        Method of Intake:   cup, spoon  Self fed, Fed by clinician      Position:   Seated, upright    INSTRUMENTAL ASSESSMENT:    ORAL PREP/ ORAL PHASE:      Decreased bolus formation resulting in observed premature pharyngeal spillage     PHARYNGEAL PHASE:     ONSET TIME       Delayed initiation of the pharyngeal swallow was noted with swallow reflex triggered at the level of the vallecula        PHARYNGEAL RESIDUALS        Vallecula/Pharyngeal Wall           Reduced tongue base retraction resulting in residuals in vallecula and/or posterior pharyngeal wall for thin liquid, nectar consistency liquid, and solid foods which mostly cleared with cued double swallow and spontaneous double swallow       Pyriform Sinuses      No significant residuals noted in the pyriform sinus     LARYNGEAL PENETRATION   Laryngeal penetration occurred in the absence of aspiration DURING the swallow for thin liquid and nectar consistency liquid due to  inadequate laryngeal closure which cleared from the laryngeal vestibule with a cued, re-directive throat clear . Laryngeal penetration was minimal and occurred inconsistently   spontaneous throat clearing was noted    ASPIRATION  Aspiration was not present during this evaluation    PENETRATION-ASPIRATION SCALE (PAS):  THIN 2 = Material enters the airway, remains above vocal folds, and is ejected from the airway   MILDLY THICK 2 = Material enters the airway, remains above vocal folds, and is ejected from the airway   MODERATELY THICK item not administered  PUREE 1 = Material does not enter the airway  HARD SOLID 1 = Material does not enter the airway       COMPENSATORY STRATEGIES    Compensatory strategies that were beneficial included Double swallow, Small bites/sips, Alternate solids and liquids, Throat clear, and No straw      STRUCTURAL/FUNCTIONAL ANOMALIES   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 ___________    Cognition:   Within functional limits for this exam    Oral Peripheral Examination   Adequate lingual/labial strength     Current Respiratory Status   room air     Parameters of Speech Production  Respiration:  Adequate for speech production  Quality:   Within functional limits  Intensity: Within functional limits    Pain: No pain reported. EDUCATION:   The Speech Language Pathologist (SLP) completed education regarding results of evaluation and that intervention is warranted at this time.   Learner: Patient and Family  Education: Reviewed results and recommendations of this evaluation  Evaluation of Education:  Saulo understanding    This plan may be re-evaluated and revised as warranted. Evaluation Time includes thorough review of current medical information, gathering information on past medical history/social history and prior level of function, completion of standardized testing/informal observation of tasks, assessment of data and education on plan of care and goals. [x]The admitting diagnosis and active problem list, have been reviewed prior to initiation of this evaluation. CPT Code: 53906  dysphagia study    ACTIVE PROBLEM LIST:   Patient Active Problem List   Diagnosis    ESBL (extended spectrum beta-lactamase) producing bacteria infection    Compression fx, thoracic spine, closed, initial encounter ([facility] [address]    Intractable back pain    Lumbar radiculopathy       Mahnaz Figueroa M.S., CCC-SLP  Speech-Language Pathologist  SP. [id number]        Intervention: 60771  dysphagia tx: Patient seen for f/u after completion of MBSS. Patient and daughter present. Per patient and daughter she has no hx of pneumonia, is ambulatory, completes her own oral care, is able to feed herself, no decayed teeth, and no previous hx of dysphagia. SLP educated patient on laryngeal penetration w/ thin and nectar thick liquids which was minimal and cleared w/ completion of a throat clear. SLP educated patient and daughter on recommendation for soft and bite sized consistency solids and thin liquids via cup only-no straws. Patient educated on use of small bites/sips, slow rate, and alt solids/liquids in order to reduce overall s/s of pen/aspiration and dysphagia. Per patient she is currently consuming softer textures and does not use straws. No further services were recommended at this time as there are no significant changes to patient's current diet. Patient and daughter verbalized understanding with all recommendations and excellent return of compensatory strategies were noted.

## 2022-12-07 ENCOUNTER — HOSPITAL ENCOUNTER (OUTPATIENT)
Age: 87
Discharge: HOME | End: 2022-12-07
Payer: MEDICARE

## 2022-12-07 DIAGNOSIS — A49.9 ESBL (EXTENDED SPECTRUM BETA-LACTAMASE) PRODUCING BACTERIA INFECTION: ICD-10-CM

## 2022-12-07 DIAGNOSIS — Z16.12 ESBL (EXTENDED SPECTRUM BETA-LACTAMASE) PRODUCING BACTERIA INFECTION: ICD-10-CM

## 2022-12-07 LAB
BACTERIA: ABNORMAL /HPF
BILIRUBIN URINE: NEGATIVE
BLOOD, URINE: ABNORMAL
CLARITY: CLEAR
COLOR: ABNORMAL
GLUCOSE URINE: 100 MG/DL
KETONES, URINE: NEGATIVE MG/DL
LEUKOCYTE ESTERASE, URINE: ABNORMAL
NITRITE, URINE: POSITIVE
PH UA: 6 (ref 5–9)
PROTEIN UA: ABNORMAL MG/DL
RBC UA: ABNORMAL /HPF (ref 0–2)
SPECIFIC GRAVITY UA: <=1.005 (ref 1–1.03)
UROBILINOGEN, URINE: 1 E.U./DL
WBC UA: ABNORMAL /HPF (ref 0–5)

## 2022-12-07 PROCEDURE — 87088 URINE BACTERIA CULTURE: CPT

## 2022-12-07 PROCEDURE — 81001 URINALYSIS AUTO W/SCOPE: CPT

## 2022-12-07 PROCEDURE — 87186 SC STD MICRODIL/AGAR DIL: CPT

## 2022-12-10 LAB
ORGANISM: ABNORMAL
URINE CULTURE, ROUTINE: ABNORMAL

## 2022-12-13 ENCOUNTER — CLINICAL DOCUMENTATION (OUTPATIENT)
Dept: INFECTIOUS DISEASES | Age: 87
End: 2022-12-13

## 2023-01-27 ENCOUNTER — APPOINTMENT (OUTPATIENT)
Dept: CT IMAGING | Age: 88
End: 2023-01-27
Payer: MEDICARE

## 2023-01-27 ENCOUNTER — HOSPITAL ENCOUNTER (EMERGENCY)
Age: 88
Discharge: HOME | End: 2023-01-27
Attending: EMERGENCY MEDICINE
Payer: MEDICARE

## 2023-01-27 VITALS
TEMPERATURE: 98.5 F | WEIGHT: 150 LBS | OXYGEN SATURATION: 93 % | DIASTOLIC BLOOD PRESSURE: 61 MMHG | BODY MASS INDEX: 29.45 KG/M2 | HEIGHT: 60 IN | RESPIRATION RATE: 16 BRPM | HEART RATE: 66 BPM | SYSTOLIC BLOOD PRESSURE: 135 MMHG

## 2023-01-27 DIAGNOSIS — R10.32 LEFT LOWER QUADRANT ABDOMINAL PAIN: ICD-10-CM

## 2023-01-27 DIAGNOSIS — N39.0 COMPLICATED UTI (URINARY TRACT INFECTION): Primary | ICD-10-CM

## 2023-01-27 LAB
ALBUMIN SERPL-MCNC: 3.9 G/DL (ref 3.5–5.2)
ALP BLD-CCNC: 150 U/L (ref 35–104)
ALT SERPL-CCNC: 8 U/L (ref 0–32)
ANION GAP SERPL CALCULATED.3IONS-SCNC: 13 MMOL/L (ref 7–16)
AST SERPL-CCNC: 15 U/L (ref 0–31)
BACTERIA: ABNORMAL /HPF
BASOPHILS ABSOLUTE: 0.04 E9/L (ref 0–0.2)
BASOPHILS RELATIVE PERCENT: 0.8 % (ref 0–2)
BILIRUB SERPL-MCNC: 0.4 MG/DL (ref 0–1.2)
BILIRUBIN URINE: NEGATIVE
BLOOD, URINE: ABNORMAL
BUN BLDV-MCNC: 19 MG/DL (ref 6–23)
C-REACTIVE PROTEIN: 2.3 MG/DL (ref 0–0.4)
CALCIUM SERPL-MCNC: 9.2 MG/DL (ref 8.6–10.2)
CHLORIDE BLD-SCNC: 98 MMOL/L (ref 98–107)
CLARITY: CLEAR
CO2: 26 MMOL/L (ref 22–29)
COLOR: YELLOW
CREAT SERPL-MCNC: 1.4 MG/DL (ref 0.5–1)
EOSINOPHILS ABSOLUTE: 0.15 E9/L (ref 0.05–0.5)
EOSINOPHILS RELATIVE PERCENT: 3 % (ref 0–6)
GFR SERPL CREATININE-BSD FRML MDRD: 34 ML/MIN/1.73
GLUCOSE BLD-MCNC: 132 MG/DL (ref 74–99)
GLUCOSE URINE: 100 MG/DL
HCT VFR BLD CALC: 38.7 % (ref 34–48)
HEMOGLOBIN: 12.9 G/DL (ref 11.5–15.5)
IMMATURE GRANULOCYTES #: 0.02 E9/L
IMMATURE GRANULOCYTES %: 0.4 % (ref 0–5)
KETONES, URINE: NEGATIVE MG/DL
LACTIC ACID, SEPSIS: 1.6 MMOL/L (ref 0.5–1.9)
LEUKOCYTE ESTERASE, URINE: ABNORMAL
LYMPHOCYTES ABSOLUTE: 1.29 E9/L (ref 1.5–4)
LYMPHOCYTES RELATIVE PERCENT: 25.6 % (ref 20–42)
MCH RBC QN AUTO: 29.3 PG (ref 26–35)
MCHC RBC AUTO-ENTMCNC: 33.3 % (ref 32–34.5)
MCV RBC AUTO: 87.8 FL (ref 80–99.9)
MONOCYTES ABSOLUTE: 0.73 E9/L (ref 0.1–0.95)
MONOCYTES RELATIVE PERCENT: 14.5 % (ref 2–12)
NEUTROPHILS ABSOLUTE: 2.8 E9/L (ref 1.8–7.3)
NEUTROPHILS RELATIVE PERCENT: 55.7 % (ref 43–80)
NITRITE, URINE: POSITIVE
PDW BLD-RTO: 14.9 FL (ref 11.5–15)
PH UA: 6.5 (ref 5–9)
PLATELET # BLD: 258 E9/L (ref 130–450)
PMV BLD AUTO: 9.7 FL (ref 7–12)
POTASSIUM SERPL-SCNC: 4.3 MMOL/L (ref 3.5–5)
PROTEIN UA: ABNORMAL MG/DL
RBC # BLD: 4.41 E12/L (ref 3.5–5.5)
RBC UA: ABNORMAL /HPF (ref 0–2)
SEDIMENTATION RATE, ERYTHROCYTE: 25 MM/HR (ref 0–20)
SODIUM BLD-SCNC: 137 MMOL/L (ref 132–146)
SPECIFIC GRAVITY UA: 1.01 (ref 1–1.03)
TOTAL PROTEIN: 7.1 G/DL (ref 6.4–8.3)
UROBILINOGEN, URINE: 1 E.U./DL
WBC # BLD: 5 E9/L (ref 4.5–11.5)
WBC UA: >20 /HPF (ref 0–5)

## 2023-01-27 PROCEDURE — 87040 BLOOD CULTURE FOR BACTERIA: CPT

## 2023-01-27 PROCEDURE — 36415 COLL VENOUS BLD VENIPUNCTURE: CPT

## 2023-01-27 PROCEDURE — 81001 URINALYSIS AUTO W/SCOPE: CPT

## 2023-01-27 PROCEDURE — 86140 C-REACTIVE PROTEIN: CPT

## 2023-01-27 PROCEDURE — 87088 URINE BACTERIA CULTURE: CPT

## 2023-01-27 PROCEDURE — 6370000000 HC RX 637 (ALT 250 FOR IP): Performed by: EMERGENCY MEDICINE

## 2023-01-27 PROCEDURE — 80053 COMPREHEN METABOLIC PANEL: CPT

## 2023-01-27 PROCEDURE — 74176 CT ABD & PELVIS W/O CONTRAST: CPT

## 2023-01-27 PROCEDURE — 99284 EMERGENCY DEPT VISIT MOD MDM: CPT

## 2023-01-27 PROCEDURE — 85651 RBC SED RATE NONAUTOMATED: CPT

## 2023-01-27 PROCEDURE — 87186 SC STD MICRODIL/AGAR DIL: CPT

## 2023-01-27 PROCEDURE — 83605 ASSAY OF LACTIC ACID: CPT

## 2023-01-27 PROCEDURE — 85025 COMPLETE CBC W/AUTO DIFF WBC: CPT

## 2023-01-27 RX ORDER — GRANULES FOR ORAL 3 G/1
3 POWDER ORAL
Qty: 2 EACH | Refills: 0 | Status: SHIPPED | OUTPATIENT
Start: 2023-01-30 | End: 2023-02-03

## 2023-01-27 RX ORDER — TRAMADOL HYDROCHLORIDE 50 MG/1
50 TABLET ORAL
COMMUNITY

## 2023-01-27 RX ORDER — GRANULES FOR ORAL 3 G/1
3 POWDER ORAL ONCE
Status: COMPLETED | OUTPATIENT
Start: 2023-01-27 | End: 2023-01-27

## 2023-01-27 RX ADMIN — FOSFOMYCIN TROMETHAMINE 1 PACKET: 3 GRANULE, FOR SOLUTION ORAL at 18:39

## 2023-01-27 ASSESSMENT — PAIN - FUNCTIONAL ASSESSMENT: PAIN_FUNCTIONAL_ASSESSMENT: NONE - DENIES PAIN

## 2023-01-27 NOTE — ED PROVIDER NOTES
[address] ENCOUNTER        Pt Name: Isaura Guardado  MRN: [id number]  [place] 8/27/1926  Date of evaluation: 1/27/2023  Provider: Gricelda Aguiar DO  PCP: Mara Baez MD  Note Started: 2:57 PM EST 1/27/23    CHIEF COMPLAINT       Chief Complaint   Patient presents with    Dysuria     Pt with dysuria especially frequency for about a week       HISTORY OF PRESENT ILLNESS: 1 or more Elements   History From: Patient, past medical records, daughter who is her caregiver    Limitations to history : None    Isaura Guardado is a 80 y.o. female who presents via EMS for dysuria and urinary frequency. She has history of complicated UTIs grew out MDRO on admission last year. Does regular follow with infectious disease. Had urine culture in December which showed colonization. She reports for the last several days she has had urinary frequency particularly today when she is urinated 14 times last few hours. States urinary urgency and burning when she urinates. Family reports no fevers at home no changes in nausea vomiting, they called her infectious disease doctor who recommended she come into the emergency department. Nursing Notes were all reviewed and agreed with or any disagreements were addressed in the HPI. REVIEW OF EXTERNAL NOTE :       Infectious disease note from patient's admission, note from 7/13/2022. She was noted to have ESBL in July of that year. Treated with meropenem. REVIEW OF SYSTEMS :           Positives and Pertinent negatives as per HPI.      SURGICAL HISTORY     Past Surgical History:   Procedure Laterality Date    APPENDECTOMY      BACK SURGERY      cement t12, l1, l2-oct 2020    HYSTERECTOMY (CERVIX STATUS UNKNOWN)      KYPHOSIS SURGERY N/A 10/10/2020    T12, L1 and L2  KYPHOPLASTY performed by Julian Barnes MD at [address] [facility]       Previous Medications    ASPIRIN 81 MG TABLET    Take 81 mg by mouth every other day    ATENOLOL (TENORMIN) 50 MG TABLET    Take 50 mg by mouth daily     CYCLOBENZAPRINE (FLEXERIL) 10 MG TABLET    Take 10 mg by mouth 3 times daily    DOCUSATE SODIUM (COLACE, DULCOLAX) 100 MG CAPS    Take 100 mg by mouth daily    ESCITALOPRAM (LEXAPRO) 5 MG TABLET    Take 5 mg by mouth daily    FUROSEMIDE (LASIX) 20 MG TABLET    Take 20 mg by mouth 2 times daily    GABAPENTIN (NEURONTIN) 100 MG CAPSULE    Take 100 mg by mouth daily. ISOSORBIDE MONONITRATE (IMDUR) 30 MG EXTENDED RELEASE TABLET    Take 30 mg by mouth daily    LORAZEPAM (ATIVAN) 1 MG TABLET    Take 1 mg by mouth 2 times daily as needed. PANTOPRAZOLE (PROTONIX) 40 MG TABLET    Take 1 tablet by mouth every morning (before breakfast)    PROBIOTIC PRODUCT (PROBIOTIC DAILY) CAPS    Take by mouth     TRAMADOL (ULTRAM) 50 MG TABLET    Take 50 mg by mouth daily (with breakfast). ALLERGIES     Chlorzoxazone and Sulfa antibiotics    FAMILYHISTORY     History reviewed. No pertinent family history.      SOCIAL HISTORY       Social History     Tobacco Use    Smoking status: Never    Smokeless tobacco: Never   Vaping Use    Vaping Use: Never used   Substance Use Topics    Alcohol use: No    Drug use: Never       SCREENINGS        [place] Coma Scale  Eye Opening: Spontaneous  Best Verbal Response: Oriented  Best Motor Response: Obeys commands  [place] Coma Scale Score: 15                CIWA Assessment  BP: 135/61  Heart Rate: 66           PHYSICAL EXAM  1 or more Elements     ED Triage Vitals [01/27/23 1436]   BP Temp Temp Source Heart Rate Resp SpO2 Height Weight   130/64 98.7 °F (37.1 °C) Oral 72 18 94 % 5' (1.524 m) 150 lb (68 kg)                 Constitutional/General: Alert and oriented x3  Head: Normocephalic and atraumatic  Eyes: PERRL, EOMI, conjunctiva normal, sclera non icteric  ENT:  Oropharynx clear, handling secretions, no trismus, no asymmetry of the posterior oropharynx or uvular edema  Neck: Supple, full ROM, no stridor, no meningeal signs  Respiratory: Lungs clear to auscultation bilaterally,  . Not in respiratory distress  Cardiovascular:  Regular rate. Regular rhythm. . 2+ distal pulses. Equal extremity pulses. Chest: No chest wall tenderness  GI:  Abdomen Soft, some left flank tenderness, non distended. No rebound, guarding, or rigidity. No pulsatile masses. Musculoskeletal: Moves all extremities x 4. Warm and well perfused,  Capillary refill <3 seconds  Integument: skin warm and dry. No rashes. Neurologic: GCS 15,    Psychiatric: Normal Affect            DIAGNOSTIC RESULTS   LABS:    Labs Reviewed   CBC WITH AUTO DIFFERENTIAL - Abnormal; Notable for the following components:       Result Value    Monocytes % 14.5 (*)     Lymphocytes Absolute 1.29 (*)     All other components within normal limits   COMPREHENSIVE METABOLIC PANEL - Abnormal; Notable for the following components:    Glucose 132 (*)     Creatinine 1.4 (*)     Alkaline Phosphatase 150 (*)     All other components within normal limits   URINALYSIS - Abnormal; Notable for the following components:    Glucose, Ur 100 (*)     Blood, Urine SMALL (*)     Nitrite, Urine POSITIVE (*)     Leukocyte Esterase, Urine LARGE (*)     All other components within normal limits   SEDIMENTATION RATE - Abnormal; Notable for the following components:    Sed Rate 25 (*)     All other components within normal limits   C-REACTIVE PROTEIN - Abnormal; Notable for the following components:    CRP 2.3 (*)     All other components within normal limits   MICROSCOPIC URINALYSIS - Abnormal; Notable for the following components:    WBC, UA >20 (*)     Bacteria, UA MANY (*)     All other components within normal limits   CULTURE, BLOOD 1   CULTURE, BLOOD 2   CULTURE, URINE   LACTATE, SEPSIS       As interpreted by me, the above displayed labs are abnormal. All other labs obtained during this visit were within normal range or not returned as of this dictation. RADIOLOGY:   Non-plain film images such as CT, Ultrasound and MRI are read by the radiologist. Plain radiographic images are visualized and preliminarily interpreted by the ED Provider with the below findings:         Interpretation per the Radiologist below, if available at the time of this note:    CT ABDOMEN PELVIS WO CONTRAST Additional Contrast? None   Final Result   No urinary tract stones or hydronephrosis. Diffuse colonic fecal retention. Large hiatal hernia. No results found. No results found. PROCEDURES   Unless otherwise noted below, none          CRITICAL CARE TIME (.cct)        PAST MEDICAL HISTORY/Chronic Conditions Affecting Care      has a past medical history of Arthritis, Compression fracture of body of thoracic vertebra ([address] Hypertension, Myocardial infarct ([address] and UTI (urinary tract infection). EMERGENCY DEPARTMENT COURSE    Vitals:    Vitals:    01/27/23 1436 01/27/23 1652   BP: 130/64 135/61   Pulse: 72 66   Resp: 18 16   Temp: 98.7 °F (37.1 °C) 98.5 °F (36.9 °C)   TempSrc: Oral    SpO2: 94% 93%   Weight: 150 lb (68 kg)    Height: 5' (1.524 m)        Patient was given the following medications:  Medications   fosfomycin tromethamine (MONUROL) 3 g PACK 1 packet (1 packet Oral Given 1/27/23 1839)           Is this patient to be included in the SEP-1 Core Measure due to severe sepsis or septic shock?    No   Exclusion criteria - the patient is NOT to be included for SEP-1 Core Measure due to:  2+ SIRS criteria are not met        Medical Decision Making/Differential Diagnosis:    CC/HPI Summary, Social Determinants of health, Records Reviewed, DDx, testing done/not done, ED Course, Reassessment, disposition considerations/shared decision making with patient, consults, disposition:          Contains abnormal data Culture, Urine  Order: [id number]  Status: Final result    Visible to patient: No (not released)    Next appt: None    Dx: ESBL (extended spectrum beta-lactamas. .. Specimen Information: Urine, clean catch   0 Result Notes  Component 12/7/22 1159    Organism Escherichia coli Abnormal     Urine Culture, Routine <25,000 CFU/ml    [facility]        Susceptibility    Escherichia coli (1)    Antibiotic Interpretation Microscan  Method Status    amoxicillin-clavulanate Intermediate ^16 mcg/mL BACTERIAL SUSCEPTIBILITY PANEL BY KIARA     ceFAZolin Resistant >=^64 mcg/mL BACTERIAL SUSCEPTIBILITY PANEL BY KIARA     cefepime Resistant >=^32 mcg/mL BACTERIAL SUSCEPTIBILITY PANEL BY KIARA     cefotaxime Resistant >=^64 mcg/mL BACTERIAL SUSCEPTIBILITY PANEL BY KIARA     cefOXitin Sensitive ^8 mcg/mL BACTERIAL SUSCEPTIBILITY PANEL BY KIARA     cefTAZidime-avibactam Sensitive <=^0.12 mcg/mL BACTERIAL SUSCEPTIBILITY PANEL BY KIARA     gentamicin Sensitive <=^1 mcg/mL BACTERIAL SUSCEPTIBILITY PANEL BY KIARA     levofloxacin Resistant >=^8 mcg/mL BACTERIAL SUSCEPTIBILITY PANEL BY KIARA     meropenem Sensitive <=^0.25 mcg/mL BACTERIAL SUSCEPTIBILITY PANEL BY KIARA     nitrofurantoin Sensitive <=^16 mcg/mL BACTERIAL SUSCEPTIBILITY PANEL BY KIARA     piperacillin-tazobactam Sensitive ^8 mcg/mL BACTERIAL SUSCEPTIBILITY PANEL BY KIARA     trimethoprim-sulfamethoxazole Sensitive <=^20 mcg/mL BACTERIAL SUSCEPTIBILITY PANEL BY KIARA                  Medical Decision Making  Patient presents with dysuria hematuria left flank pain. Differential diagnosis includes complicated urinary tract infection, pyelonephritis, urolithiasis, diverticulitis, to name a few. Work-up undertaken, no leukocytosis left shift nor lactic acidosis, urinalysis does show greater than 20 WBCs, and rate CRP are both elevated. Reviewed urine culture reviewed which grew out MDRO E. coli. On her prior admission ID note reviewed was treated with meropenem at that time.   Discussed with the patient and her daughter is her primary caregiver, recommended admission for ID consult and IV antibiotics, they do not want to be admitted to the hospital, they did speak with their ID office prior to coming in. She has tolerated fosfomycin in December. Shared decision making, will give dose of fosfomycin now, she was given prescription repeat every 3 days for total of 3 doses. Blood and urine cultures were sent. Discussed follow-up with factious disease and primary care, they state understanding agreement. CBC is ordered to evaluate for any signs of infection or inflammation by obtaining a WBC count, or any signs of acute anemia by interpreting hemoglobin. CMP was ordered to evaluate for any electrolyte imbalances, kidney function, or any elevations in anion gap. Urinalysis ordered to evaluate for UTI as the possible cause of symptoms, and may also evaluate hematuria as well. Blood cultures are ordered to evaluate, rule out and, if present, treat bacteremia with antibiotics narrowed down to the found organism. Amount and/or Complexity of Data Reviewed  Independent Historian: caregiver  External Data Reviewed: labs and notes. Labs: ordered. Radiology: ordered. Risk  Prescription drug management. CONSULTS: (Who and What was discussed)  None         I am the Primary Clinician of Record. FINAL IMPRESSION      1. Complicated UTI (urinary tract infection)    2.  Left lower quadrant abdominal pain          DISPOSITION/PLAN     DISPOSITION Decision To Discharge 01/27/2023 06:29:03 PM      PATIENT REFERRED TO:  Bharath Sims MD  [facility]  [phone]    Call       Jorge Montoya MD   [address] [place] [address] [phone]. [phone]    Schedule an appointment as soon as possible for a visit       DISCHARGE MEDICATIONS:  New Prescriptions    FOSFOMYCIN TROMETHAMINE (MONUROL) 3 G PACK    Take 1 packet by mouth every 3 days for 2 doses       DISCONTINUED MEDICATIONS:  Discontinued Medications    No medications on file              (Please note that portions of this note were completed with a voice recognition program.  Efforts were made to edit the dictations but occasionally words are mis-transcribed.)    Laura Zepeda DO (electronically signed)            Laura Zepeda DO  01/27/23 [id number]

## 2023-01-28 LAB
BLOOD CULTURE, ROUTINE: NORMAL
CULTURE, BLOOD 2: NORMAL

## 2023-01-28 NOTE — ED NOTES
Pt and daughter given discharge summary with education on complicated UTI, both aware of prescription at assigned pharmacy and to follow up with her primary care physician     Jeannie Trujillo RN  01/27/23 [id number]

## 2023-01-29 LAB
ORGANISM: ABNORMAL
URINE CULTURE, ROUTINE: ABNORMAL

## 2023-01-30 LAB
ORGANISM: ABNORMAL
URINE CULTURE, ROUTINE: ABNORMAL

## 2023-02-01 LAB
BLOOD CULTURE, ROUTINE: NORMAL
CULTURE, BLOOD 2: NORMAL

## 2023-03-07 ENCOUNTER — APPOINTMENT (OUTPATIENT)
Dept: GENERAL RADIOLOGY | Age: 88
End: 2023-03-07
Payer: MEDICARE

## 2023-03-07 ENCOUNTER — APPOINTMENT (OUTPATIENT)
Dept: CT IMAGING | Age: 88
End: 2023-03-07
Payer: MEDICARE

## 2023-03-07 ENCOUNTER — HOSPITAL ENCOUNTER (EMERGENCY)
Age: 88
Discharge: LEFT BEFORE DISCHARGE | End: 2023-03-07
Attending: EMERGENCY MEDICINE
Payer: MEDICARE

## 2023-03-07 VITALS
HEART RATE: 66 BPM | SYSTOLIC BLOOD PRESSURE: 148 MMHG | DIASTOLIC BLOOD PRESSURE: 70 MMHG | TEMPERATURE: 97.7 F | RESPIRATION RATE: 16 BRPM | OXYGEN SATURATION: 93 %

## 2023-03-07 DIAGNOSIS — M48.56XA NONTRAUMATIC COMPRESSION FRACTURE OF L4 VERTEBRA, INITIAL ENCOUNTER (HCC): ICD-10-CM

## 2023-03-07 DIAGNOSIS — R53.83 FATIGUE, UNSPECIFIED TYPE: Primary | ICD-10-CM

## 2023-03-07 DIAGNOSIS — R77.8 ELEVATED TROPONIN: ICD-10-CM

## 2023-03-07 LAB
ALBUMIN SERPL-MCNC: 3.8 G/DL (ref 3.5–5.2)
ALP BLD-CCNC: 166 U/L (ref 35–104)
ALT SERPL-CCNC: 11 U/L (ref 0–32)
ANION GAP SERPL CALCULATED.3IONS-SCNC: 12 MMOL/L (ref 7–16)
AST SERPL-CCNC: 19 U/L (ref 0–31)
B.E.: 2 MMOL/L (ref -3–3)
BACTERIA: ABNORMAL /HPF
BASOPHILS ABSOLUTE: 0.04 E9/L (ref 0–0.2)
BASOPHILS RELATIVE PERCENT: 0.8 % (ref 0–2)
BILIRUB SERPL-MCNC: 0.5 MG/DL (ref 0–1.2)
BILIRUBIN URINE: NEGATIVE
BLOOD, URINE: ABNORMAL
BUN BLDV-MCNC: 16 MG/DL (ref 6–23)
CALCIUM SERPL-MCNC: 9.2 MG/DL (ref 8.6–10.2)
CARDIOPULMONARY BYPASS: NO
CHLORIDE BLD-SCNC: 102 MMOL/L (ref 98–107)
CLARITY: CLEAR
CO2: 25 MMOL/L (ref 22–29)
COLOR: YELLOW
CREAT SERPL-MCNC: 1.1 MG/DL (ref 0.5–1)
DELIVERY SYSTEMS: ABNORMAL
DEVICE: ABNORMAL
EOSINOPHILS ABSOLUTE: 0.11 E9/L (ref 0.05–0.5)
EOSINOPHILS RELATIVE PERCENT: 2.1 % (ref 0–6)
GFR SERPL CREATININE-BSD FRML MDRD: 46 ML/MIN/1.73
GLUCOSE BLD-MCNC: 112 MG/DL (ref 74–99)
GLUCOSE URINE: NEGATIVE MG/DL
HCO3: 26.6 MMOL/L (ref 22–26)
HCT VFR BLD CALC: 39.5 % (ref 34–48)
HEMOGLOBIN: 12.9 G/DL (ref 11.5–15.5)
IMMATURE GRANULOCYTES #: 0.02 E9/L
IMMATURE GRANULOCYTES %: 0.4 % (ref 0–5)
INFLUENZA A: NOT DETECTED
INFLUENZA B: NOT DETECTED
KETONES, URINE: NEGATIVE MG/DL
LACTIC ACID: 1.2 MMOL/L (ref 0.5–2.2)
LEUKOCYTE ESTERASE, URINE: ABNORMAL
LYMPHOCYTES ABSOLUTE: 1.43 E9/L (ref 1.5–4)
LYMPHOCYTES RELATIVE PERCENT: 27.6 % (ref 20–42)
MCH RBC QN AUTO: 28.9 PG (ref 26–35)
MCHC RBC AUTO-ENTMCNC: 32.7 % (ref 32–34.5)
MCV RBC AUTO: 88.6 FL (ref 80–99.9)
MONOCYTES ABSOLUTE: 0.56 E9/L (ref 0.1–0.95)
MONOCYTES RELATIVE PERCENT: 10.8 % (ref 2–12)
NEUTROPHILS ABSOLUTE: 3.03 E9/L (ref 1.8–7.3)
NEUTROPHILS RELATIVE PERCENT: 58.3 % (ref 43–80)
NITRITE, URINE: NEGATIVE
O2 SATURATION: 93.4 % (ref 92–98.5)
OPERATOR ID: ABNORMAL
PCO2 37: 40.7 MMHG (ref 35–45)
PDW BLD-RTO: 15.3 FL (ref 11.5–15)
PH 37: 7.42 (ref 7.35–7.45)
PH UA: 6.5 (ref 5–9)
PLATELET # BLD: 199 E9/L (ref 130–450)
PMV BLD AUTO: 10.2 FL (ref 7–12)
PO2 37: 67.2 MMHG (ref 60–80)
POC SOURCE: ABNORMAL
POTASSIUM SERPL-SCNC: 3.6 MMOL/L (ref 3.5–5)
PRO-BNP: 1955 PG/ML (ref 0–450)
PROTEIN UA: NEGATIVE MG/DL
RBC # BLD: 4.46 E12/L (ref 3.5–5.5)
RBC UA: ABNORMAL /HPF (ref 0–2)
SARS-COV-2 RNA, RT PCR: NOT DETECTED
SODIUM BLD-SCNC: 139 MMOL/L (ref 132–146)
SPECIFIC GRAVITY UA: <=1.005 (ref 1–1.03)
TOTAL CK: 103 U/L (ref 20–180)
TOTAL PROTEIN: 6.9 G/DL (ref 6.4–8.3)
TROPONIN, HIGH SENSITIVITY: 75 NG/L (ref 0–9)
TROPONIN, HIGH SENSITIVITY: 78 NG/L (ref 0–9)
UROBILINOGEN, URINE: 0.2 E.U./DL
WBC # BLD: 5.2 E9/L (ref 4.5–11.5)
WBC UA: ABNORMAL /HPF (ref 0–5)

## 2023-03-07 PROCEDURE — 87636 SARSCOV2 & INF A&B AMP PRB: CPT

## 2023-03-07 PROCEDURE — 72131 CT LUMBAR SPINE W/O DYE: CPT

## 2023-03-07 PROCEDURE — 80053 COMPREHEN METABOLIC PANEL: CPT

## 2023-03-07 PROCEDURE — 81001 URINALYSIS AUTO W/SCOPE: CPT

## 2023-03-07 PROCEDURE — 93005 ELECTROCARDIOGRAM TRACING: CPT | Performed by: EMERGENCY MEDICINE

## 2023-03-07 PROCEDURE — 96374 THER/PROPH/DIAG INJ IV PUSH: CPT

## 2023-03-07 PROCEDURE — 74176 CT ABD & PELVIS W/O CONTRAST: CPT

## 2023-03-07 PROCEDURE — 87077 CULTURE AEROBIC IDENTIFY: CPT

## 2023-03-07 PROCEDURE — 36415 COLL VENOUS BLD VENIPUNCTURE: CPT

## 2023-03-07 PROCEDURE — 83880 ASSAY OF NATRIURETIC PEPTIDE: CPT

## 2023-03-07 PROCEDURE — 87186 SC STD MICRODIL/AGAR DIL: CPT

## 2023-03-07 PROCEDURE — 82550 ASSAY OF CK (CPK): CPT

## 2023-03-07 PROCEDURE — 99285 EMERGENCY DEPT VISIT HI MDM: CPT

## 2023-03-07 PROCEDURE — 87088 URINE BACTERIA CULTURE: CPT

## 2023-03-07 PROCEDURE — 84484 ASSAY OF TROPONIN QUANT: CPT

## 2023-03-07 PROCEDURE — 71045 X-RAY EXAM CHEST 1 VIEW: CPT

## 2023-03-07 PROCEDURE — 82803 BLOOD GASES ANY COMBINATION: CPT

## 2023-03-07 PROCEDURE — 85025 COMPLETE CBC W/AUTO DIFF WBC: CPT

## 2023-03-07 PROCEDURE — 6360000002 HC RX W HCPCS: Performed by: EMERGENCY MEDICINE

## 2023-03-07 PROCEDURE — 83605 ASSAY OF LACTIC ACID: CPT

## 2023-03-07 RX ORDER — FENTANYL CITRATE 50 UG/ML
25 INJECTION, SOLUTION INTRAMUSCULAR; INTRAVENOUS ONCE
Status: COMPLETED | OUTPATIENT
Start: 2023-03-07 | End: 2023-03-07

## 2023-03-07 RX ADMIN — FENTANYL CITRATE 25 MCG: 50 INJECTION, SOLUTION INTRAMUSCULAR; INTRAVENOUS at 19:23

## 2023-03-07 ASSESSMENT — ENCOUNTER SYMPTOMS
SHORTNESS OF BREATH: 0
BACK PAIN: 1
VOMITING: 0
NAUSEA: 0
ABDOMINAL PAIN: 0
EYE REDNESS: 0

## 2023-03-07 ASSESSMENT — LIFESTYLE VARIABLES: HOW OFTEN DO YOU HAVE A DRINK CONTAINING ALCOHOL: NEVER

## 2023-03-07 NOTE — ED PROVIDER NOTES
[address] ENCOUNTER        Pt Name: Iris Jasso  MRN: [id number]  [place] 8/27/1926  Date of evaluation: 3/7/2023  Provider: Mary Lazo DO  PCP: Qi Frias MD  Note Started: 6:29 PM EST 3/7/23    CHIEF COMPLAINT       Chief Complaint   Patient presents with    Fatigue    Back Pain     Lower back  pain        HISTORY OF PRESENT ILLNESS: 1 or more Elements       Iris Jasso is a 80 y.o. female who presents to the emergency department with a chief complaint of fatigue and back pain. The patient does have a history of fatigue been present for approximately last 3 weeks. The patient's daughter states that the patient falls to sleep intermittently throughout the day which is abnormal for her. She states she is having a difficult time getting around secondary to her fatigue. This is moderate in severity. Nothing is better. Nothing makes it worse. The patient denies any treatment prior to arrival.  The patient does also complain of low back pain. States she does have chronic low back pain. She does have a history of kyphoplasty's secondary to compression fractures in the past.  There is no numbness, weakness or paresthesias of the extremities. Patient is not tried any treatment for pain. She was evaluated by her primary care physician who felt potentially this may have been related to shingles. She denies any chest pain, shortness of breath, nausea, vomiting or abdominal pain. Nursing Notes were all reviewed and agreed with or any disagreements were addressed in the HPI. REVIEW OF SYSTEMS :      Review of Systems   Constitutional:  Positive for fatigue. Negative for chills. HENT:  Negative for congestion. Eyes:  Negative for redness. Respiratory:  Negative for shortness of breath. Cardiovascular:  Negative for chest pain. Gastrointestinal:  Negative for abdominal pain, nausea and vomiting. Genitourinary:  Negative for dysuria. Musculoskeletal:  Positive for back pain. Negative for arthralgias. Skin:  Negative for rash. Neurological:  Negative for light-headedness. Psychiatric/Behavioral:  Negative for confusion. All other systems reviewed and are negative. Positives and Pertinent negatives as per HPI. SURGICAL HISTORY     Past Surgical History:   Procedure Laterality Date    APPENDECTOMY      BACK SURGERY      cement t12, l1, l2-oct 2020    HYSTERECTOMY (CERVIX STATUS UNKNOWN)      KYPHOSIS SURGERY N/A 10/10/2020    T12, L1 and L2  KYPHOPLASTY performed by Alla Lim MD at [address] [place] [address]       Previous Medications    ASPIRIN 81 MG TABLET    Take 81 mg by mouth every other day    ATENOLOL (TENORMIN) 50 MG TABLET    Take 50 mg by mouth daily     CYCLOBENZAPRINE (FLEXERIL) 10 MG TABLET    Take 10 mg by mouth 3 times daily    DOCUSATE SODIUM (COLACE, DULCOLAX) 100 MG CAPS    Take 100 mg by mouth daily    ESCITALOPRAM (LEXAPRO) 5 MG TABLET    Take 5 mg by mouth daily    FUROSEMIDE (LASIX) 20 MG TABLET    Take 20 mg by mouth 2 times daily    GABAPENTIN (NEURONTIN) 100 MG CAPSULE    Take 100 mg by mouth daily. ISOSORBIDE MONONITRATE (IMDUR) 30 MG EXTENDED RELEASE TABLET    Take 30 mg by mouth daily    LORAZEPAM (ATIVAN) 1 MG TABLET    Take 1 mg by mouth 2 times daily as needed. PANTOPRAZOLE (PROTONIX) 40 MG TABLET    Take 1 tablet by mouth every morning (before breakfast)    PROBIOTIC PRODUCT (PROBIOTIC DAILY) CAPS    Take by mouth     TRAMADOL (ULTRAM) 50 MG TABLET    Take 50 mg by mouth daily (with breakfast). ALLERGIES     Chlorzoxazone and Sulfa antibiotics    FAMILYHISTORY     No family history on file.      SOCIAL HISTORY       Social History     Tobacco Use    Smoking status: Never    Smokeless tobacco: Never   Vaping Use    Vaping Use: Never used   Substance Use Topics    Alcohol use: No    Drug use: Never       SCREENINGS        Bianca Coma Scale  Eye Opening: Spontaneous  Best Verbal Response: Oriented  Best Motor Response: Obeys commands  Bianca Coma Scale Score: 15                CIWA Assessment  BP: (!) 156/76  Heart Rate: 73           PHYSICAL EXAM  1 or more Elements     ED Triage Vitals [03/07/23 1655]   BP Temp Temp src Heart Rate Resp SpO2 Height Weight   (!) 156/76 -- -- 73 16 93 % -- --         Constitutional/General: Alert and oriented x3, well appearing, non toxic in NAD  Head: NC/AT  Eyes:  EOMI  Mouth: Oropharynx clear, handling secretions, no trismus  Neck: Supple, full ROM  Pulmonary: Lungs clear to auscultation bilaterally, no wheezes, rales, or rhonchi. Not in respiratory distress  Cardiovascular:  Regular rate and rhythm, no murmurs, gallops, or rubs. 2+ distal pulses  Abdomen: Soft, non tender, non distended,   Extremities: Moves all extremities x 4. Warm and well perfused  Skin: warm and dry without rash  Neurologic: GCS 15, no gross focal neurologic deficits  Psych: Normal Affect      DIAGNOSTIC RESULTS     I have personally reviewed all laboratory and imaging results for this patient. Results are listed below. LABS:  No results found for this visit on 03/07/23. RADIOLOGY:  Interpreted by Radiologist.  Bill    (Results Pending)   CT ABDOMEN PELVIS WO CONTRAST Additional Contrast? None    (Results Pending)         RADIOLOGY:   Non-plain film images such as CT, Ultrasound and MRI are read by the radiologist. Plain radiographic images are visualized and preliminarily interpreted by the ED Provider       Interpretation per the Radiologist below, if available at the time of this note:    Bill    (Results Pending)   CT ABDOMEN PELVIS WO CONTRAST Additional Contrast? None    (Results Pending)     No results found. No results found. PROCEDURES   Unless otherwise noted below, none       MDM:   I, Dr. Ana Diop am the primary physician of record. Horacio Cedeño is a 80 y.o. female who presents to the ED for fatigue and back pain  Vital signs upon arrival BP (!) 156/76   Pulse 73   Resp 16   LMP 05/16/2018   SpO2 93%     History From: The patient and patient's daughter    Rufus Reese: Patient's daughter states patient does live on her own but the patient's daughter as well as her grandson live right next door and are at her house consistently    Limitations to history:      History: The patient presents to the emergency department with a chief complaint of fatigue and back pain. The patient does have a history of fatigue been present for approximately last 3 weeks. The patient's daughter states that the patient falls to sleep intermittently throughout the day which is abnormal for her. She states she is having a difficult time getting around secondary to her fatigue. This is moderate in severity. Nothing is better. Nothing makes it worse. The patient denies any treatment prior to arrival.  The patient does also complain of low back pain. States she does have chronic low back pain. She does have a history of kyphoplasty's secondary to compression fractures in the past.  There is no numbness, weakness or paresthesias of the extremities. Patient is not tried any treatment for pain. She was evaluated by her primary care physician who felt potentially this may have been related to shingles. She denies any chest pain, shortness of breath, nausea, vomiting or abdominal pain. Physical exam: Patient sitting in the bed no acute distress. Heart regular rate and rhythm, lungs clear to auscultation bilaterally the patient with no midline cervical, thoracic or lumbar spine tenderness to palpation.   The patient does have mild overlying rash over the right flank this does appear to be erythematous, nonblanching mild excoriation, there is no vesicular appearance to suggest herpes zoster    Differential diagnosis includes but not limited to :  Dehydration  Electrolyte derangement  UTI  Anemia  Compression fracture    Records reviewed: Reviewed patient's prior medical record she did have urine culture performed on 1/27/2023 did grow E. coli      Patient treated with   Medications   fentaNYL (SUBLIMAZE) injection 25 mcg (has no administration in time range)         Labs independently interpreted and reviewed by me demonstrate;  CBC-unremarkable  CMP-unremarkable  High-sensitivity troponin 78 and 75   proBNP mildly elevated at [address] and influenza-negative    Imaging reviewed and interpreted by me demonstrates:  Abdomen pelvis demonstrates L4 vertebral body compression fracture  CT of pelvis does demonstrate L4 compression fracture  Chest x-ray unremarkable    EKG: This EKG is signed and interpreted by me. Normal sinus rhythm rate of 64, left bundle branch block, Sgarbossa criteria negative    Chronic conditions:   Past Medical History:   Diagnosis Date    Arthritis     Compression fracture of body of thoracic vertebra (HCC)     Hypertension     Myocardial infarct [facility])     UTI (urinary tract infection)          Risk/Disposition: Patient is presenting emergency department the chief complaint of fatigue as well as back pain. The patient did have labs and imaging which were reviewed. She was found to have elevated troponin as well as L4 compression fracture. The patient and daughter adamantly refused admission. They do understand the patient can die be severely probably disabled. They do have the capacity make their own decisions. The patient will sign out against medical vice. EMERGENCY DEPARTMENT COURSE    Vitals:    Vitals:    03/07/23 1655   BP: (!) 156/76   Pulse: 73   Resp: 16   SpO2: 93%       ED Course as of 03/07/23 2256   Tue Mar 07, 2023   2221 Patient was recommended to have admission.   Patient's and daughter is present [MT]      ED Course User Index  [MT] Marifer Yoo DO      ED Course as of 03/07/23 2300   Tue Mar 07, 2023   2221 Patient was recommended to have admission.  Patient's and daughter is present [MT]      ED Course User Index  [MT] Lenore ROSALES Ryland, DO       --------------------------------------------- PAST HISTORY ---------------------------------------------  Past Medical History:  has a past medical history of Arthritis, Compression fracture of body of thoracic vertebra (HCC), Hypertension, Myocardial infarct (HCC), and UTI (urinary tract infection).    Past Surgical History:  has a past surgical history that includes Hysterectomy; Appendectomy; Kyphosis surgery (N/A, 10/10/2020); and back surgery.    Social History:  reports that she has never smoked. She has never used smokeless tobacco. She reports that she does not drink alcohol and does not use drugs.    Family History: family history is not on file.     The patient’s home medications have been reviewed.    Allergies: Chlorzoxazone and Sulfa antibiotics    -------------------------------------------------- RESULTS -------------------------------------------------  Labs:  Results for orders placed or performed during the hospital encounter of 03/07/23   COVID-19 & Influenza Combo    Specimen: Nasopharyngeal Swab   Result Value Ref Range    SARS-CoV-2 RNA, RT PCR NOT DETECTED NOT DETECTED    INFLUENZA A NOT DETECTED NOT DETECTED    INFLUENZA B NOT DETECTED NOT DETECTED   CBC with Auto Differential   Result Value Ref Range    WBC 5.2 4.5 - 11.5 E9/L    RBC 4.46 3.50 - 5.50 E12/L    Hemoglobin 12.9 11.5 - 15.5 g/dL    Hematocrit 39.5 34.0 - 48.0 %    MCV 88.6 80.0 - 99.9 fL    MCH 28.9 26.0 - 35.0 pg    MCHC 32.7 32.0 - 34.5 %    RDW 15.3 (H) 11.5 - 15.0 fL    Platelets 199 130 - 450 E9/L    MPV 10.2 7.0 - 12.0 fL    Neutrophils % 58.3 43.0 - 80.0 %    Immature Granulocytes % 0.4 0.0 - 5.0 %    Lymphocytes % 27.6 20.0 - 42.0 %    Monocytes % 10.8 2.0 - 12.0 %    Eosinophils % 2.1 0.0 - 6.0 %    Basophils % 0.8 0.0 - 2.0 %    Neutrophils Absolute 3.03 1.80 - 7.30 E9/L     Immature Granulocytes # 0.02 E9/L    Lymphocytes Absolute 1.43 (L) 1.50 - 4.00 E9/L    Monocytes Absolute 0.56 0.10 - 0.95 E9/L    Eosinophils Absolute 0.11 0.05 - 0.50 E9/L    Basophils Absolute 0.04 0.00 - 0.20 E9/L   Comprehensive Metabolic Panel   Result Value Ref Range    Sodium 139 132 - 146 mmol/L    Potassium 3.6 3.5 - 5.0 mmol/L    Chloride 102 98 - 107 mmol/L    CO2 25 22 - 29 mmol/L    Anion Gap 12 7 - 16 mmol/L    Glucose 112 (H) 74 - 99 mg/dL    BUN 16 6 - 23 mg/dL    Creatinine 1.1 (H) 0.5 - 1.0 mg/dL    Est, Glom Filt Rate 46 >=60 mL/min/1.73    Calcium 9.2 8.6 - 10.2 mg/dL    Total Protein 6.9 6.4 - 8.3 g/dL    Albumin 3.8 3.5 - 5.2 g/dL    Total Bilirubin 0.5 0.0 - 1.2 mg/dL    Alkaline Phosphatase 166 (H) 35 - 104 U/L    ALT 11 0 - 32 U/L    AST 19 0 - 31 U/L   Lactic Acid   Result Value Ref Range    Lactic Acid 1.2 0.5 - 2.2 mmol/L   Troponin   Result Value Ref Range    Troponin, High Sensitivity 78 (H) 0 - 9 ng/L   Brain Natriuretic Peptide   Result Value Ref Range    Pro-BNP 1,955 (H) 0 - 450 pg/mL   Urinalysis with Microscopic   Result Value Ref Range    Color, UA Yellow Straw/Yellow    Clarity, UA Clear Clear    Glucose, Ur Negative Negative mg/dL    Bilirubin Urine Negative Negative    Ketones, Urine Negative Negative mg/dL    Specific Gravity, UA <=1.005 1.005 - 1.030    Blood, Urine TRACE-INTACT Negative    pH, UA 6.5 5.0 - 9.0    Protein, UA Negative Negative mg/dL    Urobilinogen, Urine 0.2 <2.0 E.U./dL    Nitrite, Urine Negative Negative    Leukocyte Esterase, Urine TRACE (A) Negative    WBC, UA 0-1 0 - 5 /HPF    RBC, UA 0-1 0 - 2 /HPF    Bacteria, UA NONE SEEN None Seen /HPF   CK   Result Value Ref Range    Total  20 - 180 U/L   Troponin   Result Value Ref Range    Troponin, High Sensitivity 75 (H) 0 - 9 ng/L   POCT blood gases   Result Value Ref Range    POC Source Arterial     PH 37 7.423 7.350 - 7.450    PCO2 37 40.7 35.0 - 45.0 mmHg    PO2 37 67.2 60.0 - 80.0 mmHg    HCO3 26.6 (H) 22.0 - 26.0 mmol/L    B.E. 2.0 -3.0 - 3.0 mmol/L    O2 Sat 93.4 92.0 - 98.5 %    Cardiopulmonary Bypass No      ,505     DEVICE [id number]     [employer]        Radiology:  CT ABDOMEN PELVIS WO CONTRAST Additional Contrast? None    Result Date: 3/7/2023  EXAMINATION: CT OF THE ABDOMEN AND PELVIS WITHOUT CONTRAST 3/7/2023 8:34 pm TECHNIQUE: CT of the abdomen and pelvis was performed without the administration of intravenous contrast. Multiplanar reformatted images are provided for review. Automated exposure control, iterative reconstruction, and/or weight based adjustment of the mA/kV was utilized to reduce the radiation dose to as low as reasonably achievable. COMPARISON: None. HISTORY: ORDERING SYSTEM PROVIDED HISTORY: flank pain TECHNOLOGIST PROVIDED HISTORY: Reason for exam:->flank pain Additional Contrast?->None Decision Support Exception - unselect if not a suspected or confirmed emergency medical condition->Emergency Medical Condition (MA) FINDINGS: Lower Chest: Left lower lobe airspace opacity consistent with atelectasis and/or pneumonia. Hiatal hernia. Small left pleural effusion Organs: The liver, spleen, adrenal glands, kidneys is, pancreas and gallbladder are normal. GI/Bowel: Is diffuse colonic fecal retention. Normal small bowel. The appendix is not definitively demonstrated. Pelvis: Distended urinary bladder. Peritoneum/Retroperitoneum: No free fluid or free air. Bones/Soft Tissues: Moderate biconcave endplate compression fractures L4 vertebral body not present on 01/27/2023. Multilevel degenerative changes visualized thoracolumbar spine with multilevel vertebroplasties. The     Moderate biconcave endplate compression fracture L4 vertebral body not present on 01/27/2023. Diffuse colonic fecal retention. Left lower lobe airspace opacity consistent with atelectasis and/or pneumonia. Small left pleural effusion. Hiatal hernia.      CT LUMBAR SPINE WO CONTRAST    Result Date: 3/7/2023  EXAMINATION: CT OF THE LUMBAR SPINE WITHOUT CONTRAST  3/7/2023 TECHNIQUE: CT of the lumbar spine was performed without the administration of intravenous contrast. Multiplanar reformatted images are provided for review. Adjustment of mA and/or kV according to patient size was utilized. Automated exposure control, iterative reconstruction, and/or weight based adjustment of the mA/kV was utilized to reduce the radiation dose to as low as reasonably achievable. COMPARISON: Correlation made with view of the lumbar spine from prior CT abdomen pelvis performed 01/27/2023 HISTORY: ORDERING SYSTEM PROVIDED HISTORY: pain TECHNOLOGIST PROVIDED HISTORY: Reason for exam:->pain Decision Support Exception - unselect if not a suspected or confirmed emergency medical condition->Emergency Medical Condition (MA) FINDINGS: Redemonstration of chronic fractures at T11, T12, and L1 with kyphoplasty. Chronic depression involving superior endplate of L2. There is a comminuted fracture involving vertebral body of L4 with increased loss of height compared with previous examination. Degenerative vacuum disc phenomenon at multiple levels. There is levoscoliosis of the lumbar spine. 1. Redemonstration of comminuted L4 vertebral body fracture with increased loss of height compared with prior exam from 01/27/2023. MRI may be helpful for further evaluation. 2. Redemonstration of kyphoplasty at T11, T12, and L1 3. Stable chronic fracture involving superior endplate of L2. XR CHEST PORTABLE    Result Date: 3/7/2023  EXAMINATION: ONE XRAY VIEW OF THE CHEST 3/7/2023 9:01 pm COMPARISON: 12/26/2021 HISTORY: ORDERING SYSTEM PROVIDED HISTORY: fatigue TECHNOLOGIST PROVIDED HISTORY: Reason for exam:->fatigue FINDINGS: The lungs are without acute focal process. There is no effusion or pneumothorax. Cardiomegaly. The osseous structures are without acute process. No acute process. Cardiomegaly. ------------------------- NURSING NOTES AND VITALS REVIEWED ---------------------------  Date / Time Roomed:  3/7/2023  4:52 PM  ED Bed Assignment:  10/10    The nursing notes within the ED encounter and vital signs as below have been reviewed. BP (!) 148/70   Pulse 66   Temp 97.7 °F (36.5 °C) (Oral)   Resp 16   LMP 05/16/2018   SpO2 93%   Oxygen Saturation Interpretation: Normal      ------------------------------------------ PROGRESS NOTES ------------------------------------------  This patient has chosen to leave against medical advice. I have personally explained to them that choosing to do so may result in permanent bodily harm or death. I discussed at length that without further evaluation and monitoring there may be unforeseen circumstances and deterioration resulting in permanent bodily harm or death as a result of their choice. They are alert, oriented, and competent at this time. They state that they are aware of the serious risks as explained, but they continue to wish to leave against medical advice. In light of their decision to leave against medical advice, follow-up has been arranged and they are aware of the importance of following up as instructed. They have been advised that they should return to the ED immediately if they change their mind at any time, or if their condition begins to change or worsen. I have spoken with the patient and discussed todays availabe results to this point, in addition to providing specific details for the plan of care and counseling regarding the diagnosis and prognosis. Their questions are answered, however they are not agreeable to admission.     --------------------------------- ADDITIONAL PROVIDER NOTES ---------------------------------  This patient has chosen to leave against medical advice. I have personally explained to them that choosing to do so may result in permanent bodily harm or death.   I discussed at length that without further evaluation and monitoring there may be unforeseen circumstances and deterioration resulting in permanent bodily harm or death as a result of their choice. They are alert, oriented, and competent at this time. They state that they are aware of the serious risks as explained, but they continue to wish to leave against medical   advice. In light of their decision to leave against medical advice, follow-up has been arranged and they are aware of the importance of following up as instructed. They have been advised that they should return to the ED immediately if they change their mind at any time, or if their condition begins to change or worsen. The follow up plan has been discussed in detail and they are aware of the specific conditions for emergent return, as well as the importance of follow-up. Discharge Medication List as of 3/7/2023 10:49 PM          Diagnosis:  1. Fatigue, unspecified type    2. Nontraumatic compression fracture of L4 vertebra, initial encounter ([facility])    3. Elevated troponin        Disposition:  Patient's disposition: Left against medical advice. Patient's condition is stable.        Michael Grande, DO  03/07/23 [id number]

## 2023-03-08 LAB
EKG ATRIAL RATE: 64 BPM
EKG P-R INTERVAL: 200 MS
EKG Q-T INTERVAL: 484 MS
EKG QRS DURATION: 162 MS
EKG QTC CALCULATION (BAZETT): 499 MS
EKG R AXIS: -17 DEGREES
EKG T AXIS: 150 DEGREES
EKG VENTRICULAR RATE: 64 BPM

## 2023-03-08 PROCEDURE — 93010 ELECTROCARDIOGRAM REPORT: CPT | Performed by: INTERNAL MEDICINE

## 2023-03-08 NOTE — ED NOTES
Called access center spoke with Makenna NAGEL to cancel transfer     Claudette Elaine RN  03/07/23 4438

## 2023-03-10 LAB
ORGANISM: ABNORMAL
URINE CULTURE, ROUTINE: ABNORMAL

## 2023-03-13 ENCOUNTER — HOSPITAL ENCOUNTER (OUTPATIENT)
Age: 88
Discharge: HOME | End: 2023-03-13
Payer: MEDICARE

## 2023-03-13 DIAGNOSIS — N30.00 ACUTE CYSTITIS WITHOUT HEMATURIA: ICD-10-CM

## 2023-03-13 LAB
BACTERIA: NORMAL /HPF
BILIRUBIN URINE: NEGATIVE
BLOOD, URINE: NORMAL
CLARITY: CLEAR
COLOR: YELLOW
EPITHELIAL CELLS, UA: NORMAL /HPF
GLUCOSE URINE: NEGATIVE MG/DL
KETONES, URINE: NEGATIVE MG/DL
LEUKOCYTE ESTERASE, URINE: NEGATIVE
NITRITE, URINE: NEGATIVE
PH UA: 6.5 (ref 5–9)
PROTEIN UA: NEGATIVE MG/DL
RBC UA: NORMAL /HPF (ref 0–2)
SPECIFIC GRAVITY UA: <=1.005 (ref 1–1.03)
UROBILINOGEN, URINE: 0.2 E.U./DL
WBC UA: NORMAL /HPF (ref 0–5)

## 2023-03-13 PROCEDURE — 87088 URINE BACTERIA CULTURE: CPT

## 2023-03-13 PROCEDURE — 81001 URINALYSIS AUTO W/SCOPE: CPT

## 2023-03-16 LAB — BACTERIA UR CULT: NORMAL

## 2023-04-15 ENCOUNTER — APPOINTMENT (OUTPATIENT)
Dept: CT IMAGING | Age: 88
End: 2023-04-15
Payer: MEDICARE

## 2023-04-15 ENCOUNTER — HOSPITAL ENCOUNTER (INPATIENT)
Age: 88
LOS: 3 days | Discharge: NURSING, REHAB OR LONG-TERM CARE | End: 2023-04-19
Attending: EMERGENCY MEDICINE | Admitting: FAMILY MEDICINE
Payer: MEDICARE

## 2023-04-15 ENCOUNTER — APPOINTMENT (OUTPATIENT)
Dept: GENERAL RADIOLOGY | Age: 88
End: 2023-04-15
Payer: MEDICARE

## 2023-04-15 DIAGNOSIS — G89.29 ACUTE EXACERBATION OF CHRONIC LOW BACK PAIN: ICD-10-CM

## 2023-04-15 DIAGNOSIS — S32.030A CLOSED COMPRESSION FRACTURE OF L3 LUMBAR VERTEBRA, INITIAL ENCOUNTER (HCC): ICD-10-CM

## 2023-04-15 DIAGNOSIS — I50.9 NEW ONSET OF CONGESTIVE HEART FAILURE (HCC): ICD-10-CM

## 2023-04-15 DIAGNOSIS — R77.8 ELEVATED TROPONIN: ICD-10-CM

## 2023-04-15 DIAGNOSIS — I50.9 ACUTE CONGESTIVE HEART FAILURE, UNSPECIFIED HEART FAILURE TYPE (HCC): ICD-10-CM

## 2023-04-15 DIAGNOSIS — M54.50 ACUTE EXACERBATION OF CHRONIC LOW BACK PAIN: ICD-10-CM

## 2023-04-15 DIAGNOSIS — J96.01 ACUTE RESPIRATORY FAILURE WITH HYPOXIA (HCC): Primary | ICD-10-CM

## 2023-04-15 DIAGNOSIS — J90 PLEURAL EFFUSION ON LEFT: ICD-10-CM

## 2023-04-15 DIAGNOSIS — R82.71 ASYMPTOMATIC BACTERIURIA: ICD-10-CM

## 2023-04-15 LAB
BASOPHILS # BLD: 0.04 E9/L (ref 0–0.2)
BASOPHILS NFR BLD: 0.6 % (ref 0–2)
EOSINOPHIL # BLD: 0.08 E9/L (ref 0.05–0.5)
EOSINOPHIL NFR BLD: 1.2 % (ref 0–6)
ERYTHROCYTE [DISTWIDTH] IN BLOOD BY AUTOMATED COUNT: 16.1 FL (ref 11.5–15)
HCT VFR BLD AUTO: 37.2 % (ref 34–48)
HGB BLD-MCNC: 11.4 G/DL (ref 11.5–15.5)
IMM GRANULOCYTES # BLD: 0.04 E9/L
IMM GRANULOCYTES NFR BLD: 0.6 % (ref 0–5)
LYMPHOCYTES # BLD: 0.87 E9/L (ref 1.5–4)
LYMPHOCYTES NFR BLD: 12.9 % (ref 20–42)
MCH RBC QN AUTO: 28.1 PG (ref 26–35)
MCHC RBC AUTO-ENTMCNC: 30.6 % (ref 32–34.5)
MCV RBC AUTO: 91.9 FL (ref 80–99.9)
MONOCYTES # BLD: 0.79 E9/L (ref 0.1–0.95)
MONOCYTES NFR BLD: 11.8 % (ref 2–12)
NEUTROPHILS # BLD: 4.9 E9/L (ref 1.8–7.3)
NEUTS SEG NFR BLD: 72.9 % (ref 43–80)
PLATELET # BLD AUTO: 144 E9/L (ref 130–450)
PMV BLD AUTO: 11.5 FL (ref 7–12)
RBC # BLD AUTO: 4.05 E12/L (ref 3.5–5.5)
REASON FOR REJECTION: NORMAL
REASON FOR REJECTION: NORMAL
REJECTED TEST: NORMAL
REJECTED TEST: NORMAL
SARS-COV-2 RDRP RESP QL NAA+PROBE: NOT DETECTED
WBC # BLD: 6.7 E9/L (ref 4.5–11.5)

## 2023-04-15 PROCEDURE — 72128 CT CHEST SPINE W/O DYE: CPT

## 2023-04-15 PROCEDURE — 85025 COMPLETE CBC W/AUTO DIFF WBC: CPT

## 2023-04-15 PROCEDURE — 71045 X-RAY EXAM CHEST 1 VIEW: CPT

## 2023-04-15 PROCEDURE — 99285 EMERGENCY DEPT VISIT HI MDM: CPT

## 2023-04-15 PROCEDURE — 72131 CT LUMBAR SPINE W/O DYE: CPT

## 2023-04-15 PROCEDURE — 87635 SARS-COV-2 COVID-19 AMP PRB: CPT

## 2023-04-15 PROCEDURE — 93005 ELECTROCARDIOGRAM TRACING: CPT | Performed by: STUDENT IN AN ORGANIZED HEALTH CARE EDUCATION/TRAINING PROGRAM

## 2023-04-15 ASSESSMENT — PAIN - FUNCTIONAL ASSESSMENT: PAIN_FUNCTIONAL_ASSESSMENT: 0-10

## 2023-04-15 ASSESSMENT — PAIN SCALES - GENERAL: PAINLEVEL_OUTOF10: 10

## 2023-04-16 PROBLEM — I50.9 ACUTE CONGESTIVE HEART FAILURE (HCC): Status: ACTIVE | Noted: 2023-04-16

## 2023-04-16 PROBLEM — R77.8 ELEVATED TROPONIN: Status: ACTIVE | Noted: 2023-04-16

## 2023-04-16 PROBLEM — S32.000A COMPRESSION OF LUMBAR VERTEBRA (HCC): Status: ACTIVE | Noted: 2023-04-16

## 2023-04-16 PROBLEM — J90 PLEURAL EFFUSION ON LEFT: Status: ACTIVE | Noted: 2023-04-16

## 2023-04-16 PROBLEM — I35.9 AORTIC VALVE DISEASE: Status: ACTIVE | Noted: 2023-04-16

## 2023-04-16 PROBLEM — I44.7 LEFT BUNDLE BRANCH BLOCK: Status: ACTIVE | Noted: 2023-04-16

## 2023-04-16 PROBLEM — J96.01 ACUTE RESPIRATORY FAILURE WITH HYPOXIA (HCC): Status: ACTIVE | Noted: 2023-04-16

## 2023-04-16 PROBLEM — N18.4 STAGE 4 CHRONIC KIDNEY DISEASE (HCC): Status: ACTIVE | Noted: 2023-04-16

## 2023-04-16 LAB
ALBUMIN SERPL-MCNC: 3.5 G/DL (ref 3.5–5.2)
ALP SERPL-CCNC: 153 U/L (ref 35–104)
ALT SERPL-CCNC: 9 U/L (ref 0–32)
ANION GAP SERPL CALCULATED.3IONS-SCNC: 8 MMOL/L (ref 7–16)
AST SERPL-CCNC: 14 U/L (ref 0–31)
B PARAP IS1001 DNA NPH QL NAA+NON-PROBE: NOT DETECTED
B PERT.PT PRMT NPH QL NAA+NON-PROBE: NOT DETECTED
BACTERIA URNS QL MICRO: ABNORMAL /HPF
BILIRUB SERPL-MCNC: 0.6 MG/DL (ref 0–1.2)
BILIRUB UR QL STRIP: NEGATIVE
BNP BLD-MCNC: 2498 PG/ML (ref 0–450)
BUN SERPL-MCNC: 21 MG/DL (ref 6–23)
C PNEUM DNA NPH QL NAA+NON-PROBE: NOT DETECTED
CALCIUM SERPL-MCNC: 8.7 MG/DL (ref 8.6–10.2)
CHLORIDE SERPL-SCNC: 101 MMOL/L (ref 98–107)
CLARITY UR: CLEAR
CO2 SERPL-SCNC: 28 MMOL/L (ref 22–29)
COLOR UR: YELLOW
CREAT SERPL-MCNC: 1.2 MG/DL (ref 0.5–1)
FLUAV RNA NPH QL NAA+NON-PROBE: NOT DETECTED
FLUBV RNA NPH QL NAA+NON-PROBE: NOT DETECTED
GLUCOSE SERPL-MCNC: 136 MG/DL (ref 74–99)
GLUCOSE UR STRIP-MCNC: NEGATIVE MG/DL
HADV DNA NPH QL NAA+NON-PROBE: NOT DETECTED
HCOV 229E RNA NPH QL NAA+NON-PROBE: NOT DETECTED
HCOV HKU1 RNA NPH QL NAA+NON-PROBE: NOT DETECTED
HCOV NL63 RNA NPH QL NAA+NON-PROBE: NOT DETECTED
HCOV OC43 RNA NPH QL NAA+NON-PROBE: NOT DETECTED
HGB UR QL STRIP: ABNORMAL
HMPV RNA NPH QL NAA+NON-PROBE: NOT DETECTED
HPIV1 RNA NPH QL NAA+NON-PROBE: NOT DETECTED
HPIV2 RNA NPH QL NAA+NON-PROBE: NOT DETECTED
HPIV3 RNA NPH QL NAA+NON-PROBE: NOT DETECTED
HPIV4 RNA NPH QL NAA+NON-PROBE: NOT DETECTED
KETONES UR STRIP-MCNC: NEGATIVE MG/DL
LACTATE BLDV-SCNC: 0.9 MMOL/L (ref 0.5–2.2)
LEUKOCYTE ESTERASE UR QL STRIP: ABNORMAL
M PNEUMO DNA NPH QL NAA+NON-PROBE: NOT DETECTED
MAGNESIUM SERPL-MCNC: 2 MG/DL (ref 1.6–2.6)
NITRITE UR QL STRIP: NEGATIVE
PH UR STRIP: 5.5 [PH] (ref 5–9)
POTASSIUM SERPL-SCNC: 3.9 MMOL/L (ref 3.5–5)
PROCALCITONIN: 0.07 NG/ML (ref 0–0.08)
PROT SERPL-MCNC: 6.5 G/DL (ref 6.4–8.3)
PROT UR STRIP-MCNC: NEGATIVE MG/DL
RBC #/AREA URNS HPF: ABNORMAL /HPF (ref 0–2)
RSV RNA NPH QL NAA+NON-PROBE: NOT DETECTED
RV+EV RNA NPH QL NAA+NON-PROBE: NOT DETECTED
SARS-COV-2 RNA NPH QL NAA+NON-PROBE: NOT DETECTED
SODIUM SERPL-SCNC: 137 MMOL/L (ref 132–146)
SP GR UR STRIP: 1.02 (ref 1–1.03)
T4 FREE SERPL-MCNC: 1.13 NG/DL (ref 0.93–1.7)
TROPONIN, HIGH SENSITIVITY: 89 NG/L (ref 0–9)
TSH SERPL-MCNC: 1.56 UIU/ML (ref 0.27–4.2)
UROBILINOGEN UR STRIP-ACNC: 0.2 E.U./DL
WBC #/AREA URNS HPF: >20 /HPF (ref 0–5)

## 2023-04-16 PROCEDURE — 83880 ASSAY OF NATRIURETIC PEPTIDE: CPT

## 2023-04-16 PROCEDURE — 2140000000 HC CCU INTERMEDIATE R&B

## 2023-04-16 PROCEDURE — 83605 ASSAY OF LACTIC ACID: CPT

## 2023-04-16 PROCEDURE — 84484 ASSAY OF TROPONIN QUANT: CPT

## 2023-04-16 PROCEDURE — 87088 URINE BACTERIA CULTURE: CPT

## 2023-04-16 PROCEDURE — 2580000003 HC RX 258: Performed by: FAMILY MEDICINE

## 2023-04-16 PROCEDURE — 94664 DEMO&/EVAL PT USE INHALER: CPT

## 2023-04-16 PROCEDURE — 84145 PROCALCITONIN (PCT): CPT

## 2023-04-16 PROCEDURE — 36415 COLL VENOUS BLD VENIPUNCTURE: CPT

## 2023-04-16 PROCEDURE — APPSS180 APP SPLIT SHARED TIME > 60 MINUTES: Performed by: NURSE PRACTITIONER

## 2023-04-16 PROCEDURE — 99223 1ST HOSP IP/OBS HIGH 75: CPT | Performed by: INTERNAL MEDICINE

## 2023-04-16 PROCEDURE — 84439 ASSAY OF FREE THYROXINE: CPT

## 2023-04-16 PROCEDURE — 81001 URINALYSIS AUTO W/SCOPE: CPT

## 2023-04-16 PROCEDURE — 0202U NFCT DS 22 TRGT SARS-COV-2: CPT

## 2023-04-16 PROCEDURE — 6370000000 HC RX 637 (ALT 250 FOR IP): Performed by: FAMILY MEDICINE

## 2023-04-16 PROCEDURE — 6370000000 HC RX 637 (ALT 250 FOR IP): Performed by: NURSE PRACTITIONER

## 2023-04-16 PROCEDURE — 6360000002 HC RX W HCPCS: Performed by: FAMILY MEDICINE

## 2023-04-16 PROCEDURE — 83735 ASSAY OF MAGNESIUM: CPT

## 2023-04-16 PROCEDURE — 2700000000 HC OXYGEN THERAPY PER DAY

## 2023-04-16 PROCEDURE — 94640 AIRWAY INHALATION TREATMENT: CPT

## 2023-04-16 PROCEDURE — 6360000002 HC RX W HCPCS: Performed by: STUDENT IN AN ORGANIZED HEALTH CARE EDUCATION/TRAINING PROGRAM

## 2023-04-16 PROCEDURE — 80053 COMPREHEN METABOLIC PANEL: CPT

## 2023-04-16 PROCEDURE — 84443 ASSAY THYROID STIM HORMONE: CPT

## 2023-04-16 RX ORDER — METOPROLOL SUCCINATE 50 MG/1
50 TABLET, EXTENDED RELEASE ORAL DAILY
Status: DISCONTINUED | OUTPATIENT
Start: 2023-04-17 | End: 2023-04-19

## 2023-04-16 RX ORDER — ESCITALOPRAM OXALATE 5 MG/1
5 TABLET ORAL DAILY
Status: DISCONTINUED | OUTPATIENT
Start: 2023-04-16 | End: 2023-04-19 | Stop reason: HOSPADM

## 2023-04-16 RX ORDER — PANTOPRAZOLE SODIUM 40 MG/1
40 TABLET, DELAYED RELEASE ORAL
Status: DISCONTINUED | OUTPATIENT
Start: 2023-04-16 | End: 2023-04-19 | Stop reason: HOSPADM

## 2023-04-16 RX ORDER — DOCUSATE SODIUM 100 MG/1
100 CAPSULE, LIQUID FILLED ORAL DAILY
Status: DISCONTINUED | OUTPATIENT
Start: 2023-04-16 | End: 2023-04-19 | Stop reason: HOSPADM

## 2023-04-16 RX ORDER — HEPARIN SODIUM 10000 [USP'U]/ML
5000 INJECTION, SOLUTION INTRAVENOUS; SUBCUTANEOUS EVERY 8 HOURS
Status: DISCONTINUED | OUTPATIENT
Start: 2023-04-16 | End: 2023-04-19 | Stop reason: HOSPADM

## 2023-04-16 RX ORDER — ASPIRIN 81 MG/1
81 TABLET ORAL EVERY OTHER DAY
Status: DISCONTINUED | OUTPATIENT
Start: 2023-04-17 | End: 2023-04-19 | Stop reason: HOSPADM

## 2023-04-16 RX ORDER — GABAPENTIN 100 MG/1
100 CAPSULE ORAL DAILY
Status: DISCONTINUED | OUTPATIENT
Start: 2023-04-16 | End: 2023-04-19 | Stop reason: HOSPADM

## 2023-04-16 RX ORDER — CYCLOBENZAPRINE HCL 10 MG
10 TABLET ORAL 3 TIMES DAILY PRN
Status: DISCONTINUED | OUTPATIENT
Start: 2023-04-16 | End: 2023-04-19 | Stop reason: HOSPADM

## 2023-04-16 RX ORDER — FUROSEMIDE 20 MG/1
20 TABLET ORAL ONCE
Status: COMPLETED | OUTPATIENT
Start: 2023-04-16 | End: 2023-04-16

## 2023-04-16 RX ORDER — ISOSORBIDE MONONITRATE 30 MG/1
30 TABLET, EXTENDED RELEASE ORAL DAILY
Status: DISCONTINUED | OUTPATIENT
Start: 2023-04-16 | End: 2023-04-19

## 2023-04-16 RX ORDER — IPRATROPIUM BROMIDE AND ALBUTEROL SULFATE 2.5; .5 MG/3ML; MG/3ML
1 SOLUTION RESPIRATORY (INHALATION)
Status: DISCONTINUED | OUTPATIENT
Start: 2023-04-16 | End: 2023-04-19 | Stop reason: HOSPADM

## 2023-04-16 RX ORDER — FUROSEMIDE 10 MG/ML
20 INJECTION INTRAMUSCULAR; INTRAVENOUS ONCE
Status: COMPLETED | OUTPATIENT
Start: 2023-04-16 | End: 2023-04-16

## 2023-04-16 RX ORDER — TRAMADOL HYDROCHLORIDE 50 MG/1
50 TABLET ORAL
Status: DISCONTINUED | OUTPATIENT
Start: 2023-04-16 | End: 2023-04-19 | Stop reason: HOSPADM

## 2023-04-16 RX ORDER — FUROSEMIDE 10 MG/ML
20 INJECTION INTRAMUSCULAR; INTRAVENOUS DAILY
Status: DISCONTINUED | OUTPATIENT
Start: 2023-04-16 | End: 2023-04-19

## 2023-04-16 RX ORDER — ATENOLOL 50 MG/1
50 TABLET ORAL DAILY
Status: DISCONTINUED | OUTPATIENT
Start: 2023-04-16 | End: 2023-04-16

## 2023-04-16 RX ORDER — LORAZEPAM 1 MG/1
1 TABLET ORAL 2 TIMES DAILY PRN
Status: DISCONTINUED | OUTPATIENT
Start: 2023-04-16 | End: 2023-04-19 | Stop reason: HOSPADM

## 2023-04-16 RX ADMIN — TRAMADOL HYDROCHLORIDE 50 MG: 50 TABLET ORAL at 09:06

## 2023-04-16 RX ADMIN — CYCLOBENZAPRINE 10 MG: 10 TABLET, FILM COATED ORAL at 22:11

## 2023-04-16 RX ADMIN — ISOSORBIDE MONONITRATE 30 MG: 30 TABLET, EXTENDED RELEASE ORAL at 09:06

## 2023-04-16 RX ADMIN — IPRATROPIUM BROMIDE AND ALBUTEROL SULFATE 1 AMPULE: 2.5; .5 SOLUTION RESPIRATORY (INHALATION) at 09:23

## 2023-04-16 RX ADMIN — FUROSEMIDE 20 MG: 10 INJECTION, SOLUTION INTRAMUSCULAR; INTRAVENOUS at 11:13

## 2023-04-16 RX ADMIN — HEPARIN SODIUM 5000 UNITS: 10000 INJECTION INTRAVENOUS; SUBCUTANEOUS at 22:11

## 2023-04-16 RX ADMIN — IPRATROPIUM BROMIDE AND ALBUTEROL SULFATE 1 AMPULE: 2.5; .5 SOLUTION RESPIRATORY (INHALATION) at 19:31

## 2023-04-16 RX ADMIN — FUROSEMIDE 20 MG: 10 INJECTION, SOLUTION INTRAMUSCULAR; INTRAVENOUS at 03:28

## 2023-04-16 RX ADMIN — HEPARIN SODIUM 5000 UNITS: 10000 INJECTION INTRAVENOUS; SUBCUTANEOUS at 15:23

## 2023-04-16 RX ADMIN — FUROSEMIDE 20 MG: 20 TABLET ORAL at 16:22

## 2023-04-16 RX ADMIN — LORAZEPAM 1 MG: 1 TABLET ORAL at 22:03

## 2023-04-16 RX ADMIN — ESCITALOPRAM 5 MG: 5 TABLET, FILM COATED ORAL at 11:13

## 2023-04-16 RX ADMIN — IPRATROPIUM BROMIDE AND ALBUTEROL SULFATE 1 AMPULE: 2.5; .5 SOLUTION RESPIRATORY (INHALATION) at 15:42

## 2023-04-16 RX ADMIN — ATENOLOL 50 MG: 50 TABLET ORAL at 09:12

## 2023-04-16 RX ADMIN — LORAZEPAM 1 MG: 1 TABLET ORAL at 12:23

## 2023-04-16 RX ADMIN — IPRATROPIUM BROMIDE AND ALBUTEROL SULFATE 1 AMPULE: 2.5; .5 SOLUTION RESPIRATORY (INHALATION) at 13:21

## 2023-04-16 RX ADMIN — PANTOPRAZOLE SODIUM 40 MG: 40 TABLET, DELAYED RELEASE ORAL at 09:07

## 2023-04-16 RX ADMIN — GABAPENTIN 100 MG: 100 CAPSULE ORAL at 09:06

## 2023-04-16 RX ADMIN — WATER 1000 MG: 1 INJECTION INTRAMUSCULAR; INTRAVENOUS; SUBCUTANEOUS at 09:06

## 2023-04-16 RX ADMIN — DOCUSATE SODIUM 100 MG: 100 CAPSULE, LIQUID FILLED ORAL at 09:07

## 2023-04-16 ASSESSMENT — PAIN DESCRIPTION - ORIENTATION: ORIENTATION: LOWER;MID

## 2023-04-16 ASSESSMENT — PAIN SCALES - GENERAL
PAINLEVEL_OUTOF10: 0
PAINLEVEL_OUTOF10: 3
PAINLEVEL_OUTOF10: 0
PAINLEVEL_OUTOF10: 0

## 2023-04-16 ASSESSMENT — ENCOUNTER SYMPTOMS
BACK PAIN: 1
ABDOMINAL PAIN: 0
SHORTNESS OF BREATH: 1

## 2023-04-16 ASSESSMENT — PAIN DESCRIPTION - LOCATION: LOCATION: BACK

## 2023-04-16 ASSESSMENT — PAIN DESCRIPTION - DESCRIPTORS: DESCRIPTORS: ACHING;DISCOMFORT;SORE

## 2023-04-17 ENCOUNTER — APPOINTMENT (OUTPATIENT)
Dept: GENERAL RADIOLOGY | Age: 88
End: 2023-04-17
Payer: MEDICARE

## 2023-04-17 LAB
ANION GAP SERPL CALCULATED.3IONS-SCNC: 11 MMOL/L (ref 7–16)
BUN SERPL-MCNC: 17 MG/DL (ref 6–23)
CALCIUM SERPL-MCNC: 8.9 MG/DL (ref 8.6–10.2)
CHLORIDE SERPL-SCNC: 94 MMOL/L (ref 98–107)
CO2 SERPL-SCNC: 30 MMOL/L (ref 22–29)
CREAT SERPL-MCNC: 1.2 MG/DL (ref 0.5–1)
EKG ATRIAL RATE: 62 BPM
EKG P AXIS: 91 DEGREES
EKG P-R INTERVAL: 228 MS
EKG Q-T INTERVAL: 486 MS
EKG QRS DURATION: 172 MS
EKG QTC CALCULATION (BAZETT): 493 MS
EKG R AXIS: -37 DEGREES
EKG T AXIS: 133 DEGREES
EKG VENTRICULAR RATE: 62 BPM
ERYTHROCYTE [DISTWIDTH] IN BLOOD BY AUTOMATED COUNT: 14.3 FL (ref 11.5–15)
GLUCOSE SERPL-MCNC: 98 MG/DL (ref 74–99)
HCT VFR BLD AUTO: 44.7 % (ref 34–48)
HGB BLD-MCNC: 12.9 G/DL (ref 11.5–15.5)
LV EF: 63 %
LVEF MODALITY: NORMAL
MCH RBC QN AUTO: 28.4 PG (ref 26–35)
MCHC RBC AUTO-ENTMCNC: 28.9 % (ref 32–34.5)
MCV RBC AUTO: 98.2 FL (ref 80–99.9)
PLATELET # BLD AUTO: 156 E9/L (ref 130–450)
PMV BLD AUTO: 11.4 FL (ref 7–12)
POTASSIUM SERPL-SCNC: 3.3 MMOL/L (ref 3.5–5)
RBC # BLD AUTO: 4.55 E12/L (ref 3.5–5.5)
SODIUM SERPL-SCNC: 135 MMOL/L (ref 132–146)
WBC # BLD: 5.9 E9/L (ref 4.5–11.5)

## 2023-04-17 PROCEDURE — 71045 X-RAY EXAM CHEST 1 VIEW: CPT

## 2023-04-17 PROCEDURE — 2700000000 HC OXYGEN THERAPY PER DAY

## 2023-04-17 PROCEDURE — 6370000000 HC RX 637 (ALT 250 FOR IP): Performed by: FAMILY MEDICINE

## 2023-04-17 PROCEDURE — 85027 COMPLETE CBC AUTOMATED: CPT

## 2023-04-17 PROCEDURE — 2140000000 HC CCU INTERMEDIATE R&B

## 2023-04-17 PROCEDURE — 99222 1ST HOSP IP/OBS MODERATE 55: CPT

## 2023-04-17 PROCEDURE — 93010 ELECTROCARDIOGRAM REPORT: CPT | Performed by: INTERNAL MEDICINE

## 2023-04-17 PROCEDURE — 99233 SBSQ HOSP IP/OBS HIGH 50: CPT | Performed by: INTERNAL MEDICINE

## 2023-04-17 PROCEDURE — 94640 AIRWAY INHALATION TREATMENT: CPT

## 2023-04-17 PROCEDURE — 6360000002 HC RX W HCPCS: Performed by: FAMILY MEDICINE

## 2023-04-17 PROCEDURE — 80048 BASIC METABOLIC PNL TOTAL CA: CPT

## 2023-04-17 PROCEDURE — 2580000003 HC RX 258: Performed by: FAMILY MEDICINE

## 2023-04-17 PROCEDURE — 6370000000 HC RX 637 (ALT 250 FOR IP): Performed by: NURSE PRACTITIONER

## 2023-04-17 PROCEDURE — 93306 TTE W/DOPPLER COMPLETE: CPT

## 2023-04-17 PROCEDURE — 36415 COLL VENOUS BLD VENIPUNCTURE: CPT

## 2023-04-17 RX ADMIN — ESCITALOPRAM 5 MG: 5 TABLET, FILM COATED ORAL at 11:55

## 2023-04-17 RX ADMIN — HEPARIN SODIUM 5000 UNITS: 10000 INJECTION INTRAVENOUS; SUBCUTANEOUS at 05:54

## 2023-04-17 RX ADMIN — GABAPENTIN 100 MG: 100 CAPSULE ORAL at 08:35

## 2023-04-17 RX ADMIN — WATER 1000 MG: 1 INJECTION INTRAMUSCULAR; INTRAVENOUS; SUBCUTANEOUS at 08:36

## 2023-04-17 RX ADMIN — LORAZEPAM 1 MG: 1 TABLET ORAL at 22:05

## 2023-04-17 RX ADMIN — PANTOPRAZOLE SODIUM 40 MG: 40 TABLET, DELAYED RELEASE ORAL at 05:54

## 2023-04-17 RX ADMIN — IPRATROPIUM BROMIDE AND ALBUTEROL SULFATE 1 AMPULE: 2.5; .5 SOLUTION RESPIRATORY (INHALATION) at 07:28

## 2023-04-17 RX ADMIN — METOPROLOL SUCCINATE 50 MG: 50 TABLET, EXTENDED RELEASE ORAL at 08:35

## 2023-04-17 RX ADMIN — HEPARIN SODIUM 5000 UNITS: 10000 INJECTION INTRAVENOUS; SUBCUTANEOUS at 15:26

## 2023-04-17 RX ADMIN — CYCLOBENZAPRINE 10 MG: 10 TABLET, FILM COATED ORAL at 22:05

## 2023-04-17 RX ADMIN — IPRATROPIUM BROMIDE AND ALBUTEROL SULFATE 1 AMPULE: 2.5; .5 SOLUTION RESPIRATORY (INHALATION) at 16:28

## 2023-04-17 RX ADMIN — ISOSORBIDE MONONITRATE 30 MG: 30 TABLET, EXTENDED RELEASE ORAL at 08:33

## 2023-04-17 RX ADMIN — HEPARIN SODIUM 5000 UNITS: 10000 INJECTION INTRAVENOUS; SUBCUTANEOUS at 22:05

## 2023-04-17 RX ADMIN — DOCUSATE SODIUM 100 MG: 100 CAPSULE, LIQUID FILLED ORAL at 08:36

## 2023-04-17 RX ADMIN — TRAMADOL HYDROCHLORIDE 50 MG: 50 TABLET ORAL at 08:33

## 2023-04-17 RX ADMIN — ASPIRIN 81 MG: 81 TABLET, COATED ORAL at 08:33

## 2023-04-17 RX ADMIN — IPRATROPIUM BROMIDE AND ALBUTEROL SULFATE 1 AMPULE: 2.5; .5 SOLUTION RESPIRATORY (INHALATION) at 19:48

## 2023-04-17 RX ADMIN — LORAZEPAM 1 MG: 1 TABLET ORAL at 08:35

## 2023-04-17 RX ADMIN — IPRATROPIUM BROMIDE AND ALBUTEROL SULFATE 1 AMPULE: 2.5; .5 SOLUTION RESPIRATORY (INHALATION) at 13:09

## 2023-04-17 RX ADMIN — FUROSEMIDE 20 MG: 10 INJECTION, SOLUTION INTRAMUSCULAR; INTRAVENOUS at 08:36

## 2023-04-17 ASSESSMENT — PAIN DESCRIPTION - LOCATION
LOCATION: KNEE
LOCATION: KNEE;BACK

## 2023-04-17 ASSESSMENT — ENCOUNTER SYMPTOMS
SHORTNESS OF BREATH: 1
ABDOMINAL PAIN: 0

## 2023-04-17 ASSESSMENT — PAIN SCALES - GENERAL
PAINLEVEL_OUTOF10: 0
PAINLEVEL_OUTOF10: 2
PAINLEVEL_OUTOF10: 3
PAINLEVEL_OUTOF10: 0

## 2023-04-17 ASSESSMENT — PAIN DESCRIPTION - DESCRIPTORS
DESCRIPTORS: DISCOMFORT;DULL
DESCRIPTORS: ACHING;DISCOMFORT;SORE

## 2023-04-17 ASSESSMENT — PAIN DESCRIPTION - ORIENTATION
ORIENTATION: RIGHT;LEFT
ORIENTATION: LEFT

## 2023-04-17 ASSESSMENT — PAIN - FUNCTIONAL ASSESSMENT: PAIN_FUNCTIONAL_ASSESSMENT: ACTIVITIES ARE NOT PREVENTED

## 2023-04-17 NOTE — CARE COORDINATION
4/17/23  Spoke with patient and her daughter regarding transition of care. Patient admitted for elevated troponin, acute respiratory failure, L3 lumbar fracture, and CHF. Patient noted to have a left pleural effusion. Patient is on IV Lasix and IV Rocephin for UTI. Patient is being followed by Cardiology as well as pulmonary. Patient is alert and oriented from home where she lives alone in a trailer. Patient has assist from family for cooking and cleaning needs. Patient is on 4 liters of 02 but does not use 02 at home and may require 02 post discharge is unable to wean. PCP is Dr. Yoon Alcocer and pharmacy preference is [facility] on [address]. Family are requesting a JUDITH stay for strengthening and requested a referral be called to [facility] with referral given and pending. SW/Cm to follow for an accepting JUD[facility] and coordinate discharge arrangements. Electronically signed by TONY Lange on 4/17/2023 at 3:59 PM     Case Management Assessment  Initial Evaluation    Date/Time of Evaluation: 4/17/2023 3:59 PM  Assessment Completed by: TONY Lange    If patient is discharged prior to next notation, then this note serves as note for discharge by case management.     Patient Name: Dolly Villareal                   YOB: 1926  Diagnosis: Asymptomatic bacteriuria [R82.71]  Elevated troponin [R77.8]  Pleural effusion on left [J90]  Acute respiratory failure with hypoxia ([address] [J96.01]  Acute exacerbation of chronic low back pain [M54.50, G89.29]  Closed compression fracture of L3 lumbar vertebra, initial encounter ([address] [S32.030A]  New onset of congestive heart failure ([address] [I50.9]  Acute congestive heart failure, unspecified heart failure type ([address] [I50.9]                   Date / Time: 4/15/2023  8:26 PM    Patient Admission Status: Inpatient   Readmission Risk (Low < 19, Mod (19-27), High > 27): Readmission Risk Score: 14    Current PCP: Eunice Bustillo MD  PCP

## 2023-04-17 NOTE — PATIENT CARE CONFERENCE
[facility] Quality Flow/Interdisciplinary Rounds Progress Note        Quality Flow Rounds held on April 17, 2023    Disciplines Attending:  Bedside Nurse, , , and Nursing Unit Leadership    Katelyn Kirkland was admitted on 4/15/2023  8:26 PM    Anticipated Discharge Date:       Disposition:    Gumaro Score:  Gumaro Scale Score: 14    Readmission Risk              Risk of Unplanned Readmission:  15           Discussed patient goal for the day, patient clinical progression, and barriers to discharge.   The following Goal(s) of the Day/Commitment(s) have been identified:  Report labs/diagnostics      Larissa Mariscal RN  April 17, 2023

## 2023-04-17 NOTE — PLAN OF CARE
Problem: Discharge Planning  Goal: Discharge to home or other facility with appropriate resources  4/17/2023 1051 by Bayron Gardner, RN  Outcome: Progressing     Problem: Pain  Goal: Verbalizes/displays adequate comfort level or baseline comfort level  4/17/2023 1051 by Bayron Gardner RN  Outcome: Progressing     Problem: Skin/Tissue Integrity  Goal: Absence of new skin breakdown  Description: 1. Monitor for areas of redness and/or skin breakdown  2. Assess vascular access sites hourly  3. Every 4-6 hours minimum:  Change oxygen saturation probe site  4. Every 4-6 hours:  If on nasal continuous positive airway pressure, respiratory therapy assess nares and determine need for appliance change or resting period.   4/17/2023 1051 by Bayron Gardner, RN  Outcome: Progressing

## 2023-04-17 NOTE — ACP (ADVANCE CARE PLANNING)
Advance Care Planning   Healthcare Decision Maker:    Primary Decision Maker: Kerri Banuelos - Susie - [phone]    Secondary Decision Maker: Georgianne Nyhan - [phone]    Click here to complete Healthcare Decision Makers including selection of the Healthcare Decision Maker Relationship (ie \"Primary\").

## 2023-04-18 LAB
ANION GAP SERPL CALCULATED.3IONS-SCNC: 12 MMOL/L (ref 7–16)
BACTERIA UR CULT: NORMAL
BUN SERPL-MCNC: 25 MG/DL (ref 6–23)
CALCIUM SERPL-MCNC: 8.5 MG/DL (ref 8.6–10.2)
CHLORIDE SERPL-SCNC: 88 MMOL/L (ref 98–107)
CO2 SERPL-SCNC: 30 MMOL/L (ref 22–29)
CREAT SERPL-MCNC: 1.6 MG/DL (ref 0.5–1)
ERYTHROCYTE [DISTWIDTH] IN BLOOD BY AUTOMATED COUNT: 14.2 FL (ref 11.5–15)
GLUCOSE SERPL-MCNC: 121 MG/DL (ref 74–99)
HCT VFR BLD AUTO: 36.7 % (ref 34–48)
HGB BLD-MCNC: 11.9 G/DL (ref 11.5–15.5)
MCH RBC QN AUTO: 28.3 PG (ref 26–35)
MCHC RBC AUTO-ENTMCNC: 32.4 % (ref 32–34.5)
MCV RBC AUTO: 87.4 FL (ref 80–99.9)
PLATELET # BLD AUTO: 179 E9/L (ref 130–450)
PMV BLD AUTO: 11.1 FL (ref 7–12)
POTASSIUM SERPL-SCNC: 3.3 MMOL/L (ref 3.5–5)
RBC # BLD AUTO: 4.2 E12/L (ref 3.5–5.5)
SODIUM SERPL-SCNC: 130 MMOL/L (ref 132–146)
WBC # BLD: 7.8 E9/L (ref 4.5–11.5)

## 2023-04-18 PROCEDURE — 6370000000 HC RX 637 (ALT 250 FOR IP): Performed by: STUDENT IN AN ORGANIZED HEALTH CARE EDUCATION/TRAINING PROGRAM

## 2023-04-18 PROCEDURE — 6360000002 HC RX W HCPCS: Performed by: FAMILY MEDICINE

## 2023-04-18 PROCEDURE — 80048 BASIC METABOLIC PNL TOTAL CA: CPT

## 2023-04-18 PROCEDURE — 6370000000 HC RX 637 (ALT 250 FOR IP): Performed by: FAMILY MEDICINE

## 2023-04-18 PROCEDURE — 85027 COMPLETE CBC AUTOMATED: CPT

## 2023-04-18 PROCEDURE — 2580000003 HC RX 258: Performed by: FAMILY MEDICINE

## 2023-04-18 PROCEDURE — 36415 COLL VENOUS BLD VENIPUNCTURE: CPT

## 2023-04-18 PROCEDURE — 2140000000 HC CCU INTERMEDIATE R&B

## 2023-04-18 PROCEDURE — 94640 AIRWAY INHALATION TREATMENT: CPT

## 2023-04-18 PROCEDURE — 2700000000 HC OXYGEN THERAPY PER DAY

## 2023-04-18 PROCEDURE — 6370000000 HC RX 637 (ALT 250 FOR IP): Performed by: NURSE PRACTITIONER

## 2023-04-18 PROCEDURE — 99233 SBSQ HOSP IP/OBS HIGH 50: CPT | Performed by: INTERNAL MEDICINE

## 2023-04-18 RX ORDER — POLYETHYLENE GLYCOL 3350 17 G/17G
17 POWDER, FOR SOLUTION ORAL DAILY
Status: DISCONTINUED | OUTPATIENT
Start: 2023-04-18 | End: 2023-04-19 | Stop reason: HOSPADM

## 2023-04-18 RX ORDER — BISACODYL 10 MG
10 SUPPOSITORY, RECTAL RECTAL DAILY
Status: DISCONTINUED | OUTPATIENT
Start: 2023-04-18 | End: 2023-04-19 | Stop reason: HOSPADM

## 2023-04-18 RX ADMIN — CYCLOBENZAPRINE 10 MG: 10 TABLET, FILM COATED ORAL at 22:19

## 2023-04-18 RX ADMIN — ESCITALOPRAM 5 MG: 5 TABLET, FILM COATED ORAL at 11:50

## 2023-04-18 RX ADMIN — LORAZEPAM 1 MG: 1 TABLET ORAL at 09:17

## 2023-04-18 RX ADMIN — IPRATROPIUM BROMIDE AND ALBUTEROL SULFATE 1 AMPULE: 2.5; .5 SOLUTION RESPIRATORY (INHALATION) at 15:06

## 2023-04-18 RX ADMIN — GABAPENTIN 100 MG: 100 CAPSULE ORAL at 09:18

## 2023-04-18 RX ADMIN — PANTOPRAZOLE SODIUM 40 MG: 40 TABLET, DELAYED RELEASE ORAL at 06:11

## 2023-04-18 RX ADMIN — IPRATROPIUM BROMIDE AND ALBUTEROL SULFATE 1 AMPULE: 2.5; .5 SOLUTION RESPIRATORY (INHALATION) at 07:41

## 2023-04-18 RX ADMIN — LORAZEPAM 1 MG: 1 TABLET ORAL at 22:18

## 2023-04-18 RX ADMIN — IPRATROPIUM BROMIDE AND ALBUTEROL SULFATE 1 AMPULE: 2.5; .5 SOLUTION RESPIRATORY (INHALATION) at 19:18

## 2023-04-18 RX ADMIN — CYCLOBENZAPRINE 10 MG: 10 TABLET, FILM COATED ORAL at 09:17

## 2023-04-18 RX ADMIN — POLYETHYLENE GLYCOL 3350 17 G: 17 POWDER, FOR SOLUTION ORAL at 22:19

## 2023-04-18 RX ADMIN — HEPARIN SODIUM 5000 UNITS: 10000 INJECTION INTRAVENOUS; SUBCUTANEOUS at 06:11

## 2023-04-18 RX ADMIN — HEPARIN SODIUM 5000 UNITS: 10000 INJECTION INTRAVENOUS; SUBCUTANEOUS at 22:19

## 2023-04-18 RX ADMIN — IPRATROPIUM BROMIDE AND ALBUTEROL SULFATE 1 AMPULE: 2.5; .5 SOLUTION RESPIRATORY (INHALATION) at 10:46

## 2023-04-18 RX ADMIN — HEPARIN SODIUM 5000 UNITS: 10000 INJECTION INTRAVENOUS; SUBCUTANEOUS at 15:04

## 2023-04-18 RX ADMIN — DOCUSATE SODIUM 100 MG: 100 CAPSULE, LIQUID FILLED ORAL at 09:17

## 2023-04-18 RX ADMIN — TRAMADOL HYDROCHLORIDE 50 MG: 50 TABLET ORAL at 09:17

## 2023-04-18 RX ADMIN — FUROSEMIDE 20 MG: 10 INJECTION, SOLUTION INTRAMUSCULAR; INTRAVENOUS at 09:16

## 2023-04-18 RX ADMIN — WATER 1000 MG: 1 INJECTION INTRAMUSCULAR; INTRAVENOUS; SUBCUTANEOUS at 09:16

## 2023-04-18 RX ADMIN — ISOSORBIDE MONONITRATE 30 MG: 30 TABLET, EXTENDED RELEASE ORAL at 09:17

## 2023-04-18 RX ADMIN — METOPROLOL SUCCINATE 50 MG: 50 TABLET, EXTENDED RELEASE ORAL at 09:17

## 2023-04-18 ASSESSMENT — PAIN DESCRIPTION - LOCATION
LOCATION: BACK;KNEE
LOCATION: BACK

## 2023-04-18 ASSESSMENT — PAIN DESCRIPTION - DESCRIPTORS
DESCRIPTORS: GNAWING;DISCOMFORT;ACHING
DESCRIPTORS: ACHING;DISCOMFORT;SORE

## 2023-04-18 ASSESSMENT — PAIN SCALES - GENERAL
PAINLEVEL_OUTOF10: 8
PAINLEVEL_OUTOF10: 0
PAINLEVEL_OUTOF10: 0
PAINLEVEL_OUTOF10: 2
PAINLEVEL_OUTOF10: 0

## 2023-04-18 ASSESSMENT — ENCOUNTER SYMPTOMS
ABDOMINAL PAIN: 0
SHORTNESS OF BREATH: 1

## 2023-04-18 ASSESSMENT — PAIN - FUNCTIONAL ASSESSMENT: PAIN_FUNCTIONAL_ASSESSMENT: PREVENTS OR INTERFERES SOME ACTIVE ACTIVITIES AND ADLS

## 2023-04-18 ASSESSMENT — PAIN DESCRIPTION - ORIENTATION
ORIENTATION: LEFT
ORIENTATION: MID

## 2023-04-18 NOTE — DISCHARGE INSTR - COC
Dependent  Dressing  Dependent  Toileting  Dependent  Feeding  Assisted  Med Admin  Independent  Med Delivery   whole    Wound Care Documentation and Therapy:        Elimination:  Continence: Bowel: No  Bladder: No  Urinary Catheter: None   Colostomy/Ileostomy/Ileal Conduit: No       Date of Last BM: 4/19/2023    Intake/Output Summary (Last 24 hours) at 4/18/2023 1457  Last data filed at 4/18/2023 1357  Gross per 24 hour   Intake 480 ml   Output 800 ml   Net -320 ml     I/O last 3 completed shifts: In: 600 [P.O.:600]  Out: 2400 [Urine:2400]    Safety Concerns:     None    Impairments/Disabilities:      Hearing    Nutrition Therapy:  Current Nutrition Therapy:   - Oral Diet:  Dysphagia - Pureed    Routes of Feeding: Oral  Liquids: Thin Liquids  Daily Fluid Restriction: no  Last Modified Barium Swallow with Video (Video Swallowing Test): not done    Treatments at the Time of Hospital Discharge:   Respiratory Treatments: ***  Oxygen Therapy:  is on oxygen at 5 L/min per nasal cannula.   Ventilator:    - No ventilator support    Rehab Therapies: Physical Therapy and Occupational Therapy  Weight Bearing Status/Restrictions: No weight bearing restrictions  Other Medical Equipment (for information only, NOT a DME order):  wheelchair and walker  Other Treatments: ***    Patient's personal belongings (please select all that are sent with patient):  Hearing Aides bilateral    RN SIGNATURE:  Electronically signed by Katherine Liao RN on 4/19/23 at 3:15 PM EDT    CASE MANAGEMENT/SOCIAL WORK SECTION    Inpatient Status Date: 4/16/23    Readmission Risk Assessment Score:  Readmission Risk              Risk of Unplanned Readmission:  18           Discharging to Facility/ Agency   Name: Ghanshyam Luke  TCEXMLB:[id number] [facility] [address]  Phone:[phone]  Fax:[phone]    Dialysis Facility (if applicable)   Name:  Address:  Dialysis Schedule:  Phone:  Fax:    / signature:

## 2023-04-18 NOTE — PLAN OF CARE
Problem: Discharge Planning  Goal: Discharge to home or other facility with appropriate resources  Outcome: Progressing     Problem: Pain  Goal: Verbalizes/displays adequate comfort level or baseline comfort level  4/18/2023 1245 by Joslyn Jewell RN  Outcome: Progressing     Problem: Skin/Tissue Integrity  Goal: Absence of new skin breakdown  Description: 1. Monitor for areas of redness and/or skin breakdown  2. Assess vascular access sites hourly  3. Every 4-6 hours minimum:  Change oxygen saturation probe site  4. Every 4-6 hours:  If on nasal continuous positive airway pressure, respiratory therapy assess nares and determine need for appliance change or resting period.   4/18/2023 1245 by Joslyn Jewell RN  Outcome: Progressing

## 2023-04-18 NOTE — CARE COORDINATION
4/18/23  Transition of Care Update. Patient is pending an ECHO. Patient continues on IV Lasix and IV Rocephin. Patient did obtain a TLSO with no surgical intervention per neurosurgery. Patient has been accepted at [facility] who will start pre-cert once therapy notes are in. ISELA and destination updated. Ambulance started and pending on the soft chart. PASRR complete. ZULEYMA/Cm to follow.     Electronically signed by TONY Nelson on 4/18/2023 at 3:34 PM

## 2023-04-18 NOTE — PATIENT CARE CONFERENCE
P Quality Flow/Interdisciplinary Rounds Progress Note        Quality Flow Rounds held on April 18, 2023    Disciplines Attending:  Bedside Nurse, , , and Nursing Unit Leadership    Van Rogers was admitted on 4/15/2023  8:26 PM    Anticipated Discharge Date:       Disposition:    Gumaro Score:  Gumaro Scale Score: 15    Readmission Risk              Risk of Unplanned Readmission:  14           Discussed patient goal for the day, patient clinical progression, and barriers to discharge.   The following Goal(s) of the Day/Commitment(s) have been identified:   consult to see       Herber Jose RN  April 18, 2023

## 2023-04-18 NOTE — PLAN OF CARE
Patient's chart updated to reflect:      . - HF care plan, HF education points and HF discharge instructions.  -Orders: 2 gram sodium diet, daily weights, I/O.  -PCP and cardiology follow up appointments to be scheduled within 7 days of hospital discharge. -CHF education session will be provided to the patient prior to hospital discharge.     John Delgado RN RN, BSN  Heart Failure Navigator

## 2023-04-18 NOTE — CONSULTS
discussed and changed to limited  Code status Limited no intubation, no CPR, no cardioversion defibrillation, no medications. Basically DNR CCA DNI  Advanced Directives: HCPOA in soft chart  Surrogate/Legal NOK:  Davy Mckee (Daugther/HCPOA) [phone]    Spiritual assessment: no spiritual distress identified  Bereavement and grief: to be determined  Referrals to: none today    Thank you for the opportunity to participate in the care of Yusra Liriano. ANDREA Stahl CNP  Palliative Medicine     SUBJECTIVE:     Details of Conversation:    Chart was reviewed. Saw patient and her daughter Ger Gannon at the bedside. Introduced myself and the role of palliative medicine. Patient was awake, alert and oriented, she was hard of hearing, but she was able to participate during this conversation. HCPOA documents noted in soft chart, patient daughter Ger Gannon is her HCPOA. We discussed goal of care, and they wishes to continue to pursue treatment. However, they would like to avoid any surgical invasive approaches. We discussed CODE STATUS, patient stated that she has told her daughter of not wanting to be resuscitated, she does not want to have chest compression, intubation, defibrillation or cardioversion, or indications provided to her in a cardiopulmonary arrest.  We discussed DNR CCA with no intubation. Both patient and daughter agreed. CODE STATUS changed to limited DNR CCA DNI. Patient and daughter would like to have a DNR form filled out for them to take home at discharge. Daughter Ger Gannon would like to readdress goals of care tomorrow after they had spoken with cardiology today. Comfort support provided. We will continue to follow.     Prognosis: Guarded    OBJECTIVE:     /63   Pulse 74   Temp 99.1 °F (37.3 °C)   Resp 16   Ht 5' (1.524 m)   Wt 158 lb 15.2 oz (72.1 kg)   LMP 05/16/2018   SpO2 93%   BMI 31.04 kg/m²     Physical Examination:  Gen: elderly, thin, NAD, awake, alert,
to reduce the radiation dose to as low as reasonably achievable. COMPARISON: Radiographs on 10/28/2021, and MRI on 10/09/2020 HISTORY: ORDERING SYSTEM PROVIDED HISTORY: acute on chronic low back pain, hx of lumbar compression fx TECHNOLOGIST PROVIDED HISTORY: Reason for exam:->acute on chronic low back pain, hx of lumbar compression fx What reading provider will be dictating this exam?->CRC FINDINGS: Bones: There is a mild compression deformity noted at T5, chronic in appearance and noted on previous imaging. No new acute thoracic vertebral body fracture is identified. No acute paravertebral hematoma. Spinous processes appear intact. Posterior vertebral body alignment appears intact. Vertebral augmentation seen at the level of T12, L1 and L2. Please see the lumbar spine report for more complete details, dictated as a separate study. Diffuse osteopenia. Respiratory motion artifact limits evaluation for rib fractures. No definite fracture is identified. No clavicular fracture is seen. Soft tissues: Aortic and coronary artery atherosclerosis. Cardiomegaly. No focal esophageal thickening. Large hiatal hernia. Small left-sided pleural effusion. Patchy parenchymal infiltrates are seen within the lungs bilaterally, with consolidative changes seen in the left lower lobe. 1. No acute thoracic spine fracture. 2. Diffuse degenerative changes seen throughout the thoracic spine, predominantly moderate in amount, with a chronic T5 mild compression deformity, as well as post procedural changes from prior vertebral augmentation at T12 through L2. 3. Patchy ground-glass infiltrates are seen in the lungs bilaterally, with consolidation in the left lower lobe adjacent to the large hiatal hernia. This may be related to compressive chronic atelectasis. Ground-glass change may be atelectatic in nature, though dependent edema or pneumonia could have a similar appearance.      CT LUMBAR SPINE WO CONTRAST    Result Date:
compression fractures including cement treatments. .  Compared to previously there is mild worsening suggested of the L4 and L5 compression fractures, and more conspicuous/developed appearance at L3 which is mild. Heterogeneity noted at the lower lumbar vertebrae possible for Paget's disease DEGENERATIVE CHANGES: Relatively severe worst at the facet joints with central canal stenoses present SOFT TISSUES/RETROPERITONEUM: Large hiatal hernia noted and apparent left more than right pleural effusions. There is limited evaluation by this technique     New mild L3 compression fracture, and slightly worsened volume loss of L4 and L5 compression fractures. XR CHEST PORTABLE    Result Date: 4/15/2023  EXAMINATION: ONE XRAY VIEW OF THE CHEST 4/15/2023 8:15 pm COMPARISON: 03/07/2023 HISTORY: ORDERING SYSTEM PROVIDED HISTORY: hypoxia, SOB TECHNOLOGIST PROVIDED HISTORY: Reason for exam:->hypoxia, SOB What reading provider will be dictating this exam?->CRC FINDINGS: Prominent cardiomegaly is again seen. Compared to the prior exam, there is new opacity in the left mid lung zone with blunting of the costophrenic angle. There is diffusely increased interstitial markings. Bones appear stable. Cardiomegaly with pulmonary edema and either consolidation or pleural effusion on the left. ASSESSMENT:  80 y.o. female with asymptomatic hiatal hernia and constipation. PLAN:  Discussed with family at bedside. Patient is asymptomatic. No plans for operative repair. Can add bowel regimen for constipation. No further plans from general surgery POV. Please message [facility] with any questions or concerns. Electronically signed by Glen Cervantes MD on 4/18/23 at 6:01 PM EDT    ATTENDING PHYSICIAN PROGRESS NOTE      I have examined the patient, reviewed the record, and discussed the case with the Resident. I have reviewed all relevant labs and imaging data. Please refer to the resident's note.  I agree with the assessment

## 2023-04-19 VITALS
DIASTOLIC BLOOD PRESSURE: 57 MMHG | TEMPERATURE: 97.7 F | BODY MASS INDEX: 30.9 KG/M2 | RESPIRATION RATE: 16 BRPM | WEIGHT: 157.41 LBS | HEIGHT: 60 IN | HEART RATE: 71 BPM | OXYGEN SATURATION: 91 % | SYSTOLIC BLOOD PRESSURE: 105 MMHG

## 2023-04-19 LAB
ANION GAP SERPL CALCULATED.3IONS-SCNC: 13 MMOL/L (ref 7–16)
BUN SERPL-MCNC: 28 MG/DL (ref 6–23)
CALCIUM SERPL-MCNC: 8.4 MG/DL (ref 8.6–10.2)
CHLORIDE SERPL-SCNC: 86 MMOL/L (ref 98–107)
CO2 SERPL-SCNC: 29 MMOL/L (ref 22–29)
CREAT SERPL-MCNC: 1.6 MG/DL (ref 0.5–1)
ERYTHROCYTE [DISTWIDTH] IN BLOOD BY AUTOMATED COUNT: 14.1 FL (ref 11.5–15)
GLUCOSE SERPL-MCNC: 106 MG/DL (ref 74–99)
HCT VFR BLD AUTO: 36.4 % (ref 34–48)
HGB BLD-MCNC: 11.9 G/DL (ref 11.5–15.5)
MCH RBC QN AUTO: 28.3 PG (ref 26–35)
MCHC RBC AUTO-ENTMCNC: 32.7 % (ref 32–34.5)
MCV RBC AUTO: 86.7 FL (ref 80–99.9)
PLATELET # BLD AUTO: 184 E9/L (ref 130–450)
PMV BLD AUTO: 11.1 FL (ref 7–12)
POTASSIUM SERPL-SCNC: 3.4 MMOL/L (ref 3.5–5)
RBC # BLD AUTO: 4.2 E12/L (ref 3.5–5.5)
SODIUM SERPL-SCNC: 128 MMOL/L (ref 132–146)
WBC # BLD: 6.1 E9/L (ref 4.5–11.5)

## 2023-04-19 PROCEDURE — 99233 SBSQ HOSP IP/OBS HIGH 50: CPT | Performed by: INTERNAL MEDICINE

## 2023-04-19 PROCEDURE — 6360000002 HC RX W HCPCS: Performed by: FAMILY MEDICINE

## 2023-04-19 PROCEDURE — 80048 BASIC METABOLIC PNL TOTAL CA: CPT

## 2023-04-19 PROCEDURE — 2700000000 HC OXYGEN THERAPY PER DAY

## 2023-04-19 PROCEDURE — 94640 AIRWAY INHALATION TREATMENT: CPT

## 2023-04-19 PROCEDURE — 99232 SBSQ HOSP IP/OBS MODERATE 35: CPT

## 2023-04-19 PROCEDURE — 36415 COLL VENOUS BLD VENIPUNCTURE: CPT

## 2023-04-19 PROCEDURE — 97535 SELF CARE MNGMENT TRAINING: CPT

## 2023-04-19 PROCEDURE — 6370000000 HC RX 637 (ALT 250 FOR IP): Performed by: STUDENT IN AN ORGANIZED HEALTH CARE EDUCATION/TRAINING PROGRAM

## 2023-04-19 PROCEDURE — 85027 COMPLETE CBC AUTOMATED: CPT

## 2023-04-19 PROCEDURE — 97530 THERAPEUTIC ACTIVITIES: CPT

## 2023-04-19 PROCEDURE — 6370000000 HC RX 637 (ALT 250 FOR IP): Performed by: FAMILY MEDICINE

## 2023-04-19 PROCEDURE — 97161 PT EVAL LOW COMPLEX 20 MIN: CPT

## 2023-04-19 PROCEDURE — 97165 OT EVAL LOW COMPLEX 30 MIN: CPT

## 2023-04-19 RX ORDER — IPRATROPIUM BROMIDE AND ALBUTEROL SULFATE 2.5; .5 MG/3ML; MG/3ML
3 SOLUTION RESPIRATORY (INHALATION)
Qty: 360 ML | Refills: 0
Start: 2023-04-19

## 2023-04-19 RX ORDER — CYCLOBENZAPRINE HCL 10 MG
10 TABLET ORAL 3 TIMES DAILY PRN
Qty: 90 TABLET | Refills: 0
Start: 2023-04-19 | End: 2023-04-29

## 2023-04-19 RX ORDER — BISACODYL 10 MG
10 SUPPOSITORY, RECTAL RECTAL DAILY
Qty: 30 SUPPOSITORY | Refills: 0 | Status: SHIPPED | OUTPATIENT
Start: 2023-04-20 | End: 2023-05-20

## 2023-04-19 RX ORDER — POLYETHYLENE GLYCOL 3350 17 G/17G
17 POWDER, FOR SOLUTION ORAL DAILY
Qty: 527 G | Refills: 1 | Status: SHIPPED | OUTPATIENT
Start: 2023-04-20 | End: 2023-05-20

## 2023-04-19 RX ADMIN — DOCUSATE SODIUM 100 MG: 100 CAPSULE, LIQUID FILLED ORAL at 07:52

## 2023-04-19 RX ADMIN — PANTOPRAZOLE SODIUM 40 MG: 40 TABLET, DELAYED RELEASE ORAL at 05:32

## 2023-04-19 RX ADMIN — HEPARIN SODIUM 5000 UNITS: 10000 INJECTION INTRAVENOUS; SUBCUTANEOUS at 05:31

## 2023-04-19 RX ADMIN — ESCITALOPRAM 5 MG: 5 TABLET, FILM COATED ORAL at 11:40

## 2023-04-19 RX ADMIN — BISACODYL 10 MG: 10 SUPPOSITORY RECTAL at 09:32

## 2023-04-19 RX ADMIN — GABAPENTIN 100 MG: 100 CAPSULE ORAL at 07:52

## 2023-04-19 RX ADMIN — TRAMADOL HYDROCHLORIDE 50 MG: 50 TABLET ORAL at 07:52

## 2023-04-19 RX ADMIN — HEPARIN SODIUM 5000 UNITS: 10000 INJECTION INTRAVENOUS; SUBCUTANEOUS at 14:10

## 2023-04-19 RX ADMIN — IPRATROPIUM BROMIDE AND ALBUTEROL SULFATE 1 AMPULE: 2.5; .5 SOLUTION RESPIRATORY (INHALATION) at 14:11

## 2023-04-19 RX ADMIN — POLYETHYLENE GLYCOL 3350 17 G: 17 POWDER, FOR SOLUTION ORAL at 07:52

## 2023-04-19 RX ADMIN — IPRATROPIUM BROMIDE AND ALBUTEROL SULFATE 1 AMPULE: 2.5; .5 SOLUTION RESPIRATORY (INHALATION) at 10:16

## 2023-04-19 RX ADMIN — CYCLOBENZAPRINE 10 MG: 10 TABLET, FILM COATED ORAL at 11:40

## 2023-04-19 RX ADMIN — IPRATROPIUM BROMIDE AND ALBUTEROL SULFATE 1 AMPULE: 2.5; .5 SOLUTION RESPIRATORY (INHALATION) at 16:54

## 2023-04-19 ASSESSMENT — PAIN - FUNCTIONAL ASSESSMENT
PAIN_FUNCTIONAL_ASSESSMENT: PREVENTS OR INTERFERES WITH MANY ACTIVE NOT PASSIVE ACTIVITIES
PAIN_FUNCTIONAL_ASSESSMENT: PREVENTS OR INTERFERES WITH MANY ACTIVE NOT PASSIVE ACTIVITIES

## 2023-04-19 ASSESSMENT — PAIN DESCRIPTION - DESCRIPTORS
DESCRIPTORS: ACHING;CRAMPING;DISCOMFORT
DESCRIPTORS: ACHING;CRAMPING;DISCOMFORT

## 2023-04-19 ASSESSMENT — ENCOUNTER SYMPTOMS
SHORTNESS OF BREATH: 1
ABDOMINAL PAIN: 0

## 2023-04-19 ASSESSMENT — PAIN DESCRIPTION - ORIENTATION
ORIENTATION: RIGHT;LEFT
ORIENTATION: RIGHT;LEFT

## 2023-04-19 ASSESSMENT — PAIN DESCRIPTION - LOCATION
LOCATION: HAND
LOCATION: HAND

## 2023-04-19 ASSESSMENT — PAIN SCALES - GENERAL
PAINLEVEL_OUTOF10: 7
PAINLEVEL_OUTOF10: 7

## 2023-04-19 NOTE — PATIENT CARE CONFERENCE
P Quality Flow/Interdisciplinary Rounds Progress Note        Quality Flow Rounds held on April 19, 2023    Disciplines Attending:  Bedside Nurse, , , and Nursing Unit Leadership    Kenzie Crouch was admitted on 4/15/2023  8:26 PM    Anticipated Discharge Date:       Disposition:    Gumaro Score:  Gumaro Scale Score: 16    Readmission Risk              Risk of Unplanned Readmission:  19           Discussed patient goal for the day, patient clinical progression, and barriers to discharge.   The following Goal(s) of the Day/Commitment(s) have been identified:  discharge Jorge Erickson, RN  April 19, 2023

## 2023-04-19 NOTE — DISCHARGE SUMMARY
Discharge Summary    Date: 4/19/2023  Patient Name: Rubin Monge    YOB: 1926     Age: 80 y.o. Admit Date: 4/15/2023  Discharge Date: 4/19/2023  Discharge Condition: Stable    Admission Diagnosis  Asymptomatic bacteriuria [R82.71]; Elevated troponin [R77.8]; Pleural effusion on left [J90]; Acute respiratory failure with hypoxia ([address] [J96.01]; Acute exacerbation of chronic low back pain [M54.50, G89.29]; Closed compression fracture of L3 lumbar vertebra, initial encounter ([facility] [B95.956B]; New onset of congestive heart failure ([address] [I50.9]; Acute congestive heart failure, unspecified heart failure type ([address] [I50.9]      Discharge Diagnosis  Principal Problem:    Acute respiratory failure with hypoxia (HCC)  Active Problems:    Acute congestive heart failure (HCC)    Aortic valve disease    Left bundle branch block    Elevated troponin    Pleural effusion on left    Compression of lumbar vertebra (HCC)    Stage 4 chronic kidney disease ([address]  Resolved Problems:    * No resolved hospital problems. [facility] Stay  Narrative of Hospital Course:  Patient admitted for  (Principal) Acute respiratory failure with hypoxia ([address]       4/16/2023        Yes  Chronic lumbar back pain/ lumbar compression fracture  CHF  Pleural effusion  UTI  CAD  HTN  Worsening EMILY/CKD    Pulmonary and Cardiology followed.   Limited ECHO ok  BP meds and diuretics held after worsening renal function  Neurosurgery no surgical intervention for her compression fracture    Consultants:  [address] [place] [address] CONSULT TO CARDIOLOGY  IP CONSULT TO PULMONOLOGY  IP CONSULT TO CASE MANAGEMENT  IP CONSULT TO NEUROSURGERY  INPATIENT CONSULT TO ORTHOTIST/PROSTHETIST  IP CONSULT TO PALLIATIVE CARE  IP CONSULT TO HEART FAILURE NURSE/COORDINATOR  IP CONSULT TO GENERAL SURGERY    Surgeries/procedures Performed:      Treatments:            Discharge Plan/Disposition:  To [facility]    Hospital/Incidental Findings Requiring

## 2023-04-19 NOTE — CARE COORDINATION
4/19/23  Transition of Care Update. PT eval complete with AM-PAC score of 8/24. Facility liaison for [employer] called and asked to start pre-cert. ISELA and destination complete. Ambulance started and pending on the soft chart. PASRR complete. SW/Cm to follow. 5:54 Pre-cert obtained. Transport set up with Marta Casanova for 5:30pm.  Physician updated on pre-cert with discharge order requested if medically stable. Nursing and family updated on discharge arrangements pending discharge order as well as facility liaison.     Electronically signed by TONY Sotelo on 4/19/2023 at 12:51 PM

## 2023-04-19 NOTE — PLAN OF CARE
Problem: Discharge Planning  Goal: Discharge to home or other facility with appropriate resources  4/19/2023 0158 by Alejandra Rao RN  Outcome: Progressing     Problem: Pain  Goal: Verbalizes/displays adequate comfort level or baseline comfort level  4/19/2023 0158 by Alejandra Rao RN  Outcome: Progressing     Problem: Skin/Tissue Integrity  Goal: Absence of new skin breakdown  Description: 1. Monitor for areas of redness and/or skin breakdown  2. Assess vascular access sites hourly  3. Every 4-6 hours minimum:  Change oxygen saturation probe site  4. Every 4-6 hours:  If on nasal continuous positive airway pressure, respiratory therapy assess nares and determine need for appliance change or resting period.   4/19/2023 0158 by Alejandra Rao RN  Outcome: Progressing     Problem: Safety - Adult  Goal: Free from fall injury  Outcome: Progressing     Problem: ABCDS Injury Assessment  Goal: Absence of physical injury  Outcome: Progressing     Problem: Chronic Conditions and Co-morbidities  Goal: Patient's chronic conditions and co-morbidity symptoms are monitored and maintained or improved  Outcome: Progressing     Problem: Cardiovascular - Adult  Goal: Maintains optimal cardiac output and hemodynamic stability  Outcome: Progressing

## 2023-04-20 ENCOUNTER — TELEPHONE (OUTPATIENT)
Dept: ADMINISTRATIVE | Age: 88
End: 2023-04-20

## 2023-04-20 NOTE — TELEPHONE ENCOUNTER
Krysten Young with [facility] calling for HFU apt/timing with Dr. Justin Teixeira dx: CHF discharged on: 4/20/23.

## 2023-04-21 LAB — SARS-COV-2 N GENE RESP QL NAA+PROBE: NOT DETECTED

## 2023-04-24 LAB — SARS-COV-2 N GENE RESP QL NAA+PROBE: NOT DETECTED

## 2023-04-25 NOTE — PROGRESS NOTES
CHF RN consult placed on perfect serve.
Dolores Rock M.D.,[facility]  Elizabeth Bustillos D.O., F.A.C.O.I., Krystian Seth M.D. Angel Raza M.D. Hugh Castro D.O. Daily Pulmonary Progress Note    Patient:  Cate Guerin 80 y.o. female MRN: [id number]     Date of Service: 4/18/2023      Synopsis     We are following patient for left pleural effusion    \"CC\" SOB    Code status: Limited Code, DNI      Subjective      Patient was seen and examined resting in bed, daughter at the bedside. Went over in detail with daughter CXR results. Discussed with bedside nurse to reach out to primary to consider general surgery consult for hernia recommendations per family request.    Review of Systems:  Constitutional: Denies fever, weight loss, night sweats, and fatigue  Skin: Denies pigmentation, dark lesions, and rashes   HEENT: Denies hearing loss, tinnitus, ear drainage, epistaxis, sore throat, and hoarseness. Cardiovascular: Denies palpitations, chest pain, and chest pressure. Respiratory: Denies cough, dyspnea at rest, hemoptysis, apnea, and choking.   Gastrointestinal: Denies nausea, vomiting, poor appetite, diarrhea, heartburn or reflux  Genitourinary: Denies dysuria, frequency, urgency or hematuria  Musculoskeletal: Denies myalgias, muscle weakness, and bone pain  Neurological: Denies dizziness, vertigo, headache, and focal weakness  Psychological: Denies anxiety and depression  Endocrine: Denies heat intolerance and cold intolerance  Hematopoietic/Lymphatic: Denies bleeding problems and blood transfusions    24-hour events:  No new events    Objective   Vitals: BP 97/63   Pulse 91   Temp 97.2 °F (36.2 °C) (Temporal)   Resp 18   Ht 5' (1.524 m)   Wt 158 lb 15.2 oz (72.1 kg)   LMP 05/16/2018   SpO2 96%   BMI 31.04 kg/m²     I/O:    Intake/Output Summary (Last 24 hours) at 4/18/2023 1450  Last data filed at 4/18/2023 1357  Gross per 24 hour   Intake 480 ml   Output 1300 ml   Net -820 ml                          CURRENT MEDS
Dr. Dias Commander notified of low K
Dr. Shay Powell notified of K 3.3 again today. Also notified him that patient's family is requesting to speak with him regarding plan for patient.
General surgery consult called, resident also notified.
Neurosurg progress note  VITALS:  BP (!) 126/51   Pulse 97   Temp 98.7 °F (37.1 °C) (Temporal)   Resp 20   Ht 5' (1.524 m)   Wt 158 lb 15.2 oz (72.1 kg)   LMP 05/16/2018   SpO2 92%   BMI 31.04 kg/m²   24HR INTAKE/OUTPUT:    Intake/Output Summary (Last 24 hours) at 4/18/2023 1030  Last data filed at 4/18/2023 0841  Gross per 24 hour   Intake 360 ml   Output 800 ml   Net -440 ml     CT THORACIC SPINE WO CONTRAST    Result Date: 4/15/2023  EXAMINATION: CT OF THE THORACIC SPINE WITHOUT CONTRAST  4/15/2023 10:32 pm: TECHNIQUE: CT of the thoracic spine was performed without the administration of intravenous contrast. Multiplanar reformatted images are provided for review. Automated exposure control, iterative reconstruction, and/or weight based adjustment of the mA/kV was utilized to reduce the radiation dose to as low as reasonably achievable. COMPARISON: Radiographs on 10/28/2021, and MRI on 10/09/2020 HISTORY: ORDERING SYSTEM PROVIDED HISTORY: acute on chronic low back pain, hx of lumbar compression fx TECHNOLOGIST PROVIDED HISTORY: Reason for exam:->acute on chronic low back pain, hx of lumbar compression fx What reading provider will be dictating this exam?->CRC FINDINGS: Bones: There is a mild compression deformity noted at T5, chronic in appearance and noted on previous imaging. No new acute thoracic vertebral body fracture is identified. No acute paravertebral hematoma. Spinous processes appear intact. Posterior vertebral body alignment appears intact. Vertebral augmentation seen at the level of T12, L1 and L2. Please see the lumbar spine report for more complete details, dictated as a separate study. Diffuse osteopenia. Respiratory motion artifact limits evaluation for rib fractures. No definite fracture is identified. No clavicular fracture is seen. Soft tissues: Aortic and coronary artery atherosclerosis. Cardiomegaly. No focal esophageal thickening. Large hiatal hernia.   Small left-sided
Neurosurg progress note  VITALS:  BP 97/69   Pulse 73   Temp 97.5 °F (36.4 °C) (Oral)   Resp 20   Ht 5' (1.524 m)   Wt 157 lb 6.5 oz (71.4 kg)   LMP 05/16/2018   SpO2 94%   BMI 30.74 kg/m²   24HR INTAKE/OUTPUT:    Intake/Output Summary (Last 24 hours) at 4/19/2023 0818  Last data filed at 4/19/2023 0539  Gross per 24 hour   Intake 480 ml   Output 1000 ml   Net -520 ml     CT THORACIC SPINE WO CONTRAST    Result Date: 4/15/2023  EXAMINATION: CT OF THE THORACIC SPINE WITHOUT CONTRAST  4/15/2023 10:32 pm: TECHNIQUE: CT of the thoracic spine was performed without the administration of intravenous contrast. Multiplanar reformatted images are provided for review. Automated exposure control, iterative reconstruction, and/or weight based adjustment of the mA/kV was utilized to reduce the radiation dose to as low as reasonably achievable. COMPARISON: Radiographs on 10/28/2021, and MRI on 10/09/2020 HISTORY: ORDERING SYSTEM PROVIDED HISTORY: acute on chronic low back pain, hx of lumbar compression fx TECHNOLOGIST PROVIDED HISTORY: Reason for exam:->acute on chronic low back pain, hx of lumbar compression fx What reading provider will be dictating this exam?->CRC FINDINGS: Bones: There is a mild compression deformity noted at T5, chronic in appearance and noted on previous imaging. No new acute thoracic vertebral body fracture is identified. No acute paravertebral hematoma. Spinous processes appear intact. Posterior vertebral body alignment appears intact. Vertebral augmentation seen at the level of T12, L1 and L2. Please see the lumbar spine report for more complete details, dictated as a separate study. Diffuse osteopenia. Respiratory motion artifact limits evaluation for rib fractures. No definite fracture is identified. No clavicular fracture is seen. Soft tissues: Aortic and coronary artery atherosclerosis. Cardiomegaly. No focal esophageal thickening. Large hiatal hernia.   Small left-sided pleural
Nurse to nurse called to Edyta  in [facility] with [address] [facility]. Was updated on patient status and continuation of care. Electronically signed by Keyonna Bustillos RN on 4/19/2023 at 5:23 PM
Occupational Therapy  OT SESSION ATTEMPT     Date:2023  Patient Name: Suri Talley  MRN: [id number]  : 1926  Room: [address] [place] [address]     Attempted OT session this date:    [] unavailable due to other medical staff currently with pt   [x] on hold - await neurosurgery POC/recommendations. Per CT lumbar spine, New mild L3 compression fracture, and slightly worsened volume loss of L4 and L5 compression fractures     [] on hold per nursing staff secondary to lab / radiology results    [] declined treatment  this date due to ____. Benefits of participation in therapy reviewed with pt.    [] off unit   [] Other:     Will reattempt OT eval at a later time once appropriate.     Deyanira OTR/L #[id number]
Occupational Therapy  OT SESSION ATTEMPT     Date:2023  Patient Name: Yusra Liriano  MRN: [id number]  : 1926  Room: [address] [place] [address]-A     Attempted OT session this date:    [x] unavailable due to other medical staff currently with pt x2 this AM   [] on hold per nursing staff   [] on hold per nursing staff secondary to lab / radiology results    [] declined Occupational Therapy  this date due to ___. Benefits of participation in therapy reviewed with pt.    [] off unit   [] Other:     Will reattempt OT evaluation at a later time.     Jennifer Limon OTR/L #[id number]
Palliative Care Department  [phone]  Palliative Care Progress Note  Provider ANDREA Dewitt CNP      PATIENT: Dorcas Webb  : 1926  MRN: [id number]  ADMISSION DATE: 4/15/2023  8:26 PM  Referring Provider: ANDREA Calvo CNP    Palliative Medicine was consulted on hospital day 3 for assistance with Goals of care, Code Status Discussion, Family support. HPI:     Sydnee Conteh is a 80 y.o. y/o female with a history of CAD, chronic back pain, kyphosis surgery, hypertension, ureteral stricture with recurrent UTIs, generalized weakness, hard of hearing who presented to [facility]) on 4/15/2023 from home with inability to walk, normally ambulates with a walker, and has a wheelchair, but has gotten worse due to pain. Patient found to be hypoxic on room air, supplemental snus was provided. Chest x-ray shows pleural effusion with pulmonary vascular congestion. Repeated chest x-rays showed very large hiatal hernia. CT images shows worsening of chronic compression fractures significant laboratory finding shows proBNP 2500, troponin elevated at 89. Echo results pending. Cardiology, pulmonology, neurosurgery consulted. She was admitted for further work-up. Palliative medicine consulted to discuss goal of care, CODE STATUS discussion, family support. ASSESSMENT/PLAN:     Pertinent Hospital Diagnoses     Acute respiratory failure with hypoxia  Acuteon chronic low back pain/Closed compression fracture of L3 lumbar vertebrae  Left pleural effusion   congestive heart failure      Palliative Care Encounter / Counseling Regarding Goals of Care  Please see detailed goals of care discussion as below  At this time, Dorcas Webb, Does have capacity for medical decision-making.   Capacity is time limited and situation/question specific  During encounter was surrogate medical decision-maker  Outcome of goals of care meeting:  Continue current medical treatment    Code status
Palliative care consult placed on perfect serve.
Physical Therapy      Name: Tiera Castañeda  : 1926  MRN: [id number]  Date of Service: 2023  Room #:  3277/7967-G    PT order received. PT attempted - other staff member providing care at time of attempt. PT will follow up as appropriate.     Weston Dupont, PT, DPT  [id number]
Physical Therapy    Initial Assessment     Name: Efrem Lazo  : 1926  MRN: [id number]      Date of Service: 2023    Evaluating PT: Kevan Barlow PT, DPT [id number]      Room #:  4943/2428-E  Diagnosis:  Asymptomatic bacteriuria [R82.71]  Elevated troponin [R77.8]  Pleural effusion on left [J90]  Acute respiratory failure with hypoxia (HCC) [J96.01]  Acute exacerbation of chronic low back pain [M54.50, G89.29]  Closed compression fracture of L3 lumbar vertebra, initial encounter ([facility] [S32.030A]  New onset of congestive heart failure (HCC) [I50.9]  Acute congestive heart failure, unspecified heart failure type ([facility] [I50.9]  PMHx/PSHx:   has a past medical history of Arthritis, Compression fracture of body of thoracic vertebra ([facility] Hypertension, Myocardial infarct ([facility] and UTI (urinary tract infection). Precautions:  Fall risk, L3 compression fx, Spinal Neutrality, Soft TLSO, Dementia, [place], O2, TAPS, PureWick, Alarm    SUBJECTIVE:    Pt lives alone in a trailer with 4 stair(s) and 1 rail(s) to enter. Pt ambulated with [facility] prior to admission. Pt also owns cane. Family visits regularly. OBJECTIVE:   Initial Evaluation  Date: 23 Treatment Date: Short Term/ Long Term   Goals   AM-PAC 6 Clicks 3/64     Was pt agreeable to Eval/treatment? Yes     Does pt have pain? No pain at rest; lower back pain with mobility     Bed Mobility  Rolling: Max A  Supine to sit: Max A x2  Sit to supine: Max A x2  Scooting: Max A to EOB  Rolling: SBA  Supine to sit: SBA  Sit to supine: SBA  Scooting: SBA   Transfers Sit to stand: Max A x2 (partial)  Stand to sit:  Max A  Stand pivot: NT  Sit to stand: Min A  Stand to sit: Min A  Stand pivot: Min A with [facility]   Ambulation   NT  >10 feet with [facility] with Mod A   Stair negotiation: ascended and descended NT  NA   ROM BUE: Refer to OT note  BLE: WFL     Strength BUE: Refer to OT note  BLE: NT     Balance Sitting EOB: Max A  Dynamic Standing: NT  Sitting EOB: SBA  Dynamic
Physical Therapy    PT order received and medical chart reviewed 4/17. Holding PT evaluation for TLSO brace. Will re-attempt at a later time/date. Thank you.     Rashad Guillaume, PT, DPT  [id number]
Physician Progress Note      PATIENT:               Rodolfo Earl  CSN #:                  [id number]  :                       1926  ADMIT DATE:       4/15/2023 8:26 PM  [address] DATE:        2023 6:48 PM  RESPONDING  PROVIDER #:        Maggie Sheridan MD          QUERY TEXT:    Pt admitted with acute respiratory failure with hypoxia and has CHF   documented. If possible, please document in progress notes and discharge   summary further specificity regarding the type and acuity of CHF:    The medical record reflects the following:  Risk Factors: acute respiratory failure with hypoxia, pleural effusion, acute   congestive heart failure  Clinical Indicators: Per cardiology consult- patient presents with evidence of   acute hypoxic respiratory failure likely of a multifactorial nature and   inclusive of a likely component of acute superimposed upon chronic heart   failure of uncertain origin in the face of suspected significant aortic   stenosis. DC summary- Acute congestive heart failure.  Echo- Mild   asymmetric septal hypertrophy. There is doppler evidence of stage I diastolic   dysfunction. Left ventricular diastolic filling is elevated. Ejection fraction   is visually estimated at 60 to 65%. The left atrium is mod  Treatment: labs, monitoring, consults, CXR, Echo, diuresis    Thank you,  Jorge Alberto Mahoney RN, CRCR  Clinical Documentation Improvement  Options provided:  -- Acute Systolic CHF/HFrEF  -- Acute Diastolic CHF/HFpEF  -- Acute Systolic and Diastolic CHF  -- Other - I will add my own diagnosis  -- Disagree - Not applicable / Not valid  -- Disagree - Clinically unable to determine / Unknown  -- Refer to Clinical Documentation Reviewer    PROVIDER RESPONSE TEXT:    This patient is in acute systolic CHF/HFrEF.     Query created by: Helen Sarabia on 2023 11:00 AM      Electronically signed by:  Maggie Sheridan MD 2023 3:54 PM
Progress Note  Date:2023       Room:6503/6503-  Patient Flor Salamanca     YOB: 1926     Age:89 y.o. Patient says  she feels fair today. Subjective    Subjective:  Symptoms:  Improved. She reports shortness of breath. No chest pain. Diet:  Adequate intake. Activity level: Impaired due to weakness. Pain:  She reports no pain. Review of Systems   Constitutional:  Negative for activity change and fever. HENT:  Negative for congestion. Respiratory:  Positive for shortness of breath. Cardiovascular:  Negative for chest pain. Gastrointestinal:  Negative for abdominal pain. Genitourinary:  Negative for difficulty urinating. Neurological:  Negative for dizziness. Objective         Vitals Last 24 Hours:  TEMPERATURE:  Temp  Av.5 °F (35.8 °C)  Min: 95.3 °F (35.2 °C)  Max: 97.1 °F (36.2 °C)  RESPIRATIONS RANGE: Resp  Av.7  Min: 16  Max: 18  PULSE OXIMETRY RANGE: SpO2  Av.2 %  Min: 86 %  Max: 94 %  PULSE RANGE: Pulse  Av.3  Min: 65  Max: 75  BLOOD PRESSURE RANGE: Systolic (05GJG), VVD:973 , Min:120 , DCB:707   ; Diastolic (89ZVW), FRANCINE:66, Min:55, Max:64    I/O (24Hr): Intake/Output Summary (Last 24 hours) at 2023 0653  Last data filed at 2023 0153  Gross per 24 hour   Intake 240 ml   Output 3500 ml   Net -3260 ml     Objective:  General Appearance:  Comfortable. Vital signs: (most recent): Blood pressure 124/64, pulse 69, temperature 96.8 °F (36 °C), temperature source Temporal, resp. rate 16, height 5' (1.524 m), weight 158 lb 15.2 oz (72.1 kg), last menstrual period 2018, SpO2 93 %. No fever. Lungs:  Normal effort and normal respiratory rate. There are decreased breath sounds. Heart: Normal rate. Regular rhythm.   S1 normal and S2 normal.    Labs/Imaging/Diagnostics    Labs:  CBC:  Recent Labs     04/15/23  21123  0539   WBC 6.7 5.9   RBC 4.05 4.55   HGB 11.4* 12.9   HCT 37.2 44.7   MCV 91.9 98.2   RDW
Progress Note  Date:2023       [facility]:[id number]  Patient Traci Nick     YOB: 1926     Age:89 y.o. Patient says she feels ok today. Subjective    Subjective:  Symptoms:  Stable. She reports shortness of breath. No chest pain. Diet:  Adequate intake. Activity level: Impaired due to weakness. Pain:  She reports no pain. Review of Systems   Constitutional:  Negative for activity change and fever. HENT:  Negative for congestion. Respiratory:  Positive for shortness of breath. Cardiovascular:  Negative for chest pain. Gastrointestinal:  Negative for abdominal pain. Genitourinary:  Negative for difficulty urinating. Neurological:  Negative for dizziness. Objective         Vitals Last 24 Hours:  TEMPERATURE:  Temp  Av.4 °F (36.9 °C)  Min: 97.2 °F (36.2 °C)  Max: 98.9 °F (37.2 °C)  RESPIRATIONS RANGE: Resp  Av.4  Min: 14  Max: 20  PULSE OXIMETRY RANGE: SpO2  Av.3 %  Min: 84 %  Max: 96 %  PULSE RANGE: Pulse  Av.3  Min: 75  Max: 97  BLOOD PRESSURE RANGE: Systolic (63TTK), RAJESH:546 , Min:95 , LGN:243   ; Diastolic (20YVG), WWT:29, Min:50, Max:63    I/O (24Hr): Intake/Output Summary (Last 24 hours) at 2023 0636  Last data filed at 2023 0539  Gross per 24 hour   Intake 480 ml   Output 1000 ml   Net -520 ml       Objective:  General Appearance:  Comfortable. Vital signs: (most recent): Blood pressure 109/63, pulse 86, temperature 98.8 °F (37.1 °C), temperature source Oral, resp. rate 18, height 5' (1.524 m), weight 157 lb 6.5 oz (71.4 kg), last menstrual period 2018, SpO2 94 %. No fever. Lungs:  Normal effort and normal respiratory rate. There are decreased breath sounds. Heart: Normal rate. Regular rhythm.   S1 normal and S2 normal.    Labs/Imaging/Diagnostics    Labs:  CBC:  Recent Labs     23  0539 23  0501 23  0503   WBC 5.9 7.8 6.1   RBC 4.55 4.20 4.20   HGB 12.9 11.9 11.9   HCT 44.7 36.7 36.4
RN spoke with daughter Rafa Billing on phone completing her med rec. RN asked daughter if she had advance directives. Daughter said she is POA and Pt is a DNR. RN asked daughter to bring in papers and ask to speak with doctor about code status as her prior code status from 2022 is a full code.
Spoke with Dr. Chelsea Rizo, was updated on most recent bp reading and was made aware of discharge plan to [facility] with planned  time of 1730. Awaiting discharge from cardiology standpoint. Electronically signed by Delicia Medrano RN on 4/19/2023 at 3:32 PM
[facility] informed of consult, requested information faxed over.
(ULTRAM) 50 MG tablet Take 1 tablet by mouth daily (with breakfast). Historical Provider, MD   gabapentin (NEURONTIN) 100 MG capsule Take 1 capsule by mouth daily. 5/31/22   Historical Provider, MD   furosemide (LASIX) 20 MG tablet Take 1 tablet by mouth 2 times daily 7/28/21   Historical Provider, MD   cyclobenzaprine (FLEXERIL) 10 MG tablet Take 10 mg by mouth 3 times daily  Patient not taking: Reported on 4/16/2023    Historical Provider, MD   pantoprazole (PROTONIX) 40 MG tablet Take 1 tablet by mouth every morning (before breakfast) 1/1/22   Jazmine Dominguez MD   isosorbide mononitrate (IMDUR) 30 MG extended release tablet Take 1 tablet by mouth daily    Historical Provider, MD   escitalopram (LEXAPRO) 5 MG tablet Take 1 tablet by mouth daily    Historical Provider, MD   Probiotic Product (PROBIOTIC DAILY) CAPS Take by mouth   Patient not taking: Reported on 4/16/2023    Historical Provider, MD   docusate sodium (COLACE, DULCOLAX) 100 MG CAPS Take 100 mg by mouth daily 7/22/20   Jazmine Dominguez MD   atenolol (TENORMIN) 50 MG tablet Take 1 tablet by mouth daily    Historical Provider, MD   aspirin 81 MG tablet Take 1 tablet by mouth every other day    Historical Provider, MD   LORazepam (ATIVAN) 1 MG tablet Take 1 tablet by mouth 2 times daily as needed.     Historical Provider, MD       Current Medications:  Current Facility-Administered Medications   Medication Dose Route Frequency Provider Last Rate Last Admin    polyethylene glycol (GLYCOLAX) packet 17 g  17 g Oral Daily Winston Lawton MD        bisacodyl (DULCOLAX) suppository 10 mg  10 mg Rectal Daily Winston Lawton MD        aspirin EC tablet 81 mg  81 mg Oral Every Other Day Jazmine Dominguez MD   81 mg at 04/17/23 1676    cyclobenzaprine (FLEXERIL) tablet 10 mg  10 mg Oral TID PRN Jazmine Dominguez MD   10 mg at 04/18/23 1923    docusate sodium (COLACE) capsule 100 mg  100 mg Oral Daily Jazmine Dominguez MD   100 mg
STGs = LTGs  Time frame: 10-14 days   Feeding Stand by Assist   Cup to mouth B hands. Increased difficulty with L hand d/t tremor. Modified independence    Grooming Minimal Assist   Wiping face with wash cloth in semi-supine. Modified [place]    UB Dressing Maximal Assist   Donning TLSO brace in supine and rolling. Minimal Assist    LB Dressing Maximal Assist   Donning socks in supine. Minimal Assist    Bathing Maximal Assist  Minimal Assist    Toileting Dependent   Purewick; bed pan  Moderate Assist    Bed Mobility  Supine to sit: Maximal Assist x2  Sit to supine: Maximal Assist x2  Rolling: Maximal Assist  Scooting: Maximal Assist    Sitting EOB for 8+ minutes. Rolling: Min A  Supine to sit: Minimal Assist   Sit to supine: Minimal Assist    Functional Transfers Max A x2  Partial stand to/from EOB  Minimal Assist    Functional Mobility NT   Unable to address d/t increase pain and right posterior/lateral lean impacting safety. Minimal Assist    Balance Sitting:     Static: Maximal Assist     Dynamic:Maximal Assist x2  Standing: Max A x2 (partial, side by side assist)     Activity Tolerance Poor +  Unable to tolerate activities d/t increase in pain and discomfort. Fair    Visual/  Perceptual Glasses: Yes     WFL                 Hand Dominance Rigth    AROM (PROM) Strength Additional Info:    RUE  WFL excluding shoulder   (shoulder flexion: ~45 degrees) Deferred at this time. good  and wfl FMC/dexterity noted during ADL tasks       LUE WFL excluding shoulder (d/t discomfort)  Shoulder flexion to ~45 degrees Deferred at this time. fair  and wfl FMC/dexterity noted during ADL tasks    L UE tremor noted during functional tasks       Hearing: [place] and wears hearing aids in both ears  Sensation:  Pt reports numbness and tingling in hands and L leg (this has worsened since admission).   Tone: WFL   Edema: Minimal L leg edema noted     Comments: Upon arrival patient supine in bed and agreeable to OT
kg)   01/27/23 150 lb (68 kg)   07/11/22 149 lb 4 oz (67.7 kg)       Appearance: Alert and oriented x3 not in acute distress. Skin: Dry skin  Head: Atraumatic  Eyes: Intact extraocular muscles   ENMT: Mucous membranes are moist  Neck: Supple  Lungs: Clear to auscultation  Cardiac: Normal S1 and S2  Abdomen: Protuberant  Extremities: Intact range of motion  Neurologic: No focal neurological deficits  Peripheral Pulses: 2+ peripheral pulses    Intake/Output:    Intake/Output Summary (Last 24 hours) at 4/19/2023 1650  Last data filed at 4/19/2023 1600  Gross per 24 hour   Intake 360 ml   Output 1600 ml   Net -1240 ml     I/O this shift:  In: 120 [P.O.:120]  Out: 1100 [Urine:1100]    Laboratory Tests:  Recent Labs     04/17/23  0539 04/18/23  0501 04/19/23  0503    130* 128*   K 3.3* 3.3* 3.4*   CL 94* 88* 86*   CO2 30* 30* 29   BUN 17 25* 28*   CREATININE 1.2* 1.6* 1.6*   GLUCOSE 98 121* 106*   CALCIUM 8.9 8.5* 8.4*     Lab Results   Component Value Date/Time    MG 2.0 04/16/2023 12:06 AM    MG 2.2 12/26/2021 03:29 PM    MG 2.0 10/13/2020 06:40 AM     No results for input(s): ALKPHOS, ALT, AST, PROT, BILITOT, BILIDIR, LABALBU in the last 72 hours. Recent Labs     04/17/23  0539 04/18/23  0501 04/19/23  0503   WBC 5.9 7.8 6.1   RBC 4.55 4.20 4.20   HGB 12.9 11.9 11.9   HCT 44.7 36.7 36.4   MCV 98.2 87.4 86.7   MCH 28.4 28.3 28.3   MCHC 28.9* 32.4 32.7   RDW 14.3 14.2 14.1    179 184   MPV 11.4 11.1 11.1     Lab Results   Component Value Date    CKTOTAL 103 03/07/2023    TROPONINI <0.01 07/16/2020     No results for input(s): CKTOTAL, CKMB, CKMBINDEX, TROPHS in the last 72 hours.     No results found for: INR, PROTIME  Lab Results   Component Value Date    TSH 1.560 04/16/2023     No results found for: LABA1C  No results found for: EAG  Lab Results   Component Value Date    CHOL 120 01/27/2021     Lab Results   Component Value Date    TRIG 106 01/27/2021     Lab Results   Component Value Date    HDL 66
medication list were reviewed by me independently. I spoke with bedside nursing, therapists and consultants. The case was discussed in detail and plans for care were established. Review of CNP documentation was conducted and revisions were made as appropriate. I agree with the above documented exam, problem list and plan of care.    Louise Umana MD
neurological deficits  Peripheral Pulses: 2+ peripheral pulses    Intake/Output:    Intake/Output Summary (Last 24 hours) at 4/17/2023 1732  Last data filed at 4/17/2023 1453  Gross per 24 hour   Intake 600 ml   Output 2900 ml   Net -2300 ml     I/O this shift:  In: 180 [P.O.:180]  Out: 500 [Urine:500]    Laboratory Tests:  Recent Labs     04/16/23 0006 04/17/23  0539    135   K 3.9 3.3*    94*   CO2 28 30*   BUN 21 17   CREATININE 1.2* 1.2*   GLUCOSE 136* 98   CALCIUM 8.7 8.9     Lab Results   Component Value Date/Time    MG 2.0 04/16/2023 12:06 AM    MG 2.2 12/26/2021 03:29 PM    MG 2.0 10/13/2020 06:40 AM     Recent Labs     04/16/23  0006   ALKPHOS 153*   ALT 9   AST 14   PROT 6.5   BILITOT 0.6   LABALBU 3.5     Recent Labs     04/15/23  2112 04/17/23  0539   WBC 6.7 5.9   RBC 4.05 4.55   HGB 11.4* 12.9   HCT 37.2 44.7   MCV 91.9 98.2   MCH 28.1 28.4   MCHC 30.6* 28.9*   RDW 16.1* 14.3    156   MPV 11.5 11.4     Lab Results   Component Value Date    CKTOTAL 103 03/07/2023    TROPONINI <0.01 07/16/2020     Recent Labs     04/16/23  0006   TROPHS 89*     No results found for: INR, PROTIME  Lab Results   Component Value Date    TSH 1.560 04/16/2023     No results found for: LABA1C  No results found for: EAG  Lab Results   Component Value Date    CHOL 120 01/27/2021     Lab Results   Component Value Date    TRIG 106 01/27/2021     Lab Results   Component Value Date    HDL 66 01/27/2021     Lab Results   Component Value Date    LDLCALC 33 01/27/2021     Lab Results   Component Value Date    LABVLDL 21 01/27/2021     No results found for: CHOLHDLRATIO  Recent Labs     04/16/23  0006   PROBNP 2,498*       Cardiac Tests:          Echocardiogram reviewed:     Stress test reviewed:      Cardiac catheterization reviewed:     CXR reviewed: The ASCVD Risk score (Solomon ANAND, et al., 2019) failed to calculate for the following reasons:     The 2019 ASCVD risk score is only valid for ages 36 to
intervention  IV lasix for now. Rocephin for now, will follow labs and cultures. Continue rest of meds. PT/OT  ).

## 2023-04-26 ENCOUNTER — HOSPITAL ENCOUNTER (OUTPATIENT)
Dept: OTHER | Age: 88
Setting detail: THERAPIES SERIES
Discharge: HOME | End: 2023-04-26
Payer: MEDICARE

## 2023-04-26 VITALS
OXYGEN SATURATION: 96 % | RESPIRATION RATE: 16 BRPM | SYSTOLIC BLOOD PRESSURE: 113 MMHG | DIASTOLIC BLOOD PRESSURE: 64 MMHG | HEART RATE: 62 BPM

## 2023-04-26 DIAGNOSIS — I50.9 ACUTE CONGESTIVE HEART FAILURE, UNSPECIFIED HEART FAILURE TYPE (HCC): Primary | ICD-10-CM

## 2023-04-26 LAB
ANION GAP SERPL CALCULATED.3IONS-SCNC: 8 MMOL/L (ref 7–16)
BNP BLD-MCNC: 1103 PG/ML (ref 0–450)
BUN SERPL-MCNC: 22 MG/DL (ref 6–23)
CALCIUM SERPL-MCNC: 8.7 MG/DL (ref 8.6–10.2)
CHLORIDE SERPL-SCNC: 86 MMOL/L (ref 98–107)
CO2 SERPL-SCNC: 31 MMOL/L (ref 22–29)
CREAT SERPL-MCNC: 1 MG/DL (ref 0.5–1)
GLUCOSE SERPL-MCNC: 122 MG/DL (ref 74–99)
POTASSIUM SERPL-SCNC: 4.7 MMOL/L (ref 3.5–5)
SARS-COV-2 N GENE RESP QL NAA+PROBE: NOT DETECTED
SODIUM SERPL-SCNC: 125 MMOL/L (ref 132–146)

## 2023-04-26 PROCEDURE — 36415 COLL VENOUS BLD VENIPUNCTURE: CPT

## 2023-04-26 PROCEDURE — 80048 BASIC METABOLIC PNL TOTAL CA: CPT

## 2023-04-26 PROCEDURE — 83880 ASSAY OF NATRIURETIC PEPTIDE: CPT

## 2023-04-26 PROCEDURE — 99204 OFFICE O/P NEW MOD 45 MIN: CPT

## 2023-04-26 RX ORDER — TRAMADOL HYDROCHLORIDE 50 MG/1
50 TABLET ORAL EVERY 6 HOURS PRN
COMMUNITY

## 2023-04-26 RX ORDER — ATENOLOL 50 MG/1
50 TABLET ORAL DAILY
COMMUNITY

## 2023-04-26 RX ORDER — FUROSEMIDE 20 MG/1
20 TABLET ORAL 2 TIMES DAILY
COMMUNITY

## 2023-04-26 RX ORDER — ISOSORBIDE MONONITRATE 30 MG/1
30 TABLET, EXTENDED RELEASE ORAL DAILY
COMMUNITY

## 2023-04-26 RX ORDER — GREEN TEA/HOODIA GORDONII 315-12.5MG
1 CAPSULE ORAL DAILY
COMMUNITY

## 2023-04-26 RX ORDER — ASPIRIN 81 MG/1
81 TABLET ORAL DAILY
COMMUNITY

## 2023-04-26 NOTE — PROGRESS NOTES
Congestive Heart Failure Sharon 89   8/27/1926          Referring Provider: Wagner Austin   Primary Care Physician: Nati Santamaria   Cardiologist: Wagner Austin  Nephrologist: None        History of Present Illness:     Antony Vale is a 80 y.o. female with a history of HFpEF, most recent EF 56-78. Patient Story:    She does not  have dyspnea with exertion, shortness of breath, or decline in overall functional capacity. She does not have orthopnea, PND, nocturnal cough or hemoptysis. She does not have abdominal distention or bloating, early satiety, anorexia/change in appetite. She does has a good urinary response to  oral diuretic. She does not have  lower extremity edema. She denies lightheadedness, dizziness. She denies palpitations, syncope or near syncope. She does not complain of chest pain, pressure, discomfort. Allergies   Allergen Reactions    Chlorzoxazone     Sulfa Antibiotics Hives         Outpatient Medications Marked as Taking for the 4/26/23 encounter [facility] Encounter) with [facility] CHF ROOM 1   Medication Sig Dispense Refill    isosorbide mononitrate (IMDUR) 30 MG extended release tablet Take 1 tablet by mouth daily      Probiotic Acidophilus (FLORANEX) TABS Take 1 tablet by mouth daily      traMADol (ULTRAM) 50 MG tablet Take 1 tablet by mouth every 6 hours as needed for Pain.  Max Daily Amount: 200 mg      magnesium hydroxide (MILK OF MAGNESIA) 400 MG/5ML suspension Take by mouth daily as needed for Constipation      aspirin 81 MG EC tablet Take 1 tablet by mouth daily      atenolol (TENORMIN) 50 MG tablet Take 1 tablet by mouth daily      furosemide (LASIX) 20 MG tablet Take 1 tablet by mouth 2 times daily       Current Outpatient Medications on File Prior to Encounter   Medication Sig Dispense Refill    isosorbide mononitrate (IMDUR) 30 MG extended release tablet Take 1 tablet by mouth daily      Probiotic Acidophilus (FLORANEX) TABS

## 2023-04-28 LAB — SARS-COV-2 N GENE RESP QL NAA+PROBE: NOT DETECTED

## 2023-04-29 LAB
BACTERIA UR CULT: ABNORMAL
ORGANISM: ABNORMAL

## 2023-05-02 LAB — SARS-COV-2 N GENE RESP QL NAA+PROBE: NOT DETECTED

## 2023-05-05 LAB — SARS-COV-2 N GENE RESP QL NAA+PROBE: NOT DETECTED

## 2023-05-09 LAB — SARS-COV-2 N GENE RESP QL NAA+PROBE: NOT DETECTED

## 2023-05-12 LAB
BACTERIA UR CULT: ABNORMAL
BACTERIA UR CULT: ABNORMAL
ORGANISM: ABNORMAL
ORGANISM: ABNORMAL
SARS-COV-2 N GENE RESP QL NAA+PROBE: NOT DETECTED

## 2023-05-12 ASSESSMENT — EJECTION FRACTION
EF_VALUE: 60-65
EF_SOURCE: 2D ECHO
EF_SOURCE: 2D ECHO
EF_VALUE: 60-65

## 2023-05-16 PROBLEM — R77.8 ELEVATED TROPONIN: Status: RESOLVED | Noted: 2023-04-16 | Resolved: 2023-05-16

## 2023-05-16 LAB
BASOPHILS # BLD: 0.04 E9/L (ref 0–0.2)
BASOPHILS NFR BLD: 0.6 % (ref 0–2)
EOSINOPHIL # BLD: 0.26 E9/L (ref 0.05–0.5)
EOSINOPHIL NFR BLD: 4.1 % (ref 0–6)
ERYTHROCYTE [DISTWIDTH] IN BLOOD BY AUTOMATED COUNT: 14.2 FL (ref 11.5–15)
HCT VFR BLD AUTO: 37.3 % (ref 34–48)
HGB BLD-MCNC: 12.4 G/DL (ref 11.5–15.5)
IMM GRANULOCYTES # BLD: 0.04 E9/L
IMM GRANULOCYTES NFR BLD: 0.6 % (ref 0–5)
LYMPHOCYTES # BLD: 1.28 E9/L (ref 1.5–4)
LYMPHOCYTES NFR BLD: 20.2 % (ref 20–42)
MCH RBC QN AUTO: 28.1 PG (ref 26–35)
MCHC RBC AUTO-ENTMCNC: 33.2 % (ref 32–34.5)
MCV RBC AUTO: 84.4 FL (ref 80–99.9)
MONOCYTES # BLD: 0.87 E9/L (ref 0.1–0.95)
MONOCYTES NFR BLD: 13.7 % (ref 2–12)
NEUTROPHILS # BLD: 3.86 E9/L (ref 1.8–7.3)
NEUTS SEG NFR BLD: 60.8 % (ref 43–80)
PLATELET # BLD AUTO: 267 E9/L (ref 130–450)
PMV BLD AUTO: 9.8 FL (ref 7–12)
RBC # BLD AUTO: 4.42 E12/L (ref 3.5–5.5)
WBC # BLD: 6.4 E9/L (ref 4.5–11.5)

## 2023-05-17 ENCOUNTER — TELEPHONE (OUTPATIENT)
Dept: CARDIOLOGY CLINIC | Age: 88
End: 2023-05-17

## 2023-05-17 NOTE — TELEPHONE ENCOUNTER
\" Spoke with Isaías Rosenberg at [facility] and confirmed that Alex Marvin 87.  Emmanuelmiranda Lyle has transportation set up to  at 9 Am for appointment with Maritza Louis NP tomorrow at 10 AM at the [facility]\" CF

## 2023-05-18 ENCOUNTER — HOSPITAL ENCOUNTER (OUTPATIENT)
Dept: OTHER | Age: 88
Setting detail: THERAPIES SERIES
Discharge: HOME | End: 2023-05-18

## 2023-05-18 ENCOUNTER — TELEPHONE (OUTPATIENT)
Dept: CARDIOLOGY CLINIC | Age: 88
End: 2023-05-18

## 2023-05-18 DIAGNOSIS — I50.9 ACUTE CONGESTIVE HEART FAILURE, UNSPECIFIED HEART FAILURE TYPE (HCC): Primary | ICD-10-CM

## 2023-05-18 NOTE — TELEPHONE ENCOUNTER
Spoke with [employer], RN at [facility] she read back provider's instructions to fax at [phone] BMP/BNP weights, BP/HR.  CF

## 2023-05-18 NOTE — TELEPHONE ENCOUNTER
----- Message from ANDREA Muse CNP sent at 5/18/2023  2:37 PM EDT -----  Please have facility provide BP/HR, weights and draw BMP/BNP to fax to our office    Thank you    ----- Message -----  From: [employer]  Sent: 5/18/2023  10:10 AM EDT  To: ANDREA Muse CNP, #    Patient's daughter Riana Castle canceled her apt this morning. Riana Castle explained that at the [facility] apt on 4/26/23 the staff told her there was not much they could do for the pt since she was in a back brace so she canceled apt today based on that visit. Riana Castle would like to be updated on lab work results.    Marylou Moreno

## 2023-05-19 ENCOUNTER — APPOINTMENT (OUTPATIENT)
Dept: GENERAL RADIOLOGY | Age: 88
End: 2023-05-19
Payer: MEDICARE

## 2023-05-19 ENCOUNTER — TELEPHONE (OUTPATIENT)
Dept: OTHER | Facility: CLINIC | Age: 88
End: 2023-05-19

## 2023-05-19 ENCOUNTER — HOSPITAL ENCOUNTER (INPATIENT)
Age: 88
LOS: 7 days | Discharge: HOSPICE | End: 2023-05-26
Attending: EMERGENCY MEDICINE | Admitting: FAMILY MEDICINE
Payer: MEDICARE

## 2023-05-19 DIAGNOSIS — Z51.5 PALLIATIVE CARE BY SPECIALIST: ICD-10-CM

## 2023-05-19 DIAGNOSIS — E87.1 HYPONATREMIA: ICD-10-CM

## 2023-05-19 DIAGNOSIS — N39.0 COMPLICATED UTI (URINARY TRACT INFECTION): Primary | ICD-10-CM

## 2023-05-19 LAB
ALBUMIN SERPL-MCNC: 3.3 G/DL (ref 3.5–5.2)
ALBUMIN SERPL-MCNC: 3.8 G/DL (ref 3.5–5.2)
ALP SERPL-CCNC: 160 U/L (ref 35–104)
ALP SERPL-CCNC: 190 U/L (ref 35–104)
ALT SERPL-CCNC: 10 U/L (ref 0–32)
ALT SERPL-CCNC: 12 U/L (ref 0–32)
ANION GAP SERPL CALCULATED.3IONS-SCNC: 10 MMOL/L (ref 7–16)
ANION GAP SERPL CALCULATED.3IONS-SCNC: 14 MMOL/L (ref 7–16)
ANION GAP SERPL CALCULATED.3IONS-SCNC: 7 MMOL/L (ref 7–16)
ANION GAP SERPL CALCULATED.3IONS-SCNC: 8 MMOL/L (ref 7–16)
AST SERPL-CCNC: 21 U/L (ref 0–31)
AST SERPL-CCNC: 25 U/L (ref 0–31)
BACTERIA UR CULT: ABNORMAL
BACTERIA UR CULT: ABNORMAL
BACTERIA URNS QL MICRO: ABNORMAL /HPF
BASOPHILS # BLD: 0.02 E9/L (ref 0–0.2)
BASOPHILS NFR BLD: 0.3 % (ref 0–2)
BILIRUB SERPL-MCNC: 0.6 MG/DL (ref 0–1.2)
BILIRUB SERPL-MCNC: 0.7 MG/DL (ref 0–1.2)
BILIRUB UR QL STRIP: NEGATIVE
BUN SERPL-MCNC: 14 MG/DL (ref 6–23)
BUN SERPL-MCNC: 15 MG/DL (ref 6–23)
BUN SERPL-MCNC: 17 MG/DL (ref 6–23)
BUN SERPL-MCNC: 17 MG/DL (ref 6–23)
CALCIUM SERPL-MCNC: 6.9 MG/DL (ref 8.6–10.2)
CALCIUM SERPL-MCNC: 7.9 MG/DL (ref 8.6–10.2)
CALCIUM SERPL-MCNC: 9.1 MG/DL (ref 8.6–10.2)
CALCIUM SERPL-MCNC: 9.8 MG/DL (ref 8.6–10.2)
CHLORIDE SERPL-SCNC: 71 MMOL/L (ref 98–107)
CHLORIDE SERPL-SCNC: 77 MMOL/L (ref 98–107)
CHLORIDE SERPL-SCNC: 87 MMOL/L (ref 98–107)
CHLORIDE SERPL-SCNC: 94 MMOL/L (ref 98–107)
CHLORIDE UR-SCNC: <20 MMOL/L
CLARITY UR: CLEAR
CO2 SERPL-SCNC: 24 MMOL/L (ref 22–29)
CO2 SERPL-SCNC: 27 MMOL/L (ref 22–29)
CO2 SERPL-SCNC: 28 MMOL/L (ref 22–29)
CO2 SERPL-SCNC: 31 MMOL/L (ref 22–29)
COLOR UR: YELLOW
CREAT SERPL-MCNC: 0.7 MG/DL (ref 0.5–1)
CREAT SERPL-MCNC: 0.8 MG/DL (ref 0.5–1)
CREAT SERPL-MCNC: 0.8 MG/DL (ref 0.5–1)
CREAT SERPL-MCNC: 0.9 MG/DL (ref 0.5–1)
EKG ATRIAL RATE: 83 BPM
EKG Q-T INTERVAL: 474 MS
EKG QRS DURATION: 184 MS
EKG QTC CALCULATION (BAZETT): 543 MS
EKG R AXIS: -40 DEGREES
EKG T AXIS: 120 DEGREES
EKG VENTRICULAR RATE: 79 BPM
EOSINOPHIL # BLD: 0.04 E9/L (ref 0.05–0.5)
EOSINOPHIL NFR BLD: 0.6 % (ref 0–6)
EPI CELLS #/AREA URNS HPF: ABNORMAL /HPF
ERYTHROCYTE [DISTWIDTH] IN BLOOD BY AUTOMATED COUNT: 14.1 FL (ref 11.5–15)
GLUCOSE SERPL-MCNC: 121 MG/DL (ref 74–99)
GLUCOSE SERPL-MCNC: 127 MG/DL (ref 74–99)
GLUCOSE SERPL-MCNC: 144 MG/DL (ref 74–99)
GLUCOSE SERPL-MCNC: 93 MG/DL (ref 74–99)
GLUCOSE UR STRIP-MCNC: NEGATIVE MG/DL
HCT VFR BLD AUTO: 42 % (ref 34–48)
HGB BLD-MCNC: 14.4 G/DL (ref 11.5–15.5)
HGB UR QL STRIP: ABNORMAL
IMM GRANULOCYTES # BLD: 0.02 E9/L
IMM GRANULOCYTES NFR BLD: 0.3 % (ref 0–5)
KETONES UR STRIP-MCNC: NEGATIVE MG/DL
LACTATE BLDV-SCNC: 1.6 MMOL/L (ref 0.5–1.9)
LACTATE BLDV-SCNC: 2.4 MMOL/L (ref 0.5–1.9)
LEUKOCYTE ESTERASE UR QL STRIP: ABNORMAL
LYMPHOCYTES # BLD: 0.99 E9/L (ref 1.5–4)
LYMPHOCYTES NFR BLD: 14.8 % (ref 20–42)
MCH RBC QN AUTO: 28.1 PG (ref 26–35)
MCHC RBC AUTO-ENTMCNC: 34.3 % (ref 32–34.5)
MCV RBC AUTO: 81.9 FL (ref 80–99.9)
MONOCYTES # BLD: 0.54 E9/L (ref 0.1–0.95)
MONOCYTES NFR BLD: 8.1 % (ref 2–12)
NEUTROPHILS # BLD: 5.09 E9/L (ref 1.8–7.3)
NEUTS SEG NFR BLD: 75.9 % (ref 43–80)
NITRITE UR QL STRIP: POSITIVE
ORGANISM: ABNORMAL
ORGANISM: ABNORMAL
OSMOLALITY SERPL CALC.SUM OF ELEC: 244 MOSM/KG (ref 285–310)
OSMOLALITY URINE: 186 MOSM/KG (ref 300–900)
PH UR STRIP: 8 [PH] (ref 5–9)
PLATELET # BLD AUTO: 295 E9/L (ref 130–450)
PMV BLD AUTO: 9.2 FL (ref 7–12)
POTASSIUM SERPL-SCNC: 3.3 MMOL/L (ref 3.5–5)
POTASSIUM SERPL-SCNC: 4 MMOL/L (ref 3.5–5)
POTASSIUM SERPL-SCNC: 4.3 MMOL/L (ref 3.5–5)
POTASSIUM SERPL-SCNC: 4.8 MMOL/L (ref 3.5–5)
POTASSIUM UR-SCNC: 43.2 MMOL/L
PROT SERPL-MCNC: 7 G/DL (ref 6.4–8.3)
PROT SERPL-MCNC: 7.9 G/DL (ref 6.4–8.3)
PROT UR STRIP-MCNC: NEGATIVE MG/DL
RBC # BLD AUTO: 5.13 E12/L (ref 3.5–5.5)
RBC #/AREA URNS HPF: ABNORMAL /HPF (ref 0–2)
RENAL EPITHELIAL, UA: ABNORMAL /HPF
SODIUM SERPL-SCNC: 112 MMOL/L (ref 132–146)
SODIUM SERPL-SCNC: 119 MMOL/L (ref 132–146)
SODIUM SERPL-SCNC: 121 MMOL/L (ref 132–146)
SODIUM SERPL-SCNC: 126 MMOL/L (ref 132–146)
SODIUM UR-SCNC: 22 MMOL/L
SODIUM UR-SCNC: 23 MMOL/L
SP GR UR STRIP: <=1.005 (ref 1–1.03)
UROBILINOGEN UR STRIP-ACNC: 1 E.U./DL
WBC # BLD: 6.7 E9/L (ref 4.5–11.5)
WBC #/AREA URNS HPF: ABNORMAL /HPF (ref 0–5)

## 2023-05-19 PROCEDURE — 71045 X-RAY EXAM CHEST 1 VIEW: CPT

## 2023-05-19 PROCEDURE — 2000000000 HC ICU R&B

## 2023-05-19 PROCEDURE — 94664 DEMO&/EVAL PT USE INHALER: CPT

## 2023-05-19 PROCEDURE — 93010 ELECTROCARDIOGRAM REPORT: CPT | Performed by: INTERNAL MEDICINE

## 2023-05-19 PROCEDURE — 6360000002 HC RX W HCPCS: Performed by: INTERNAL MEDICINE

## 2023-05-19 PROCEDURE — 36415 COLL VENOUS BLD VENIPUNCTURE: CPT

## 2023-05-19 PROCEDURE — 51702 INSERT TEMP BLADDER CATH: CPT

## 2023-05-19 PROCEDURE — 2580000003 HC RX 258: Performed by: INTERNAL MEDICINE

## 2023-05-19 PROCEDURE — 6360000002 HC RX W HCPCS

## 2023-05-19 PROCEDURE — 83935 ASSAY OF URINE OSMOLALITY: CPT

## 2023-05-19 PROCEDURE — 93005 ELECTROCARDIOGRAM TRACING: CPT | Performed by: EMERGENCY MEDICINE

## 2023-05-19 PROCEDURE — 99285 EMERGENCY DEPT VISIT HI MDM: CPT

## 2023-05-19 PROCEDURE — 87077 CULTURE AEROBIC IDENTIFY: CPT

## 2023-05-19 PROCEDURE — 87040 BLOOD CULTURE FOR BACTERIA: CPT

## 2023-05-19 PROCEDURE — 87186 SC STD MICRODIL/AGAR DIL: CPT

## 2023-05-19 PROCEDURE — 81001 URINALYSIS AUTO W/SCOPE: CPT

## 2023-05-19 PROCEDURE — 80053 COMPREHEN METABOLIC PANEL: CPT

## 2023-05-19 PROCEDURE — 2580000003 HC RX 258: Performed by: STUDENT IN AN ORGANIZED HEALTH CARE EDUCATION/TRAINING PROGRAM

## 2023-05-19 PROCEDURE — 92610 EVALUATE SWALLOWING FUNCTION: CPT

## 2023-05-19 PROCEDURE — 84133 ASSAY OF URINE POTASSIUM: CPT

## 2023-05-19 PROCEDURE — 83930 ASSAY OF BLOOD OSMOLALITY: CPT

## 2023-05-19 PROCEDURE — 99291 CRITICAL CARE FIRST HOUR: CPT | Performed by: INTERNAL MEDICINE

## 2023-05-19 PROCEDURE — 82436 ASSAY OF URINE CHLORIDE: CPT

## 2023-05-19 PROCEDURE — 2500000003 HC RX 250 WO HCPCS: Performed by: INTERNAL MEDICINE

## 2023-05-19 PROCEDURE — 2700000000 HC OXYGEN THERAPY PER DAY

## 2023-05-19 PROCEDURE — 84300 ASSAY OF URINE SODIUM: CPT

## 2023-05-19 PROCEDURE — 83605 ASSAY OF LACTIC ACID: CPT

## 2023-05-19 PROCEDURE — 6370000000 HC RX 637 (ALT 250 FOR IP): Performed by: INTERNAL MEDICINE

## 2023-05-19 PROCEDURE — 6360000002 HC RX W HCPCS: Performed by: STUDENT IN AN ORGANIZED HEALTH CARE EDUCATION/TRAINING PROGRAM

## 2023-05-19 PROCEDURE — 87081 CULTURE SCREEN ONLY: CPT

## 2023-05-19 PROCEDURE — 80048 BASIC METABOLIC PNL TOTAL CA: CPT

## 2023-05-19 PROCEDURE — 99222 1ST HOSP IP/OBS MODERATE 55: CPT

## 2023-05-19 PROCEDURE — 85025 COMPLETE CBC W/AUTO DIFF WBC: CPT

## 2023-05-19 PROCEDURE — 87088 URINE BACTERIA CULTURE: CPT

## 2023-05-19 RX ORDER — ENOXAPARIN SODIUM 100 MG/ML
40 INJECTION SUBCUTANEOUS DAILY
Status: DISCONTINUED | OUTPATIENT
Start: 2023-05-20 | End: 2023-05-26 | Stop reason: HOSPADM

## 2023-05-19 RX ORDER — DIPHENHYDRAMINE HYDROCHLORIDE 50 MG/ML
INJECTION INTRAMUSCULAR; INTRAVENOUS
Status: COMPLETED
Start: 2023-05-19 | End: 2023-05-19

## 2023-05-19 RX ORDER — LORAZEPAM 1 MG/1
TABLET ORAL
Status: ON HOLD | COMMUNITY
End: 2023-05-26 | Stop reason: HOSPADM

## 2023-05-19 RX ORDER — ONDANSETRON 4 MG/1
4 TABLET, ORALLY DISINTEGRATING ORAL EVERY 8 HOURS PRN
Status: DISCONTINUED | OUTPATIENT
Start: 2023-05-19 | End: 2023-05-26 | Stop reason: HOSPADM

## 2023-05-19 RX ORDER — POLYETHYLENE GLYCOL 3350 17 G/17G
17 POWDER, FOR SOLUTION ORAL DAILY PRN
Status: DISCONTINUED | OUTPATIENT
Start: 2023-05-19 | End: 2023-05-26 | Stop reason: HOSPADM

## 2023-05-19 RX ORDER — ISOSORBIDE MONONITRATE 30 MG/1
30 TABLET, EXTENDED RELEASE ORAL DAILY
Status: DISCONTINUED | OUTPATIENT
Start: 2023-05-19 | End: 2023-05-19

## 2023-05-19 RX ORDER — PANTOPRAZOLE SODIUM 40 MG/1
40 TABLET, DELAYED RELEASE ORAL
Status: DISCONTINUED | OUTPATIENT
Start: 2023-05-19 | End: 2023-05-19

## 2023-05-19 RX ORDER — SODIUM CHLORIDE 9 MG/ML
INJECTION, SOLUTION INTRAVENOUS CONTINUOUS
Status: DISCONTINUED | OUTPATIENT
Start: 2023-05-19 | End: 2023-05-19

## 2023-05-19 RX ORDER — LORAZEPAM 2 MG/ML
0.5 INJECTION INTRAMUSCULAR 2 TIMES DAILY PRN
Status: DISCONTINUED | OUTPATIENT
Start: 2023-05-19 | End: 2023-05-21

## 2023-05-19 RX ORDER — ASPIRIN 81 MG/1
81 TABLET ORAL DAILY
Status: DISCONTINUED | OUTPATIENT
Start: 2023-05-19 | End: 2023-05-19

## 2023-05-19 RX ORDER — ONDANSETRON 2 MG/ML
4 INJECTION INTRAMUSCULAR; INTRAVENOUS EVERY 6 HOURS PRN
Status: DISCONTINUED | OUTPATIENT
Start: 2023-05-19 | End: 2023-05-26 | Stop reason: HOSPADM

## 2023-05-19 RX ORDER — TRAMADOL HYDROCHLORIDE 50 MG/1
TABLET ORAL
COMMUNITY

## 2023-05-19 RX ORDER — ATENOLOL 25 MG/1
50 TABLET ORAL DAILY
Status: DISCONTINUED | OUTPATIENT
Start: 2023-05-19 | End: 2023-05-19

## 2023-05-19 RX ORDER — SODIUM CHLORIDE 450 MG/100ML
INJECTION, SOLUTION INTRAVENOUS CONTINUOUS
Status: DISCONTINUED | OUTPATIENT
Start: 2023-05-19 | End: 2023-05-19

## 2023-05-19 RX ORDER — DIPHENHYDRAMINE HYDROCHLORIDE 50 MG/ML
25 INJECTION INTRAMUSCULAR; INTRAVENOUS EVERY 6 HOURS PRN
Status: DISCONTINUED | OUTPATIENT
Start: 2023-05-19 | End: 2023-05-26 | Stop reason: HOSPADM

## 2023-05-19 RX ORDER — SODIUM CHLORIDE 0.9 % (FLUSH) 0.9 %
10 SYRINGE (ML) INJECTION EVERY 12 HOURS SCHEDULED
Status: DISCONTINUED | OUTPATIENT
Start: 2023-05-19 | End: 2023-05-26 | Stop reason: HOSPADM

## 2023-05-19 RX ORDER — ACETAMINOPHEN 325 MG/1
650 TABLET ORAL EVERY 6 HOURS PRN
Status: DISCONTINUED | OUTPATIENT
Start: 2023-05-19 | End: 2023-05-19 | Stop reason: SDUPTHER

## 2023-05-19 RX ORDER — SODIUM CHLORIDE 0.9 % (FLUSH) 0.9 %
5-40 SYRINGE (ML) INJECTION PRN
Status: DISCONTINUED | OUTPATIENT
Start: 2023-05-19 | End: 2023-05-26 | Stop reason: HOSPADM

## 2023-05-19 RX ORDER — ACETAMINOPHEN 650 MG/1
650 SUPPOSITORY RECTAL EVERY 6 HOURS PRN
Status: DISCONTINUED | OUTPATIENT
Start: 2023-05-19 | End: 2023-05-26 | Stop reason: HOSPADM

## 2023-05-19 RX ORDER — GABAPENTIN 100 MG/1
100 CAPSULE ORAL 2 TIMES DAILY
COMMUNITY

## 2023-05-19 RX ORDER — SODIUM CHLORIDE 0.9 % (FLUSH) 0.9 %
10 SYRINGE (ML) INJECTION PRN
Status: DISCONTINUED | OUTPATIENT
Start: 2023-05-19 | End: 2023-05-26 | Stop reason: HOSPADM

## 2023-05-19 RX ORDER — ACETAMINOPHEN 325 MG/1
650 TABLET ORAL EVERY 6 HOURS PRN
Status: DISCONTINUED | OUTPATIENT
Start: 2023-05-19 | End: 2023-05-26 | Stop reason: HOSPADM

## 2023-05-19 RX ORDER — IPRATROPIUM BROMIDE AND ALBUTEROL SULFATE 2.5; .5 MG/3ML; MG/3ML
3 SOLUTION RESPIRATORY (INHALATION)
Status: DISCONTINUED | OUTPATIENT
Start: 2023-05-19 | End: 2023-05-19

## 2023-05-19 RX ORDER — SODIUM CHLORIDE 0.9 % (FLUSH) 0.9 %
5-40 SYRINGE (ML) INJECTION EVERY 12 HOURS SCHEDULED
Status: DISCONTINUED | OUTPATIENT
Start: 2023-05-19 | End: 2023-05-26 | Stop reason: HOSPADM

## 2023-05-19 RX ORDER — OXYBUTYNIN CHLORIDE 15 MG/1
TABLET, EXTENDED RELEASE ORAL
COMMUNITY

## 2023-05-19 RX ORDER — CEFDINIR 300 MG/1
CAPSULE ORAL
COMMUNITY

## 2023-05-19 RX ORDER — DEXTROSE MONOHYDRATE 50 MG/ML
INJECTION, SOLUTION INTRAVENOUS CONTINUOUS
Status: DISCONTINUED | OUTPATIENT
Start: 2023-05-19 | End: 2023-05-20

## 2023-05-19 RX ORDER — POTASSIUM CHLORIDE 7.45 MG/ML
10 INJECTION INTRAVENOUS
Status: DISPENSED | OUTPATIENT
Start: 2023-05-19 | End: 2023-05-20

## 2023-05-19 RX ORDER — GABAPENTIN 100 MG/1
100 CAPSULE ORAL 2 TIMES DAILY
Status: DISCONTINUED | OUTPATIENT
Start: 2023-05-19 | End: 2023-05-19

## 2023-05-19 RX ORDER — SODIUM CHLORIDE 9 MG/ML
INJECTION, SOLUTION INTRAVENOUS PRN
Status: DISCONTINUED | OUTPATIENT
Start: 2023-05-19 | End: 2023-05-26 | Stop reason: HOSPADM

## 2023-05-19 RX ORDER — LORAZEPAM 1 MG/1
1 TABLET ORAL 2 TIMES DAILY PRN
Status: DISCONTINUED | OUTPATIENT
Start: 2023-05-19 | End: 2023-05-21

## 2023-05-19 RX ORDER — ACETAMINOPHEN 650 MG/1
650 SUPPOSITORY RECTAL EVERY 6 HOURS PRN
Status: DISCONTINUED | OUTPATIENT
Start: 2023-05-19 | End: 2023-05-19 | Stop reason: SDUPTHER

## 2023-05-19 RX ORDER — DESMOPRESSIN ACETATE 4 UG/ML
2 INJECTION, SOLUTION INTRAVENOUS; SUBCUTANEOUS ONCE
Status: COMPLETED | OUTPATIENT
Start: 2023-05-19 | End: 2023-05-19

## 2023-05-19 RX ORDER — TRIAMCINOLONE ACETONIDE 40 MG/ML
40 INJECTION, SUSPENSION INTRA-ARTICULAR; INTRAMUSCULAR
COMMUNITY

## 2023-05-19 RX ORDER — ENOXAPARIN SODIUM 100 MG/ML
40 INJECTION SUBCUTANEOUS DAILY
Status: DISCONTINUED | OUTPATIENT
Start: 2023-05-19 | End: 2023-05-19

## 2023-05-19 RX ADMIN — SODIUM CHLORIDE: 4.5 INJECTION, SOLUTION INTRAVENOUS at 13:12

## 2023-05-19 RX ADMIN — CEFEPIME 500 MG: 1 INJECTION, POWDER, FOR SOLUTION INTRAMUSCULAR; INTRAVENOUS at 14:56

## 2023-05-19 RX ADMIN — SODIUM CHLORIDE, PRESERVATIVE FREE 10 ML: 5 INJECTION INTRAVENOUS at 09:21

## 2023-05-19 RX ADMIN — POTASSIUM CHLORIDE 10 MEQ: 7.46 INJECTION, SOLUTION INTRAVENOUS at 20:24

## 2023-05-19 RX ADMIN — LORAZEPAM 0.5 MG: 2 INJECTION INTRAMUSCULAR; INTRAVENOUS at 10:56

## 2023-05-19 RX ADMIN — POTASSIUM CHLORIDE 10 MEQ: 7.46 INJECTION, SOLUTION INTRAVENOUS at 22:25

## 2023-05-19 RX ADMIN — DOXYCYCLINE 100 MG: 100 INJECTION, POWDER, LYOPHILIZED, FOR SOLUTION INTRAVENOUS at 23:16

## 2023-05-19 RX ADMIN — ENOXAPARIN SODIUM 40 MG: 100 INJECTION SUBCUTANEOUS at 10:58

## 2023-05-19 RX ADMIN — DESMOPRESSIN ACETATE 2 MCG: 4 INJECTION, SOLUTION INTRAVENOUS; SUBCUTANEOUS at 19:58

## 2023-05-19 RX ADMIN — IPRATROPIUM BROMIDE AND ALBUTEROL SULFATE 3 ML: 2.5; .5 SOLUTION RESPIRATORY (INHALATION) at 06:48

## 2023-05-19 RX ADMIN — DIPHENHYDRAMINE HYDROCHLORIDE 25 MG: 50 INJECTION, SOLUTION INTRAMUSCULAR; INTRAVENOUS at 17:07

## 2023-05-19 RX ADMIN — MEROPENEM 1000 MG: 1 INJECTION, POWDER, FOR SOLUTION INTRAVENOUS at 09:23

## 2023-05-19 RX ADMIN — SODIUM CHLORIDE, PRESERVATIVE FREE 10 ML: 5 INJECTION INTRAVENOUS at 20:05

## 2023-05-19 RX ADMIN — POTASSIUM CHLORIDE 10 MEQ: 7.46 INJECTION, SOLUTION INTRAVENOUS at 21:10

## 2023-05-19 RX ADMIN — SODIUM CHLORIDE 75 ML/HR: 9 INJECTION, SOLUTION INTRAVENOUS at 10:34

## 2023-05-19 RX ADMIN — DOXYCYCLINE 100 MG: 100 INJECTION, POWDER, LYOPHILIZED, FOR SOLUTION INTRAVENOUS at 13:15

## 2023-05-19 RX ADMIN — DEXTROSE MONOHYDRATE: 50 INJECTION, SOLUTION INTRAVENOUS at 19:36

## 2023-05-19 ASSESSMENT — PAIN SCALES - GENERAL
PAINLEVEL_OUTOF10: 0

## 2023-05-19 ASSESSMENT — PAIN - FUNCTIONAL ASSESSMENT
PAIN_FUNCTIONAL_ASSESSMENT: NONE - DENIES PAIN
PAIN_FUNCTIONAL_ASSESSMENT: NONE - DENIES PAIN

## 2023-05-19 ASSESSMENT — LIFESTYLE VARIABLES
HOW MANY STANDARD DRINKS CONTAINING ALCOHOL DO YOU HAVE ON A TYPICAL DAY: PATIENT DOES NOT DRINK
HOW OFTEN DO YOU HAVE A DRINK CONTAINING ALCOHOL: NEVER

## 2023-05-20 LAB
ANION GAP SERPL CALCULATED.3IONS-SCNC: 10 MMOL/L (ref 7–16)
ANION GAP SERPL CALCULATED.3IONS-SCNC: 11 MMOL/L (ref 7–16)
ANION GAP SERPL CALCULATED.3IONS-SCNC: 13 MMOL/L (ref 7–16)
ANION GAP SERPL CALCULATED.3IONS-SCNC: 14 MMOL/L (ref 7–16)
ANION GAP SERPL CALCULATED.3IONS-SCNC: 7 MMOL/L (ref 7–16)
BUN SERPL-MCNC: 12 MG/DL (ref 6–23)
BUN SERPL-MCNC: 13 MG/DL (ref 6–23)
BUN SERPL-MCNC: 13 MG/DL (ref 6–23)
BUN SERPL-MCNC: 14 MG/DL (ref 6–23)
BUN SERPL-MCNC: 14 MG/DL (ref 6–23)
CALCIUM SERPL-MCNC: 6.9 MG/DL (ref 8.6–10.2)
CALCIUM SERPL-MCNC: 7 MG/DL (ref 8.6–10.2)
CALCIUM SERPL-MCNC: 7.6 MG/DL (ref 8.6–10.2)
CALCIUM SERPL-MCNC: 7.8 MG/DL (ref 8.6–10.2)
CALCIUM SERPL-MCNC: 8.1 MG/DL (ref 8.6–10.2)
CALCIUM SERPL-MCNC: 8.2 MG/DL (ref 8.6–10.2)
CALCIUM SERPL-MCNC: 8.5 MG/DL (ref 8.6–10.2)
CHLORIDE SERPL-SCNC: 81 MMOL/L (ref 98–107)
CHLORIDE SERPL-SCNC: 85 MMOL/L (ref 98–107)
CHLORIDE SERPL-SCNC: 85 MMOL/L (ref 98–107)
CHLORIDE SERPL-SCNC: 86 MMOL/L (ref 98–107)
CHLORIDE SERPL-SCNC: 91 MMOL/L (ref 98–107)
CHLORIDE SERPL-SCNC: 91 MMOL/L (ref 98–107)
CHLORIDE SERPL-SCNC: 97 MMOL/L (ref 98–107)
CO2 SERPL-SCNC: 21 MMOL/L (ref 22–29)
CO2 SERPL-SCNC: 22 MMOL/L (ref 22–29)
CO2 SERPL-SCNC: 23 MMOL/L (ref 22–29)
CO2 SERPL-SCNC: 23 MMOL/L (ref 22–29)
CO2 SERPL-SCNC: 24 MMOL/L (ref 22–29)
CO2 SERPL-SCNC: 25 MMOL/L (ref 22–29)
CO2 SERPL-SCNC: 25 MMOL/L (ref 22–29)
CREAT SERPL-MCNC: 0.7 MG/DL (ref 0.5–1)
CREAT SERPL-MCNC: 0.8 MG/DL (ref 0.5–1)
GLUCOSE SERPL-MCNC: 104 MG/DL (ref 74–99)
GLUCOSE SERPL-MCNC: 104 MG/DL (ref 74–99)
GLUCOSE SERPL-MCNC: 105 MG/DL (ref 74–99)
GLUCOSE SERPL-MCNC: 110 MG/DL (ref 74–99)
GLUCOSE SERPL-MCNC: 113 MG/DL (ref 74–99)
GLUCOSE SERPL-MCNC: 113 MG/DL (ref 74–99)
GLUCOSE SERPL-MCNC: 96 MG/DL (ref 74–99)
MAGNESIUM SERPL-MCNC: 1.7 MG/DL (ref 1.6–2.6)
PHOSPHATE SERPL-MCNC: 2.9 MG/DL (ref 2.5–4.5)
POTASSIUM SERPL-SCNC: 3.5 MMOL/L (ref 3.5–5)
POTASSIUM SERPL-SCNC: 3.5 MMOL/L (ref 3.5–5)
POTASSIUM SERPL-SCNC: 3.7 MMOL/L (ref 3.5–5)
POTASSIUM SERPL-SCNC: 3.8 MMOL/L (ref 3.5–5)
POTASSIUM SERPL-SCNC: 3.9 MMOL/L (ref 3.5–5)
POTASSIUM SERPL-SCNC: 4.3 MMOL/L (ref 3.5–5)
POTASSIUM SERPL-SCNC: 4.6 MMOL/L (ref 3.5–5)
SODIUM SERPL-SCNC: 116 MMOL/L (ref 132–146)
SODIUM SERPL-SCNC: 118 MMOL/L (ref 132–146)
SODIUM SERPL-SCNC: 120 MMOL/L (ref 132–146)
SODIUM SERPL-SCNC: 123 MMOL/L (ref 132–146)
SODIUM SERPL-SCNC: 125 MMOL/L (ref 132–146)
SODIUM SERPL-SCNC: 125 MMOL/L (ref 132–146)
SODIUM SERPL-SCNC: 127 MMOL/L (ref 132–146)

## 2023-05-20 PROCEDURE — 2580000003 HC RX 258: Performed by: INTERNAL MEDICINE

## 2023-05-20 PROCEDURE — 99291 CRITICAL CARE FIRST HOUR: CPT | Performed by: INTERNAL MEDICINE

## 2023-05-20 PROCEDURE — 6360000002 HC RX W HCPCS: Performed by: INTERNAL MEDICINE

## 2023-05-20 PROCEDURE — 80048 BASIC METABOLIC PNL TOTAL CA: CPT

## 2023-05-20 PROCEDURE — 2700000000 HC OXYGEN THERAPY PER DAY

## 2023-05-20 PROCEDURE — 36415 COLL VENOUS BLD VENIPUNCTURE: CPT

## 2023-05-20 PROCEDURE — 6360000002 HC RX W HCPCS

## 2023-05-20 PROCEDURE — 2000000000 HC ICU R&B

## 2023-05-20 PROCEDURE — 2580000003 HC RX 258

## 2023-05-20 PROCEDURE — 2500000003 HC RX 250 WO HCPCS: Performed by: INTERNAL MEDICINE

## 2023-05-20 PROCEDURE — 83735 ASSAY OF MAGNESIUM: CPT

## 2023-05-20 PROCEDURE — 84100 ASSAY OF PHOSPHORUS: CPT

## 2023-05-20 RX ORDER — POTASSIUM CHLORIDE 7.45 MG/ML
10 INJECTION INTRAVENOUS
Status: DISCONTINUED | OUTPATIENT
Start: 2023-05-20 | End: 2023-05-20 | Stop reason: ALTCHOICE

## 2023-05-20 RX ORDER — POTASSIUM CHLORIDE 29.8 MG/ML
20 INJECTION INTRAVENOUS ONCE
Status: COMPLETED | OUTPATIENT
Start: 2023-05-20 | End: 2023-05-20

## 2023-05-20 RX ORDER — MAGNESIUM SULFATE IN WATER 40 MG/ML
2000 INJECTION, SOLUTION INTRAVENOUS ONCE
Status: COMPLETED | OUTPATIENT
Start: 2023-05-20 | End: 2023-05-20

## 2023-05-20 RX ORDER — DEXTROSE AND SODIUM CHLORIDE 5; .45 G/100ML; G/100ML
INJECTION, SOLUTION INTRAVENOUS CONTINUOUS
Status: DISCONTINUED | OUTPATIENT
Start: 2023-05-20 | End: 2023-05-21

## 2023-05-20 RX ORDER — POTASSIUM CHLORIDE 29.8 MG/ML
20 INJECTION INTRAVENOUS
Status: DISCONTINUED | OUTPATIENT
Start: 2023-05-20 | End: 2023-05-20 | Stop reason: DRUGHIGH

## 2023-05-20 RX ORDER — SODIUM CHLORIDE 9 MG/ML
INJECTION, SOLUTION INTRAVENOUS CONTINUOUS
Status: DISCONTINUED | OUTPATIENT
Start: 2023-05-20 | End: 2023-05-20

## 2023-05-20 RX ADMIN — DIPHENHYDRAMINE HYDROCHLORIDE 25 MG: 50 INJECTION, SOLUTION INTRAMUSCULAR; INTRAVENOUS at 15:50

## 2023-05-20 RX ADMIN — DIPHENHYDRAMINE HYDROCHLORIDE 25 MG: 50 INJECTION, SOLUTION INTRAMUSCULAR; INTRAVENOUS at 11:35

## 2023-05-20 RX ADMIN — CEFEPIME 500 MG: 1 INJECTION, POWDER, FOR SOLUTION INTRAMUSCULAR; INTRAVENOUS at 13:11

## 2023-05-20 RX ADMIN — SODIUM CHLORIDE: 9 INJECTION, SOLUTION INTRAVENOUS at 20:56

## 2023-05-20 RX ADMIN — SODIUM CHLORIDE, PRESERVATIVE FREE 10 ML: 5 INJECTION INTRAVENOUS at 09:04

## 2023-05-20 RX ADMIN — LORAZEPAM 0.5 MG: 2 INJECTION INTRAMUSCULAR; INTRAVENOUS at 23:08

## 2023-05-20 RX ADMIN — DOXYCYCLINE 100 MG: 100 INJECTION, POWDER, LYOPHILIZED, FOR SOLUTION INTRAVENOUS at 23:14

## 2023-05-20 RX ADMIN — MEROPENEM 1000 MG: 1 INJECTION, POWDER, FOR SOLUTION INTRAVENOUS at 18:38

## 2023-05-20 RX ADMIN — DOXYCYCLINE 100 MG: 100 INJECTION, POWDER, LYOPHILIZED, FOR SOLUTION INTRAVENOUS at 11:19

## 2023-05-20 RX ADMIN — SODIUM CHLORIDE: 9 INJECTION, SOLUTION INTRAVENOUS at 06:44

## 2023-05-20 RX ADMIN — SODIUM CHLORIDE, PRESERVATIVE FREE 10 ML: 5 INJECTION INTRAVENOUS at 20:56

## 2023-05-20 RX ADMIN — POTASSIUM CHLORIDE 20 MEQ: 29.8 INJECTION, SOLUTION INTRAVENOUS at 06:46

## 2023-05-20 RX ADMIN — POTASSIUM CHLORIDE 20 MEQ: 29.8 INJECTION, SOLUTION INTRAVENOUS at 08:33

## 2023-05-20 RX ADMIN — MAGNESIUM SULFATE HEPTAHYDRATE 2000 MG: 40 INJECTION, SOLUTION INTRAVENOUS at 08:37

## 2023-05-20 RX ADMIN — DEXTROSE AND SODIUM CHLORIDE: 5; 450 INJECTION, SOLUTION INTRAVENOUS at 21:19

## 2023-05-20 RX ADMIN — ENOXAPARIN SODIUM 40 MG: 100 INJECTION SUBCUTANEOUS at 08:38

## 2023-05-20 ASSESSMENT — PAIN SCALES - GENERAL
PAINLEVEL_OUTOF10: 0

## 2023-05-20 ASSESSMENT — ENCOUNTER SYMPTOMS
COUGH: 0
DIARRHEA: 0
NAUSEA: 0
SHORTNESS OF BREATH: 0
VOMITING: 0

## 2023-05-21 LAB
ANION GAP SERPL CALCULATED.3IONS-SCNC: 10 MMOL/L (ref 7–16)
ANION GAP SERPL CALCULATED.3IONS-SCNC: 10 MMOL/L (ref 7–16)
ANION GAP SERPL CALCULATED.3IONS-SCNC: 11 MMOL/L (ref 7–16)
ANION GAP SERPL CALCULATED.3IONS-SCNC: 11 MMOL/L (ref 7–16)
ANION GAP SERPL CALCULATED.3IONS-SCNC: 12 MMOL/L (ref 7–16)
ANION GAP SERPL CALCULATED.3IONS-SCNC: 9 MMOL/L (ref 7–16)
BACTERIA UR CULT: ABNORMAL
BACTERIA UR CULT: ABNORMAL
BASOPHILS # BLD: 0.03 E9/L (ref 0–0.2)
BASOPHILS NFR BLD: 0.4 % (ref 0–2)
BUN SERPL-MCNC: 11 MG/DL (ref 6–23)
BUN SERPL-MCNC: 12 MG/DL (ref 6–23)
BUN SERPL-MCNC: 13 MG/DL (ref 6–23)
CALCIUM SERPL-MCNC: 7.7 MG/DL (ref 8.6–10.2)
CALCIUM SERPL-MCNC: 8.4 MG/DL (ref 8.6–10.2)
CALCIUM SERPL-MCNC: 8.4 MG/DL (ref 8.6–10.2)
CALCIUM SERPL-MCNC: 8.5 MG/DL (ref 8.6–10.2)
CALCIUM SERPL-MCNC: 8.8 MG/DL (ref 8.6–10.2)
CALCIUM SERPL-MCNC: 8.9 MG/DL (ref 8.6–10.2)
CHLORIDE SERPL-SCNC: 88 MMOL/L (ref 98–107)
CHLORIDE SERPL-SCNC: 88 MMOL/L (ref 98–107)
CHLORIDE SERPL-SCNC: 90 MMOL/L (ref 98–107)
CHLORIDE SERPL-SCNC: 91 MMOL/L (ref 98–107)
CHLORIDE SERPL-SCNC: 91 MMOL/L (ref 98–107)
CHLORIDE SERPL-SCNC: 92 MMOL/L (ref 98–107)
CO2 SERPL-SCNC: 22 MMOL/L (ref 22–29)
CO2 SERPL-SCNC: 22 MMOL/L (ref 22–29)
CO2 SERPL-SCNC: 23 MMOL/L (ref 22–29)
CO2 SERPL-SCNC: 24 MMOL/L (ref 22–29)
CO2 SERPL-SCNC: 25 MMOL/L (ref 22–29)
CO2 SERPL-SCNC: 26 MMOL/L (ref 22–29)
CREAT SERPL-MCNC: 0.8 MG/DL (ref 0.5–1)
CREAT SERPL-MCNC: 0.9 MG/DL (ref 0.5–1)
CREAT SERPL-MCNC: 1 MG/DL (ref 0.5–1)
EOSINOPHIL # BLD: 0.06 E9/L (ref 0.05–0.5)
EOSINOPHIL NFR BLD: 0.9 % (ref 0–6)
ERYTHROCYTE [DISTWIDTH] IN BLOOD BY AUTOMATED COUNT: 14.4 FL (ref 11.5–15)
GLUCOSE SERPL-MCNC: 108 MG/DL (ref 74–99)
GLUCOSE SERPL-MCNC: 113 MG/DL (ref 74–99)
GLUCOSE SERPL-MCNC: 128 MG/DL (ref 74–99)
GLUCOSE SERPL-MCNC: 136 MG/DL (ref 74–99)
GLUCOSE SERPL-MCNC: 137 MG/DL (ref 74–99)
GLUCOSE SERPL-MCNC: 139 MG/DL (ref 74–99)
HCT VFR BLD AUTO: 38.2 % (ref 34–48)
HGB BLD-MCNC: 13 G/DL (ref 11.5–15.5)
IMM GRANULOCYTES # BLD: 0.04 E9/L
IMM GRANULOCYTES NFR BLD: 0.6 % (ref 0–5)
LYMPHOCYTES # BLD: 0.9 E9/L (ref 1.5–4)
LYMPHOCYTES NFR BLD: 13.2 % (ref 20–42)
MAGNESIUM SERPL-MCNC: 1.9 MG/DL (ref 1.6–2.6)
MCH RBC QN AUTO: 28.1 PG (ref 26–35)
MCHC RBC AUTO-ENTMCNC: 34 % (ref 32–34.5)
MCV RBC AUTO: 82.7 FL (ref 80–99.9)
MONOCYTES # BLD: 0.75 E9/L (ref 0.1–0.95)
MONOCYTES NFR BLD: 11 % (ref 2–12)
MRSA SPEC QL CULT: NORMAL
NEUTROPHILS # BLD: 5.02 E9/L (ref 1.8–7.3)
NEUTS SEG NFR BLD: 73.9 % (ref 43–80)
ORGANISM: ABNORMAL
ORGANISM: ABNORMAL
PHOSPHATE SERPL-MCNC: 2.9 MG/DL (ref 2.5–4.5)
PLATELET # BLD AUTO: 228 E9/L (ref 130–450)
PMV BLD AUTO: 8.8 FL (ref 7–12)
POTASSIUM SERPL-SCNC: 3.6 MMOL/L (ref 3.5–5)
POTASSIUM SERPL-SCNC: 3.6 MMOL/L (ref 3.5–5)
POTASSIUM SERPL-SCNC: 3.9 MMOL/L (ref 3.5–5)
POTASSIUM SERPL-SCNC: 3.9 MMOL/L (ref 3.5–5)
POTASSIUM SERPL-SCNC: 4 MMOL/L (ref 3.5–5)
POTASSIUM SERPL-SCNC: 4 MMOL/L (ref 3.5–5)
RBC # BLD AUTO: 4.62 E12/L (ref 3.5–5.5)
REASON FOR REJECTION: NORMAL
REJECTED TEST: NORMAL
SODIUM SERPL-SCNC: 121 MMOL/L (ref 132–146)
SODIUM SERPL-SCNC: 124 MMOL/L (ref 132–146)
SODIUM SERPL-SCNC: 124 MMOL/L (ref 132–146)
SODIUM SERPL-SCNC: 125 MMOL/L (ref 132–146)
SODIUM SERPL-SCNC: 125 MMOL/L (ref 132–146)
SODIUM SERPL-SCNC: 126 MMOL/L (ref 132–146)
WBC # BLD: 6.8 E9/L (ref 4.5–11.5)

## 2023-05-21 PROCEDURE — 2700000000 HC OXYGEN THERAPY PER DAY

## 2023-05-21 PROCEDURE — 2580000003 HC RX 258: Performed by: INTERNAL MEDICINE

## 2023-05-21 PROCEDURE — 6360000002 HC RX W HCPCS

## 2023-05-21 PROCEDURE — 99233 SBSQ HOSP IP/OBS HIGH 50: CPT | Performed by: INTERNAL MEDICINE

## 2023-05-21 PROCEDURE — 2580000003 HC RX 258

## 2023-05-21 PROCEDURE — 85025 COMPLETE CBC W/AUTO DIFF WBC: CPT

## 2023-05-21 PROCEDURE — 80048 BASIC METABOLIC PNL TOTAL CA: CPT

## 2023-05-21 PROCEDURE — 83735 ASSAY OF MAGNESIUM: CPT

## 2023-05-21 PROCEDURE — 36415 COLL VENOUS BLD VENIPUNCTURE: CPT

## 2023-05-21 PROCEDURE — 6360000002 HC RX W HCPCS: Performed by: INTERNAL MEDICINE

## 2023-05-21 PROCEDURE — 2140000000 HC CCU INTERMEDIATE R&B

## 2023-05-21 PROCEDURE — 84100 ASSAY OF PHOSPHORUS: CPT

## 2023-05-21 PROCEDURE — 2500000003 HC RX 250 WO HCPCS

## 2023-05-21 RX ORDER — LABETALOL HYDROCHLORIDE 5 MG/ML
5 INJECTION, SOLUTION INTRAVENOUS ONCE
Status: COMPLETED | OUTPATIENT
Start: 2023-05-21 | End: 2023-05-21

## 2023-05-21 RX ORDER — DEXTROSE AND SODIUM CHLORIDE 5; .9 G/100ML; G/100ML
INJECTION, SOLUTION INTRAVENOUS CONTINUOUS
Status: DISCONTINUED | OUTPATIENT
Start: 2023-05-21 | End: 2023-05-23

## 2023-05-21 RX ORDER — POTASSIUM CHLORIDE 7.45 MG/ML
10 INJECTION INTRAVENOUS
Status: COMPLETED | OUTPATIENT
Start: 2023-05-21 | End: 2023-05-21

## 2023-05-21 RX ADMIN — ENOXAPARIN SODIUM 40 MG: 100 INJECTION SUBCUTANEOUS at 09:05

## 2023-05-21 RX ADMIN — DEXTROSE AND SODIUM CHLORIDE: 5; 450 INJECTION, SOLUTION INTRAVENOUS at 08:06

## 2023-05-21 RX ADMIN — SODIUM CHLORIDE, PRESERVATIVE FREE 10 ML: 5 INJECTION INTRAVENOUS at 09:06

## 2023-05-21 RX ADMIN — POTASSIUM CHLORIDE 10 MEQ: 7.46 INJECTION, SOLUTION INTRAVENOUS at 07:08

## 2023-05-21 RX ADMIN — POTASSIUM CHLORIDE 10 MEQ: 7.46 INJECTION, SOLUTION INTRAVENOUS at 11:24

## 2023-05-21 RX ADMIN — MEROPENEM 1000 MG: 1 INJECTION, POWDER, FOR SOLUTION INTRAVENOUS at 18:27

## 2023-05-21 RX ADMIN — POTASSIUM CHLORIDE 10 MEQ: 7.46 INJECTION, SOLUTION INTRAVENOUS at 09:05

## 2023-05-21 RX ADMIN — LABETALOL HYDROCHLORIDE 5 MG: 5 INJECTION INTRAVENOUS at 00:07

## 2023-05-21 RX ADMIN — VANCOMYCIN HYDROCHLORIDE 1750 MG: 10 INJECTION, POWDER, LYOPHILIZED, FOR SOLUTION INTRAVENOUS at 10:38

## 2023-05-21 RX ADMIN — DEXTROSE AND SODIUM CHLORIDE: 5; 900 INJECTION, SOLUTION INTRAVENOUS at 12:57

## 2023-05-21 RX ADMIN — POTASSIUM CHLORIDE 10 MEQ: 7.46 INJECTION, SOLUTION INTRAVENOUS at 10:21

## 2023-05-21 RX ADMIN — MEROPENEM 1000 MG: 1 INJECTION, POWDER, FOR SOLUTION INTRAVENOUS at 02:07

## 2023-05-21 RX ADMIN — MEROPENEM 1000 MG: 1 INJECTION, POWDER, FOR SOLUTION INTRAVENOUS at 09:47

## 2023-05-21 ASSESSMENT — PAIN SCALES - GENERAL
PAINLEVEL_OUTOF10: 0

## 2023-05-21 ASSESSMENT — ENCOUNTER SYMPTOMS
SHORTNESS OF BREATH: 0
NAUSEA: 0
COUGH: 0
DIARRHEA: 0
VOMITING: 0

## 2023-05-22 ENCOUNTER — APPOINTMENT (OUTPATIENT)
Dept: GENERAL RADIOLOGY | Age: 88
End: 2023-05-22
Payer: MEDICARE

## 2023-05-22 LAB
ALBUMIN SERPL-MCNC: 3 G/DL (ref 3.5–5.2)
ALP SERPL-CCNC: 133 U/L (ref 35–104)
ALT SERPL-CCNC: 8 U/L (ref 0–32)
ANION GAP SERPL CALCULATED.3IONS-SCNC: 11 MMOL/L (ref 7–16)
ANION GAP SERPL CALCULATED.3IONS-SCNC: 12 MMOL/L (ref 7–16)
AST SERPL-CCNC: 17 U/L (ref 0–31)
BACTERIA UR CULT: ABNORMAL
BACTERIA UR CULT: ABNORMAL
BASOPHILS # BLD: 0.04 E9/L (ref 0–0.2)
BASOPHILS NFR BLD: 0.6 % (ref 0–2)
BILIRUB SERPL-MCNC: 0.5 MG/DL (ref 0–1.2)
BUN SERPL-MCNC: 10 MG/DL (ref 6–23)
BUN SERPL-MCNC: 11 MG/DL (ref 6–23)
CALCIUM SERPL-MCNC: 8.8 MG/DL (ref 8.6–10.2)
CALCIUM SERPL-MCNC: 9.2 MG/DL (ref 8.6–10.2)
CHLORIDE SERPL-SCNC: 94 MMOL/L (ref 98–107)
CHLORIDE SERPL-SCNC: 96 MMOL/L (ref 98–107)
CO2 SERPL-SCNC: 22 MMOL/L (ref 22–29)
CO2 SERPL-SCNC: 22 MMOL/L (ref 22–29)
CREAT SERPL-MCNC: 0.8 MG/DL (ref 0.5–1)
CREAT SERPL-MCNC: 0.9 MG/DL (ref 0.5–1)
EOSINOPHIL # BLD: 0.17 E9/L (ref 0.05–0.5)
EOSINOPHIL NFR BLD: 2.7 % (ref 0–6)
ERYTHROCYTE [DISTWIDTH] IN BLOOD BY AUTOMATED COUNT: 14.7 FL (ref 11.5–15)
GLUCOSE SERPL-MCNC: 113 MG/DL (ref 74–99)
GLUCOSE SERPL-MCNC: 122 MG/DL (ref 74–99)
HCT VFR BLD AUTO: 40.8 % (ref 34–48)
HGB BLD-MCNC: 13.5 G/DL (ref 11.5–15.5)
IMM GRANULOCYTES # BLD: 0.03 E9/L
IMM GRANULOCYTES NFR BLD: 0.5 % (ref 0–5)
LYMPHOCYTES # BLD: 0.94 E9/L (ref 1.5–4)
LYMPHOCYTES NFR BLD: 14.7 % (ref 20–42)
MAGNESIUM SERPL-MCNC: 1.8 MG/DL (ref 1.6–2.6)
MCH RBC QN AUTO: 28.2 PG (ref 26–35)
MCHC RBC AUTO-ENTMCNC: 33.1 % (ref 32–34.5)
MCV RBC AUTO: 85.2 FL (ref 80–99.9)
MONOCYTES # BLD: 0.74 E9/L (ref 0.1–0.95)
MONOCYTES NFR BLD: 11.6 % (ref 2–12)
NEUTROPHILS # BLD: 4.47 E9/L (ref 1.8–7.3)
NEUTS SEG NFR BLD: 69.9 % (ref 43–80)
ORGANISM: ABNORMAL
ORGANISM: ABNORMAL
PHOSPHATE SERPL-MCNC: 2.5 MG/DL (ref 2.5–4.5)
PLATELET # BLD AUTO: 243 E9/L (ref 130–450)
PMV BLD AUTO: 8.8 FL (ref 7–12)
POTASSIUM SERPL-SCNC: 3.6 MMOL/L (ref 3.5–5)
POTASSIUM SERPL-SCNC: 3.7 MMOL/L (ref 3.5–5)
PROT SERPL-MCNC: 6.2 G/DL (ref 6.4–8.3)
RBC # BLD AUTO: 4.79 E12/L (ref 3.5–5.5)
SODIUM SERPL-SCNC: 128 MMOL/L (ref 132–146)
SODIUM SERPL-SCNC: 129 MMOL/L (ref 132–146)
WBC # BLD: 6.4 E9/L (ref 4.5–11.5)

## 2023-05-22 PROCEDURE — 80048 BASIC METABOLIC PNL TOTAL CA: CPT

## 2023-05-22 PROCEDURE — 84100 ASSAY OF PHOSPHORUS: CPT

## 2023-05-22 PROCEDURE — 36415 COLL VENOUS BLD VENIPUNCTURE: CPT

## 2023-05-22 PROCEDURE — 83735 ASSAY OF MAGNESIUM: CPT

## 2023-05-22 PROCEDURE — 6360000002 HC RX W HCPCS: Performed by: INTERNAL MEDICINE

## 2023-05-22 PROCEDURE — 2580000003 HC RX 258: Performed by: INTERNAL MEDICINE

## 2023-05-22 PROCEDURE — 97165 OT EVAL LOW COMPLEX 30 MIN: CPT

## 2023-05-22 PROCEDURE — 97530 THERAPEUTIC ACTIVITIES: CPT

## 2023-05-22 PROCEDURE — 6360000002 HC RX W HCPCS

## 2023-05-22 PROCEDURE — 97162 PT EVAL MOD COMPLEX 30 MIN: CPT

## 2023-05-22 PROCEDURE — 2700000000 HC OXYGEN THERAPY PER DAY

## 2023-05-22 PROCEDURE — 92526 ORAL FUNCTION THERAPY: CPT

## 2023-05-22 PROCEDURE — 97535 SELF CARE MNGMENT TRAINING: CPT

## 2023-05-22 PROCEDURE — 80053 COMPREHEN METABOLIC PANEL: CPT

## 2023-05-22 PROCEDURE — 92611 MOTION FLUOROSCOPY/SWALLOW: CPT

## 2023-05-22 PROCEDURE — 2140000000 HC CCU INTERMEDIATE R&B

## 2023-05-22 PROCEDURE — 2500000003 HC RX 250 WO HCPCS: Performed by: PHYSICIAN ASSISTANT

## 2023-05-22 PROCEDURE — 99232 SBSQ HOSP IP/OBS MODERATE 35: CPT

## 2023-05-22 PROCEDURE — 74230 X-RAY XM SWLNG FUNCJ C+: CPT

## 2023-05-22 PROCEDURE — 85025 COMPLETE CBC W/AUTO DIFF WBC: CPT

## 2023-05-22 RX ORDER — LORAZEPAM 2 MG/ML
0.5 INJECTION INTRAMUSCULAR 2 TIMES DAILY PRN
Status: DISCONTINUED | OUTPATIENT
Start: 2023-05-22 | End: 2023-05-26 | Stop reason: HOSPADM

## 2023-05-22 RX ORDER — LINEZOLID 2 MG/ML
600 INJECTION, SOLUTION INTRAVENOUS EVERY 12 HOURS
Status: DISCONTINUED | OUTPATIENT
Start: 2023-05-22 | End: 2023-05-26 | Stop reason: HOSPADM

## 2023-05-22 RX ADMIN — LINEZOLID 600 MG: 600 INJECTION, SOLUTION INTRAVENOUS at 17:33

## 2023-05-22 RX ADMIN — ENOXAPARIN SODIUM 40 MG: 100 INJECTION SUBCUTANEOUS at 09:41

## 2023-05-22 RX ADMIN — ONDANSETRON 4 MG: 2 INJECTION INTRAMUSCULAR; INTRAVENOUS at 06:53

## 2023-05-22 RX ADMIN — VANCOMYCIN HYDROCHLORIDE 1000 MG: 1 INJECTION, POWDER, LYOPHILIZED, FOR SOLUTION INTRAVENOUS at 11:53

## 2023-05-22 RX ADMIN — MEROPENEM 1000 MG: 1 INJECTION, POWDER, FOR SOLUTION INTRAVENOUS at 09:43

## 2023-05-22 RX ADMIN — BARIUM SULFATE 15 ML: 400 SUSPENSION ORAL at 14:50

## 2023-05-22 RX ADMIN — DEXTROSE AND SODIUM CHLORIDE: 5; 900 INJECTION, SOLUTION INTRAVENOUS at 21:50

## 2023-05-22 RX ADMIN — MEROPENEM 1000 MG: 1 INJECTION, POWDER, FOR SOLUTION INTRAVENOUS at 17:23

## 2023-05-22 RX ADMIN — SODIUM CHLORIDE, PRESERVATIVE FREE 10 ML: 5 INJECTION INTRAVENOUS at 21:40

## 2023-05-22 RX ADMIN — DEXTROSE AND SODIUM CHLORIDE: 5; 900 INJECTION, SOLUTION INTRAVENOUS at 18:53

## 2023-05-22 RX ADMIN — LORAZEPAM 0.5 MG: 2 INJECTION INTRAMUSCULAR; INTRAVENOUS at 00:29

## 2023-05-22 RX ADMIN — BARIUM SULFATE 15 ML: 400 PASTE ORAL at 14:50

## 2023-05-22 RX ADMIN — MEROPENEM 1000 MG: 1 INJECTION, POWDER, FOR SOLUTION INTRAVENOUS at 02:44

## 2023-05-22 RX ADMIN — BARIUM SULFATE 15 ML: 0.81 POWDER, FOR SUSPENSION ORAL at 14:50

## 2023-05-22 RX ADMIN — LORAZEPAM 0.5 MG: 2 INJECTION INTRAMUSCULAR; INTRAVENOUS at 23:04

## 2023-05-22 RX ADMIN — DEXTROSE AND SODIUM CHLORIDE: 5; 900 INJECTION, SOLUTION INTRAVENOUS at 02:45

## 2023-05-22 ASSESSMENT — ENCOUNTER SYMPTOMS
ABDOMINAL PAIN: 0
SHORTNESS OF BREATH: 0

## 2023-05-22 ASSESSMENT — PAIN SCALES - GENERAL: PAINLEVEL_OUTOF10: 0

## 2023-05-22 NOTE — CARE COORDINATION
Care Coordination   Following for discharge planning. Plan remains to return to [facility]. Needs pre cert. PT OT today. PT 9/24 OT awaiting note. Chart reviewed and case discussed in IDR. Sodium 129 today, Nephro notified. Consult to HF nurse. Swallow eval at bedside completed over weekend and RN following for patient to resume pureed diet. .  Pre cert with this insurance and current therapy score can be returned in 2 hours. Will await input from medical team prior to request for pre cert to be sent. Envelop and ambulance form on soft chart. If able to go by [facility], facility should be able to transport.   Will follow

## 2023-05-22 NOTE — DISCHARGE INSTRUCTIONS
HEART FAILURE  / CONGESTIVE HEART FAILURE  DISCHARGE INSTRUCTIONS:  GUIDELINES TO FOLLOW AT HOME    Self- Managed Care:     MEDICATIONS:  Take your medication as directed. If you are experiencing any side effects, inform your doctor, Do not stop taking any of your medications without letting your doctor know. Check with your doctor before taking any over-the-counter medications / herbal / or dietary supplements. They may interfere with your other medications. Do not take ibuprofen (Advil or Motrin) and naproxen (Aleve) without talking to your doctor first. They could make your heart failure worse. WEIGHT MONITORING:   Weigh yourself everyday (with the same scale) around the same time of the day and write it down. (you can chart them on a calendar or keep track of them on paper. Notify your doctor of a weight gain of 3 pounds or more in 1 day   OR a total of 5 pounds or more in 1 week    Take your weight record to your doctor visits  Also, the same goes if you loose more than 3# in one day, let your heart doctor know. DIET:   Cardiac heart healthy diet- Low saturated / low trans fat, no added salt, caffeine restricted, Low sodium diet-   No more than 2,000mg (2 grams) of salt / sodium per day (which equals to a little less than  a teaspoon of salt)  If your doctor wants you on a fluid restriction. ..it is usually recommended a fluid limit of 2,000cc -  Fluid restriction- 2,000 ml (milliliters) = 64 ounces = you can have 8 glasses of fluid per day (each glass 8 ounces)    Follow a low salt diet - avoid using salt at the table, avoid / limit use of canned soups, processed / packaged foods, salted snacks, olives and pickles. Do not use a salt substitute without checking with your doctor, they may contain a high amount of potassioum. (Mrs. Sree aDwson is safe to use).     Limit the use of alcohol       CALL YOUR DOCTOR THE FIRST DAY YOU NOTICE ANY OF THESE   SYMPTOMS:  You have a weight

## 2023-05-22 NOTE — PLAN OF CARE
Patient's chart updated to reflect:      . - HF care plan, HF education points and HF discharge instructions.  -Orders: 2 gram sodium diet, daily weights, I/O.  -PCP and cardiology follow up appointments to be scheduled within 7 days of hospital discharge. -CHF education session will be provided to the patient prior to hospital discharge.     Riki Brady RN RN, BSN  Heart Failure Navigator

## 2023-05-22 NOTE — PLAN OF CARE
Problem: Chronic Conditions and Co-morbidities  Goal: Patient's chronic conditions and co-morbidity symptoms are monitored and maintained or improved  5/22/2023 1508 by Shruthi Barkley RN  Outcome: Progressing  5/22/2023 0318 by Elizabet Crum RN  Outcome: Progressing     Problem: Discharge Planning  Goal: Discharge to home or other facility with appropriate resources  5/22/2023 1508 by Shruthi Barkley RN  Outcome: Progressing  5/22/2023 0318 by Elizabet Crum RN  Outcome: Progressing     Problem: Skin/Tissue Integrity  Goal: Absence of new skin breakdown  Description: 1. Monitor for areas of redness and/or skin breakdown  2. Assess vascular access sites hourly  3. Every 4-6 hours minimum:  Change oxygen saturation probe site  4. Every 4-6 hours:  If on nasal continuous positive airway pressure, respiratory therapy assess nares and determine need for appliance change or resting period.   5/22/2023 1508 by Shruthi Barkley RN  Outcome: Progressing  5/22/2023 0318 by Elizabet Crum RN  Outcome: Progressing     Problem: ABCDS Injury Assessment  Goal: Absence of physical injury  5/22/2023 1508 by Shruthi Barkley RN  Outcome: Progressing  5/22/2023 0318 by Elizabet Crum RN  Outcome: Progressing     Problem: Safety - Adult  Goal: Free from fall injury  5/22/2023 1508 by Shruthi Barkley RN  Outcome: Progressing  5/22/2023 0318 by Elizabet Crum RN  Outcome: Progressing

## 2023-05-22 NOTE — PATIENT CARE CONFERENCE
P Quality Flow/Interdisciplinary Rounds Progress Note        Quality Flow Rounds held on May 22, 2023    Disciplines Attending:  Bedside Nurse, , , and Nursing Unit Leadership    Nadya Pratt was admitted on 5/19/2023  3:29 AM    Anticipated Discharge Date:       Disposition:    Gumaro Score:  Gumaro Scale Score: 17    Readmission Risk              Risk of Unplanned Readmission:  22           Discussed patient goal for the day, patient clinical progression, and barriers to discharge.   The following Goal(s) of the Day/Commitment(s) have been identified:  Labs - Report Results      Malick Presley RN  May 22, 2023

## 2023-05-22 NOTE — PLAN OF CARE

## 2023-05-23 LAB
ALBUMIN SERPL-MCNC: 3 G/DL (ref 3.5–5.2)
ALP SERPL-CCNC: 118 U/L (ref 35–104)
ALT SERPL-CCNC: 9 U/L (ref 0–32)
ANION GAP SERPL CALCULATED.3IONS-SCNC: 11 MMOL/L (ref 7–16)
ANION GAP SERPL CALCULATED.3IONS-SCNC: 11 MMOL/L (ref 7–16)
ANION GAP SERPL CALCULATED.3IONS-SCNC: 8 MMOL/L (ref 7–16)
AST SERPL-CCNC: 17 U/L (ref 0–31)
BILIRUB SERPL-MCNC: 0.5 MG/DL (ref 0–1.2)
BUN SERPL-MCNC: 9 MG/DL (ref 6–23)
CALCIUM SERPL-MCNC: 8.5 MG/DL (ref 8.6–10.2)
CALCIUM SERPL-MCNC: 8.6 MG/DL (ref 8.6–10.2)
CALCIUM SERPL-MCNC: 8.6 MG/DL (ref 8.6–10.2)
CHLORIDE SERPL-SCNC: 95 MMOL/L (ref 98–107)
CO2 SERPL-SCNC: 22 MMOL/L (ref 22–29)
CO2 SERPL-SCNC: 23 MMOL/L (ref 22–29)
CO2 SERPL-SCNC: 23 MMOL/L (ref 22–29)
CREAT SERPL-MCNC: 0.8 MG/DL (ref 0.5–1)
ERYTHROCYTE [DISTWIDTH] IN BLOOD BY AUTOMATED COUNT: 14.7 FL (ref 11.5–15)
GLUCOSE SERPL-MCNC: 109 MG/DL (ref 74–99)
GLUCOSE SERPL-MCNC: 110 MG/DL (ref 74–99)
GLUCOSE SERPL-MCNC: 135 MG/DL (ref 74–99)
HCT VFR BLD AUTO: 39.3 % (ref 34–48)
HGB BLD-MCNC: 12.8 G/DL (ref 11.5–15.5)
MAGNESIUM SERPL-MCNC: 1.8 MG/DL (ref 1.6–2.6)
MCH RBC QN AUTO: 28.3 PG (ref 26–35)
MCHC RBC AUTO-ENTMCNC: 32.6 % (ref 32–34.5)
MCV RBC AUTO: 86.9 FL (ref 80–99.9)
PHOSPHATE SERPL-MCNC: 2.2 MG/DL (ref 2.5–4.5)
PLATELET # BLD AUTO: 221 E9/L (ref 130–450)
PMV BLD AUTO: 8.8 FL (ref 7–12)
POTASSIUM SERPL-SCNC: 3.4 MMOL/L (ref 3.5–5)
POTASSIUM SERPL-SCNC: 3.4 MMOL/L (ref 3.5–5)
POTASSIUM SERPL-SCNC: 3.5 MMOL/L (ref 3.5–5)
PROT SERPL-MCNC: 6.1 G/DL (ref 6.4–8.3)
RBC # BLD AUTO: 4.52 E12/L (ref 3.5–5.5)
SODIUM SERPL-SCNC: 126 MMOL/L (ref 132–146)
SODIUM SERPL-SCNC: 128 MMOL/L (ref 132–146)
SODIUM SERPL-SCNC: 129 MMOL/L (ref 132–146)
WBC # BLD: 6 E9/L (ref 4.5–11.5)

## 2023-05-23 PROCEDURE — 6360000002 HC RX W HCPCS

## 2023-05-23 PROCEDURE — 6370000000 HC RX 637 (ALT 250 FOR IP)

## 2023-05-23 PROCEDURE — 2580000003 HC RX 258: Performed by: INTERNAL MEDICINE

## 2023-05-23 PROCEDURE — 84100 ASSAY OF PHOSPHORUS: CPT

## 2023-05-23 PROCEDURE — 2700000000 HC OXYGEN THERAPY PER DAY

## 2023-05-23 PROCEDURE — 2580000003 HC RX 258

## 2023-05-23 PROCEDURE — 6360000002 HC RX W HCPCS: Performed by: INTERNAL MEDICINE

## 2023-05-23 PROCEDURE — 99232 SBSQ HOSP IP/OBS MODERATE 35: CPT

## 2023-05-23 PROCEDURE — 85027 COMPLETE CBC AUTOMATED: CPT

## 2023-05-23 PROCEDURE — 6370000000 HC RX 637 (ALT 250 FOR IP): Performed by: INTERNAL MEDICINE

## 2023-05-23 PROCEDURE — 80048 BASIC METABOLIC PNL TOTAL CA: CPT

## 2023-05-23 PROCEDURE — 36415 COLL VENOUS BLD VENIPUNCTURE: CPT

## 2023-05-23 PROCEDURE — 2140000000 HC CCU INTERMEDIATE R&B

## 2023-05-23 PROCEDURE — 80053 COMPREHEN METABOLIC PANEL: CPT

## 2023-05-23 PROCEDURE — 83735 ASSAY OF MAGNESIUM: CPT

## 2023-05-23 RX ORDER — POTASSIUM CHLORIDE 20 MEQ/1
20 TABLET, EXTENDED RELEASE ORAL ONCE
Status: DISCONTINUED | OUTPATIENT
Start: 2023-05-23 | End: 2023-05-26 | Stop reason: HOSPADM

## 2023-05-23 RX ADMIN — LINEZOLID 600 MG: 600 INJECTION, SOLUTION INTRAVENOUS at 05:43

## 2023-05-23 RX ADMIN — ACETAMINOPHEN 650 MG: 325 TABLET ORAL at 12:46

## 2023-05-23 RX ADMIN — MEROPENEM 1000 MG: 1 INJECTION, POWDER, FOR SOLUTION INTRAVENOUS at 10:09

## 2023-05-23 RX ADMIN — DIPHENHYDRAMINE HYDROCHLORIDE 25 MG: 50 INJECTION, SOLUTION INTRAMUSCULAR; INTRAVENOUS at 06:10

## 2023-05-23 RX ADMIN — Medication 250 MG: at 20:29

## 2023-05-23 RX ADMIN — SODIUM CHLORIDE, PRESERVATIVE FREE 10 ML: 5 INJECTION INTRAVENOUS at 10:09

## 2023-05-23 RX ADMIN — MEROPENEM 1000 MG: 1 INJECTION, POWDER, FOR SOLUTION INTRAVENOUS at 17:41

## 2023-05-23 RX ADMIN — LINEZOLID 600 MG: 600 INJECTION, SOLUTION INTRAVENOUS at 17:43

## 2023-05-23 RX ADMIN — MEROPENEM 1000 MG: 1 INJECTION, POWDER, FOR SOLUTION INTRAVENOUS at 02:44

## 2023-05-23 RX ADMIN — ENOXAPARIN SODIUM 40 MG: 100 INJECTION SUBCUTANEOUS at 10:09

## 2023-05-23 RX ADMIN — POTASSIUM BICARBONATE 20 MEQ: 782 TABLET, EFFERVESCENT ORAL at 15:33

## 2023-05-23 RX ADMIN — SODIUM CHLORIDE, PRESERVATIVE FREE 10 ML: 5 INJECTION INTRAVENOUS at 20:30

## 2023-05-23 RX ADMIN — Medication 250 MG: at 17:38

## 2023-05-23 RX ADMIN — ACETAMINOPHEN 650 MG: 325 TABLET ORAL at 05:52

## 2023-05-23 RX ADMIN — Medication 250 MG: at 12:45

## 2023-05-23 ASSESSMENT — PAIN SCALES - GENERAL
PAINLEVEL_OUTOF10: 3
PAINLEVEL_OUTOF10: 0

## 2023-05-23 ASSESSMENT — PAIN DESCRIPTION - ONSET: ONSET: ON-GOING

## 2023-05-23 ASSESSMENT — PAIN - FUNCTIONAL ASSESSMENT: PAIN_FUNCTIONAL_ASSESSMENT: ACTIVITIES ARE NOT PREVENTED

## 2023-05-23 ASSESSMENT — PAIN DESCRIPTION - PAIN TYPE: TYPE: CHRONIC PAIN

## 2023-05-23 ASSESSMENT — PAIN DESCRIPTION - ORIENTATION: ORIENTATION: MID

## 2023-05-23 ASSESSMENT — PAIN DESCRIPTION - FREQUENCY: FREQUENCY: INTERMITTENT

## 2023-05-23 ASSESSMENT — PAIN DESCRIPTION - LOCATION: LOCATION: BACK

## 2023-05-23 ASSESSMENT — PAIN DESCRIPTION - DESCRIPTORS: DESCRIPTORS: ACHING;DISCOMFORT;THROBBING

## 2023-05-23 ASSESSMENT — ENCOUNTER SYMPTOMS
SHORTNESS OF BREATH: 0
ABDOMINAL PAIN: 0

## 2023-05-23 NOTE — PLAN OF CARE
Problem: Chronic Conditions and Co-morbidities  Goal: Patient's chronic conditions and co-morbidity symptoms are monitored and maintained or improved  5/22/2023 2139 by Marivel Hernández RN  Outcome: Progressing  5/22/2023 1508 by Zay Vera RN  Outcome: Progressing     Problem: Discharge Planning  Goal: Discharge to home or other facility with appropriate resources  5/22/2023 2139 by Marivel Hernández RN  Outcome: Progressing  5/22/2023 1508 by Zay Vera RN  Outcome: Progressing

## 2023-05-23 NOTE — CONSULTS
CHF NURSE NAVIGATOR CONSULT NOTE:      Patient currently admitted with diagnosis of diastolic heart failure. Patient was alert and oriented during the consultation with grand daughter Swati Dai present at the bedside. Placement is undetermined at this time, she did agree to Cardiology APRN appointment scheduling. Scheduling with the CHF clinic No: due to uncertainty of patietn condition and plans moving forward this was declined . No future appointments. Barriers to Care:  Contributing risk factors for Heart Failure are identified as possible placement/palliative care. The patient is ordered:  Diet: ADULT DIET; Dysphagia - Pureed   Sodium controlled diet No see above failed swallow study  Fluid restriction daily ordered (fluid restriction recommended if patient is hyponatremic and/or diuretic is initiated or increased) No  FR:   Daily Weights: Patient Vitals for the past 96 hrs (Last 3 readings):   Weight   05/23/23 0248 151 lb 14.4 oz (68.9 kg)   05/22/23 0610 154 lb 9.6 oz (70.1 kg)   05/21/23 0600 154 lb 14.4 oz (70.3 kg)     I/O:   Intake/Output Summary (Last 24 hours) at 5/23/2023 1130  Last data filed at 5/23/2023 0854  Gross per 24 hour   Intake 240 ml   Output 1125 ml   Net -885 ml              We reviewed the introduction to Heart Failure, the HF zones, signs and symptoms to report on day 1 of onset, medications, medication compliance, the importance of obtaining daily weights, following a low sodium diet, reading food labels for the sodium content, keeping physician appointments, and smoking cessation. We discussed writing / tracking daily weights on a calendar / log because a 5 pound gain in 1 week can sneak up if you are not tracking it. I advised patient they can reduce the risk for Heart Failure exacerbations by modifying / controlling the risk factors.  We discussed self-managed care which includes the following:  to take medications as prescribed, report any intolerable side

## 2023-05-23 NOTE — DISCHARGE INSTR - COC
Continuity of Care Form    Patient Name: Dick Queen   :  1926  MRN:  [id number]    Admit date:  2023  Discharge date:  ***    Code Status Order: DNR-CCA   Advance Directives:     Admitting Physician:  Edwin Sargent MD  PCP: Xochitl Prealta MD    Discharging Nurse: Anton Ruiz Unit/Room#: 4721/6015-R  Discharging Unit Phone Number: ***    Emergency Contact:   Extended Emergency Contact Information  Primary Emergency Contact: Kerri Banuelos  Address: [facility], [facility] [address] Phone: [phone]  Mobile Phone: [phone]  Relation: Child  Secondary Emergency Contact: Chris Banuelos  Address: [address] [place] [address], [facility] [address] [facility]  Mobile Phone: [phone]  Relation: Grandchild    Past Surgical History:  Past Surgical History:   Procedure Laterality Date    APPENDECTOMY      BACK SURGERY      cement t12, l1, l2-oct 2020    HYSTERECTOMY (CERVIX STATUS UNKNOWN)      KYPHOSIS SURGERY N/A 10/10/2020    T12, L1 and L2  KYPHOPLASTY performed by Chyna Gay MD at [address] [place] [address]       Immunization History:   Immunization History   Administered Date(s) Administered    COVID-19, J&J, (age 18y+), IM, 0.5 mL 2021       Active Problems:  Patient Active Problem List   Diagnosis Code    ESBL (extended spectrum beta-lactamase) producing bacteria infection A49.9, Z16.12    Compression fx, thoracic spine, closed, initial encounter ([facility] [address] S22.000A    Intractable back pain M54.9    Lumbar radiculopathy B63.81    Complicated UTI (urinary tract infection) N39.0    Acute respiratory failure with hypoxia (HCC) J96.01    Acute congestive heart failure (HCC) I50.9    Aortic valve disease I35.9    Left bundle branch block I44.7    Pleural effusion on left J90    Compression of lumbar vertebra ([facility]) S32.000A    Stage 4 chronic kidney disease ([facility] [address] N18.4    Hyponatremia E87.1       Isolation/Infection:   Isolation

## 2023-05-23 NOTE — CARE COORDINATION
Care Coordination  The patient was admitted from [facility]  and her plan is to return there once medically stable. Pt [facility] 9/24, ot [facility] 9/24. Will need precert started once more medically stable. The patient was admitted due to a uti and Dr Jean Claude burris Id is following and she is on Iv Merrem 1 gm iv q8 hrs and Iv zyvox 600 mg iv q12 hrs. This am her sodium is 126 and her potassium is 3.4. nephrology is following the patient. Return envelope done. Per nephrology the patient is on Ivf at 76 and monitor labs daily.

## 2023-05-23 NOTE — PLAN OF CARE
Problem: Chronic Conditions and Co-morbidities  Goal: Patient's chronic conditions and co-morbidity symptoms are monitored and maintained or improved  5/23/2023 0753 by Nnamdi Reardon RN  Outcome: Progressing     Problem: Discharge Planning  Goal: Discharge to home or other facility with appropriate resources  5/23/2023 0753 by Nnamdi Reardon RN  Outcome: Progressing     Problem: Skin/Tissue Integrity  Goal: Absence of new skin breakdown  Description: 1. Monitor for areas of redness and/or skin breakdown  2. Assess vascular access sites hourly  3. Every 4-6 hours minimum:  Change oxygen saturation probe site  4. Every 4-6 hours:  If on nasal continuous positive airway pressure, respiratory therapy assess nares and determine need for appliance change or resting period.   Outcome: Progressing     Problem: Safety - Adult  Goal: Free from fall injury  Outcome: Progressing

## 2023-05-23 NOTE — PATIENT CARE CONFERENCE
P Quality Flow/Interdisciplinary Rounds Progress Note        Quality Flow Rounds held on May 23, 2023    Disciplines Attending:  Bedside Nurse, , , and Nursing Unit Leadership    Wilfred Mcclain was admitted on 5/19/2023  3:29 AM    Anticipated Discharge Date:       Disposition:    Gumaro Score:  Gumaro Scale Score: 17    Readmission Risk              Risk of Unplanned Readmission:  23           Discussed patient goal for the day, patient clinical progression, and barriers to discharge.   The following Goal(s) of the Day/Commitment(s) have been identified:  Report labs/diagnostics      Anastacio Yu RN  May 23, 2023

## 2023-05-24 ENCOUNTER — APPOINTMENT (OUTPATIENT)
Dept: CT IMAGING | Age: 88
End: 2023-05-24
Payer: MEDICARE

## 2023-05-24 LAB
ALBUMIN SERPL-MCNC: 3.1 G/DL (ref 3.5–5.2)
ALP SERPL-CCNC: 128 U/L (ref 35–104)
ALT SERPL-CCNC: 10 U/L (ref 0–32)
ANION GAP SERPL CALCULATED.3IONS-SCNC: 10 MMOL/L (ref 7–16)
ANION GAP SERPL CALCULATED.3IONS-SCNC: 17 MMOL/L (ref 7–16)
ANION GAP SERPL CALCULATED.3IONS-SCNC: 17 MMOL/L (ref 7–16)
AST SERPL-CCNC: 20 U/L (ref 0–31)
BACTERIA BLD CULT ORG #2: NORMAL
BACTERIA BLD CULT: NORMAL
BASOPHILS # BLD: 0.04 E9/L (ref 0–0.2)
BASOPHILS NFR BLD: 0.5 % (ref 0–2)
BILIRUB SERPL-MCNC: 0.6 MG/DL (ref 0–1.2)
BUN SERPL-MCNC: 8 MG/DL (ref 6–23)
BUN SERPL-MCNC: 8 MG/DL (ref 6–23)
BUN SERPL-MCNC: 9 MG/DL (ref 6–23)
CALCIUM SERPL-MCNC: 8.6 MG/DL (ref 8.6–10.2)
CALCIUM SERPL-MCNC: 9 MG/DL (ref 8.6–10.2)
CALCIUM SERPL-MCNC: 9 MG/DL (ref 8.6–10.2)
CHLORIDE SERPL-SCNC: 91 MMOL/L (ref 98–107)
CHLORIDE SERPL-SCNC: 93 MMOL/L (ref 98–107)
CHLORIDE SERPL-SCNC: 93 MMOL/L (ref 98–107)
CO2 SERPL-SCNC: 19 MMOL/L (ref 22–29)
CO2 SERPL-SCNC: 22 MMOL/L (ref 22–29)
CO2 SERPL-SCNC: 23 MMOL/L (ref 22–29)
CREAT SERPL-MCNC: 0.7 MG/DL (ref 0.5–1)
EOSINOPHIL # BLD: 0.1 E9/L (ref 0.05–0.5)
EOSINOPHIL NFR BLD: 1.3 % (ref 0–6)
ERYTHROCYTE [DISTWIDTH] IN BLOOD BY AUTOMATED COUNT: 14.8 FL (ref 11.5–15)
GLUCOSE SERPL-MCNC: 121 MG/DL (ref 74–99)
GLUCOSE SERPL-MCNC: 124 MG/DL (ref 74–99)
GLUCOSE SERPL-MCNC: 140 MG/DL (ref 74–99)
HCT VFR BLD AUTO: 43.5 % (ref 34–48)
HGB BLD-MCNC: 14.3 G/DL (ref 11.5–15.5)
IMM GRANULOCYTES # BLD: 0.03 E9/L
IMM GRANULOCYTES NFR BLD: 0.4 % (ref 0–5)
LYMPHOCYTES # BLD: 1.17 E9/L (ref 1.5–4)
LYMPHOCYTES NFR BLD: 15.5 % (ref 20–42)
MAGNESIUM SERPL-MCNC: 1.6 MG/DL (ref 1.6–2.6)
MCH RBC QN AUTO: 28.2 PG (ref 26–35)
MCHC RBC AUTO-ENTMCNC: 32.9 % (ref 32–34.5)
MCV RBC AUTO: 85.8 FL (ref 80–99.9)
MONOCYTES # BLD: 0.85 E9/L (ref 0.1–0.95)
MONOCYTES NFR BLD: 11.3 % (ref 2–12)
NEUTROPHILS # BLD: 5.35 E9/L (ref 1.8–7.3)
NEUTS SEG NFR BLD: 71 % (ref 43–80)
PHOSPHATE SERPL-MCNC: 2.6 MG/DL (ref 2.5–4.5)
PLATELET # BLD AUTO: 212 E9/L (ref 130–450)
PMV BLD AUTO: 9.1 FL (ref 7–12)
POTASSIUM SERPL-SCNC: 3.6 MMOL/L (ref 3.5–5)
POTASSIUM SERPL-SCNC: 3.8 MMOL/L (ref 3.5–5)
POTASSIUM SERPL-SCNC: 3.9 MMOL/L (ref 3.5–5)
PROT SERPL-MCNC: 6.5 G/DL (ref 6.4–8.3)
RBC # BLD AUTO: 5.07 E12/L (ref 3.5–5.5)
REASON FOR REJECTION: NORMAL
REJECTED TEST: NORMAL
SODIUM SERPL-SCNC: 126 MMOL/L (ref 132–146)
SODIUM SERPL-SCNC: 129 MMOL/L (ref 132–146)
SODIUM SERPL-SCNC: 130 MMOL/L (ref 132–146)
WBC # BLD: 7.5 E9/L (ref 4.5–11.5)

## 2023-05-24 PROCEDURE — 36415 COLL VENOUS BLD VENIPUNCTURE: CPT

## 2023-05-24 PROCEDURE — 6360000002 HC RX W HCPCS: Performed by: INTERNAL MEDICINE

## 2023-05-24 PROCEDURE — 80053 COMPREHEN METABOLIC PANEL: CPT

## 2023-05-24 PROCEDURE — 2580000003 HC RX 258: Performed by: INTERNAL MEDICINE

## 2023-05-24 PROCEDURE — 80048 BASIC METABOLIC PNL TOTAL CA: CPT

## 2023-05-24 PROCEDURE — 6360000002 HC RX W HCPCS: Performed by: FAMILY MEDICINE

## 2023-05-24 PROCEDURE — 6370000000 HC RX 637 (ALT 250 FOR IP): Performed by: INTERNAL MEDICINE

## 2023-05-24 PROCEDURE — 84100 ASSAY OF PHOSPHORUS: CPT

## 2023-05-24 PROCEDURE — 83735 ASSAY OF MAGNESIUM: CPT

## 2023-05-24 PROCEDURE — 85025 COMPLETE CBC W/AUTO DIFF WBC: CPT

## 2023-05-24 PROCEDURE — 2700000000 HC OXYGEN THERAPY PER DAY

## 2023-05-24 PROCEDURE — 6360000002 HC RX W HCPCS

## 2023-05-24 PROCEDURE — 70450 CT HEAD/BRAIN W/O DYE: CPT

## 2023-05-24 PROCEDURE — 2140000000 HC CCU INTERMEDIATE R&B

## 2023-05-24 RX ORDER — METHYLPREDNISOLONE SODIUM SUCCINATE 125 MG/2ML
100 INJECTION, POWDER, LYOPHILIZED, FOR SOLUTION INTRAMUSCULAR; INTRAVENOUS ONCE
Status: COMPLETED | OUTPATIENT
Start: 2023-05-24 | End: 2023-05-24

## 2023-05-24 RX ORDER — HYDRALAZINE HYDROCHLORIDE 20 MG/ML
10 INJECTION INTRAMUSCULAR; INTRAVENOUS EVERY 4 HOURS PRN
Status: DISCONTINUED | OUTPATIENT
Start: 2023-05-24 | End: 2023-05-26 | Stop reason: HOSPADM

## 2023-05-24 RX ADMIN — LINEZOLID 600 MG: 600 INJECTION, SOLUTION INTRAVENOUS at 17:19

## 2023-05-24 RX ADMIN — ENOXAPARIN SODIUM 40 MG: 100 INJECTION SUBCUTANEOUS at 08:44

## 2023-05-24 RX ADMIN — MEROPENEM 1000 MG: 1 INJECTION, POWDER, FOR SOLUTION INTRAVENOUS at 03:24

## 2023-05-24 RX ADMIN — MEROPENEM 1000 MG: 1 INJECTION, POWDER, FOR SOLUTION INTRAVENOUS at 18:44

## 2023-05-24 RX ADMIN — MEROPENEM 1000 MG: 1 INJECTION, POWDER, FOR SOLUTION INTRAVENOUS at 08:48

## 2023-05-24 RX ADMIN — SODIUM CHLORIDE, PRESERVATIVE FREE 10 ML: 5 INJECTION INTRAVENOUS at 08:44

## 2023-05-24 RX ADMIN — ACETAMINOPHEN 650 MG: 325 TABLET ORAL at 08:44

## 2023-05-24 RX ADMIN — HYDRALAZINE HYDROCHLORIDE 10 MG: 20 INJECTION INTRAMUSCULAR; INTRAVENOUS at 15:09

## 2023-05-24 RX ADMIN — DIPHENHYDRAMINE HYDROCHLORIDE 25 MG: 50 INJECTION, SOLUTION INTRAMUSCULAR; INTRAVENOUS at 17:19

## 2023-05-24 RX ADMIN — LINEZOLID 600 MG: 600 INJECTION, SOLUTION INTRAVENOUS at 05:28

## 2023-05-24 RX ADMIN — METHYLPREDNISOLONE SODIUM SUCCINATE 100 MG: 125 INJECTION INTRAMUSCULAR; INTRAVENOUS at 18:35

## 2023-05-24 RX ADMIN — SODIUM CHLORIDE, PRESERVATIVE FREE 10 ML: 5 INJECTION INTRAVENOUS at 20:00

## 2023-05-24 ASSESSMENT — PAIN DESCRIPTION - LOCATION: LOCATION: BACK

## 2023-05-24 ASSESSMENT — PAIN DESCRIPTION - DESCRIPTORS: DESCRIPTORS: DISCOMFORT;ACHING;THROBBING

## 2023-05-24 ASSESSMENT — ENCOUNTER SYMPTOMS
SHORTNESS OF BREATH: 0
ABDOMINAL PAIN: 0

## 2023-05-24 ASSESSMENT — PAIN SCALES - GENERAL
PAINLEVEL_OUTOF10: 3
PAINLEVEL_OUTOF10: 0

## 2023-05-24 ASSESSMENT — PAIN DESCRIPTION - ORIENTATION: ORIENTATION: MID

## 2023-05-24 NOTE — PATIENT CARE CONFERENCE
P Quality Flow/Interdisciplinary Rounds Progress Note        Quality Flow Rounds held on May 24, 2023    Disciplines Attending:  Bedside Nurse, , , and Nursing Unit Leadership    Josephine Orozco was admitted on 5/19/2023  3:29 AM    Anticipated Discharge Date:       Disposition:    Gumaro Score:  Gumaro Scale Score: 16    Readmission Risk              Risk of Unplanned Readmission:  22           Discussed patient goal for the day, patient clinical progression, and barriers to discharge.   The following Goal(s) of the Day/Commitment(s) have been identified:  discharge Jorge Erickson, RN  May 24, 2023

## 2023-05-24 NOTE — PLAN OF CARE
Problem: Chronic Conditions and Co-morbidities  Goal: Patient's chronic conditions and co-morbidity symptoms are monitored and maintained or improved  5/24/2023 1912 by Nara Villalobos RN  Outcome: Progressing  5/24/2023 0743 by Chris Devine RN  Outcome: Progressing     Problem: Discharge Planning  Goal: Discharge to home or other facility with appropriate resources  5/24/2023 1912 by Nara Villalobos RN  Outcome: Progressing  5/24/2023 0743 by Chris Devine RN  Outcome: Progressing     Problem: ABCDS Injury Assessment  Goal: Absence of physical injury  Outcome: Progressing     Problem: Pain  Goal: Verbalizes/displays adequate comfort level or baseline comfort level  Recent Flowsheet Documentation  Taken 5/24/2023 1910 by Nara Villalobos RN  Verbalizes/displays adequate comfort level or baseline comfort level: Encourage patient to monitor pain and request assistance

## 2023-05-24 NOTE — PLAN OF CARE
Problem: Chronic Conditions and Co-morbidities  Goal: Patient's chronic conditions and co-morbidity symptoms are monitored and maintained or improved  5/24/2023 0743 by Johnathon Carrera RN  Outcome: Progressing     Problem: Discharge Planning  Goal: Discharge to home or other facility with appropriate resources  5/24/2023 0743 by Johnathon Carrera RN  Outcome: Progressing     Problem: Skin/Tissue Integrity  Goal: Absence of new skin breakdown  Description: 1. Monitor for areas of redness and/or skin breakdown  2. Assess vascular access sites hourly  3. Every 4-6 hours minimum:  Change oxygen saturation probe site  4. Every 4-6 hours:  If on nasal continuous positive airway pressure, respiratory therapy assess nares and determine need for appliance change or resting period.   5/24/2023 0743 by Johnathon Carrera RN  Outcome: Progressing

## 2023-05-24 NOTE — ACP (ADVANCE CARE PLANNING)
Advance Care Planning   Healthcare Decision Maker:    Primary Decision Maker: BenjieKerri wolf Susie - [phone]    Secondary Decision Maker: Olivier Gonzalez - [phone]    Click here to complete Healthcare Decision Makers including selection of the Healthcare Decision Maker Relationship (ie \"Primary\").

## 2023-05-24 NOTE — CONSULTS
GENERAL SURGERY  CONSULT NOTE  5/24/2023    Physician Consulted: Dr. Sanchez Home   Reason for Consult: \"swallowing issues and feeding options\"   Referring Physician: Dr. Renata Quinn     [facility]  Pranav Reddy is a 80 y.o. female w hx of CAD, chronic UTI admitted from her facility for evaluation of dysphasia. When she initially presented to the ED 5/19 she was confused and had not been tolerating a diet. She was found to have a Na 112 and UTI and admitted to the MICU. General surgery was consulted today for further evaluation of dysphagia and discussed feeding options. The patient was seen and examined noted to be aphasic. Per nursing the patient acutely became aphasic this morning and had been talking just the day prior. STAT CT head was ordered, showing no acute abnormalities. Nursing had also noted worsening tongue and throat swelling compared to the day prior. Per the daughter and nursing, the patient had a reaction to cefepime that she was on and received benadryl at that time, and once yesterday. The patient's daughter denies any other new medications. The patient is tolerating her secretions and dental swabs but unable to tolerate any liquids. Per the patient's daughter she has been eating less and less over the past few weeks and has had some emesis with meals. She states she does not want any sort or PEG or feeding tube for her mother. 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 CXR 5/19 showing unchanged diaphragmatic hernia seen on CTAP 3/2023, type 4 hiatal hernia with colon involvement.        Past Medical History:   Diagnosis Date    Arthritis     Compression fracture of body of thoracic vertebra (HCC)     Hypertension     Myocardial infarct [facility])     UTI (urinary tract infection)        Past Surgical History:   Procedure Laterality Date    APPENDECTOMY      BACK SURGERY      cement t12, l1, l2-oct 2020    HYSTERECTOMY (CERVIX

## 2023-05-24 NOTE — PLAN OF CARE
Problem: Chronic Conditions and Co-morbidities  Goal: Patient's chronic conditions and co-morbidity symptoms are monitored and maintained or improved  5/23/2023 2027 by Kiran Willoughby RN  Outcome: Progressing  5/23/2023 0753 by Efrain Renee RN  Outcome: Progressing     Problem: Discharge Planning  Goal: Discharge to home or other facility with appropriate resources  5/23/2023 2027 by Kiran Willoughby RN  Outcome: Progressing  5/23/2023 0753 by Efrain Renee RN  Outcome: Progressing

## 2023-05-25 PROBLEM — K22.2 ESOPHAGEAL STRICTURE: Status: ACTIVE | Noted: 2023-05-25

## 2023-05-25 PROBLEM — I44.0 FIRST DEGREE ATRIOVENTRICULAR BLOCK: Status: ACTIVE | Noted: 2023-05-25

## 2023-05-25 PROBLEM — E87.20 LACTIC ACIDOSIS: Status: ACTIVE | Noted: 2023-05-25

## 2023-05-25 PROBLEM — I48.0 PAROXYSMAL ATRIAL FIBRILLATION (HCC): Status: ACTIVE | Noted: 2023-05-25

## 2023-05-25 PROBLEM — I50.32 CHRONIC DIASTOLIC (CONGESTIVE) HEART FAILURE (HCC): Status: ACTIVE | Noted: 2023-05-25

## 2023-05-25 PROBLEM — E87.6 HYPOKALEMIA: Status: ACTIVE | Noted: 2023-05-25

## 2023-05-25 LAB
ALBUMIN SERPL-MCNC: 3.7 G/DL (ref 3.5–5.2)
ALP SERPL-CCNC: 156 U/L (ref 35–104)
ALT SERPL-CCNC: 13 U/L (ref 0–32)
ANION GAP SERPL CALCULATED.3IONS-SCNC: 15 MMOL/L (ref 7–16)
ANION GAP SERPL CALCULATED.3IONS-SCNC: 16 MMOL/L (ref 7–16)
ANION GAP SERPL CALCULATED.3IONS-SCNC: 23 MMOL/L (ref 7–16)
AST SERPL-CCNC: 18 U/L (ref 0–31)
BASOPHILS # BLD: 0.01 E9/L (ref 0–0.2)
BASOPHILS NFR BLD: 0.3 % (ref 0–2)
BILIRUB SERPL-MCNC: 0.7 MG/DL (ref 0–1.2)
BUN SERPL-MCNC: 11 MG/DL (ref 6–23)
BUN SERPL-MCNC: 13 MG/DL (ref 6–23)
BUN SERPL-MCNC: 9 MG/DL (ref 6–23)
CALCIUM SERPL-MCNC: 8.9 MG/DL (ref 8.6–10.2)
CALCIUM SERPL-MCNC: 9.1 MG/DL (ref 8.6–10.2)
CALCIUM SERPL-MCNC: 9.3 MG/DL (ref 8.6–10.2)
CHLORIDE SERPL-SCNC: 85 MMOL/L (ref 98–107)
CHLORIDE SERPL-SCNC: 90 MMOL/L (ref 98–107)
CHLORIDE SERPL-SCNC: 92 MMOL/L (ref 98–107)
CO2 SERPL-SCNC: 19 MMOL/L (ref 22–29)
CO2 SERPL-SCNC: 20 MMOL/L (ref 22–29)
CO2 SERPL-SCNC: 24 MMOL/L (ref 22–29)
CREAT SERPL-MCNC: 0.6 MG/DL (ref 0.5–1)
CREAT SERPL-MCNC: 0.7 MG/DL (ref 0.5–1)
CREAT SERPL-MCNC: 0.7 MG/DL (ref 0.5–1)
EKG ATRIAL RATE: 136 BPM
EKG ATRIAL RATE: 159 BPM
EKG ATRIAL RATE: 51 BPM
EKG ATRIAL RATE: 53 BPM
EKG Q-T INTERVAL: 322 MS
EKG Q-T INTERVAL: 356 MS
EKG Q-T INTERVAL: 408 MS
EKG Q-T INTERVAL: 428 MS
EKG QRS DURATION: 166 MS
EKG QRS DURATION: 168 MS
EKG QRS DURATION: 172 MS
EKG QRS DURATION: 226 MS
EKG QTC CALCULATION (BAZETT): 568 MS
EKG QTC CALCULATION (BAZETT): 582 MS
EKG QTC CALCULATION (BAZETT): 588 MS
EKG QTC CALCULATION (BAZETT): 596 MS
EKG R AXIS: -42 DEGREES
EKG R AXIS: -43 DEGREES
EKG T AXIS: -5 DEGREES
EKG T AXIS: 123 DEGREES
EKG T AXIS: 135 DEGREES
EKG T AXIS: 137 DEGREES
EKG VENTRICULAR RATE: 111 BPM
EKG VENTRICULAR RATE: 125 BPM
EKG VENTRICULAR RATE: 153 BPM
EKG VENTRICULAR RATE: 206 BPM
EOSINOPHIL # BLD: 0 E9/L (ref 0.05–0.5)
EOSINOPHIL NFR BLD: 0 % (ref 0–6)
ERYTHROCYTE [DISTWIDTH] IN BLOOD BY AUTOMATED COUNT: 15 FL (ref 11.5–15)
GLUCOSE SERPL-MCNC: 117 MG/DL (ref 74–99)
GLUCOSE SERPL-MCNC: 152 MG/DL (ref 74–99)
GLUCOSE SERPL-MCNC: 191 MG/DL (ref 74–99)
HCT VFR BLD AUTO: 48 % (ref 34–48)
HGB BLD-MCNC: 15.8 G/DL (ref 11.5–15.5)
IMM GRANULOCYTES # BLD: 0.06 E9/L
IMM GRANULOCYTES NFR BLD: 1.6 % (ref 0–5)
LACTATE BLDV-SCNC: 9.5 MMOL/L (ref 0.5–2.2)
LYMPHOCYTES # BLD: 1.21 E9/L (ref 1.5–4)
LYMPHOCYTES NFR BLD: 32.3 % (ref 20–42)
MAGNESIUM SERPL-MCNC: 1.8 MG/DL (ref 1.6–2.6)
MCH RBC QN AUTO: 28.3 PG (ref 26–35)
MCHC RBC AUTO-ENTMCNC: 32.9 % (ref 32–34.5)
MCV RBC AUTO: 86 FL (ref 80–99.9)
METER GLUCOSE: 180 MG/DL (ref 74–99)
MONOCYTES # BLD: 0.15 E9/L (ref 0.1–0.95)
MONOCYTES NFR BLD: 4 % (ref 2–12)
NEUTROPHILS # BLD: 2.32 E9/L (ref 1.8–7.3)
NEUTS SEG NFR BLD: 61.8 % (ref 43–80)
PHOSPHATE SERPL-MCNC: 4.1 MG/DL (ref 2.5–4.5)
PLATELET # BLD AUTO: 293 E9/L (ref 130–450)
PMV BLD AUTO: 9.3 FL (ref 7–12)
POTASSIUM SERPL-SCNC: 2.6 MMOL/L (ref 3.5–5)
POTASSIUM SERPL-SCNC: 3.3 MMOL/L (ref 3.5–5)
POTASSIUM SERPL-SCNC: 3.9 MMOL/L (ref 3.5–5)
PROT SERPL-MCNC: 7.4 G/DL (ref 6.4–8.3)
RBC # BLD AUTO: 5.58 E12/L (ref 3.5–5.5)
SODIUM SERPL-SCNC: 127 MMOL/L (ref 132–146)
SODIUM SERPL-SCNC: 128 MMOL/L (ref 132–146)
SODIUM SERPL-SCNC: 129 MMOL/L (ref 132–146)
TROPONIN, HIGH SENSITIVITY: 40 NG/L (ref 0–9)
WBC # BLD: 3.8 E9/L (ref 4.5–11.5)

## 2023-05-25 PROCEDURE — 2700000000 HC OXYGEN THERAPY PER DAY

## 2023-05-25 PROCEDURE — 6360000002 HC RX W HCPCS: Performed by: FAMILY MEDICINE

## 2023-05-25 PROCEDURE — 6360000002 HC RX W HCPCS

## 2023-05-25 PROCEDURE — 83735 ASSAY OF MAGNESIUM: CPT

## 2023-05-25 PROCEDURE — 93005 ELECTROCARDIOGRAM TRACING: CPT

## 2023-05-25 PROCEDURE — 84484 ASSAY OF TROPONIN QUANT: CPT

## 2023-05-25 PROCEDURE — 82962 GLUCOSE BLOOD TEST: CPT

## 2023-05-25 PROCEDURE — 93010 ELECTROCARDIOGRAM REPORT: CPT | Performed by: INTERNAL MEDICINE

## 2023-05-25 PROCEDURE — 31575 DIAGNOSTIC LARYNGOSCOPY: CPT | Performed by: OTOLARYNGOLOGY

## 2023-05-25 PROCEDURE — 6360000002 HC RX W HCPCS: Performed by: INTERNAL MEDICINE

## 2023-05-25 PROCEDURE — 80048 BASIC METABOLIC PNL TOTAL CA: CPT

## 2023-05-25 PROCEDURE — 36415 COLL VENOUS BLD VENIPUNCTURE: CPT

## 2023-05-25 PROCEDURE — 2140000000 HC CCU INTERMEDIATE R&B

## 2023-05-25 PROCEDURE — 99223 1ST HOSP IP/OBS HIGH 75: CPT | Performed by: INTERNAL MEDICINE

## 2023-05-25 PROCEDURE — 85025 COMPLETE CBC W/AUTO DIFF WBC: CPT

## 2023-05-25 PROCEDURE — 84100 ASSAY OF PHOSPHORUS: CPT

## 2023-05-25 PROCEDURE — 2580000003 HC RX 258: Performed by: INTERNAL MEDICINE

## 2023-05-25 PROCEDURE — 2500000003 HC RX 250 WO HCPCS

## 2023-05-25 PROCEDURE — 93005 ELECTROCARDIOGRAM TRACING: CPT | Performed by: FAMILY MEDICINE

## 2023-05-25 PROCEDURE — 83605 ASSAY OF LACTIC ACID: CPT

## 2023-05-25 PROCEDURE — 99232 SBSQ HOSP IP/OBS MODERATE 35: CPT

## 2023-05-25 PROCEDURE — 99221 1ST HOSP IP/OBS SF/LOW 40: CPT | Performed by: OTOLARYNGOLOGY

## 2023-05-25 PROCEDURE — 93005 ELECTROCARDIOGRAM TRACING: CPT | Performed by: INTERNAL MEDICINE

## 2023-05-25 PROCEDURE — 99221 1ST HOSP IP/OBS SF/LOW 40: CPT | Performed by: SURGERY

## 2023-05-25 PROCEDURE — 80053 COMPREHEN METABOLIC PANEL: CPT

## 2023-05-25 RX ORDER — METOPROLOL TARTRATE 5 MG/5ML
2.5 INJECTION INTRAVENOUS ONCE
Status: COMPLETED | OUTPATIENT
Start: 2023-05-25 | End: 2023-05-25

## 2023-05-25 RX ORDER — METOPROLOL TARTRATE 5 MG/5ML
INJECTION INTRAVENOUS
Status: DISPENSED
Start: 2023-05-25 | End: 2023-05-25

## 2023-05-25 RX ORDER — DIGOXIN 0.25 MG/ML
250 INJECTION INTRAMUSCULAR; INTRAVENOUS ONCE
Status: COMPLETED | OUTPATIENT
Start: 2023-05-25 | End: 2023-05-25

## 2023-05-25 RX ORDER — POTASSIUM CHLORIDE 7.45 MG/ML
10 INJECTION INTRAVENOUS ONCE
Status: COMPLETED | OUTPATIENT
Start: 2023-05-25 | End: 2023-05-25

## 2023-05-25 RX ORDER — SODIUM CHLORIDE AND POTASSIUM CHLORIDE 300; 900 MG/100ML; MG/100ML
INJECTION, SOLUTION INTRAVENOUS CONTINUOUS
Status: DISCONTINUED | OUTPATIENT
Start: 2023-05-25 | End: 2023-05-26 | Stop reason: HOSPADM

## 2023-05-25 RX ADMIN — TRIMETHOBENZAMIDE HYDROCHLORIDE 200 MG: 100 INJECTION INTRAMUSCULAR at 07:01

## 2023-05-25 RX ADMIN — LINEZOLID 600 MG: 600 INJECTION, SOLUTION INTRAVENOUS at 05:46

## 2023-05-25 RX ADMIN — HYDRALAZINE HYDROCHLORIDE 10 MG: 20 INJECTION INTRAMUSCULAR; INTRAVENOUS at 00:39

## 2023-05-25 RX ADMIN — METOPROLOL TARTRATE 2.5 MG: 1 INJECTION, SOLUTION INTRAVENOUS at 07:02

## 2023-05-25 RX ADMIN — MEROPENEM 1000 MG: 1 INJECTION, POWDER, FOR SOLUTION INTRAVENOUS at 10:42

## 2023-05-25 RX ADMIN — SODIUM CHLORIDE, PRESERVATIVE FREE 10 ML: 5 INJECTION INTRAVENOUS at 20:31

## 2023-05-25 RX ADMIN — POTASSIUM CHLORIDE AND SODIUM CHLORIDE: 900; 300 INJECTION, SOLUTION INTRAVENOUS at 20:30

## 2023-05-25 RX ADMIN — DIGOXIN 250 MCG: 250 INJECTION, SOLUTION INTRAMUSCULAR; INTRAVENOUS at 10:37

## 2023-05-25 RX ADMIN — LORAZEPAM 0.5 MG: 2 INJECTION INTRAMUSCULAR; INTRAVENOUS at 21:10

## 2023-05-25 RX ADMIN — METOPROLOL TARTRATE 2.5 MG: 1 INJECTION, SOLUTION INTRAVENOUS at 07:12

## 2023-05-25 RX ADMIN — MEROPENEM 1000 MG: 1 INJECTION, POWDER, FOR SOLUTION INTRAVENOUS at 19:00

## 2023-05-25 RX ADMIN — ONDANSETRON 4 MG: 2 INJECTION INTRAMUSCULAR; INTRAVENOUS at 18:57

## 2023-05-25 RX ADMIN — ENOXAPARIN SODIUM 40 MG: 100 INJECTION SUBCUTANEOUS at 08:21

## 2023-05-25 RX ADMIN — POTASSIUM CHLORIDE AND SODIUM CHLORIDE: 900; 300 INJECTION, SOLUTION INTRAVENOUS at 08:21

## 2023-05-25 RX ADMIN — POTASSIUM CHLORIDE 10 MEQ: 7.46 INJECTION, SOLUTION INTRAVENOUS at 12:43

## 2023-05-25 RX ADMIN — SODIUM CHLORIDE, PRESERVATIVE FREE 10 ML: 5 INJECTION INTRAVENOUS at 08:21

## 2023-05-25 RX ADMIN — LINEZOLID 600 MG: 600 INJECTION, SOLUTION INTRAVENOUS at 17:36

## 2023-05-25 RX ADMIN — MEROPENEM 1000 MG: 1 INJECTION, POWDER, FOR SOLUTION INTRAVENOUS at 03:42

## 2023-05-25 RX ADMIN — METOPROLOL TARTRATE 2.5 MG: 1 INJECTION, SOLUTION INTRAVENOUS at 06:53

## 2023-05-25 ASSESSMENT — ENCOUNTER SYMPTOMS
ABDOMINAL PAIN: 0
TROUBLE SWALLOWING: 1
SHORTNESS OF BREATH: 0

## 2023-05-25 NOTE — CONSULTS
[facility]  CONSULT NOTE  ATTENDING NOTE      Physician Consulted: Dr. Guillermo Marsh   Reason for Consult: \"swallowing issues and feeding options\"   Referring Physician: Dr. Yvon COHN  Portia Garza is a 80 y.o. female w hx of CAD, chronic UTI admitted from her facility for evaluation of dysphasia. When she initially presented to the ED 5/19 she was confused and had not been tolerating a diet. She was found to have a Na 112 and UTI and admitted to the MICU. General surgery was consulted today for further evaluation of dysphagia and discussed feeding options. The patient was seen and examined noted to be aphasic. Per nursing the patient acutely became aphasic this morning and had been talking just the day prior. STAT CT head was ordered, showing no acute abnormalities. Nursing had also noted worsening tongue and throat swelling compared to the day prior. Per the daughter and nursing, the patient had a reaction to cefepime that she was on and received benadryl at that time, and once yesterday. The patient's daughter denies any other new medications. The patient is tolerating her secretions and dental swabs but unable to tolerate any liquids. Per the patient's daughter she has been eating less and less over the past few weeks and has had some emesis with meals. She states she does not want any sort or PEG or feeding tube for her mother. 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 CXR 5/19 showing unchanged diaphragmatic hernia seen on CTAP 3/2023, type 4 hiatal hernia with colon involvement.          Past Medical History        Past Medical History:   Diagnosis Date    Arthritis      Compression fracture of body of thoracic vertebra (HCC)      Hypertension      Myocardial infarct [facility])      UTI (urinary tract infection)              Past Surgical History         Past Surgical History:   Procedure

## 2023-05-25 NOTE — PLAN OF CARE
Problem: Chronic Conditions and Co-morbidities  Goal: Patient's chronic conditions and co-morbidity symptoms are monitored and maintained or improved  5/25/2023 1337 by Kris Robbins RN  Outcome: Progressing     Problem: Discharge Planning  Goal: Discharge to home or other facility with appropriate resources  Outcome: Progressing     Problem: Skin/Tissue Integrity  Goal: Absence of new skin breakdown  Description: 1. Monitor for areas of redness and/or skin breakdown  2. Assess vascular access sites hourly  3. Every 4-6 hours minimum:  Change oxygen saturation probe site  4. Every 4-6 hours:  If on nasal continuous positive airway pressure, respiratory therapy assess nares and determine need for appliance change or resting period.   5/25/2023 1337 by Kris Robbins RN  Outcome: Progressing     Problem: ABCDS Injury Assessment  Goal: Absence of physical injury  Outcome: Progressing  Flowsheets (Taken 5/25/2023 0029 by Hannah Salinas RN)  Absence of Physical Injury: Implement safety measures based on patient assessment     Problem: Safety - Adult  Goal: Free from fall injury  5/25/2023 1337 by Kris Robbins RN  Outcome: Progressing  Flowsheets (Taken 5/25/2023 1334)  Free From Fall Injury: Instruct family/caregiver on patient safety     Problem: Pain  Goal: Verbalizes/displays adequate comfort level or baseline comfort level  5/25/2023 1337 by Kris Robbins RN  Outcome: Progressing     Problem: Metabolic/Fluid and Electrolytes - Adult  Goal: Hemodynamic stability and optimal renal function maintained  5/25/2023 1337 by Kris Robbins RN  Outcome: Progressing     Problem: Cardiovascular - Adult  Goal: Maintains optimal cardiac output and hemodynamic stability  Outcome: Progressing

## 2023-05-25 NOTE — CONSULTS
OTOLARYNGOLOGY  CONSULT NOTE  5/25/2023    Physician Consulted: Dr. Sha Peralta  Reason for Consult: dysphagia, tongue swelling  Referring Physician: Dr. Josee COHN  Erica Orta is a 80 y.o. female with h/o CAD and chronic UTI who ENT was consulted for evaluation of dysphagia and tongue swelling. She was admitted from her facility due to worsening dysphagia. Daughter at the bedside reports this began several months ago but that she had been tolerating a pureed diet until the past few days. She is now unable to take any liquids at all without choking or vomiting. Seen by Dr. Misael Sadler in the past and had MBSS at this time which demonstrated cricopharyngeal bar with severe esophageal narrowing. Patient later developed aphasia yesterday along with tongue swelling. Review of Systems   Unable to perform ROS: Other   HENT:  Positive for trouble swallowing. Past Medical History:   Diagnosis Date    Arthritis     Compression fracture of body of thoracic vertebra (HCC)     Hypertension     Myocardial infarct [facility])     UTI (urinary tract infection)        Past Surgical History:   Procedure Laterality Date    APPENDECTOMY      BACK SURGERY      cement t12, l1, l2-oct 2020    HYSTERECTOMY (CERVIX STATUS UNKNOWN)      KYPHOSIS SURGERY N/A 10/10/2020    T12, L1 and L2  KYPHOPLASTY performed by Juan Antonio Pisano MD at [address] [place] [address]       Medications Prior to Admission:    Prior to Admission medications    Medication Sig Start Date End Date Taking? Authorizing Provider   Aspirin Buf,CaCarb-MgCarb-MgO, 81 MG TABS aspirin 81 mg tablet   Take 1 tablet every day by oral route. Historical Provider, MD   cefdinir (OMNICEF) 300 MG capsule cefdinir 300 mg capsule   Take 1 capsule every 12 hours by oral route for 10 days. Historical Provider, MD   gabapentin (NEURONTIN) 100 MG capsule 1 capsule 2 times daily.     Historical Provider, MD   LORazepam (ATIVAN) 1 MG tablet lorazepam 1 mg tablet   Take 1 tablet twice a day by oral

## 2023-05-25 NOTE — PATIENT CARE CONFERENCE
P Quality Flow/Interdisciplinary Rounds Progress Note        Quality Flow Rounds held on May 25, 2023    Disciplines Attending:  Bedside Nurse, , , and Nursing Unit Leadership    Thanh Medina was admitted on 5/19/2023  3:29 AM    Anticipated Discharge Date:       Disposition:    Gumaro Score:  Gumaro Scale Score: 13    Readmission Risk              Risk of Unplanned Readmission:  23           Discussed patient goal for the day, patient clinical progression, and barriers to discharge.   The following Goal(s) of the Day/Commitment(s) have been identified:  comfort/discharge planning       Elgin Hussein RN  May 25, 2023

## 2023-05-25 NOTE — SIGNIFICANT EVENT
Critical Care - Rapid Response Team Note      Date of event: 5/25/2023   Time of event: 6:34 AM    Thanh Medina 80y.o. year old female   YOB: 1926     PCP:  Izzy Ellison MD   Location: [address] [place] [address]   Witnessed? : [x]Yes  [] No  Initial Code status: [] Full  [] DNR-CCA  []DNR-CC    [x]Limited  ______________________________________________________________________  Reason for RRT:   Tachycardia     Chief Complaint for this admission:   Chief Complaint   Patient presents with    Urinary Tract Infection    Congestion       Admit date:  5/19/2023     Admitting Diagnosis: Hyponatremia [C91.0]  Complicated UTI (urinary tract infection) [N39.0]      Current Diagnosis: The primary encounter diagnosis was Complicated UTI (urinary tract infection). A diagnosis of Hyponatremia was also pertinent to this visit. Subjective: The patient is a 77-year-old female recently transferred from the ICU. RRT was called for tachycardia. Patient was noted yesterday to be nodule fibrillation with RVR. At that point, primary team consulted cardiology for further recommendations. This morning nurse noted tachycardia. EKG was done which showed wide-complex QRS tachycardia. Also showed a left bundle branch block that was present in prior imagings. Ultimately 3 doses of Lopressor 2.5 mg every 5 minutes were pushed which improved both heart rate and blood pressure for the patient. Reached out to the cardiologist Dr. Nando Sevilla for further recommendations. Dr. Collier Manual confirmed he will come evaluate the patient shortly. Following her resolving of hypertension and improvement in heart rate, patient's mentation began to improve to the point where RN confirmed that she has returned to her normal baseline. RRT ended.     Objective:   Initial Assessment on arrival:  Vital signs:  BP: 180/100,  HR: 200 SpO2:96    Airway and Condition: Conscious, Pulse present, and Airway Open/Clear noted    Lungs And Circulation:

## 2023-05-25 NOTE — CARE COORDINATION
5/25/23  Transition of Care Update. Patient admitted for hyponatremia and UTI. Patient was an RRT this morning with rapid heart rate and unresponsiveness. Patient continues on IV Merrem and IV Linezolid. Patient is being followed by speech for dysphagia. Patient currently not taking anything by mouth. Patient discharge goal was  to return to [facility]. Family has since met with palliative and is requesting a Hospice consult. Family would like [facility] with referral called to [address]   ZULEYMA/Daren to follow pending consult with Hospice for any needs post discharge    Electronically signed by TONY Herrera on 5/25/2023 at 2:01 PM .

## 2023-05-25 NOTE — PLAN OF CARE
Problem: Chronic Conditions and Co-morbidities  Goal: Patient's chronic conditions and co-morbidity symptoms are monitored and maintained or improved  5/25/2023 0302 by Nando Rider RN  Outcome: Progressing    Problem: Safety - Adult  Goal: Free from fall injury  Outcome: Progressing  Flowsheets (Taken 5/24/2023 2230 by Berry Nuñez RN)  Free From Fall Injury: Instruct family/caregiver on patient safety        Problem: Skin/Tissue Integrity  Goal: Absence of new skin breakdown  Description: 1. Monitor for areas of redness and/or skin breakdown  2. Assess vascular access sites hourly  3. Every 4-6 hours minimum:  Change oxygen saturation probe site  4. Every 4-6 hours:  If on nasal continuous positive airway pressure, respiratory therapy assess nares and determine need for appliance change or resting period.   5/25/2023 0302 by Nando Rider RN  Outcome: Progressing     Problem: Metabolic/Fluid and Electrolytes - Adult  Goal: Hemodynamic stability and optimal renal function maintained  Outcome: Progressing       Problem: Pain  Goal: Verbalizes/displays adequate comfort level or baseline comfort level  Outcome: Progressing

## 2023-05-25 NOTE — CONSULTS
INPATIENT CARDIOLOGY CONSULT    Name: Glenda Aranda    Age: 80 y.o. Date of Admission: 5/19/2023  3:29 AM    Date of Service: 5/25/2023    Reason for Consultation: Metabolic acidosis, hyponatremia, bacteriuria, chronic diastolic heart failure, paroxysmal atrial fibrillation, left bundle branch block, first-degree atrioventricular block, esophageal stricture, hypokalemia    Referring Physician: Terry Bonner MD    History of Present Illness: The patient is a 80-year-old white female known to [facility] exclusively by evaluation during recent hospitalization by myself. She is extremely lethargic and incapable of providing the history with that predominantly obtained from review of medical records in addition to discussion with family. She has a known history of hypertension and a chronically noted left bundle branch block and had remained independent and living in spite of significant limitations from chronic lumbosacral back pain with a known history of compression fractures and prior kyphoplasty. She was recently hospitalized with evidence of altered mental status in the face of acute hypoxic respiratory failure of a multifactorial nature inclusive of a component of acute diastolic heart failure with an echocardiogram demonstrating a normal-sized left ventricular chamber with asymmetric septal hypertrophy and normal left systolic function with associated stage I diastolic dysfunction. No significant valvular maladies were present with moderate pulmonary hypertension. She additionally had evidence of reported asymptomatic bacteriuria with the development of hypotension and management and during the course of assessment findings of an esophageal stricture.   He was eventually transferred to a rehabilitation facility where she was reportedly receiving a puréed diet in addition to that of antibiotics for a reported urinary tract infection and presented with altered mental status and upon presentation

## 2023-05-26 VITALS
OXYGEN SATURATION: 92 % | RESPIRATION RATE: 15 BRPM | TEMPERATURE: 97.5 F | HEIGHT: 60 IN | BODY MASS INDEX: 30.67 KG/M2 | SYSTOLIC BLOOD PRESSURE: 169 MMHG | WEIGHT: 156.2 LBS | DIASTOLIC BLOOD PRESSURE: 79 MMHG | HEART RATE: 86 BPM

## 2023-05-26 PROBLEM — E43 SEVERE PROTEIN-CALORIE MALNUTRITION (HCC): Chronic | Status: ACTIVE | Noted: 2023-05-26

## 2023-05-26 LAB
EKG ATRIAL RATE: 87 BPM
EKG P AXIS: 54 DEGREES
EKG P-R INTERVAL: 218 MS
EKG Q-T INTERVAL: 426 MS
EKG QRS DURATION: 166 MS
EKG QTC CALCULATION (BAZETT): 512 MS
EKG R AXIS: -37 DEGREES
EKG T AXIS: 140 DEGREES
EKG VENTRICULAR RATE: 87 BPM

## 2023-05-26 PROCEDURE — 2580000003 HC RX 258: Performed by: INTERNAL MEDICINE

## 2023-05-26 PROCEDURE — 93010 ELECTROCARDIOGRAM REPORT: CPT | Performed by: INTERNAL MEDICINE

## 2023-05-26 PROCEDURE — 6360000002 HC RX W HCPCS: Performed by: FAMILY MEDICINE

## 2023-05-26 PROCEDURE — 99232 SBSQ HOSP IP/OBS MODERATE 35: CPT | Performed by: INTERNAL MEDICINE

## 2023-05-26 PROCEDURE — 6360000002 HC RX W HCPCS: Performed by: INTERNAL MEDICINE

## 2023-05-26 PROCEDURE — 93005 ELECTROCARDIOGRAM TRACING: CPT | Performed by: INTERNAL MEDICINE

## 2023-05-26 PROCEDURE — 6360000002 HC RX W HCPCS

## 2023-05-26 PROCEDURE — 2700000000 HC OXYGEN THERAPY PER DAY

## 2023-05-26 RX ORDER — GLYCOPYRROLATE 1 MG/1
1 TABLET ORAL 3 TIMES DAILY
Qty: 90 TABLET | Refills: 3 | Status: SHIPPED | OUTPATIENT
Start: 2023-05-26

## 2023-05-26 RX ORDER — LORAZEPAM 1 MG/1
1 TABLET ORAL EVERY 6 HOURS PRN
Qty: 21 TABLET | Refills: 0 | Status: SHIPPED | OUTPATIENT
Start: 2023-05-26 | End: 2023-06-02

## 2023-05-26 RX ORDER — MORPHINE SULFATE 20 MG/ML
2.5 SOLUTION ORAL
Qty: 15 ML | Refills: 0 | Status: SHIPPED | OUTPATIENT
Start: 2023-05-26 | End: 2023-06-05

## 2023-05-26 RX ADMIN — SODIUM CHLORIDE, PRESERVATIVE FREE 10 ML: 5 INJECTION INTRAVENOUS at 08:54

## 2023-05-26 RX ADMIN — ENOXAPARIN SODIUM 40 MG: 100 INJECTION SUBCUTANEOUS at 08:53

## 2023-05-26 RX ADMIN — MEROPENEM 1000 MG: 1 INJECTION, POWDER, FOR SOLUTION INTRAVENOUS at 10:44

## 2023-05-26 RX ADMIN — MEROPENEM 1000 MG: 1 INJECTION, POWDER, FOR SOLUTION INTRAVENOUS at 03:42

## 2023-05-26 RX ADMIN — HYDRALAZINE HYDROCHLORIDE 10 MG: 20 INJECTION INTRAMUSCULAR; INTRAVENOUS at 12:53

## 2023-05-26 RX ADMIN — LINEZOLID 600 MG: 600 INJECTION, SOLUTION INTRAVENOUS at 05:28

## 2023-05-26 RX ADMIN — LORAZEPAM 0.5 MG: 2 INJECTION INTRAMUSCULAR; INTRAVENOUS at 17:47

## 2023-05-26 RX ADMIN — ONDANSETRON 4 MG: 2 INJECTION INTRAMUSCULAR; INTRAVENOUS at 08:54

## 2023-05-26 ASSESSMENT — ENCOUNTER SYMPTOMS
ABDOMINAL PAIN: 0
SHORTNESS OF BREATH: 0

## 2023-05-26 NOTE — DISCHARGE SUMMARY
TABS  Take 1 tablet by mouth daily    !! traMADol (ULTRAM) 50 MG tablet  Take 1 tablet by mouth every 6 hours as needed for Pain. Max Daily Amount: 200 mg    magnesium hydroxide (MILK OF MAGNESIA) 400 MG/5ML suspension  Take by mouth daily as needed for Constipation    aspirin 81 MG EC tablet  Take 1 tablet by mouth daily    atenolol (TENORMIN) 50 MG tablet  Take 1 tablet by mouth daily    furosemide (LASIX) 20 MG tablet  Take 1 tablet by mouth 2 times daily    ipratropium-albuterol (DUONEB) 0.5-2.5 (3) MG/3ML SOLN nebulizer solution  Inhale 3 mLs into the lungs every 4 hours (while awake)  Qty: 360 mL Refills: 0    !! gabapentin (NEURONTIN) 100 MG capsule  Take 1 capsule by mouth daily. pantoprazole (PROTONIX) 40 MG tablet  Take 1 tablet by mouth every morning (before breakfast)  Qty: 30 tablet Refills: 3    escitalopram (LEXAPRO) 5 MG tablet  Take 1 tablet by mouth daily    docusate sodium (COLACE, DULCOLAX) 100 MG CAPS  Take 100 mg by mouth daily  Qty: 30 capsule Refills: 0    !! - Potential duplicate medications found. Please discuss with provider. Current Discharge Medication List    STOP taking these medications    bisacodyl (DULCOLAX) 10 MG suppository  Comments:  Reason for Stopping:    polyethylene glycol (GLYCOLAX) 17 g packet  Comments:  Reason for Stopping:          Time Spent on Discharge:  15 minutes were spent in patient examination, evaluation, counseling as well as medication reconciliation, prescriptions for required medications, discharge plan, and follow up.     Electronically signed by Misael Sharma MD on 5/26/23 at 2:23 PM EDT

## 2023-05-26 NOTE — PLAN OF CARE
Problem: Chronic Conditions and Co-morbidities  Goal: Patient's chronic conditions and co-morbidity symptoms are monitored and maintained or improved  5/26/2023 0212 by Kathy Carmona RN  Outcome: Not Progressing    Problem: Discharge Planning  Goal: Discharge to home or other facility with appropriate resources  5/26/2023 0212 by Kathy Carmona RN  Outcome: Progressing     Problem: Skin/Tissue Integrity  Goal: Absence of new skin breakdown  Description: 1. Monitor for areas of redness and/or skin breakdown  2. Assess vascular access sites hourly  3. Every 4-6 hours minimum:  Change oxygen saturation probe site  4. Every 4-6 hours:  If on nasal continuous positive airway pressure, respiratory therapy assess nares and determine need for appliance change or resting period.   5/26/2023 0212 by Kathy Carmona RN  Outcome: Progressing     Problem: ABCDS Injury Assessment  Goal: Absence of physical injury  5/26/2023 0212 by Kathy Carmona RN  Outcome: Progressing  Flowsheets (Taken 5/25/2023 1908)  Absence of Physical Injury: Implement safety measures based on patient assessment  Problem: Safety - Adult  Goal: Free from fall injury  5/26/2023 0212 by Kathy Carmona RN  Outcome: Progressing     Problem: Pain  Goal: Verbalizes/displays adequate comfort level or baseline comfort level  5/26/2023 0212 by Kathy Carmona RN  Outcome: Progressing     Problem: Cardiovascular - Adult  Goal: Maintains optimal cardiac output and hemodynamic stability  5/26/2023 0212 by Kathy Carmona RN  Outcome: Progressing     Problem: Metabolic/Fluid and Electrolytes - Adult  Goal: Hemodynamic stability and optimal renal function maintained  5/26/2023 0212 by Kathy Carmona RN  Outcome: Progressing

## 2023-05-26 NOTE — PROGRESS NOTES
[address] [place] [address] [facility]  Progress Note    SUBJECTIVE:  Chief Complaint   Patient presents with    Urinary Tract Infection    Congestion     Patient is tolerating medications. No reported adverse drug reactions. No nausea, vomiting, diarrhea. Sitting up in bed, states she needs a swallowing test and if she fails she doesn't want a PEG tube  Afebrile overnight     Review of systems:  As stated above in the chief complaint, otherwise negative. Medications:  Scheduled Meds:   linezolid  600 mg IntraVENous Q12H    meropenem  1,000 mg IntraVENous Q8H    sodium chloride flush  10 mL IntraVENous 2 times per day    sodium chloride flush  5-40 mL IntraVENous 2 times per day    enoxaparin  40 mg SubCUTAneous Daily     Continuous Infusions:   dextrose 5 % and 0.9 % NaCl 75 mL/hr at 23 2150    sodium chloride      sodium chloride       PRN Meds:LORazepam, sodium chloride flush, sodium chloride, sodium chloride flush, sodium chloride, ondansetron **OR** ondansetron, polyethylene glycol, acetaminophen **OR** acetaminophen, diphenhydrAMINE    OBJECTIVE:  BP (!) 159/81   Pulse 80   Temp 97.3 °F (36.3 °C) (Temporal)   Resp 18   Ht 5' (1.524 m)   Wt 151 lb 14.4 oz (68.9 kg)   LMP 2018   SpO2 99%   BMI 29.67 kg/m²   Temp  Av °F (36.7 °C)  Min: 97.3 °F (36.3 °C)  Max: 98.6 °F (37 °C)  Constitutional: The patient is awake, alert. In bed. Wearing her brace. Skin: Warm and dry. No rashes were noted. HEENT: Round and reactive pupils. Moist mucous membranes. No ulcerations or thrush. Neck: Supple to movements. Chest: No use of accessory muscles to breathe. Symmetrical expansion. No wheezing, crackles or rhonchi. Cardiovascular: S1 and S2 are rhythmic and regular. No murmurs appreciated. Abdomen: Positive bowel sounds to auscultation. Benign to palpation. Extremities: No clubbing, no cyanosis, no edema. Lines: Peripheral.  Travis.      Laboratory and Tests Review:  Lab
[address] [place] [address] [facility]  Progress Note    SUBJECTIVE:  Chief Complaint   Patient presents with    Urinary Tract Infection    Congestion     Patient is tolerating medications. No reported adverse drug reactions. She is laying in bed, asleep  2 family members at bedside report that she is going home today under the care of hospice   Afebrile overnight     Review of systems:  As stated above in the chief complaint, otherwise negative. Medications:  Scheduled Meds:   potassium chloride  20 mEq Oral Once    linezolid  600 mg IntraVENous Q12H    meropenem  1,000 mg IntraVENous Q8H    sodium chloride flush  10 mL IntraVENous 2 times per day    sodium chloride flush  5-40 mL IntraVENous 2 times per day    enoxaparin  40 mg SubCUTAneous Daily     Continuous Infusions:   0.9% NaCl with KCl 40 mEq 60 mL/hr at 23    sodium chloride      sodium chloride       PRN Meds:hydrALAZINE, LORazepam, sodium chloride flush, sodium chloride, sodium chloride flush, sodium chloride, ondansetron **OR** ondansetron, polyethylene glycol, acetaminophen **OR** acetaminophen, diphenhydrAMINE    OBJECTIVE:  BP (!) 167/77   Pulse 86   Temp 97.5 °F (36.4 °C) (Temporal)   Resp 15   Ht 5' (1.524 m)   Wt 156 lb 3.2 oz (70.9 kg)   LMP 2018   SpO2 92%   BMI 30.51 kg/m²   Temp  Av.7 °F (36.5 °C)  Min: 96.8 °F (36 °C)  Max: 98.9 °F (37.2 °C)  Constitutional: The patient is awake in bed. Asleep. Family at bedside. Skin: Warm and dry. No rashes were noted. HEENT: Round and reactive pupils. Dry mucous membranes. No ulcerations or thrush. [place]. Neck: Supple to movements. Chest: No use of accessory muscles to breathe. Symmetrical expansion. No wheezing, crackles or rhonchi. Diminished. Cardiovascular: S1 and S2 are rhythmic and regular. No murmurs appreciated. Abdomen: Positive bowel sounds to auscultation. Benign to palpation. Extremities: No clubbing, no cyanosis, no edema.   Lines:
[facility] MD Juwan Girard Cea, MD Patricia Martinez, NAEEM Rogers, ANP  George Larson, NAEEM  Progress Note    5/23/2023    SUBJECTIVE:   5/20: Somnolent, though arousable. No new complaint or issue. BP stable. (-) c/o's  (-) sob/gusman/cp/palp Appetite ok    5/21: Seen in the ICU this afternoon. Status quo. Pain somewhat, though arousable. Hemodynamically stable. 5/22: Seen while sitting up in bed. No acute distress. Asking for water. She does have a tray at her bedside, though states she has not had anything to eat. Speech evaluation was done and she does have a diet order in place. Denies chest pain, palpitations, or dyspnea. TLSO brace on.     5/23: Sitting up in bed, eating lunch. Daughter is present at bedside. No acute distress or complaints. She denies chest pain, dyspnea, or palpitations. On nasal canula. PROBLEM LIST:    Principal Problem:    Hyponatremia  Active Problems:    Complicated UTI (urinary tract infection)  Resolved Problems:    * No resolved hospital problems. *         DIET:    ADULT DIET;  Dysphagia - Pureed     MEDS (scheduled):    potassium & sodium phosphates  1 packet Oral 4x Daily    potassium chloride  20 mEq Oral Once    linezolid  600 mg IntraVENous Q12H    meropenem  1,000 mg IntraVENous Q8H    sodium chloride flush  10 mL IntraVENous 2 times per day    sodium chloride flush  5-40 mL IntraVENous 2 times per day    enoxaparin  40 mg SubCUTAneous Daily       MEDS (infusions):   sodium chloride      sodium chloride         MEDS (prn):  LORazepam, sodium chloride flush, sodium chloride, sodium chloride flush, sodium chloride, ondansetron **OR** ondansetron, polyethylene glycol, acetaminophen **OR** acetaminophen, diphenhydrAMINE    PHYSICAL EXAM:     Patient Vitals for the past 24 hrs:   BP Temp Temp src Pulse Resp SpO2 Weight   05/23/23 1123 (!) 154/74 97 °F (36.1 °C) Temporal 76 16 98 % --   05/23/23 0740 (!)
[facility] MD Neymar Williamson MD Waynard Heritage, MD Vernida Miss, NAEEM Rogers, DEVANTE Guerin, NAEEM  Progress Note    5/25/2023    SUBJECTIVE:   5/20: Somnolent, though arousable. No new complaint or issue. BP stable. (-) c/o's  (-) sob/gusman/cp/palp Appetite ok    5/21: Seen in the ICU this afternoon. Status quo. Pain somewhat, though arousable. Hemodynamically stable. 5/22: Seen while sitting up in bed. No acute distress. Asking for water. She does have a tray at her bedside, though states she has not had anything to eat. Speech evaluation was done and she does have a diet order in place. Denies chest pain, palpitations, or dyspnea. TLSO brace on.     5/23: Sitting up in bed, eating lunch. Daughter is present at bedside. No acute distress or complaints. She denies chest pain, dyspnea, or palpitations. On nasal canula. 5/24: Seen while sitting up in bed. Family present at bedside. Nursing concerned this morning as she is unable to speak. CT of the head was negative for an acute process. Tongue does appear swollen. Nods her head \"no\" when asked if she is experiencing shortness of breath.     5/25: Laying in bed, no acute distress. Daughter is present at the bedside. RRT was called earlier this morning for atrial fibrillation. She was given metoprolol IV. Heart rate is still elevated. Still not speaking, tongue is swollen. She is not taking anything in orally. On 1 liter nasal canula. PROBLEM LIST:    Principal Problem:    Hyponatremia  Active Problems:    Complicated UTI (urinary tract infection)  Resolved Problems:    * No resolved hospital problems. *         DIET:    ADULT DIET;  Dysphagia - Pureed     MEDS (scheduled):    metoprolol        metoprolol        digoxin  250 mcg IntraVENous Once    potassium chloride  20 mEq Oral Once    linezolid  600 mg IntraVENous Q12H    meropenem  1,000 mg IntraVENous Q8H    sodium chloride flush  10 mL
[facility] MD Van Esquivel MD Alfonse Shiner, MD Jolynn Aline, NAEEM Rogers, DEVANTE Cody, NAEEM  Progress Note    5/22/2023    SUBJECTIVE:   5/20: Somnolent, though arousable. No new complaint or issue. BP stable. (-) c/o's  (-) sob/gusman/cp/palp Appetite ok    5/21: Seen in the ICU this afternoon. Status quo. Pain somewhat, though arousable. Hemodynamically stable. 5/22: Seen while sitting up in bed. No acute distress. Asking for water. She does have a tray at her bedside, though states she has not had anything to eat. Speech evaluation was done and she does have a diet order in place. Denies chest pain, palpitations, or dyspnea. TLSO brace on. PROBLEM LIST:    Principal Problem:    Hyponatremia  Active Problems:    Complicated UTI (urinary tract infection)  Resolved Problems:    * No resolved hospital problems. *         DIET:    ADULT DIET;  Dysphagia - Pureed     MEDS (scheduled):    linezolid  600 mg IntraVENous Q12H    meropenem  1,000 mg IntraVENous Q8H    sodium chloride flush  10 mL IntraVENous 2 times per day    sodium chloride flush  5-40 mL IntraVENous 2 times per day    enoxaparin  40 mg SubCUTAneous Daily       MEDS (infusions):   dextrose 5 % and 0.9 % NaCl 75 mL/hr at 05/22/23 0245    sodium chloride      sodium chloride         MEDS (prn):  LORazepam, sodium chloride flush, sodium chloride, sodium chloride flush, sodium chloride, ondansetron **OR** ondansetron, polyethylene glycol, acetaminophen **OR** acetaminophen, diphenhydrAMINE    PHYSICAL EXAM:     Patient Vitals for the past 24 hrs:   BP Temp Temp src Pulse Resp SpO2 Weight   05/22/23 1148 126/72 -- -- 96 14 100 % --   05/22/23 0752 (!) 162/74 98.1 °F (36.7 °C) Temporal 100 16 96 % --   05/22/23 0610 -- -- -- -- -- -- 154 lb 9.6 oz (70.1 kg)   05/22/23 0238 (!) 155/83 98.2 °F (36.8 °C) Temporal 93 18 96 % --   05/21/23 1694 (!) 165/87 97.2 °F (36.2 °C) Temporal 90 16 96 %
[facility] MD Van Esquivel MD Alfonse Shiner, MD Jolynn Aline, NAEEM Rogers, DEVANTE Cody, NAEEM  Progress Note    5/24/2023    SUBJECTIVE:   5/20: Somnolent, though arousable. No new complaint or issue. BP stable. (-) c/o's  (-) sob/gusman/cp/palp Appetite ok    5/21: Seen in the ICU this afternoon. Status quo. Pain somewhat, though arousable. Hemodynamically stable. 5/22: Seen while sitting up in bed. No acute distress. Asking for water. She does have a tray at her bedside, though states she has not had anything to eat. Speech evaluation was done and she does have a diet order in place. Denies chest pain, palpitations, or dyspnea. TLSO brace on.     5/23: Sitting up in bed, eating lunch. Daughter is present at bedside. No acute distress or complaints. She denies chest pain, dyspnea, or palpitations. On nasal canula. 5/24: Seen while sitting up in bed. Family present at bedside. Nursing concerned this morning as she is unable to speak. CT of the head was negative for an acute process. Tongue does appear swollen. Nods her head \"no\" when asked if she is experiencing shortness of breath. PROBLEM LIST:    Principal Problem:    Hyponatremia  Active Problems:    Complicated UTI (urinary tract infection)  Resolved Problems:    * No resolved hospital problems. *         DIET:    ADULT DIET;  Dysphagia - Pureed     MEDS (scheduled):    potassium chloride  20 mEq Oral Once    linezolid  600 mg IntraVENous Q12H    meropenem  1,000 mg IntraVENous Q8H    sodium chloride flush  10 mL IntraVENous 2 times per day    sodium chloride flush  5-40 mL IntraVENous 2 times per day    enoxaparin  40 mg SubCUTAneous Daily       MEDS (infusions):   sodium chloride      sodium chloride         MEDS (prn):  LORazepam, sodium chloride flush, sodium chloride, sodium chloride flush, sodium chloride, ondansetron **OR** ondansetron, polyethylene glycol, acetaminophen **OR**
CLINICAL PHARMACY NOTE: MEDS TO BEDS    Total # of Prescriptions Filled: 3   The following medications were delivered to the patient:  Morphine sulfate 100soln  Glycopyrrolate 1mg  Ativan 1mg    Additional Documentation:  Delivered to [address] [place] [address] 5-26-23 @12:48pm
Called Dr. Priscilla Hudson about pt critical K level of  2.57
Called the answering service for Dr. Cece Galvan about pt BP and HR. Also asked for Ativan to be switched to IV per pt request since she is unable to swallow. Chauncey Craven RN
Cardiology consult placed
Chart reviewed. Patient seen at the bedside. Daughter and son present in the room. Plan is to transition patient to comfort care with hospice at home. Spoke with hospice of the [facility]. Meds to beds ordered. DNR CC form completed and placed in soft chart. We will continue to follow.     Concetta Perez, ANDREA - CNP
Chart reviewed. Saw patient at the bedside. Patient is no communicating today. Stat CT of the head was ordered. No family members at the bedside. She is a DNR CCA. We will continue to follow.     ANDREA Gonzalez - CNP   Palliative Medicine
Consult received and chart reviewed. Met with daughter and granddaughter at the bedside. We moved to family waiting room to discuss the pt. Hospice philosophy and services discussed and all questions answered at this time. We discussed home with hospice vs HH. Pt does not meet requirements for hospice house at this time. Family would like to have the test that the ENT dr discussed prior to making a decision to take pt home with hospice. Family stated they would want to take the pt home they do not want her to go back to nursing facility[facility] for [facility].      Electronically signed by Madhu Liriano RN on 5/25/2023 at 3:29 PM  [phone]
Dr. Eino Boeck notified of morning sodium
Dr. Lucy Rojas notified of CT results and BP's this AM via [employer]. Dr. Lucy Rojas notified that pt's family would like him to come speak with them to understand what is going on with pt. 10mg hydralazine IV Q4H PRN ordered for BP above 160.
General surgery consult placed on perfect serve.
[facility]    56 Met family in room with patient. Daughter Brad Aguilar would like to take patient home with [facility] services. Equipment delivered to house. Transportation set up with [facility] for 1730. Meds to beds ordered and to be sent home with the patient. Nurse updated of above, asked to leave cotto in and for a discharged order.     Electronically signed by Kulwant Grullon RN on 5/26/2023 at 5:23 PM  [phone]; [phone]
Hemo notified RN that cbc w diff was rejected. Reordered.
INPATIENT CARDIOLOGY FOLLOW-UP    Name: Sangeetha Griffith    Age: 80 y.o. Date of Admission: 5/19/2023  3:29 AM    Date of Service: 5/26/2023    Chief Complaint: Follow-up for metabolic acidosis, hyponatremia, bacteria, chronic diastolic heart failure, paroxysmal atrial fibrillation, left bundle branch block, first-degree atrioventricular, esophageal stricture, hypokalemia    Interim History: From review of arrhythmia monitoring, the patient appears to have converted to sinus rhythm with acceptable heart rates. She remains noncommunicative and hemodynamically stable with present evidence of stage I systolic hypertension. Additional assessment of additional consultants and recommendation of comfort care measures have been extensively reviewed. Review of Systems: The remainder of a complete multisystem review including consitutional, central nervous, respiratory, circulatory, gastrointestinal, genitourinary, endocrinologic, hematologic, musculoskeletal and psychiatric are negative. Problem List:  Patient Active Problem List   Diagnosis    ESBL (extended spectrum beta-lactamase) producing bacteria infection    Compression fx, thoracic spine, closed, initial encounter ([facility] [place] [address]    Intractable back pain    Lumbar radiculopathy    Complicated UTI (urinary tract infection)    Acute respiratory failure with hypoxia (HCC)    Acute congestive heart failure (HCC)    Aortic valve disease    Left bundle branch block    Pleural effusion on left    Compression of lumbar vertebra (HCC)    Stage 4 chronic kidney disease (HCC)    Hyponatremia    Chronic diastolic (congestive) heart failure (HCC)    Esophageal stricture    First degree atrioventricular block    Lactic acidosis    Paroxysmal atrial fibrillation (HCC)    Hypokalemia       Allergies:   Allergies   Allergen Reactions    Cefepime-Dextrose     Chlorzoxazone     Sulfa Antibiotics Hives       Current Medications:  Current Facility-Administered Medications
Jason machuca to asad from Dr. Derick Medina
Klor-con 20mEq PO ordered for K replacement. Pt unable to tolerate Klor-con pill. Pt on dysphagia diet. Dr. Janeen Hand notified.
Marguerite Moore, grandchild ,notified of RRT called on patient and stated he would notify the rest of the family. Physician to call Marguerite Moore or Maria Teresa Lee after RRT for patient update.
Messaged dr Eliza George about lactic acid of 9.5
Notified Dr. Noemí Herrera via [employer] re: Pt is unable to swallow anything, so pt has not eaten all day and unable to talk still. Tongue and throat are more swollen than yesterday. Pt's family would like to be updated by the doctor because they have not been told anything. This RN notified Dr. Noemí Herrera yesterday of pt's family's concerns and received no response. Ordered to consult general surgery per Dr. Noemí Herrera.
Palliative Care Department  [phone]  Palliative Care Initial Consult  Provider Concetta Peter, APRN - CNP      PATIENT: Michael Garland  : 1926  MRN: [id number]  ADMISSION DATE: 2023  3:29 AM  Referring Provider: Jani Laguna MD    Palliative Medicine was consulted on hospital day 3 for assistance with Goals of care, Family support. HPI:     Leigh Sanchez is a 80 y.o. y/o female with a history of CAD, chronic back pain, kyphosis surgery, hypertension, ureteral stricture with recurrent UTIs, generalized weakness, hard of hearing, large hiatal hernia, esophageal stricture who presented to [facility]) on 2023 [place] [address] due to UTI, confusion, difficulty swallowing. Patient was discharged from the hospital a month ago, and went to [place] [address] for rehab, she was found to have a UTI, she was treated with Ruben Mario. Patient has increased confusion, with regurgitation of food or water given. Noted daughter is questioning patient's puréed diet, wanting for mother to have regular diet. Last MBST in 2022 showed a prominent  cricopharyngeal bar is seen at the posterior aspect of the cervical esophagus at the C4 level causing severe narrowing of the lumen, estimated about 80%. at the emergency room, patient was found to have a critical sodium level 112, lactic acid 2.4, glucose 127, urine with nitrates and large leukocytes esterase. She received meropenem. Nephrology was consulted, and she was referred to MICU for further medical management. Palliative medicine consulted to discuss goal of care, family support. ASSESSMENT/PLAN:     Pertinent Hospital Diagnoses     Hyponatremia  Dysphagia  UTI  Chronic left bundle branch block    Palliative Care Encounter / Counseling Regarding Goals of Care  Please see detailed goals of care discussion as below  At this time, Michael Garland, Does have capacity for medical decision-making.   Capacity is time limited and
Palliative Care Department  [phone]  Palliative Care Progress Note  Provider Ava Heath, APRN - CNP      PATIENT: Henna Poon  : 1926  MRN: [id number]  ADMISSION DATE: 2023  3:29 AM  Referring Provider: Lisa Christensen MD    Palliative Medicine was consulted on hospital day 4 for assistance with Goals of care, Family support. HPI:     Ramses Saavedra is a 80 y.o. y/o female with a history of CAD, chronic back pain, kyphosis surgery, hypertension, ureteral stricture with recurrent UTIs, generalized weakness, hard of hearing, large hiatal hernia, esophageal stricture who presented to [facility]) on 2023 [place] [address] due to UTI, confusion, difficulty swallowing. Patient was discharged from the hospital a month ago, and went to [place] [address] for rehab, she was found to have a UTI, she was treated with Tejas Tavo. Patient has increased confusion, with regurgitation of food or water given. Noted daughter is questioning patient's puréed diet, wanting for mother to have regular diet. Last MBST in 2022 showed a prominent  cricopharyngeal bar is seen at the posterior aspect of the cervical esophagus at the C4 level causing severe narrowing of the lumen, estimated about 80%. at the emergency room, patient was found to have a critical sodium level 112, lactic acid 2.4, glucose 127, urine with nitrates and large leukocytes esterase. She received meropenem. Nephrology was consulted, and she was referred to MICU for further medical management. Palliative medicine consulted to discuss goal of care, family support. ASSESSMENT/PLAN:     Pertinent Hospital Diagnoses     Hyponatremia  Dysphagia  UTI  Chronic left bundle branch block    Palliative Care Encounter / Counseling Regarding Goals of Care  Please see detailed goals of care discussion as below  At this time, Henna Poon, Does have capacity for medical decision-making.   Capacity is time limited and
Patient's hearing aid removed and placed in an envelope. Envelope given to  transporter Patient left CT department with hearing aid.
Pharmacy Consultation Note  (Antibiotic Dosing and Monitoring)    Initial consult date: 5/21/2023  Consulting physician/provider: Alexus Garcia MD   Drug: Vancomycin  Indication: Urinary Tract Infection; 7 days     Age/  Gender Height Weight IBW  Allergy Information   89 y.o./female 5' (152.4 cm) 157 lb 8 oz (71.4 kg)     Ideal body weight: 45.5 kg (100 lb 4.9 oz)  Adjusted ideal body weight: 55.4 kg (122 lb 0.4 oz)   Cefepime-dextrose, Chlorzoxazone, and Sulfa antibiotics      Renal Function:  Recent Labs     05/21/23  1801 05/22/23  0554 05/22/23  1118   BUN 12 11 10   CREATININE 0.9 0.8 0.9         Intake/Output Summary (Last 24 hours) at 5/22/2023 1338  Last data filed at 5/22/2023 1006  Gross per 24 hour   Intake 1324.26 ml   Output 2675 ml   Net -1350.74 ml         Vancomycin Monitoring:  Trough:  No results for input(s): VANCOTROUGH in the last 72 hours. Random:  No results for input(s): VANCORANDOM in the last 72 hours. No results for input(s): Charlyemily Tirado in the last 72 hours. Historical Cultures:  Organism   Date Value Ref Range Status   05/19/2023 Pseudomonas aeruginosa (A)  Final   05/19/2023 Enterococcus faecalis (A)  Final     No results for input(s): BC in the last 72 hours. Vancomycin Administration Times:  Recent vancomycin administrations                     vancomycin (VANCOCIN) 1,000 mg in sodium chloride 0.9 % 250 mL IVPB ([id number]) (mg) 1,000 mg New Bag 05/22/23 1153    vancomycin (VANCOCIN) 1,750 mg in sodium chloride 0.9 % 500 mL IVPB (mg) 1,750 mg New Bag 05/21/23 1038                      Assessment:  Patient is a 80 y.o. female who has been initiated on vancomycin. ID has been consulted. Estimated Creatinine Clearance: 32 mL/min (based on SCr of 0.9 mg/dL). To dose vancomycin, pharmacy will be utilizing [employer] calculation software for goal AUC/KIARA 400-600 mg/L-hr  5/21: Scr: 0.9 mg/dL. 5/22: ID changed vanco to IV linezolid today. Plan:  Pharmacy sign off.
Physical Therapy    Initial Assessment     Name: Conner Farris  : 1926  MRN: [id number]      Date of Service: 2023    Evaluating PT: Renan Ortega, PT, DPT [id number]      Room #:  9699/6409-M  Diagnosis:  Hyponatremia [Q43.4]  Complicated UTI (urinary tract infection) [N39.0]  PMHx/PSHx:   has a past medical history of Arthritis, Compression fracture of body of thoracic vertebra ([facility] [address] Hypertension, Myocardial infarct ([facility] [address] and UTI (urinary tract infection). Precautions:  Fall risk, L3 Compression Fracture, Soft TLSO, Neutral Spine, Elim IRA, Dementia, O2, TAPS, Travis, Alarm  Equipment Needs:  TBD    SUBJECTIVE:    Pt is a poor historian. Per chart, pt was admitted from [place] [address]. Pt lives alone in a trailer with 4 stair(s) and 1 rail(s) to enter. Pt ambulated with [facility] to rehab. Pt states she was not standing or ambulating at rehab but is a questionable historian. OBJECTIVE:   Initial Evaluation  Date: 23 Treatment Date: Short Term/ Long Term   Goals   AM-PAC 6 Clicks 3/91     Was pt agreeable to Eval/treatment? Yes     Does pt have pain? None reported     Bed Mobility  Rolling: Mod A  Supine to sit: Max A x2  Sit to supine: Max A x2  Scooting: Max A  Rolling: Supervision  Supine to sit: Min A  Sit to supine: Min A  Scooting: Min A   Transfers Sit to stand: Mod A x2  Stand to sit: Mod A x2  Stand pivot: NT  Sit to stand: SBA  Stand to sit: SBA  Stand pivot: SBA with [facility]   Ambulation   NT  >10 feet with [facility] with Mod A   Stair negotiation: ascended and descended NT  NA   ROM BUE: Per OT note  BLE: WFL     Strength BUE: Per OT note  BLE: Gross 3+/5     Balance Sitting EOB: Max A  Dynamic Standing: NT  Sitting EOB: SBA  Dynamic Standing: Min A with [facility]     Pt is A & O x: 3 to person, place, and month/year. Pt confused to situation and frequently spoke of waiting on her mother to come see her.   Sensation: No numbness or tingling reported  Edema: Unremarkable    Patient education  Pt educated on PT role,
Progress Note  Date:2023       DEMF:[id number]  Patient Oly Roberts     YOB: 1926     Age:89 y.o. Patient resting comfortably. Subjective    Subjective:  Symptoms:  Stable. No shortness of breath or chest pain. Diet:  Poor intake. Activity level: Impaired due to weakness. Review of Systems   Constitutional:  Negative for activity change and fever. HENT:  Negative for congestion. Respiratory:  Negative for shortness of breath. Cardiovascular:  Negative for chest pain. Gastrointestinal:  Negative for abdominal pain. Objective         Vitals Last 24 Hours:  TEMPERATURE:  Temp  Av.4 °F (36.3 °C)  Min: 96.8 °F (36 °C)  Max: 98.7 °F (37.1 °C)  RESPIRATIONS RANGE: Resp  Av  Min: 16  Max: 18  PULSE OXIMETRY RANGE: SpO2  Av.3 %  Min: 96 %  Max: 99 %  PULSE RANGE: Pulse  Av  Min: 76  Max: 100  BLOOD PRESSURE RANGE: Systolic (46COQ), VJU:944 , Min:140 , OCN:826   ; Diastolic (25IGH), SJP:91, Min:74, Max:90    I/O (24Hr): Intake/Output Summary (Last 24 hours) at 2023 0627  Last data filed at 2023 0326  Gross per 24 hour   Intake 240 ml   Output 1450 ml   Net -1210 ml       Objective:  General Appearance:  Comfortable. Vital signs: (most recent): Blood pressure (!) 160/86, pulse 100, temperature 98.7 °F (37.1 °C), temperature source Temporal, resp. rate 18, height 5' (1.524 m), weight 151 lb 3.2 oz (68.6 kg), last menstrual period 2018, SpO2 96 %. No fever. Lungs:  Normal effort and normal respiratory rate. Breath sounds clear to auscultation. Heart: Normal rate. Regular rhythm.   S1 normal and S2 normal.    Labs/Imaging/Diagnostics    Labs:  CBC:  Recent Labs     23  0554 23  0527   WBC 6.4 6.0   RBC 4.79 4.52   HGB 13.5 12.8   HCT 40.8 39.3   MCV 85.2 86.9   RDW 14.7 14.7    221       CHEMISTRIES:  Recent Labs     23  0811 23  1411 23  0554 23  1118 23  0527 23  1047
Progress Note  Date:2023       Dennis:[id number]  Patient Beverly Echeverria     YOB: 1926     Age:89 y.o. Patient resting comfortably. Subjective    Subjective:  Symptoms:  Stable. No shortness of breath or chest pain. Diet:  Poor intake. Activity level: Impaired due to weakness. Review of Systems   Constitutional:  Negative for activity change and fever. HENT:  Negative for congestion. Respiratory:  Negative for shortness of breath. Cardiovascular:  Negative for chest pain. Gastrointestinal:  Negative for abdominal pain. Objective         Vitals Last 24 Hours:  TEMPERATURE:  Temp  Av °F (36.7 °C)  Min: 97.3 °F (36.3 °C)  Max: 98.6 °F (37 °C)  RESPIRATIONS RANGE: Resp  Av.4  Min: 14  Max: 18  PULSE OXIMETRY RANGE: SpO2  Av %  Min: 98 %  Max: 100 %  PULSE RANGE: Pulse  Av.2  Min: 80  Max: 96  BLOOD PRESSURE RANGE: Systolic (82LTG), JOM:796 , Min:126 , YLS:240   ; Diastolic (83CWD), NKI:56, Min:72, Max:81    I/O (24Hr): Intake/Output Summary (Last 24 hours) at 2023 0904  Last data filed at 2023 0854  Gross per 24 hour   Intake 240 ml   Output 2125 ml   Net -1885 ml       Objective:  General Appearance:  Comfortable. Vital signs: (most recent): Blood pressure (!) 159/81, pulse 80, temperature 97.3 °F (36.3 °C), temperature source Temporal, resp. rate 18, height 5' (1.524 m), weight 151 lb 14.4 oz (68.9 kg), last menstrual period 2018, SpO2 99 %. No fever. Lungs:  Normal effort and normal respiratory rate. Breath sounds clear to auscultation. Heart: Normal rate. Regular rhythm.   S1 normal and S2 normal.    Labs/Imaging/Diagnostics    Labs:  CBC:  Recent Labs     23  0500 23  0554 23  0527   WBC 6.8 6.4 6.0   RBC 4.62 4.79 4.52   HGB 13.0 13.5 12.8   HCT 38.2 40.8 39.3   MCV 82.7 85.2 86.9   RDW 14.4 14.7 14.7    243 221       CHEMISTRIES:  Recent Labs     23  0811 23  1411
Progress Note  Date:2023       PALLAVI:[id number]/6321-D  Patient Cheyenne Rodriguez     YOB: 1926     Age:89 y.o. Patient resting comfortably. Subjective    Subjective:  Symptoms:  Stable. No shortness of breath or chest pain. Diet:  Poor intake. Activity level: Impaired due to weakness. Review of Systems   Constitutional:  Negative for activity change and fever. HENT:  Negative for congestion. Respiratory:  Negative for shortness of breath. Cardiovascular:  Negative for chest pain. Gastrointestinal:  Negative for abdominal pain. Objective         Vitals Last 24 Hours:  TEMPERATURE:  Temp  Av.8 °F (36.6 °C)  Min: 97.2 °F (36.2 °C)  Max: 98.2 °F (36.8 °C)  RESPIRATIONS RANGE: Resp  Av.4  Min: 10  Max: 19  PULSE OXIMETRY RANGE: SpO2  Av.5 %  Min: 90 %  Max: 96 %  PULSE RANGE: Pulse  Av.7  Min: 75  Max: 100  BLOOD PRESSURE RANGE: Systolic (65NJR), FIZ:617 , Min:147 , PIY:172   ; Diastolic (61DSJ), KSI:30, Min:68, Max:87    I/O (24Hr): Intake/Output Summary (Last 24 hours) at 2023 1115  Last data filed at 2023 1006  Gross per 24 hour   Intake 1324.26 ml   Output 2875 ml   Net -1550.74 ml     Objective:  General Appearance:  Comfortable. Vital signs: (most recent): Blood pressure (!) 162/74, pulse 100, temperature 98.1 °F (36.7 °C), temperature source Temporal, resp. rate 16, height 5' (1.524 m), weight 154 lb 9.6 oz (70.1 kg), last menstrual period 2018, SpO2 96 %. No fever. Lungs:  Normal effort and normal respiratory rate. Breath sounds clear to auscultation. Heart: Normal rate. Regular rhythm.   S1 normal and S2 normal.    Labs/Imaging/Diagnostics    Labs:  CBC:  Recent Labs     23  0500 23  0554   WBC 6.8 6.4   RBC 4.62 4.79   HGB 13.0 13.5   HCT 38.2 40.8   MCV 82.7 85.2   RDW 14.4 14.7    243     CHEMISTRIES:  Recent Labs     23  0402 23  1009 23  0811 23  1411 23  1611
Pt HR was in 140s. VS taken and EKG done. Confirmed afib rvr. Dr. Ronda Pinzon via [employer].
Pt sustaining in the 160s on monitor. Entered pt's room and pt's eyes are fixed, not responding to sternal rub.  RRT called
Pts family would like to try and get this set up with hospice tomorrow to take her home. Hospice to follow up tomorrow and start that process.
RN notified Dr. Hi Chavez via [employer] re: Just making you aware pt labs this AM; K 3.5, Na 128, Mag 1.8, Phos 2.2. Pt daughter stating she would like \"hiatal hernia found on swallow eval\" addressed. Also, pt stating she feels that anytime she swallows, it is stuck in her throat. Examined pt and airway was clear, but it did seem that pt tongue/throat was a little swollen.     Awaiting response
RN notified Dr. Priscilla Hudson via [employer] that Pt is currently nonverbal. Only shaking head \"no\" at this time. /85 HR 95. Pupils PERRLA. Moderate bilat hand . Able to wiggle toes. Unable to speak or answer orientation questions. STAT CT of head wo contrast ordered. Notified Dr. Priscilla Hudson that CT ordered and of morning labs results.
RN notified Dr. Sia Starks via [employer] that pt's daughter would like him to call her d/t expressing concerns regarding pt.
RN reached out to Dr. Boni Whitehead via [employer] regarding dc order needing placed. Will put in order.
RRT called this am.   Will hold therapy for today.
SPEECH LANGUAGE PATHOLOGY  DAILY PROGRESS NOTE        PATIENT NAME:  Dick Queen      :  1926          TODAY'S DATE:  2023 [facility]    Attempted ongoing Speech-Language Pathology intervention for dysphagia management. Pt unavailable at this time due to:  [x] HOLD per RN; RN reported pt with swollen tongue this date and RN reported that pt has not been tolerating PO intake or speaking this date. RN reported pt to have been tolerating dental swabs only. RN advised to hold on pt tx this date d/t pt status and RN reported also awaiting general surgery consult. SLP held tx and SLP informed RN that pt still has diet order in EMR; RN to speak w/ medical team if diet should continue at this time. Will re-attempt next date as able/appropriate.    [] Off unit for testing/ procedure    [] With medical staff   [] Declined intervention  [] Sleeping/ Lethargic   [] Other:
SPEECH/LANGUAGE PATHOLOGY  VIDEOFLUOROSCOPIC STUDY OF SWALLOWING (MBS)   and PLAN OF CARE    PATIENT NAME:  Daphene Spurling  (female)     MRN:  [id number]    :  1926  (80 y.o.)  STATUS:  Inpatient: Room 6508/6508-A    TODAY'S DATE:  2023  REFERRING PROVIDER:   Cori Zendejas MD   SPECIFIC PROVIDER ORDER: FL modified barium swallow with video  Date of order:  23   REASON FOR REFERRAL: Assess oropharyngeal swallow function   EVALUATING THERAPIST: YONI Cerda      RESULTS:      DYSPHAGIA DIAGNOSIS:  moderate oral phase dysphagia  and mild  pharyngeal phase dysphagia   *Possible esophageal concerns. Recommend GI consult due to family report of consistent emesis during meals. DIET RECOMMENDATIONS:  Pureed consistency solids (IDDSI level 4) with  thin liquids (IDDSI level 0) SMALL BITES/SIPS    FEEDING RECOMMENDATIONS:    Assistance level:  Full assistance is needed during all oral intake     Compensatory strategies recommended: Double swallow, Small bites/sips, Alternate solids and liquids, and NO STRAW     Discussed recommendations with nursing and/or faxed report to referring provider: Yes    Laryngeal Penetration and Aspiration:  Penetration WITHOUT aspiration was observed in today's study with  thin liquid    SPEECH THERAPY  PLAN OF CARE   The dysphagia POC is established based on physician order and dysphagia diagnosis    Meal time assessment for 1-2 sessions to provide diet modification and compensatory strategy implementation due to need to ensure proper implementation of compensatory strategies during PO intake       Conditions Requiring Skilled Therapeutic Intervention for dysphagia:    Patient is performing below functional baseline d/t  current acute condition, Multiple diagnoses, multiple medications, and increased dependency upon caregivers.   impaired mastication   impaired superior movement of the thyroid cartilage with reduced approximation of the arytenoids to the
Spoke with hospice nurse and they stated that pt will be leaving today. Left message with answering service for Dr. Gomez Batista regarding PM doses of antibiotics before dc. Awaiting response. Per Dr. Gomez Batista, if pt has dc before PM antibiotics the pt can leave without receiving last doses.
< > 129* 130* 129*   K 3.5   < > 3.9 3.6 3.3*   CL 95*   < > 93* 91* 90*   CO2 22   < > 19* 22 24   BUN 9   < > 8 8 9   CREATININE 0.8   < > 0.7 0.7 0.6   GLUCOSE 109*   < > 140* 121* 152*   PHOS 2.2*  --  2.6  --   --    MG 1.8  --  1.6  --   --     < > = values in this interval not displayed. PT/INR:No results for input(s): PROTIME, INR in the last 72 hours. APTT:No results for input(s): APTT in the last 72 hours. LIVER PROFILE:  Recent Labs     05/23/23  0527 05/24/23  0559   AST 17 20   ALT 9 10   BILITOT 0.5 0.6   ALKPHOS 118* 128*         Imaging Last 24 Hours:  No results found. Assessment//Plan           Hospital Problems             Last Modified POA    * (Principal) Hyponatremia 3/29/6063 Yes    Complicated UTI (urinary tract infection) 5/19/2023 Yes   Assessment:   (1. Hypotonic Hyponatremia  2. Dysphagia  3. Esophageal narrowing  4. Bacteruria   5. Chronic Left bundle branch block   6. Afib     ). Plan:    (Nephrology and ID following  Cardiology to see for Afib recs  No plans for PEG per surgery  Monitor labs).      \
> 129* 128* 127*   K 3.9   < > 3.3* 2.6* 3.9   CL 93*   < > 90* 85* 92*   CO2 19*   < > 24 20* 19*   BUN 8   < > 9 11 13   CREATININE 0.7   < > 0.6 0.7 0.7   GLUCOSE 140*   < > 152* 191* 117*   PHOS 2.6  --   --  4.1  --    MG 1.6  --   --  1.8  --     < > = values in this interval not displayed. PT/INR:No results for input(s): PROTIME, INR in the last 72 hours. APTT:No results for input(s): APTT in the last 72 hours. LIVER PROFILE:  Recent Labs     05/24/23  0559 05/25/23  0650   AST 20 18   ALT 10 13   BILITOT 0.6 0.7   ALKPHOS 128* 156*         Imaging Last 24 Hours:  No results found. Assessment//Plan           Hospital Problems             Last Modified POA    * (Principal) Hyponatremia 7/59/8771 Yes    Complicated UTI (urinary tract infection) 5/19/2023 Yes    Chronic diastolic (congestive) heart failure ([address] 5/25/2023 Yes    Esophageal stricture 5/25/2023 Yes    First degree atrioventricular block 5/25/2023 Yes    Lactic acidosis 5/25/2023 Yes    Paroxysmal atrial fibrillation ([address] 5/25/2023 Yes    Hypokalemia 5/25/2023 Yes   Assessment:   (1. Hypotonic Hyponatremia  2. Dysphagia  3. Esophageal narrowing  4. Bacteruria   5. Chronic Left bundle branch block   6. Afib     ). Plan:    (Nephrology and ID following  Cardiology fiollowing for Afib  No plans for PEG per surgery  Monitor labs  Home with hospice is reasonable at this time. ).      \
Weight (IBW): 100 lbs (45 kg)    Admission Body Weight: 154 lb 14 oz (70.3 kg) (5/21 bedscale)  Current Body Weight: 156 lb 3 oz (70.8 kg) (5/26), 156.2 % IBW. Weight Source: Bed Scale  Current BMI (kg/m2): 30.5  Usual Body Weight:  (UTO ; EMR shows past weight of 157# actual 4/19/23)     Weight Adjustment For: No Adjustment                 BMI Categories: Obese Class 1 (BMI 30.0-34. 9)    Estimated Daily Nutrient Needs:  Energy Requirements Based On: Formula  Weight Used for Energy Requirements: Current  Energy (kcal/day): 7849-2526 (REE 1012 x 1.3 SF)  Weight Used for Protein Requirements: Ideal  Protein (g/day): 60-70  Method Used for Fluid Requirements: 1 ml/kcal  Fluid (ml/day): 7755-2245 ml/d    Nutrition Diagnosis:   Severe malnutrition, In context of chronic illness related to swallowing difficulty as evidenced by severe muscle loss, severe loss of subcutaneous fat, poor intake prior to admission    Nutrition Interventions:   Food and/or Nutrient Delivery: Continue Current Diet, Start Oral Nutrition Supplement (Ensure High Protein)  Nutrition Education/Counseling: No recommendation at this time  Coordination of Nutrition Care: Continue to monitor while inpatient, Speech Therapy, Swallow Evaluation       Goals:     Goals: Meet at least 75% of estimated needs, by next RD assessment       Nutrition Monitoring and Evaluation:   Behavioral-Environmental Outcomes: None Identified  Food/Nutrient Intake Outcomes: Food and Nutrient Intake, Supplement Intake  Physical Signs/Symptoms Outcomes: Biochemical Data, Nutrition Focused Physical Findings, Skin, Chewing or Swallowing, Weight, GI Status, Nausea or Vomiting, Fluid Status or Edema, Meal Time Behavior    Discharge Planning:    Continue Oral Nutrition Supplement     Willian Kessler, RD, LD  Contact: [phone]
edema. Lines: Peripheral.  [place]. Laboratory and Tests Review:  Lab Results   Component Value Date    WBC 7.5 05/24/2023    WBC 6.0 05/23/2023    WBC 6.4 05/22/2023    HGB 14.3 05/24/2023    HCT 43.5 05/24/2023    MCV 85.8 05/24/2023     05/24/2023     Lab Results   Component Value Date    NEUTROABS 5.35 05/24/2023    NEUTROABS 4.47 05/22/2023    NEUTROABS 5.02 05/21/2023     No results found for: CRPHS  Lab Results   Component Value Date    ALT 10 05/24/2023    AST 20 05/24/2023    ALKPHOS 128 (H) 05/24/2023    BILITOT 0.6 05/24/2023     Lab Results   Component Value Date/Time     05/24/2023 05:59 AM    K 3.9 05/24/2023 05:59 AM    K 4.8 05/19/2023 04:07 AM    CL 93 05/24/2023 05:59 AM    CO2 19 05/24/2023 05:59 AM    BUN 8 05/24/2023 05:59 AM    CREATININE 0.7 05/24/2023 05:59 AM    CREATININE 0.7 05/23/2023 11:55 PM    CREATININE 0.8 05/23/2023 10:47 AM    GFRAA 46 07/13/2022 05:00 AM    LABGLOM >60 05/24/2023 05:59 AM    GLUCOSE 140 05/24/2023 05:59 AM    PROT 6.5 05/24/2023 05:59 AM    LABALBU 3.1 05/24/2023 05:59 AM    CALCIUM 9.0 05/24/2023 05:59 AM    BILITOT 0.6 05/24/2023 05:59 AM    ALKPHOS 128 05/24/2023 05:59 AM    AST 20 05/24/2023 05:59 AM    ALT 10 05/24/2023 05:59 AM     Lab Results   Component Value Date    CRP 2.3 (H) 01/27/2023     Lab Results   Component Value Date    SEDRATE 25 (H) 01/27/2023     Radiology:      Microbiology:   Blood Culture 5/19: negative so far  Urine Culture 5/19: Pseudomonas aeruginosa, Enterococcus faecalis     ASSESSMENT:  Hyponatremia  Pseudomonas and E faecalis UTI  Cefepime Allergy with rash refractory to benadryl    PLAN:  Continue IV Merrem 1g q8 and IV Linezolid - planning for a total of 7 days   Monitor labs  DC plan: [place]o [place]   ID will continue to follow   Two more days of antibiotics    ANDREA Montanez - CNP  11:38 AM  5/24/2023   Pt seen and examined. Above discussed agree with advanced practice nurse.  Labs, cultures, and
AM  5/25/2023   Pt seen and examined. Above discussed agree with advanced practice nurse. Labs, cultures, and radiographs reviewed. Face to Face encounter occurred. Changes made as necessary.      Shayy Mendoza MD
necessary.      Konrad Diaz MD
not labored,   Heart: irregular rhythm, RVR  Abdomen: normoactive bowel sounds, soft, non-tender  Extremities: Edema,  bilateral lower extremity weakness  skin: warm, dry without rashes, lesions, bruising  Neuro: Still not talking, tongue swollen      Objective data reviewed: labs, images, records, medication use, vitals, and chart    Time/Communication  Greater than 50% of time spent, total 35 minutes in counseling and coordination of care at the bedside regarding  CODE STATUS discussion, family support, goals of care, and diagnosis and prognosis. Thank you for allowing Palliative Medicine to participate in the care of Ambrose Tan. Note: This report was completed using computerize voiced recognition software. Every effort has been made to ensure accuracy; however, inadvertent computerized transcription errors may be present.
>45 degrees while wearing TLSO brace, with bed alarm, call light and phone within reach, all lines and tubes intact. RN present and aware of bleeding at IV site and diet concerns. Overall patient demonstrated fair decreased independence and safety during completion of ADL/functional transfer/mobility tasks. Pt would benefit from continued skilled OT to increase safety and independence with completion of ADL/IADL tasks for functional independence and quality of life. Treatment: OT treatment provided this date includes: Therapist reviewed spinal precautions prior to and during participation in functional activities. Therapist facilitated the following activities impacting functional performance with ADLs including: bed mobility (multiple L<>R rolling using log roll technique for donning TLSO and initiate supine>sit, scooting, and sit>supine) with increased VC to maintain within spinal precautions/to ensure comfort and unsupported sitting (static and dynamic tasks incorporating functional reaching) and standing balance and tolerance with education/skilled cuing on safety, posture, positioning, weight shifting, and balance. Therapist facilitated functional transfer w/o AD (sit><stand EOB) with safety education for hand placement (pushing from surface and using hands as guide when lowering) and body mechanics/postural control. Therapist facilitated grooming task (face washing sitting EOB), UB dressing (donning/doffing gown semi-supine in bed; TLSO supine in bed with L<>R rolling), and LB dressing (donning socks supine in bed). Therapist facilitated education/skilled cuing for modified strategies, body mechanics, pain control, and energy conservation (pursed lip breathing). Therapist educated pt and RN on wear schedule of TLSO during participation in functional activities >45 degrees. Skilled monitoring of HR and SpO2 and pt's response to treatment.  RN notified of bleeding from IV site and concerns related to diet

## 2023-05-26 NOTE — PLAN OF CARE
Problem: Chronic Conditions and Co-morbidities  Goal: Patient's chronic conditions and co-morbidity symptoms are monitored and maintained or improved  5/26/2023 1149 by Jeff Kelly RN  Outcome: Progressing     Problem: Discharge Planning  Goal: Discharge to home or other facility with appropriate resources  5/26/2023 1149 by Jeff Kelly RN  Outcome: Progressing     Problem: Skin/Tissue Integrity  Goal: Absence of new skin breakdown  Description: 1. Monitor for areas of redness and/or skin breakdown  2. Assess vascular access sites hourly  3. Every 4-6 hours minimum:  Change oxygen saturation probe site  4. Every 4-6 hours:  If on nasal continuous positive airway pressure, respiratory therapy assess nares and determine need for appliance change or resting period.   5/26/2023 1149 by Jeff Kelly RN  Outcome: Progressing     Problem: Chronic Conditions and Co-morbidities  Goal: Patient's chronic conditions and co-morbidity symptoms are monitored and maintained or improved  5/26/2023 1149 by Jeff Kelly RN  Outcome: Progressing  5/26/2023 0212 by Jael Fagan RN  Outcome: Not Progressing

## 2023-05-26 NOTE — PATIENT CARE CONFERENCE
P Quality Flow/Interdisciplinary Rounds Progress Note        Quality Flow Rounds held on May 26, 2023    Disciplines Attending:  Bedside Nurse, , , and Nursing Unit Leadership    Shawna Wolf was admitted on 5/19/2023  3:29 AM    Anticipated Discharge Date:       Disposition:    Gumaro Score:  Gumaro Scale Score: 13    Readmission Risk              Risk of Unplanned Readmission:  23           Discussed patient goal for the day, patient clinical progression, and barriers to discharge.   The following Goal(s) of the Day/Commitment(s) have been identified:  Discharge - Obtain Order after set up with Brenton Woods RN  May 26, 2023

## 2023-05-26 NOTE — CARE COORDINATION
5/26/23  Transition of Care Update. Family has decided on a comfort care route and would like to discharge patient home today with Hospice support through [facility]. Hospice Nurse is coordinating DME for home and will arrange transport to home. ZULEYMA/JERONIMO to follow.     Electronically signed by TONY Mendoza on 5/26/2023 at 12:15 PM

## 2023-06-07 DIAGNOSIS — R09.89 AIR HUNGER: ICD-10-CM

## 2023-06-07 DIAGNOSIS — Z51.5 HOSPICE CARE PATIENT: Primary | ICD-10-CM

## 2023-06-07 DIAGNOSIS — R52 PAIN: ICD-10-CM

## 2023-06-07 RX ORDER — MORPHINE SULFATE 100 MG/5ML
2.5 SOLUTION ORAL
Qty: 30 ML | Refills: 0 | Status: SHIPPED | OUTPATIENT
Start: 2023-06-07 | End: 2023-07-07

## 2023-06-22 LAB
BACTERIA URNS QL MICRO: ABNORMAL /HPF
BILIRUB UR QL STRIP: NEGATIVE
CLARITY UR: ABNORMAL
COLOR UR: YELLOW
EPI CELLS #/AREA URNS HPF: ABNORMAL /HPF
GLUCOSE UR STRIP-MCNC: NEGATIVE MG/DL
HGB UR QL STRIP: ABNORMAL
KETONES UR STRIP-MCNC: NEGATIVE MG/DL
LEUKOCYTE ESTERASE UR QL STRIP: ABNORMAL
NITRITE UR QL STRIP: POSITIVE
PH UR STRIP: 6.5 [PH] (ref 5–9)
PROT UR STRIP-MCNC: 30 MG/DL
RBC #/AREA URNS HPF: ABNORMAL /HPF (ref 0–2)
SP GR UR STRIP: 1.01 (ref 1–1.03)
UROBILINOGEN UR STRIP-ACNC: 1 E.U./DL
WBC #/AREA URNS HPF: ABNORMAL /HPF (ref 0–5)

## 2023-06-26 DIAGNOSIS — Z51.5 HOSPICE CARE PATIENT: ICD-10-CM

## 2023-06-26 DIAGNOSIS — R09.89 AIR HUNGER: ICD-10-CM

## 2023-06-26 DIAGNOSIS — R52 PAIN: ICD-10-CM

## 2023-06-26 RX ORDER — MORPHINE SULFATE 100 MG/5ML
5 SOLUTION ORAL
Qty: 30 ML | Refills: 0 | Status: SHIPPED | OUTPATIENT
Start: 2023-06-26 | End: 2023-07-26

## 2023-06-27 DIAGNOSIS — H10.9 BACTERIAL CONJUNCTIVITIS OF BOTH EYES: ICD-10-CM

## 2023-06-27 DIAGNOSIS — Z51.5 HOSPICE CARE PATIENT: Primary | ICD-10-CM

## 2023-06-27 DIAGNOSIS — B96.89 BACTERIAL CONJUNCTIVITIS OF BOTH EYES: ICD-10-CM

## 2023-06-27 RX ORDER — POLYMYXIN B SULFATE AND TRIMETHOPRIM 1; 10000 MG/ML; [USP'U]/ML
1 SOLUTION OPHTHALMIC EVERY 6 HOURS
Qty: 20 ML | Refills: 0 | Status: SHIPPED | OUTPATIENT
Start: 2023-06-27 | End: 2023-07-04

## 2023-06-29 ENCOUNTER — TELEPHONE (OUTPATIENT)
Dept: CARDIOLOGY CLINIC | Age: 88
End: 2023-06-29

## (undated) DEVICE — APPLICATOR PREP 26ML 0.7% IOD POVACRYLEX 74% ISO ALC ST

## (undated) DEVICE — NEEDLE HYPO 25GA L0.625IN BLU POLYPR HUB S STL REG BVL STR

## (undated) DEVICE — TRAP,MUCUS SPECIMEN,40CC: Brand: MEDLINE

## (undated) DEVICE — BONE BIOPSY DEVICE F05A BBD SIZE 3: Brand: MEDTRONIC REUSABLE INSTRUMENTS

## (undated) DEVICE — TAMP K08A XPAN INFLAT BONE  SIZE 20/3-RB: Brand: KYPHON XPANDER™ INFLATABLE BONE TAMP

## (undated) DEVICE — STRIP,CLOSURE,WOUND,MEDI-STRIP,1/4X3: Brand: MEDLINE

## (undated) DEVICE — SWABSTICK SURG PREP BENZOIN TINCTURE SINGLE ST

## (undated) DEVICE — PACK,LAPAROTOMY,NO GOWNS: Brand: MEDLINE

## (undated) DEVICE — GOWN,SIRUS,FABRNF,L,20/CS: Brand: MEDLINE

## (undated) DEVICE — MEDIA CONTRAST ISOVUE  300 10X50ML

## (undated) DEVICE — 3M™ STERI-DRAPE™ INCISE DRAPE 1050 (60CM X 45CM): Brand: STERI-DRAPE™

## (undated) DEVICE — GOWN,SIRUS,FABRNF,XL,20/CS: Brand: MEDLINE

## (undated) DEVICE — INTRODUCER T15K ONE STEP OID BEVEL: Brand: KYPHON® ONE-STEP™ OSTEO INTRODUCER™ SYSTEM

## (undated) DEVICE — TOWEL,OR,DSP,ST,BLUE,DLX,10/PK,8PK/CS: Brand: MEDLINE

## (undated) DEVICE — SYRINGE MED 10ML LUERLOCK TIP W/O SFTY DISP

## (undated) DEVICE — BONE FILLER DEVICE F04B SIZE 3

## (undated) DEVICE — BANDAGE WND ADH LF FLX CURAD 3/4X3IN

## (undated) DEVICE — LABEL MED 4 IN SURG PANEL W/ PEN STRL

## (undated) DEVICE — DOUBLE BASIN SET: Brand: MEDLINE INDUSTRIES, INC.

## (undated) DEVICE — INTENDED FOR TISSUE SEPARATION, AND OTHER PROCEDURES THAT REQUIRE A SHARP SURGICAL BLADE TO PUNCTURE OR CUT.: Brand: BARD-PARKER ® STAINLESS STEEL BLADES

## (undated) DEVICE — DRAPE THER FLUID WARMING 66X44 IN FLAT SLUSH DBL DISC ORS

## (undated) DEVICE — DRAPE 24X23IN RADIOLOGY CASS ADHES STRIP

## (undated) DEVICE — MEDIA CONTRAST RX ISOVUE-300 61% 30ML VIALS

## (undated) DEVICE — DRAPE C ARM UNIV W41XL74IN CLR PLAS XR VELC CLSR POLY STRP

## (undated) DEVICE — GAUZE,SPONGE,4"X4",16PLY,XRAY,STRL,LF: Brand: MEDLINE

## (undated) DEVICE — COUNTER NDL 30 COUNT DBL MAG

## (undated) DEVICE — GLOVE SURG SZ 65 THK91MIL LTX FREE SYN POLYISOPRENE

## (undated) DEVICE — Device

## (undated) DEVICE — CLOTH SURG PREP PREOPERATIVE CHLORHEXIDINE GLUC 2% READYPREP

## (undated) DEVICE — 1000 S-DRAPE TOWEL DRAPE 10/BX: Brand: STERI-DRAPE™

## (undated) DEVICE — DRAPE,REIN 53X77,STERILE: Brand: MEDLINE